# Patient Record
Sex: FEMALE | Race: WHITE | NOT HISPANIC OR LATINO | Employment: OTHER | URBAN - METROPOLITAN AREA
[De-identification: names, ages, dates, MRNs, and addresses within clinical notes are randomized per-mention and may not be internally consistent; named-entity substitution may affect disease eponyms.]

---

## 2017-01-09 ENCOUNTER — THERAPY VISIT (OUTPATIENT)
Dept: PHYSICAL THERAPY | Facility: CLINIC | Age: 72
End: 2017-01-09
Payer: COMMERCIAL

## 2017-01-09 DIAGNOSIS — R26.81 UNSTEADY GAIT: ICD-10-CM

## 2017-01-09 DIAGNOSIS — Z96.651 STATUS POST TOTAL RIGHT KNEE REPLACEMENT: ICD-10-CM

## 2017-01-09 DIAGNOSIS — M25.561 ACUTE PAIN OF RIGHT KNEE: Primary | ICD-10-CM

## 2017-01-09 PROCEDURE — 97110 THERAPEUTIC EXERCISES: CPT | Mod: GP | Performed by: PHYSICAL THERAPIST

## 2017-01-09 PROCEDURE — 97530 THERAPEUTIC ACTIVITIES: CPT | Mod: GP | Performed by: PHYSICAL THERAPIST

## 2017-01-09 PROCEDURE — 97140 MANUAL THERAPY 1/> REGIONS: CPT | Mod: GP | Performed by: PHYSICAL THERAPIST

## 2017-01-13 ENCOUNTER — THERAPY VISIT (OUTPATIENT)
Dept: PHYSICAL THERAPY | Facility: CLINIC | Age: 72
End: 2017-01-13
Payer: COMMERCIAL

## 2017-01-13 DIAGNOSIS — M25.561 ACUTE PAIN OF RIGHT KNEE: Primary | ICD-10-CM

## 2017-01-13 DIAGNOSIS — R26.81 UNSTEADY GAIT: ICD-10-CM

## 2017-01-13 DIAGNOSIS — Z96.651 STATUS POST TOTAL RIGHT KNEE REPLACEMENT: ICD-10-CM

## 2017-01-13 PROCEDURE — 97140 MANUAL THERAPY 1/> REGIONS: CPT | Mod: GP | Performed by: PHYSICAL THERAPIST

## 2017-01-13 PROCEDURE — 97110 THERAPEUTIC EXERCISES: CPT | Mod: GP | Performed by: PHYSICAL THERAPIST

## 2017-01-16 ENCOUNTER — THERAPY VISIT (OUTPATIENT)
Dept: PHYSICAL THERAPY | Facility: CLINIC | Age: 72
End: 2017-01-16
Payer: COMMERCIAL

## 2017-01-16 DIAGNOSIS — R26.81 UNSTEADY GAIT: ICD-10-CM

## 2017-01-16 DIAGNOSIS — Z96.651 STATUS POST TOTAL RIGHT KNEE REPLACEMENT: ICD-10-CM

## 2017-01-16 DIAGNOSIS — M25.561 ACUTE PAIN OF RIGHT KNEE: Primary | ICD-10-CM

## 2017-01-16 PROCEDURE — 97110 THERAPEUTIC EXERCISES: CPT | Mod: GP | Performed by: PHYSICAL THERAPIST

## 2017-01-16 PROCEDURE — 97530 THERAPEUTIC ACTIVITIES: CPT | Mod: GP | Performed by: PHYSICAL THERAPIST

## 2017-01-16 PROCEDURE — 97140 MANUAL THERAPY 1/> REGIONS: CPT | Mod: GP | Performed by: PHYSICAL THERAPIST

## 2017-01-27 ENCOUNTER — THERAPY VISIT (OUTPATIENT)
Dept: PHYSICAL THERAPY | Facility: CLINIC | Age: 72
End: 2017-01-27
Payer: COMMERCIAL

## 2017-01-27 DIAGNOSIS — M25.561 ACUTE PAIN OF RIGHT KNEE: Primary | ICD-10-CM

## 2017-01-27 DIAGNOSIS — R26.81 UNSTEADY GAIT: ICD-10-CM

## 2017-01-27 DIAGNOSIS — Z96.651 STATUS POST TOTAL RIGHT KNEE REPLACEMENT: ICD-10-CM

## 2017-01-27 PROCEDURE — 97140 MANUAL THERAPY 1/> REGIONS: CPT | Mod: GP | Performed by: PHYSICAL THERAPIST

## 2017-01-27 PROCEDURE — 97110 THERAPEUTIC EXERCISES: CPT | Mod: GP | Performed by: PHYSICAL THERAPIST

## 2017-02-06 ENCOUNTER — OFFICE VISIT (OUTPATIENT)
Dept: DERMATOLOGY | Facility: CLINIC | Age: 72
End: 2017-02-06

## 2017-02-06 VITALS — SYSTOLIC BLOOD PRESSURE: 127 MMHG | HEART RATE: 65 BPM | DIASTOLIC BLOOD PRESSURE: 74 MMHG

## 2017-02-06 DIAGNOSIS — L57.8 DIFFUSE PHOTODAMAGE OF SKIN: ICD-10-CM

## 2017-02-06 DIAGNOSIS — R21 RASH: ICD-10-CM

## 2017-02-06 DIAGNOSIS — L57.0 AK (ACTINIC KERATOSIS): Primary | ICD-10-CM

## 2017-02-06 ASSESSMENT — PAIN SCALES - GENERAL: PAINLEVEL: NO PAIN (0)

## 2017-02-06 NOTE — NURSING NOTE
Dermatology Rooming Note    Rafaela Rock's goals for this visit include:   Chief Complaint   Patient presents with     Skin Check     Rafaela is here for a skin check, no areas of concern at this time.     Nury Bucio LPN

## 2017-02-06 NOTE — LETTER
2/6/2017     RE: Rafaela Rock  [address] [place] [id number]     Dear Colleague,    Thank you for referring your patient, Rafaela Rock, to the [facility] DERMATOLOGY at [facility]. Please see a copy of my visit note below.    [facility] Dermatology Clinic Note     Dermatology Problem List:  1. Actinic keratosis: multiple on upper chest. S/p cryo 2/6/17; will discuss 5-FU tx in at next appt.  2. Solar lentigines: referred to Dr. Baron.      Encounter Date: Feb 6, 2017   Chief Complaint   Patient presents with     Skin Check     Rafaela is here for a skin check, no areas of concern at this time.     History of Present Illness:   This is a 71 year old female who presents to dermatology clinic for a follow up for actinic keratoses. The patient was last seen by Dr. Pippa Alonso on 02/29/16 when 3 biopsies indicated 2 AKs and an inflamed seborrheic keratosis. The patient presents today for a routine skin check. She is unaware of any concerning lesions. She denies any itchy, painful, bleeding, non-healing, or changing spots. She does report a many year hx of a chronically recurring rash. She did not have any rash present on examination today. The rash usually occurs at night and is extremely pruritic, causing the patient to scratch to the point of bleeding at times. It will be localized, but the affected area often changes. The rash will appear red and bumpy. She applies triamcinolone 0.1% with good result. The patient acknowledges a remote hx of asthma. The patient was also wondering if allergy testing would be a good idea.    Additionally, the pt has grown more concerned over the appearance of the tan macules and patches scattered across her arms and legs. She had these spots for a while, but they have been multiplying over the last few years. They seem to become much more distinct during the summer with more sun exposure. The patient is  aware of the benign nature of these spots but was wondering if there was any treatment that might improve their appearance.    Past Medical History:   Allergies as of    Diagnosis     Hyperlipidemia with target LDL less than 130     OA (osteoarthritis)     Rosacea     Impaired fasting glucose     POSTERIOR VITREOUS DETACHMENT, os     Advanced directives, counseling/discussion     Cataract     S/P total knee arthroplasty - left     Vitamin D deficiency     AK (actinic keratosis)     Xerosis of skin     Intertrigo     Inflamed seborrheic keratosis     Primary osteoarthritis of right knee     Status post total left knee replacement     Obesity, Class II, BMI 35-39.9, with comorbidity (H)     Acute pain of right knee     Status post total right knee replacement     Unsteady gait     Social History:   The patient is a nonsmoker.   Family History:    The patient reports family history of SCC in her son.    Medications:   Current Outpatient Prescriptions   Medication Sig Dispense Refill     metroNIDAZOLE (METROGEL) 1 % gel Apply topically once or twice daily daily to the face for rosacea. 60 g 1     triamcinolone (KENALOG) 0.1 % ointment Apply topically 2 times daily To affected areas on the arms, legs, chest, and back a needed for itch. 80 g 5     ammonium lactate (LAC-HYDRIN) 12 % cream Apply topically 2 times daily as needed for dry skin Apply to entire body daily after showers. 454 g 3     Allergies as of 02/06/2017     (No Known Allergies)     Review of Systems:   Patient reports otherwise feeling well and denies any other skin concerns.    Physical exam:   General: Well appearing female in no acute distress alert and oriented to time, person, place, and situation.  Examination: Examination of the face, head, neck, chest, abdomen, back, both arms, both hands/digits/nails and both feet is performed.  -Waxy stuck on tan to brown papules on back.  -Significant tan colored macules and patches scattered across the arms and  legs bilaterally  -Multiple erythematous, gritty feeling patches on upper chest    Assessment and Plan:   1. Actinic keratoses: multiple AKs on patient's chest. Effudex tx was recommended, but the patient will be travelling to [facility] in a few weeks and elected to hold off on this treatment until later.    Cryotherapy procedure note (performed by faculty): After verbal consent and discussion of risks and benefits including but no limited to dyspigmentation/scar, blister, infection, recurrence,5 lesions were treated with 1-2mm freeze border for 2 cycles with liquid nitrogen. Post cryotherapy instructions were provided.     Discuss possible Effudex tx at f/u with Dr. Baron  2. Solar lentigines: patient remarks that the tan macules/patches on her arms and legs have been increasing over the last few years. They become especially striking during the summer with sun exposure. The benign nature of these lesions was discussed with the patient. She is bothered by their appearance and is interested in pursing cosmetic treatments. We provided information about possible laser treatments, possibly fraxl,  and referred her to Dr. Violeta Baron.    Follow-up with Dr. Baron in 2-3 months.  3. Possible atopic dermatitis: patient reports a many year hx of a chronically recurring rash. She did not have any rash present on examination today. The rash usually occurs at night and is extremely pruritic, causing the patient to scratch to the point of bleeding at times. It will be localized, but the affected area often changes. The rash will appear red and bumpy. She applies triamcinolone 0.1% with good result.    Instructed patient to document rash in the future with photo    Scribe Disclosure:   This note was scribed by Andrew Blanco, MS4, on behalf of Dr. Ann Marie Miles MD.    I agree with the PFSH and ROS as completed by the Medical Student. The remainder of the encounter was performed by me and scribed by the Medical Student.  The scribed note accurately reflects my personal services and the medical decisions made by me. The cryotherapy procedure was primarily done by me.      Ann Marie Miles MD  Professor and Chair  Department of Dermatology  [facility]    Cc: Dr. Violeta Baron

## 2017-02-06 NOTE — PATIENT INSTRUCTIONS
-We would like you to follow up with Dr. Violeta Baron. She will discuss possible cosmetic treatments (including the laser) for your face and the darker spots on your arms and legs. She also will follow-up with you for the precancerous spots on your upper chest.  -The next time you have the itchy rash, please try to take a picture of it and email it to [email or web address]. [email or web address]  -Please bring the product you are using on your face to your appointment with Dr. Baron.    Cryotherapy    What is it?    Use of a very cold liquid, such as liquid nitrogen, to freeze and destroy abnormal skin cells that need to be removed    What should I expect?    Tenderness and redness    A small blister that might grow and fill with dark purple blood. There may be crusting.    More than one treatment may be needed if the lesions do not go away.    How do I care for the treated area?    Gently wash the area with your hands when bathing.    Use a thin layer of Vaseline to help with healing. You may use a Band-Aid.     The area should heal within 7-10 days and may leave behind a pink or lighter color.     Do not use an antibiotic or Neosporin ointment.     You may take acetaminophen (Tylenol) for pain.     Call your Doctor if you have:    Severe pain    Signs of infection (warmth, redness, cloudy yellow drainage, and or a bad smell)    Questions or concerns    Who should I call with questions?       [facility]: [phone]       [facility]: [phone]       For urgent needs outside of business hours call the [facility] at [phone]        and ask for the dermatology resident on call

## 2017-02-06 NOTE — PROGRESS NOTES
[facility] Dermatology Clinic Note     Dermatology Problem List:  1. Actinic keratosis: multiple on upper chest. S/p cryo 2/6/17; will discuss 5-FU tx in at next appt.  2. Solar lentigines: referred to Dr. Baron.      Encounter Date: Feb 6, 2017   Chief Complaint   Patient presents with     Skin Check     Rafaela is here for a skin check, no areas of concern at this time.     History of Present Illness:   This is a 71 year old female who presents to dermatology clinic for a follow up for actinic keratoses. The patient was last seen by Dr. Pippa Alonso on 02/29/16 when 3 biopsies indicated 2 AKs and an inflamed seborrheic keratosis. The patient presents today for a routine skin check. She is unaware of any concerning lesions. She denies any itchy, painful, bleeding, non-healing, or changing spots. She does report a many year hx of a chronically recurring rash. She did not have any rash present on examination today. The rash usually occurs at night and is extremely pruritic, causing the patient to scratch to the point of bleeding at times. It will be localized, but the affected area often changes. The rash will appear red and bumpy. She applies triamcinolone 0.1% with good result. The patient acknowledges a remote hx of asthma. The patient was also wondering if allergy testing would be a good idea.    Additionally, the pt has grown more concerned over the appearance of the tan macules and patches scattered across her arms and legs. She had these spots for a while, but they have been multiplying over the last few years. They seem to become much more distinct during the summer with more sun exposure. The patient is aware of the benign nature of these spots but was wondering if there was any treatment that might improve their appearance.    Past Medical History:   Allergies as of    Diagnosis     Hyperlipidemia with target LDL less than 130     OA (osteoarthritis)     Rosacea     Impaired  fasting glucose     POSTERIOR VITREOUS DETACHMENT, os     Advanced directives, counseling/discussion     Cataract     S/P total knee arthroplasty - left     Vitamin D deficiency     AK (actinic keratosis)     Xerosis of skin     Intertrigo     Inflamed seborrheic keratosis     Primary osteoarthritis of right knee     Status post total left knee replacement     Obesity, Class II, BMI 35-39.9, with comorbidity (H)     Acute pain of right knee     Status post total right knee replacement     Unsteady gait     Social History:   The patient is a nonsmoker.   Family History:    The patient reports family history of SCC in her son.    Medications:   Current Outpatient Prescriptions   Medication Sig Dispense Refill     metroNIDAZOLE (METROGEL) 1 % gel Apply topically once or twice daily daily to the face for rosacea. 60 g 1     triamcinolone (KENALOG) 0.1 % ointment Apply topically 2 times daily To affected areas on the arms, legs, chest, and back a needed for itch. 80 g 5     ammonium lactate (LAC-HYDRIN) 12 % cream Apply topically 2 times daily as needed for dry skin Apply to entire body daily after showers. 454 g 3     Allergies as of 02/06/2017     (No Known Allergies)     Review of Systems:   Patient reports otherwise feeling well and denies any other skin concerns.    Physical exam:   General: Well appearing female in no acute distress alert and oriented to time, person, place, and situation.  Examination: Examination of the face, head, neck, chest, abdomen, back, both arms, both hands/digits/nails and both feet is performed.  -Waxy stuck on tan to brown papules on back.  -Significant tan colored macules and patches scattered across the arms and legs bilaterally  -Multiple erythematous, gritty feeling patches on upper chest    Assessment and Plan:   1. Actinic keratoses: multiple AKs on patient's chest. Effudex tx was recommended, but the patient will be travelling to [facility] in a few weeks and elected to  hold off on this treatment until later.    Cryotherapy procedure note (performed by faculty): After verbal consent and discussion of risks and benefits including but no limited to dyspigmentation/scar, blister, infection, recurrence,5 lesions were treated with 1-2mm freeze border for 2 cycles with liquid nitrogen. Post cryotherapy instructions were provided.     Discuss possible Effudex tx at f/u with Dr. Baron  2. Solar lentigines: patient remarks that the tan macules/patches on her arms and legs have been increasing over the last few years. They become especially striking during the summer with sun exposure. The benign nature of these lesions was discussed with the patient. She is bothered by their appearance and is interested in pursing cosmetic treatments. We provided information about possible laser treatments, possibly fraxl,  and referred her to Dr. Violeta Baron.    Follow-up with Dr. Baron in 2-3 months.  3. Possible atopic dermatitis: patient reports a many year hx of a chronically recurring rash. She did not have any rash present on examination today. The rash usually occurs at night and is extremely pruritic, causing the patient to scratch to the point of bleeding at times. It will be localized, but the affected area often changes. The rash will appear red and bumpy. She applies triamcinolone 0.1% with good result.    Instructed patient to document rash in the future with photo    Scribe Disclosure:   This note was scribed by Andrew Blanco, MS4, on behalf of Dr. Ann Marie Miles MD.    I agree with the PFSH and ROS as completed by the Medical Student. The remainder of the encounter was performed by me and scribed by the Medical Student. The scribed note accurately reflects my personal services and the medical decisions made by me. The cryotherapy procedure was primarily done by me.      Ann Marie Miles MD  Professor and Chair  Department of Dermatology  [facility]    Cc: Dr. Violeta Baron

## 2017-02-06 NOTE — MR AVS SNAPSHOT
After Visit Summary   2/6/2017    Rafaela Rock    MRN: [id number]           Patient Information     Date Of Birth          1945        Visit Information        Provider Department      2/6/2017 2:05 PM Ann Marie Miles MD [facility] Dermatology        Care Instructions    -We would like you to follow up with Dr. Violeta Baron. She will discuss possible cosmetic treatments (including the laser) for your face and the darker spots on your arms and legs. She also will follow-up with you for the precancerous spots on your upper chest.  -The next time you have the itchy rash, please try to take a picture of it and email it to [email or web address]  -Please bring the product you are using on your face to your appointment with Dr. Baron.          Follow-ups after your visit        Your next 10 appointments already scheduled     Feb 10, 2017 10:10 AM   NIKKI Extremity with Juma Paredes PT   [facility] [place] [facility] ([facility])    [address] [facility] [address] [phone]              Who to contact     Please call your clinic at [phone] to:    Ask questions about your health    Make or cancel appointments    Discuss your medicines    Learn about your test results    Speak to your doctor   If you have compliments or concerns about an experience at your clinic, or if you wish to file a complaint, please contact [facility] Patient Relations at [phone] or email us at [email or web address]         Additional Information About Your Visit        MyChart Information     [employer] gives you secure access to your electronic health record. If you see a primary care provider, you can also send messages to your care team and make appointments. If you have questions, please call your primary care clinic.  If you do not have a primary care provider, please call [phone] and they will assist  you.      [employer] is an electronic gateway that provides easy, online access to your medical records. With [employer], you can request a clinic appointment, read your test results, renew a prescription or communicate with your care team.     To access your existing account, please contact your [facility] or call [phone] for assistance.        Care EveryWhere ID     This is your Care EveryWhere ID. This could be used by other organizations to access your [facility] [place] medical records  [id number]        Your Vitals Were     Pulse                   65            Blood Pressure from Last 3 Encounters:   02/06/17 127/74   08/08/16 127/77   06/18/15 116/72    Weight from Last 3 Encounters:   11/14/16 88.724 kg (195 lb 9.6 oz)   10/06/16 93.441 kg (206 lb)   09/01/16 93.441 kg (206 lb)              Today, you had the following     No orders found for display       Primary Care Provider Office Phone # Fax #    Josue Shah [phone]       [facility] [address] [place], [place]. \[facility].\"" [id number]        Thank you!     Thank you for choosing [facility] DERMATOLOGY  for your care. Our goal is always to provide you with excellent care. Hearing back from our patients is one way we can continue to improve our services. Please take a few minutes to complete the written survey that you may receive in the mail after your visit with us. Thank you!             Your Updated Medication List - Protect others around you: Learn how to safely use, store and throw away your medicines at www.disposemymeds.org.          This list is accurate as of: 2/6/17  2:49 PM.  Always use your most recent med list.                   Brand Name Dispense Instructions for use    ammonium lactate 12 % cream    LAC-HYDRIN    454 g    Apply topically 2 times daily as needed for dry skin Apply to entire body daily after showers.       metroNIDAZOLE 1 % gel    METROGEL    60 g    Apply topically  once or twice daily daily to the face for rosacea.       triamcinolone 0.1 % ointment    KENALOG    80 g    Apply topically 2 times daily To affected areas on the arms, legs, chest, and back a needed for itch.

## 2017-02-10 ENCOUNTER — THERAPY VISIT (OUTPATIENT)
Dept: PHYSICAL THERAPY | Facility: CLINIC | Age: 72
End: 2017-02-10
Payer: COMMERCIAL

## 2017-02-10 DIAGNOSIS — R26.81 UNSTEADY GAIT: ICD-10-CM

## 2017-02-10 DIAGNOSIS — Z96.651 STATUS POST TOTAL RIGHT KNEE REPLACEMENT: ICD-10-CM

## 2017-02-10 DIAGNOSIS — M25.561 ACUTE PAIN OF RIGHT KNEE: Primary | ICD-10-CM

## 2017-02-10 PROCEDURE — 97110 THERAPEUTIC EXERCISES: CPT | Mod: GP | Performed by: PHYSICAL THERAPIST

## 2017-02-10 PROCEDURE — 97140 MANUAL THERAPY 1/> REGIONS: CPT | Mod: GP | Performed by: PHYSICAL THERAPIST

## 2017-02-10 PROCEDURE — 97530 THERAPEUTIC ACTIVITIES: CPT | Mod: GP | Performed by: PHYSICAL THERAPIST

## 2017-02-15 DIAGNOSIS — L85.3 XEROSIS OF SKIN: ICD-10-CM

## 2017-02-15 RX ORDER — AMMONIUM LACTATE 12 G/100G
CREAM TOPICAL 2 TIMES DAILY PRN
Qty: 454 G | Refills: 3 | Status: SHIPPED | OUTPATIENT
Start: 2017-02-15 | End: 2017-06-15

## 2017-02-15 NOTE — TELEPHONE ENCOUNTER
Last visit 2-6-17  No upcoming appts        Assessment and Plan:   1. Actinic keratoses: multiple AKs on patient's chest. Effudex tx was recommended, but the patient will be travelling to [facility] in a few weeks and elected to hold off on this treatment until later.    Cryotherapy procedure note (performed by faculty): After verbal consent and discussion of risks and benefits including but no limited to dyspigmentation/scar, blister, infection, recurrence,5 was(were) treated with 1-2mm freeze border for 2 cycles with liquid nitrogen. Post cryotherapy instructions were provided.     Discuss possible Effudex tx at f/u with Dr. Baron  2. Solar lentigines: patient remarks that the tan macules/patches on her arms and legs have been increasing over the last few years. They become especially striking during the summer with sun exposure. The benign nature of these lesions was discussed with the patient. She is bothered by their appearance and is interested in pursing cosmetic treatments. We provided information about possible laser treatments and referred her to Dr. Violeta Baron.    Follow-up with Dr. Baron in 2-3 months.  3. Possible atopic dermatitis: patient reports a many year hx of a chronically recurring rash. She did not have any rash present on examination today. The rash usually occurs at night and extremely pruritic, causing the patient to itch to the point of bleeding at times. It will be localized, but the affected area often changes. The rash will appear red and bumpy. She applies triamcinolone 0.1% with good result.    Instructed patient to document rash in the future with photo     Scribe Disclosure:   This note was scribed by Andrew Blanco, MS4, on behalf of Dr. Ann Marie Miles MD.

## 2017-02-15 NOTE — TELEPHONE ENCOUNTER
Received refill request for Amlactin as the resident on call. Reviewed patient's chart and attached communication. Patient last seen 2/2017 for possible atopic dermatitis; prior to that, was prescribed Amlactin by Dr. Jeromy Leger for xerosis. After reviewing the assessment and plan from last visit, the refill request was accepted.      Sharif Campos MD   Dermatology Resident

## 2017-02-24 ENCOUNTER — THERAPY VISIT (OUTPATIENT)
Dept: PHYSICAL THERAPY | Facility: CLINIC | Age: 72
End: 2017-02-24
Payer: COMMERCIAL

## 2017-02-24 DIAGNOSIS — Z96.651 STATUS POST TOTAL RIGHT KNEE REPLACEMENT: ICD-10-CM

## 2017-02-24 DIAGNOSIS — R26.81 UNSTEADY GAIT: ICD-10-CM

## 2017-02-24 DIAGNOSIS — M25.561 ACUTE PAIN OF RIGHT KNEE: ICD-10-CM

## 2017-02-24 PROCEDURE — 97140 MANUAL THERAPY 1/> REGIONS: CPT | Mod: GP | Performed by: PHYSICAL THERAPIST

## 2017-02-24 PROCEDURE — 97530 THERAPEUTIC ACTIVITIES: CPT | Mod: GP | Performed by: PHYSICAL THERAPIST

## 2017-02-24 PROCEDURE — 97110 THERAPEUTIC EXERCISES: CPT | Mod: GP | Performed by: PHYSICAL THERAPIST

## 2017-02-24 ASSESSMENT — ACTIVITIES OF DAILY LIVING (ADL)
GIVING WAY, BUCKLING OR SHIFTING OF KNEE: I DO NOT HAVE THE SYMPTOM
GO DOWN STAIRS: ACTIVITY IS NOT DIFFICULT
KNEE_ACTIVITY_OF_DAILY_LIVING_SUM: 65
LIMPING: I DO NOT HAVE THE SYMPTOM
RAW_SCORE: 70
GO UP STAIRS: ACTIVITY IS NOT DIFFICULT
KNEE_ACTIVITY_OF_DAILY_LIVING_SCORE: 100
STAND: ACTIVITY IS NOT DIFFICULT
WEAKNESS: I DO NOT HAVE THE SYMPTOM
WALK: ACTIVITY IS NOT DIFFICULT
SQUAT: ACTIVITY IS NOT DIFFICULT
RISE FROM A CHAIR: ACTIVITY IS NOT DIFFICULT
SWELLING: I DO NOT HAVE THE SYMPTOM
PAIN: I DO NOT HAVE THE SYMPTOM
HOW_WOULD_YOU_RATE_THE_OVERALL_FUNCTION_OF_YOUR_KNEE_DURING_YOUR_USUAL_DAILY_ACTIVITIES?: NORMAL
AS_A_RESULT_OF_YOUR_KNEE_INJURY,_HOW_WOULD_YOU_RATE_YOUR_CURRENT_LEVEL_OF_DAILY_ACTIVITY?: NORMAL
SIT WITH YOUR KNEE BENT: ACTIVITY IS NOT DIFFICULT
STIFFNESS: I DO NOT HAVE THE SYMPTOM
KNEEL ON THE FRONT OF YOUR KNEE: NOT ANSWERED
HOW_WOULD_YOU_RATE_THE_CURRENT_FUNCTION_OF_YOUR_KNEE_DURING_YOUR_USUAL_DAILY_ACTIVITIES_ON_A_SCALE_FROM_0_TO_100_WITH_100_BEING_YOUR_LEVEL_OF_KNEE_FUNCTION_PRIOR_TO_YOUR_INJURY_AND_0_BEING_THE_INABILITY_TO_PERFORM_ANY_OF_YOUR_USUAL_DAILY_ACTIVITIES?: 95

## 2017-02-24 NOTE — MR AVS SNAPSHOT
After Visit Summary   2/24/2017    Rafaela Rock    MRN: [id number]           Patient Information     Date Of Birth          1945        Visit Information        Provider Department      2/24/2017 10:10 AM Juma Pareeds, PT [facility]        Today's Diagnoses     Acute pain of right knee        Status post total right knee replacement        Unsteady gait           Follow-ups after your visit        Who to contact     If you have questions or need follow up information about today's clinic visit or your schedule please contact [facility] directly at [phone].  Normal or non-critical lab and imaging results will be communicated to you by SMB Suitehart, letter or phone within 4 business days after the clinic has received the results. If you do not hear from us within 7 days, please contact the clinic through NightOwlt or phone. If you have a critical or abnormal lab result, we will notify you by phone as soon as possible.  Submit refill requests through [email or web address] or call your pharmacy and they will forward the refill request to us. Please allow 3 business days for your refill to be completed.          Additional Information About Your Visit        MyChart Information     [email or web address] gives you secure access to your electronic health record. If you see a primary care provider, you can also send messages to your care team and make appointments. If you have questions, please call your primary care clinic.  If you do not have a primary care provider, please call [phone] and they will assist you.        Care EveryWhere ID     This is your Care EveryWhere ID. This could be used by other organizations to access your [facility] medical records  [id number]         Blood Pressure from Last 3 Encounters:   02/06/17 127/74   08/08/16 127/77   06/18/15 116/72    Weight from Last 3 Encounters:   11/14/16 88.7 kg (195 lb 9.6 oz)    10/06/16 93.4 kg (206 lb)   09/01/16 93.4 kg (206 lb)              We Performed the Following     NIKKI PROGRESS NOTES REPORT     MANUAL THER TECH,1+REGIONS,EA 15 MIN     THERAPEUTIC ACTIVITIES     THERAPEUTIC EXERCISES        Primary Care Provider Office Phone # Fax #    Josue Shah [phone]       [facility] [address] [facility] [address]        Thank you!     Thank you for choosing [facility]  for your care. Our goal is always to provide you with excellent care. Hearing back from our patients is one way we can continue to improve our services. Please take a few minutes to complete the written survey that you may receive in the mail after your visit with us. Thank you!             Your Updated Medication List - Protect others around you: Learn how to safely use, store and throw away your medicines at www.disposemymeds.org.          This list is accurate as of: 2/24/17 10:47 AM.  Always use your most recent med list.                   Brand Name Dispense Instructions for use    ammonium lactate 12 % cream    LAC-HYDRIN    454 g    Apply topically 2 times daily as needed for dry skin Apply to entire body daily after showers.       metroNIDAZOLE 1 % gel    METROGEL    60 g    Apply topically once or twice daily daily to the face for rosacea.       triamcinolone 0.1 % ointment    KENALOG    80 g    Apply topically 2 times daily To affected areas on the arms, legs, chest, and back a needed for itch.

## 2017-02-24 NOTE — PROGRESS NOTES
Subjective:    HPI       Knee Activity of Daily Living Score: 100            Objective:    System    Physical Exam    General     ROS    Assessment/Plan:      DISCHARGE REPORT    Progress reporting period is from 11/11/2016 to 2/24/2017.       SUBJECTIVE  Subjective changes noted by patient:   No issues    Current pain level is  0/10.     Previous pain level was   1/10.   Changes in function:  Yes (See Goal flowsheet attached for changes in current functional level)  Adverse reaction to treatment or activity: None    OBJECTIVE  AROM 0 - 115  PROM 0 - 118   Normal gait   Strength continues to improve      ASSESSMENT/PLAN  Updated problem list and treatment plan: Diagnosis 1: R TKA Pain - manual therapy, self management, education, directional preference exercise and home program   Decreased ROM/flexibility - manual therapy and therapeutic exercise   Decreased strength - therapeutic exercise and therapeutic activities   Impaired gait - gait training   Decreased function - therapeutic activities  STG/LTGs have been met or progress has been made towards goals:  Yes (See Goal flow sheet completed today.)  Assessment of Progress: The patient's condition is improving.  The patient has met all of their long term goals.  Self Management Plans:  Patient has been instructed in a home treatment program.    Rafaela continues to require the following intervention to meet STG and LTG's:  PT intervention is no longer required to meet STG/LTG.    Recommendations:  This patient is ready to be discharged from therapy and continue their home treatment program.    Please refer to the daily flowsheet for treatment today, total treatment time and time spent performing 1:1 timed codes.

## 2017-03-26 PROBLEM — R21 RASH: Status: ACTIVE | Noted: 2017-03-26

## 2017-03-26 PROBLEM — L57.8 DIFFUSE PHOTODAMAGE OF SKIN: Status: ACTIVE | Noted: 2017-03-26

## 2017-04-03 ENCOUNTER — TELEPHONE (OUTPATIENT)
Dept: ORTHOPEDICS | Facility: CLINIC | Age: 72
End: 2017-04-03

## 2017-04-28 ENCOUNTER — TELEPHONE (OUTPATIENT)
Dept: OPHTHALMOLOGY | Facility: CLINIC | Age: 72
End: 2017-04-28

## 2017-05-25 ENCOUNTER — OFFICE VISIT (OUTPATIENT)
Dept: PODIATRY | Facility: CLINIC | Age: 72
End: 2017-05-25
Payer: COMMERCIAL

## 2017-05-25 VITALS
HEIGHT: 64 IN | BODY MASS INDEX: 35.03 KG/M2 | SYSTOLIC BLOOD PRESSURE: 130 MMHG | WEIGHT: 205.2 LBS | RESPIRATION RATE: 16 BRPM | DIASTOLIC BLOOD PRESSURE: 87 MMHG

## 2017-05-25 DIAGNOSIS — M72.2 PLANTAR FASCIITIS: Primary | ICD-10-CM

## 2017-05-25 PROCEDURE — 99213 OFFICE O/P EST LOW 20 MIN: CPT | Performed by: PODIATRIST

## 2017-05-25 ASSESSMENT — PAIN SCALES - GENERAL: PAINLEVEL: MILD PAIN (3)

## 2017-05-25 NOTE — MR AVS SNAPSHOT
After Visit Summary   5/25/2017    Rafaela Rock    MRN: [id number]           Patient Information     Date Of Birth          1945        Visit Information        Provider Department      5/25/2017 4:30 PM Cristino Rivera, MARGARITO [facility]        Today's Diagnoses     Plantar fasciitis    -  1      Care Instructions    Weight management plan: Patient was referred to their PCP to discuss a diet and exercise plan.            Follow-ups after your visit        Your next 10 appointments already scheduled     Jul 10, 2017  9:00 AM CDT   RETURN GENERAL with Meseret Jay MD   Eye Clinic ([facility])    [address]  [address] [facility]  [address] [address]  [facility] [id number] [phone]            Jul 14, 2017  9:00 AM CDT   [facility]    [id number] [place] [address] [phone]              Who to contact     If you have questions or need follow up information about today's clinic visit or your schedule please contact [facility] directly at [phone].  Normal or non-critical lab and imaging results will be communicated to you by Routezillahart, letter or phone within 4 business days after the clinic has received the results. If you do not hear from us within 7 days, please contact the clinic through Routezillahart or phone. If you have a critical or abnormal lab result, we will notify you by phone as soon as possible.  Submit refill requests through MoveinBlue or call your pharmacy and they will forward the refill request to us. Please allow 3 business days for your refill to be completed.          Additional Information About Your Visit        MyChart Information     MoveinBlue gives you secure access to your electronic health record. If you see a primary care provider, you can also send messages to your care team and make appointments. If you have questions, please  "call your primary care clinic.  If you do not have a primary care provider, please call [phone] and they will assist you.        Care EveryWhere ID     This is your Care EveryWhere ID. This could be used by other organizations to access your [facility] medical records  [id number]        Your Vitals Were     Respirations Height BMI (Body Mass Index)             16 1.613 m (5' 3.5\") 35.78 kg/m2          Blood Pressure from Last 3 Encounters:   05/25/17 130/87   02/06/17 127/74   08/08/16 127/77    Weight from Last 3 Encounters:   05/25/17 93.1 kg (205 lb 3.2 oz)   11/14/16 88.7 kg (195 lb 9.6 oz)   10/06/16 93.4 kg (206 lb)              Today, you had the following     No orders found for display       Primary Care Provider Office Phone # Fax #    Josue Shah [phone]       [address] [facility] [address]        Thank you!     Thank you for choosing [facility]  for your care. Our goal is always to provide you with excellent care. Hearing back from our patients is one way we can continue to improve our services. Please take a few minutes to complete the written survey that you may receive in the mail after your visit with us. Thank you!             Your Updated Medication List - Protect others around you: Learn how to safely use, store and throw away your medicines at www.disposemymeds.org.          This list is accurate as of: 5/25/17  4:45 PM.  Always use your most recent med list.                   Brand Name Dispense Instructions for use    ammonium lactate 12 % cream    LAC-HYDRIN    454 g    Apply topically 2 times daily as needed for dry skin Apply to entire body daily after showers.       metroNIDAZOLE 1 % gel    METROGEL    60 g    Apply topically once or twice daily daily to the face for rosacea.       triamcinolone 0.1 % ointment    KENALOG    80 g    Apply topically 2 times daily To affected areas on the arms, legs, chest, " and back a needed for itch.

## 2017-05-25 NOTE — PROGRESS NOTES
Subjective:    Patient is seen today with a 10 day(s) hx of right heel pain.  Points to the plantarmedial calcaneal tubercle.  Most painful upon rising in a.m. or after prolonged sitting.  Aggravated by activity and relieved by rest.  Has tried changing shoewear, OTC inserts, without much success.  Standing 50 % of time at work.  Wears slippers around the house.    ROS:  denies numbness, denies edema, denies weakness. Denies ecchymosis or trauma.     No Known Allergies    Current Outpatient Prescriptions   Medication Sig Dispense Refill     ammonium lactate (LAC-HYDRIN) 12 % cream Apply topically 2 times daily as needed for dry skin Apply to entire body daily after showers. 454 g 3     metroNIDAZOLE (METROGEL) 1 % gel Apply topically once or twice daily daily to the face for rosacea. 60 g 1     triamcinolone (KENALOG) 0.1 % ointment Apply topically 2 times daily To affected areas on the arms, legs, chest, and back a needed for itch. 80 g 5       Patient Active Problem List   Diagnosis     Hyperlipidemia with target LDL less than 130     OA (osteoarthritis)     Rosacea     Impaired fasting glucose     POSTERIOR VITREOUS DETACHMENT, os     Advanced directives, counseling/discussion     Cataract     S/P total knee arthroplasty - left     Vitamin D deficiency     AK (actinic keratosis)     Xerosis of skin     Intertrigo     Inflamed seborrheic keratosis     Primary osteoarthritis of right knee     Status post total left knee replacement     Obesity, Class II, BMI 35-39.9, with comorbidity (H)     Rash     Diffuse photodamage of skin       Past Medical History:   Diagnosis Date     AK (actinic keratosis) 2/2/2016     Allergic rhinitis      Asthma     mild intermittent     Hyperlipidemia LDL goal < 130      Impaired fasting glucose      Lyme disease      Nonsenile cataract      OA (osteoarthritis)      Obesity      PONV (postoperative nausea and vomiting)      Rosacea      Vitamin D deficiency 10/12       Past Surgical  "History:   Procedure Laterality Date     C NONSPECIFIC PROCEDURE  9/19/2003    Left knee scope/OA,MMT     C TOTAL KNEE ARTHROPLASTY Left 4/2/2008    Left     C TOTAL KNEE ARTHROPLASTY Right 8/17/16    DML     CATARACT IOL, RT/LT  12/27/2013    Toric- RE     CATARACT IOL, RT/LT  12/05/2013    LE     COLONOSCOPY       COLONOSCOPY  5-5-14    Return for Colonoscopy in 1 yrs     PHACOEMULSIFICATION CLEAR CORNEA WITH TORIC INTRAOCULAR LENS IMPLANT  12/6/2013    Procedure: PHACOEMULSIFICATION CLEAR CORNEA WITH TORIC INTRAOCULAR LENS IMPLANT;  LEFT PHACOEMULSIFICATION CLEAR CORNEA WITH TORIC INTRAOCULAR LENS IMPLANT  ;  Surgeon: Otoniel Schuster MD;  Location:  [facility]     PHACOEMULSIFICATION CLEAR CORNEA WITH TORIC INTRAOCULAR LENS IMPLANT  12/27/2013    Procedure: PHACOEMULSIFICATION CLEAR CORNEA WITH TORIC INTRAOCULAR LENS IMPLANT;  RIGHT PHACOEMULSIFICATION CLEAR CORNEA WITH TORIC INTRAOCULAR LENS IMPLANT  ;  Surgeon: Otoniel Schuster MD;  Location:  [facility]     TONSILLECTOMY  Age 11       Family History   Problem Relation Age of Onset     CANCER Father      lung smoker     Skin Cancer Son      skin cancer     Glaucoma No family hx of      Macular Degeneration No family hx of      Eye Surgery No family hx of      Retinal detachment No family hx of        Social History   Substance Use Topics     Smoking status: Never Smoker     Smokeless tobacco: Never Used     Alcohol use Yes      Comment: weekends         Objective:    /87  Resp 16  Ht 1.613 m (5' 3.5\")  Wt 93.1 kg (205 lb 3.2 oz)  BMI 35.78 kg/m2.  Good historian.  A&O X3 Pulses are palpable +2/4 DP & PT bilateral.  CRT < 3 seconds X 10 digits.  Bilateral ankle edema or varicosities noted.  Sensation to light touch is intact.  Achilles reflex 2/4 bilaterally.  Skin has normal texture and turgor bilaterally.  Normal arch with weightbearing.   Muscle strength and ROM is within normal limits.  Negative tinel's sign.  No side to side compression pain of the " calcaneus.  Pain upon palpation to the right plantarmedial  of the calcaneus.  No erythema, edema ecchymosis, or subcutaneous masses noted.  No pain on palpation or stressing any tendons.      Assessment:  Plantar Fasciitis right foot     Plan:  Discussed etiology and treatment options with the patient.  The potential causes and nature of plantar fasciitis were discussed with the patient.  We reviewed the natural history/prognosis of the condition and risks if left untreated.  These include chronic pain, other sites of pain due to gait changes, and potential plantar fascial rupture.      We discussed possible causes of the condition as it relates to the patients specific situation.      Conservative treatment options were reviewed:  appropriate shoes, avoidance of barefoot walking, inserts/orthoses, stretching, ice, massage, immobilization and NSAIDs.     We also reviewed the options of injection therapy and surgery.  However, it was made clear that surgery is only considered when conservative therapy fails.  The risks and benefits of injection therapy, and surgery were discussed.  Dispensed handout.       After thorough discussion and answering all questions, the patient elected to modifying activities, supportive shoes, ice, stretching, and not going barefoot.  Good house shoes at all times and I made recommendations.  Decrease activities.  Dispensed heel cup.    RTC in 4 weeks if not better otherwise prn.     Cristino Rivera DPM, FACFAS

## 2017-05-25 NOTE — NURSING NOTE
"Chief Complaint   Patient presents with     Foot Pain     Right heel pain. Onset about a week and a half. Pain is at the heel and travel down the medial side of her foot. Heel is very tender. Out of no where she will get real sharp pains in it.        Initial /87  Resp 16  Ht 1.613 m (5' 3.5\")  Wt 93.1 kg (205 lb 3.2 oz)  BMI 35.78 kg/m2 Estimated body mass index is 35.78 kg/(m^2) as calculated from the following:    Height as of this encounter: 1.613 m (5' 3.5\").    Weight as of this encounter: 93.1 kg (205 lb 3.2 oz).  Medication Reconciliation: tasha Alvarez Certified Medical Assistant    "

## 2017-05-25 NOTE — LETTER
5/25/2017       RE: Rafaela Rock  [address] [place] [id number]           Dear Colleague,    Thank you for referring your patient, Rafaela Rock, to the [facility]. Please see a copy of my visit note below.    Subjective:    Patient is seen today with a 10 day(s) hx of right heel pain.  Points to the plantarmedial calcaneal tubercle.  Most painful upon rising in a.m. or after prolonged sitting.  Aggravated by activity and relieved by rest.  Has tried changing shoewear, OTC inserts, without much success.  Standing 50 % of time at work.  Wears slippers around the house.    ROS:  denies numbness, denies edema, denies weakness. Denies ecchymosis or trauma.     No Known Allergies    Current Outpatient Prescriptions   Medication Sig Dispense Refill     ammonium lactate (LAC-HYDRIN) 12 % cream Apply topically 2 times daily as needed for dry skin Apply to entire body daily after showers. 454 g 3     metroNIDAZOLE (METROGEL) 1 % gel Apply topically once or twice daily daily to the face for rosacea. 60 g 1     triamcinolone (KENALOG) 0.1 % ointment Apply topically 2 times daily To affected areas on the arms, legs, chest, and back a needed for itch. 80 g 5       Patient Active Problem List   Diagnosis     Hyperlipidemia with target LDL less than 130     OA (osteoarthritis)     Rosacea     Impaired fasting glucose     POSTERIOR VITREOUS DETACHMENT, os     Advanced directives, counseling/discussion     Cataract     S/P total knee arthroplasty - left     Vitamin D deficiency     AK (actinic keratosis)     Xerosis of skin     Intertrigo     Inflamed seborrheic keratosis     Primary osteoarthritis of right knee     Status post total left knee replacement     Obesity, Class II, BMI 35-39.9, with comorbidity (H)     Rash     Diffuse photodamage of skin       Past Medical History:   Diagnosis Date     AK (actinic keratosis) 2/2/2016     Allergic rhinitis      Asthma     mild intermittent      "Hyperlipidemia LDL goal < 130      Impaired fasting glucose      Lyme disease      Nonsenile cataract      OA (osteoarthritis)      Obesity      PONV (postoperative nausea and vomiting)      Rosacea      Vitamin D deficiency 10/12       Past Surgical History:   Procedure Laterality Date     C NONSPECIFIC PROCEDURE  9/19/2003    Left knee scope/OA,MMT     C TOTAL KNEE ARTHROPLASTY Left 4/2/2008    Left     C TOTAL KNEE ARTHROPLASTY Right 8/17/16    DML     CATARACT IOL, RT/LT  12/27/2013    Toric- RE     CATARACT IOL, RT/LT  12/05/2013    LE     COLONOSCOPY       COLONOSCOPY  5-5-14    Return for Colonoscopy in 1 yrs     PHACOEMULSIFICATION CLEAR CORNEA WITH TORIC INTRAOCULAR LENS IMPLANT  12/6/2013    Procedure: PHACOEMULSIFICATION CLEAR CORNEA WITH TORIC INTRAOCULAR LENS IMPLANT;  LEFT PHACOEMULSIFICATION CLEAR CORNEA WITH TORIC INTRAOCULAR LENS IMPLANT  ;  Surgeon: Otoniel Schuster MD;  Location: [facility]     PHACOEMULSIFICATION CLEAR CORNEA WITH TORIC INTRAOCULAR LENS IMPLANT  12/27/2013    Procedure: PHACOEMULSIFICATION CLEAR CORNEA WITH TORIC INTRAOCULAR LENS IMPLANT;  RIGHT PHACOEMULSIFICATION CLEAR CORNEA WITH TORIC INTRAOCULAR LENS IMPLANT  ;  Surgeon: Otoniel Schuster MD;  Location:  [facility]     TONSILLECTOMY  Age 11       Family History   Problem Relation Age of Onset     CANCER Father      lung smoker     Skin Cancer Son      skin cancer     Glaucoma No family hx of      Macular Degeneration No family hx of      Eye Surgery No family hx of      Retinal detachment No family hx of        Social History   Substance Use Topics     Smoking status: Never Smoker     Smokeless tobacco: Never Used     Alcohol use Yes      Comment: weekends         Objective:    /87  Resp 16  Ht 1.613 m (5' 3.5\")  Wt 93.1 kg (205 lb 3.2 oz)  BMI 35.78 kg/m2.  Good historian.  A&O X3 Pulses are palpable +2/4 DP & PT bilateral.  CRT < 3 seconds X 10 digits.  Bilateral ankle edema or varicosities noted.  Sensation to " light touch is intact.  Achilles reflex 2/4 bilaterally.  Skin has normal texture and turgor bilaterally.  Normal arch with weightbearing.   Muscle strength and ROM is within normal limits.  Negative tinel's sign.  No side to side compression pain of the calcaneus.  Pain upon palpation to the right plantarmedial  of the calcaneus.  No erythema, edema ecchymosis, or subcutaneous masses noted.  No pain on palpation or stressing any tendons.      Assessment:  Plantar Fasciitis right foot     Plan:  Discussed etiology and treatment options with the patient.  The potential causes and nature of plantar fasciitis were discussed with the patient.  We reviewed the natural history/prognosis of the condition and risks if left untreated.  These include chronic pain, other sites of pain due to gait changes, and potential plantar fascial rupture.      We discussed possible causes of the condition as it relates to the patients specific situation.      Conservative treatment options were reviewed:  appropriate shoes, avoidance of barefoot walking, inserts/orthoses, stretching, ice, massage, immobilization and NSAIDs.     We also reviewed the options of injection therapy and surgery.  However, it was made clear that surgery is only considered when conservative therapy fails.  The risks and benefits of injection therapy, and surgery were discussed.  Dispensed handout.       After thorough discussion and answering all questions, the patient elected to modifying activities, supportive shoes, ice, stretching, and not going barefoot.  Good house shoes at all times and I made recommendations.  Decrease activities.  Dispensed heel cup.    RTC in 4 weeks if not better otherwise prn.     Cristino Rivera DPM, FACFAS      Again, thank you for allowing me to participate in the care of your patient.        Sincerely,              Cristino Rivera DPM

## 2017-06-05 ENCOUNTER — TELEPHONE (OUTPATIENT)
Dept: FAMILY MEDICINE | Facility: CLINIC | Age: 72
End: 2017-06-05

## 2017-06-05 NOTE — TELEPHONE ENCOUNTER
Reason for call:question  Patient called regarding (reason for call): Patient would like to know when is best to have her cortisone shot and if she can walk after? She is going on a trip the day after.   Additional comments:Please call patient to discuss further    Phone number to reach patient:  Cell number on file:    Telephone Information:   Mobile [phone]       Best Time:  anytime    Can we leave a detailed message on this number?  YES

## 2017-06-05 NOTE — TELEPHONE ENCOUNTER
Pt called back to clarify that she did not get an injection on the last visit. She would like to get one at her next visit. She will will discuss the options with the MD at the time of the visit.    Shalini Reese MA

## 2017-06-05 NOTE — TELEPHONE ENCOUNTER
LMTC(left mess to call)  At last Office visit an Injection was given 5/25/17. She would like a another one before her next trip. Rafaela has appt on 6/15/17 With Dr. Rivera. Can she get another one at that visit and can she walk afterwards.    Shalini Reese MA

## 2017-06-15 ENCOUNTER — OFFICE VISIT (OUTPATIENT)
Dept: PODIATRY | Facility: CLINIC | Age: 72
End: 2017-06-15
Payer: COMMERCIAL

## 2017-06-15 VITALS — OXYGEN SATURATION: 97 % | WEIGHT: 205 LBS | BODY MASS INDEX: 35.74 KG/M2 | HEART RATE: 88 BPM

## 2017-06-15 DIAGNOSIS — M72.2 PLANTAR FASCIITIS: Primary | ICD-10-CM

## 2017-06-15 PROCEDURE — 20550 NJX 1 TENDON SHEATH/LIGAMENT: CPT | Mod: RT | Performed by: PODIATRIST

## 2017-06-15 PROCEDURE — 29540 STRAPPING ANKLE &/FOOT: CPT | Mod: 59 | Performed by: PODIATRIST

## 2017-06-15 NOTE — PATIENT INSTRUCTIONS
We wish you continued good healing. If you have any questions or concerns, please do not hesitate to contact us at [phone]      Please remember to call and schedule a follow up appointment if one was recommended at your earliest convenience.   PODIATRY CLINIC HOURS  TELEPHONE NUMBER    Dr. Cristino Rivera D.P.M [facility]    Clinics:  [facility]        Shalini Reese MA  Medical Assistant  Tuesday 1PM-6PM  [facility]  Wednesday 7AM-2PM  [place]  Thursday 10AM-6PM  [place] Friday 7AM-345PM  [place]  Specialty schedulers:   [phone] to make an appointment with any Specialty Provider.        Urgent Care locations:    [facility] Monday-Friday 5 pm - 9 pm. Saturday-Sunday 9 am -5pm    Monday-Friday 11 am - 9 pm Saturday 9 am - 5 pm     Monday-Sunday 12 noon-8PM [phone]     If you need a medication refill, please contact us you may need lab work and/or a follow up visit prior to your refill (i.e. Antifungal medications).    Flickrt (secure e-mail communication and access to your chart) to send a message or to make an appointment.    If MRI needed please call [facility] at [phone]        Weight management plan: Patient was referred to their PCP to discuss a diet and exercise plan.

## 2017-06-15 NOTE — MR AVS SNAPSHOT
After Visit Summary   6/15/2017    Rafaela Rock    MRN: [id number]           Patient Information     Date Of Birth          1945        Visit Information        Provider Department      6/15/2017 10:45 AM Cristino Rivera, DPDAISY [facility]        Today's Diagnoses     Plantar fasciitis    -  1      Care Instructions    We wish you continued good healing. If you have any questions or concerns, please do not hesitate to contact us at [phone]      Please remember to call and schedule a follow up appointment if one was recommended at your earliest convenience.   PODIATRY CLINIC HOURS  TELEPHONE NUMBER    Dr. Cristino Rivera DJeriPYASSINE FAC FAS    Clinics:  [facility]        Shalini Reese MA  Medical Assistant  Tuesday 1PM-6PM  [place]  Wednesday 7AM-2PM  [place]  Thursday 10AM-6PM  [place] Friday 7AM-345PM  [place]  Specialty schedulers:   [phone] to make an appointment with any Specialty Provider.        Urgent Care locations:    [facility] Monday-Friday 5 pm - 9 pm. Saturday-Sunday 9 am -5pm    Monday-Friday 11 am - 9 pm Saturday 9 am - 5 pm     Monday-Sunday 12 noon-8PM [phone]     If you need a medication refill, please contact us you may need lab work and/or a follow up visit prior to your refill (i.e. Antifungal medications).    PlaySayhart (secure e-mail communication and access to your chart) to send a message or to make an appointment.    If MRI needed please call [facility] at [phone]        Weight management plan: Patient was referred to their PCP to discuss a diet and exercise plan.            Follow-ups after your visit        Your next 10 appointments already scheduled     Jul 10, 2017  9:00 AM CDT   RETURN GENERAL with Meseret Jay MD   Eye Clinic ([facility])    Lauro Painter  [address]  [facility]  [address]  [facility] [address]   [phone]            Jul 14, 2017  9:00 AM CDT   New Visit with Violeta Baron MD   [facility] ([facility]    [address] [address] [place] [address] [phone]              Who to contact     If you have questions or need follow up information about today's clinic visit or your schedule please contact [facility] directly at [phone].  Normal or non-critical lab and imaging results will be communicated to you by RMDMgrouphart, letter or phone within 4 business days after the clinic has received the results. If you do not hear from us within 7 days, please contact the clinic through RMDMgrouphart or phone. If you have a critical or abnormal lab result, we will notify you by phone as soon as possible.  Submit refill requests through Bidgely or call your pharmacy and they will forward the refill request to us. Please allow 3 business days for your refill to be completed.          Additional Information About Your Visit        MyChart Information     Bidgely gives you secure access to your electronic health record. If you see a primary care provider, you can also send messages to your care team and make appointments. If you have questions, please call your primary care clinic.  If you do not have a primary care provider, please call [phone] and they will assist you.        Care EveryWhere ID     This is your Care EveryWhere ID. This could be used by other organizations to access your [place] [facility] medical records  [id number]        Your Vitals Were     Pulse Pulse Oximetry BMI (Body Mass Index)             88 97% 35.74 kg/m2          Blood Pressure from Last 3 Encounters:   05/25/17 130/87   02/06/17 127/74   08/08/16 127/77    Weight from Last 3 Encounters:   06/15/17 93 kg (205 lb)   05/25/17 93.1 kg (205 lb 3.2 oz)   11/14/16 88.7 kg (195 lb 9.6 oz)              We Performed the Following     INJECTION  SINGLE TENDON SHEATH/LIGAMENT     STRAPPING ANKLE/FOOT     TRIAMCINOLONE ACET INJ NOS        Primary Care Provider Office Phone # Fax #    Josue Shah [phone]       [facility] [address]  [facility] [address]        Thank you!     Thank you for choosing [facility]  for your care. Our goal is always to provide you with excellent care. Hearing back from our patients is one way we can continue to improve our services. Please take a few minutes to complete the written survey that you may receive in the mail after your visit with us. Thank you!             Your Updated Medication List - Protect others around you: Learn how to safely use, store and throw away your medicines at www.disposemymeds.org.          This list is accurate as of: 6/15/17 11:15 AM.  Always use your most recent med list.                   Brand Name Dispense Instructions for use    metroNIDAZOLE 1 % gel    METROGEL    60 g    Apply topically once or twice daily daily to the face for rosacea.       triamcinolone 0.1 % ointment    KENALOG    80 g    Apply topically 2 times daily To affected areas on the arms, legs, chest, and back a needed for itch.

## 2017-06-15 NOTE — LETTER
6/15/2017       RE: Rafaela Rock  [address] [place] [address]           Dear Colleague,    Thank you for referring your patient, Rafaela Rock, to the [facility]. Please see a copy of my visit note below.    Subjective:    5/25/17  Patient is seen today with a 10 day(s) hx of right heel pain.  Points to the plantarmedial calcaneal tubercle.  Most painful upon rising in a.m. or after prolonged sitting.  Aggravated by activity and relieved by rest.  Has tried changing shoewear, OTC inserts, without much success.  Standing 50 % of time at work.  Wears slippers around the house.    6/15/17  Patient only slightly better.  Has new shoes and wearing these all the time.  Going rafting and hiking in 2 days and states she would like an injection.      ROS:  denies numbness, denies edema, ecchymosis      No Known Allergies    Current Outpatient Prescriptions   Medication Sig Dispense Refill     metroNIDAZOLE (METROGEL) 1 % gel Apply topically once or twice daily daily to the face for rosacea. 60 g 1     triamcinolone (KENALOG) 0.1 % ointment Apply topically 2 times daily To affected areas on the arms, legs, chest, and back a needed for itch. 80 g 5       Patient Active Problem List   Diagnosis     Hyperlipidemia with target LDL less than 130     OA (osteoarthritis)     Rosacea     Impaired fasting glucose     POSTERIOR VITREOUS DETACHMENT, os     Advanced directives, counseling/discussion     Cataract     S/P total knee arthroplasty - left     Vitamin D deficiency     AK (actinic keratosis)     Xerosis of skin     Intertrigo     Inflamed seborrheic keratosis     Primary osteoarthritis of right knee     Status post total left knee replacement     Obesity, Class II, BMI 35-39.9, with comorbidity (H)     Rash     Diffuse photodamage of skin       Past Medical History:   Diagnosis Date     AK (actinic keratosis) 2/2/2016     Allergic rhinitis      Asthma     mild intermittent     Hyperlipidemia  LDL goal < 130      Impaired fasting glucose      Lyme disease      Nonsenile cataract      OA (osteoarthritis)      Obesity      PONV (postoperative nausea and vomiting)      Rosacea      Vitamin D deficiency 10/12       Past Surgical History:   Procedure Laterality Date     C NONSPECIFIC PROCEDURE  9/19/2003    Left knee scope/OA,MMT     C TOTAL KNEE ARTHROPLASTY Left 4/2/2008    Left     C TOTAL KNEE ARTHROPLASTY Right 8/17/16    DML     CATARACT IOL, RT/LT  12/27/2013    Toric- RE     CATARACT IOL, RT/LT  12/05/2013    LE     COLONOSCOPY       COLONOSCOPY  5-5-14    Return for Colonoscopy in 1 yrs     PHACOEMULSIFICATION CLEAR CORNEA WITH TORIC INTRAOCULAR LENS IMPLANT  12/6/2013    Procedure: PHACOEMULSIFICATION CLEAR CORNEA WITH TORIC INTRAOCULAR LENS IMPLANT;  LEFT PHACOEMULSIFICATION CLEAR CORNEA WITH TORIC INTRAOCULAR LENS IMPLANT  ;  Surgeon: Otoniel Schuster MD;  Location: [facility]     PHACOEMULSIFICATION CLEAR CORNEA WITH TORIC INTRAOCULAR LENS IMPLANT  12/27/2013    Procedure: PHACOEMULSIFICATION CLEAR CORNEA WITH TORIC INTRAOCULAR LENS IMPLANT;  RIGHT PHACOEMULSIFICATION CLEAR CORNEA WITH TORIC INTRAOCULAR LENS IMPLANT  ;  Surgeon: Otoniel Schuster MD;  [facility]     TONSILLECTOMY  Age 11       Family History   Problem Relation Age of Onset     CANCER Father      lung smoker     Skin Cancer Son      skin cancer     Glaucoma No family hx of      Macular Degeneration No family hx of      Eye Surgery No family hx of      Retinal detachment No family hx of        Social History   Substance Use Topics     Smoking status: Never Smoker     Smokeless tobacco: Never Used     Alcohol use Yes      Comment: weekends         Objective:    Pulse 88  Wt 93 kg (205 lb)  SpO2 97%  BMI 35.74 kg/m2.  Good historian.  A&O X3 Pulses are palpable +2/4 DP & PT bilateral.  CRT < 3 seconds X 10 digits.  Bilateral ankle edema or varicosities noted.  Sensation to light touch is intact.  Achilles reflex 2/4  bilaterally.  Skin has normal texture and turgor bilaterally.  Normal arch with weightbearing.   Muscle strength and ROM is within normal limits.  Negative tinel's sign.  No side to side compression pain of the calcaneus.  Pain upon palpation to the right plantarmedial  of the calcaneus.  No erythema, edema ecchymosis, or subcutaneous masses noted.  No pain on palpation or stressing any tendons.      Assessment:  Plantar Fasciitis right foot     Plan:  Discussed etiology and treatment options with the patient.  The potential causes and nature of plantar fasciitis were discussed with the patient.  We reviewed the natural history/prognosis of the condition and risks if left untreated.  These include chronic pain, other sites of pain due to gait changes, and potential plantar fascial rupture.      We discussed possible causes of the condition as it relates to the patients specific situation.      Conservative treatment options were reviewed:  appropriate shoes, avoidance of barefoot walking, inserts/orthoses, stretching, ice, massage, immobilization and NSAIDs.     We also reviewed the options of injection therapy and surgery.  However, it was made clear that surgery is only considered when conservative therapy fails.  The risks and benefits of injection therapy, and surgery were discussed.  Dispensed handout.       After thorough discussion and answering all questions, the patient elected to have injection and tape.   Discussed etiology and treatment options with the pt.  Risks complications, and efficacy of cortisone injection discussed.  Patient understands there is a risk of plantar fascial rupture.  After written consent, sterile prep, injected heel with 1 cc 1% lidocaine plain and 1 cc kenalog 10 mg.  Lo dye strap applied to foot.  Had long discussion on activities to avoid so as not to rupture plantar fascia while rafting and hiking.  Return to clinic prn.        Cristino Rivera DPM, FACFAS      Again, thank  you for allowing me to participate in the care of your patient.        Sincerely,              Cristino Rivera DPM

## 2017-06-15 NOTE — PROGRESS NOTES
Subjective:    5/25/17  Patient is seen today with a 10 day(s) hx of right heel pain.  Points to the plantarmedial calcaneal tubercle.  Most painful upon rising in a.m. or after prolonged sitting.  Aggravated by activity and relieved by rest.  Has tried changing shoewear, OTC inserts, without much success.  Standing 50 % of time at work.  Wears slippers around the house.    6/15/17  Patient only slightly better.  Has new shoes and wearing these all the time.  Going rafting and hiking in 2 days and states she would like an injection.      ROS:  denies numbness, denies edema, ecchymosis      No Known Allergies    Current Outpatient Prescriptions   Medication Sig Dispense Refill     metroNIDAZOLE (METROGEL) 1 % gel Apply topically once or twice daily daily to the face for rosacea. 60 g 1     triamcinolone (KENALOG) 0.1 % ointment Apply topically 2 times daily To affected areas on the arms, legs, chest, and back a needed for itch. 80 g 5       Patient Active Problem List   Diagnosis     Hyperlipidemia with target LDL less than 130     OA (osteoarthritis)     Rosacea     Impaired fasting glucose     POSTERIOR VITREOUS DETACHMENT, os     Advanced directives, counseling/discussion     Cataract     S/P total knee arthroplasty - left     Vitamin D deficiency     AK (actinic keratosis)     Xerosis of skin     Intertrigo     Inflamed seborrheic keratosis     Primary osteoarthritis of right knee     Status post total left knee replacement     Obesity, Class II, BMI 35-39.9, with comorbidity (H)     Rash     Diffuse photodamage of skin       Past Medical History:   Diagnosis Date     AK (actinic keratosis) 2/2/2016     Allergic rhinitis      Asthma     mild intermittent     Hyperlipidemia LDL goal < 130      Impaired fasting glucose      Lyme disease      Nonsenile cataract      OA (osteoarthritis)      Obesity      PONV (postoperative nausea and vomiting)      Rosacea      Vitamin D deficiency 10/12       Past Surgical History:    Procedure Laterality Date     C NONSPECIFIC PROCEDURE  9/19/2003    Left knee scope/OA,MMT     C TOTAL KNEE ARTHROPLASTY Left 4/2/2008    Left     C TOTAL KNEE ARTHROPLASTY Right 8/17/16    DML     CATARACT IOL, RT/LT  12/27/2013    Toric- RE     CATARACT IOL, RT/LT  12/05/2013    LE     COLONOSCOPY       COLONOSCOPY  5-5-14    Return for Colonoscopy in 1 yrs     PHACOEMULSIFICATION CLEAR CORNEA WITH TORIC INTRAOCULAR LENS IMPLANT  12/6/2013    Procedure: PHACOEMULSIFICATION CLEAR CORNEA WITH TORIC INTRAOCULAR LENS IMPLANT;  LEFT PHACOEMULSIFICATION CLEAR CORNEA WITH TORIC INTRAOCULAR LENS IMPLANT  ;  Surgeon: Otoniel Schuster MD;  [facility]     PHACOEMULSIFICATION CLEAR CORNEA WITH TORIC INTRAOCULAR LENS IMPLANT  12/27/2013    Procedure: PHACOEMULSIFICATION CLEAR CORNEA WITH TORIC INTRAOCULAR LENS IMPLANT;  RIGHT PHACOEMULSIFICATION CLEAR CORNEA WITH TORIC INTRAOCULAR LENS IMPLANT  ;  Surgeon: Otoniel Schuster MD;  [facility]     TONSILLECTOMY  Age 11       Family History   Problem Relation Age of Onset     CANCER Father      lung smoker     Skin Cancer Son      skin cancer     Glaucoma No family hx of      Macular Degeneration No family hx of      Eye Surgery No family hx of      Retinal detachment No family hx of        Social History   Substance Use Topics     Smoking status: Never Smoker     Smokeless tobacco: Never Used     Alcohol use Yes      Comment: weekends         Objective:    Pulse 88  Wt 93 kg (205 lb)  SpO2 97%  BMI 35.74 kg/m2.  Good historian.  A&O X3 Pulses are palpable +2/4 DP & PT bilateral.  CRT < 3 seconds X 10 digits.  Bilateral ankle edema or varicosities noted.  Sensation to light touch is intact.  Achilles reflex 2/4 bilaterally.  Skin has normal texture and turgor bilaterally.  Normal arch with weightbearing.   Muscle strength and ROM is within normal limits.  Negative tinel's sign.  No side to side compression pain of the calcaneus.  Pain upon palpation to the  right plantarmedial  of the calcaneus.  No erythema, edema ecchymosis, or subcutaneous masses noted.  No pain on palpation or stressing any tendons.      Assessment:  Plantar Fasciitis right foot     Plan:  Discussed etiology and treatment options with the patient.  The potential causes and nature of plantar fasciitis were discussed with the patient.  We reviewed the natural history/prognosis of the condition and risks if left untreated.  These include chronic pain, other sites of pain due to gait changes, and potential plantar fascial rupture.      We discussed possible causes of the condition as it relates to the patients specific situation.      Conservative treatment options were reviewed:  appropriate shoes, avoidance of barefoot walking, inserts/orthoses, stretching, ice, massage, immobilization and NSAIDs.     We also reviewed the options of injection therapy and surgery.  However, it was made clear that surgery is only considered when conservative therapy fails.  The risks and benefits of injection therapy, and surgery were discussed.  Dispensed handout.       After thorough discussion and answering all questions, the patient elected to have injection and tape.   Discussed etiology and treatment options with the pt.  Risks complications, and efficacy of cortisone injection discussed.  Patient understands there is a risk of plantar fascial rupture.  After written consent, sterile prep, injected heel with 1 cc 1% lidocaine plain and 1 cc kenalog 10 mg.  Lo dye strap applied to foot.  Had long discussion on activities to avoid so as not to rupture plantar fascia while rafting and hiking.  Return to clinic prn.        Cristino Rivera DPM, FACFAS

## 2017-07-10 ENCOUNTER — OFFICE VISIT (OUTPATIENT)
Dept: OPHTHALMOLOGY | Facility: CLINIC | Age: 72
End: 2017-07-10
Attending: OPHTHALMOLOGY
Payer: MEDICARE

## 2017-07-10 DIAGNOSIS — H52.4 PRESBYOPIA: ICD-10-CM

## 2017-07-10 DIAGNOSIS — H52.203 MYOPIC ASTIGMATISM OF BOTH EYES: ICD-10-CM

## 2017-07-10 DIAGNOSIS — H52.13 MYOPIC ASTIGMATISM OF BOTH EYES: ICD-10-CM

## 2017-07-10 DIAGNOSIS — Z96.1 PSEUDOPHAKIA, BOTH EYES: Primary | ICD-10-CM

## 2017-07-10 PROCEDURE — 92015 DETERMINE REFRACTIVE STATE: CPT | Mod: ZF

## 2017-07-10 PROCEDURE — 99213 OFFICE O/P EST LOW 20 MIN: CPT | Mod: ZF

## 2017-07-10 ASSESSMENT — REFRACTION_WEARINGRX
OS_ADD: +2.75
OD_CYLINDER: +1.25
OS_AXIS: 043
SPECS_TYPE: PAL
OS_CYLINDER: +1.50
OS_ADD: +2.75
OS_AXIS: 040
OS_SPHERE: -0.50
OD_ADD: +2.75
OS_CYLINDER: +1.25
OD_AXIS: 142
OD_SPHERE: -0.50
OD_CYLINDER: +1.00
OD_ADD: +2.75
SPECS_TYPE: PAL
OD_SPHERE: -0.50
OD_AXIS: 145
OS_SPHERE: -0.50

## 2017-07-10 ASSESSMENT — REFRACTION_MANIFEST
OS_SPHERE: -1.00
OS_AXIS: 035
OD_SPHERE: -1.00
OD_ADD: +2.75
OS_ADD: +2.75
OD_CYLINDER: +1.25
OS_CYLINDER: +1.50
OD_AXIS: 150

## 2017-07-10 ASSESSMENT — VISUAL ACUITY
CORRECTION_TYPE: GLASSES
OD_CC+: -1
METHOD: SNELLEN - LINEAR
OD_CC: 20/20
OS_CC+: -1
OS_CC: 20/20

## 2017-07-10 ASSESSMENT — SLIT LAMP EXAM - LIDS
COMMENTS: NORMAL
COMMENTS: NORMAL

## 2017-07-10 ASSESSMENT — TONOMETRY
IOP_METHOD: TONOPEN
OD_IOP_MMHG: 11
OS_IOP_MMHG: 12

## 2017-07-10 ASSESSMENT — CONF VISUAL FIELD
METHOD: COUNTING FINGERS
OS_NORMAL: 1
OD_NORMAL: 1

## 2017-07-10 ASSESSMENT — CUP TO DISC RATIO
OD_RATIO: 0.3
OS_RATIO: 0.3

## 2017-07-10 ASSESSMENT — EXTERNAL EXAM - RIGHT EYE: OD_EXAM: NORMAL

## 2017-07-10 ASSESSMENT — EXTERNAL EXAM - LEFT EYE: OS_EXAM: NORMAL

## 2017-07-10 NOTE — MR AVS SNAPSHOT
After Visit Summary   7/10/2017    Rafaela Rock    MRN: [id number]           Patient Information     Date Of Birth          1945        Visit Information        Provider Department      7/10/2017 9:00 AM Meseret Jay MD [facility]        Today's Diagnoses     Pseudophakia, both eyes    -  1    Myopic astigmatism of both eyes        Presbyopia           Follow-ups after your visit        Follow-up notes from your care team     Return in about 1 year (around 7/10/2018).      Your next 10 appointments already scheduled     Jul 14, 2017  9:00 AM CDT   New Visit with Violeta Baron MD   [facility]    [address] [place] [address] [phone]              Who to contact     Please call your clinic at [phone] to:    Ask questions about your health    Make or cancel appointments    Discuss your medicines    Learn about your test results    Speak to your doctor   If you have compliments or concerns about an experience at your clinic, or if you wish to file a complaint, please contact [facility] Patient Relations at [phone] or email us at [email or web address]         Additional Information About Your Visit        MyChart Information     Friend.lyt gives you secure access to your electronic health record. If you see a primary care provider, you can also send messages to your care team and make appointments. If you have questions, please call your primary care clinic.  If you do not have a primary care provider, please call [phone] and they will assist you.      Contract Live is an electronic gateway that provides easy, online access to your medical records. With Contract Live, you can request a clinic appointment, read your test results, renew a prescription or communicate with your care team.     To access your existing account, please contact your [facility] or call  [phone] for assistance.        Care EveryWhere ID     This is your Care EveryWhere ID. This could be used by other organizations to access your [place] medical records  [id number]         Blood Pressure from Last 3 Encounters:   05/25/17 130/87   02/06/17 127/74   08/08/16 127/77    Weight from Last 3 Encounters:   06/15/17 93 kg (205 lb)   05/25/17 93.1 kg (205 lb 3.2 oz)   11/14/16 88.7 kg (195 lb 9.6 oz)              Today, you had the following     No orders found for display       Primary Care Provider Office Phone # Fax #    Josue Shah [phone]       [facility] [address] [facility] [address]        Equal Access to Services     CALLY CLEARY : Hadii aad siobhan hadasho Soomaali, waaxda luqadaha, qaybta kaalmada adeegyada, waxgabriel fulton hayaishwarya caruso . So [facility] [phone].    ATENCIÓN: Si habla español, tiene a lutz disposición servicios gratuitos de asistencia lingüística. Felicity al [phone].    We comply with applicable federal civil rights laws and [place] laws. We do not discriminate on the basis of race, color, national origin, age, disability sex, sexual orientation or gender identity.            Thank you!     Thank you for choosing [facility]  for your care. Our goal is always to provide you with excellent care. Hearing back from our patients is one way we can continue to improve our services. Please take a few minutes to complete the written survey that you may receive in the mail after your visit with us. Thank you!             Your Updated Medication List - Protect others around you: Learn how to safely use, store and throw away your medicines at www.disposemymeds.org.          This list is accurate as of: 7/10/17  9:41 AM.  Always use your most recent med list.                   Brand Name Dispense Instructions for use Diagnosis    metroNIDAZOLE 1 % gel    METROGEL    60 g    Apply topically once or twice daily daily to the  face for rosacea.    Rosacea       triamcinolone 0.1 % ointment    KENALOG    80 g    Apply topically 2 times daily To affected areas on the arms, legs, chest, and back a needed for itch.    Nummular eczema

## 2017-07-10 NOTE — NURSING NOTE
Chief Complaints and History of Present Illnesses   Patient presents with     Yearly Exam     HPI    Affected eye(s):  Both   Symptoms:     No decreased vision   No floaters   No flashes   No redness      Duration:  1 year      Do you have eye pain now?:  No      Comments:  Pt here for routine eye exam  Pt states vision seems stable overall - does note some blurring occasionally    Pt uses regular tears ROXANE PARIS 8:56 AM July 10, 2017

## 2017-07-10 NOTE — PROGRESS NOTES
HPI  Rafaela Rock is a 72 year old female here for full eye exam. She feels her vision is overall good and stable in both eyes. She thinks the may need a new glasses Rx. Her sunglasses especially aren't quite as clear. She denies eye pain, redness, discharge. No flashes/floaters    Assessment & Plan      (Z96.1) Pseudophakia, both eyes   Comment: S/p CE/IOL with toric lenses OU 12/2013. Open posterior capsule with clear visual axis OU  Plan: Observe    (H52.203) Myopic astigmatism of both eyes/(H52.4) Presbyopia  Comment: Minimal change in refraction  Plan: Given updated glasses Rx     -----------------------------------------------------------------------------------    Patient disposition:   Return in about 1 year (around 7/10/2018). or sooner as needed.    Teaching statement:  Complete documentation of historical and exam elements from today's encounter can be found in the full encounter summary report (not reduplicated in this progress note). I personally obtained the chief complaint(s) and history of present illness.  I confirmed and edited as necessary the review of systems, past medical/surgical history, family history, social history, and examination findings as documented by others; and I examined the patient myself. I personally reviewed the relevant tests, images, and reports as documented above.     I formulated and edited as necessary the assessment and plan and discussed the findings and management plan with the patient and family.    Meseret Jay MD  Comprehensive Ophthalmology & Ocular Pathology  Department of Ophthalmology and Visual Neurosciences  [email or web address]  Pager [phone]

## 2017-10-13 ENCOUNTER — OFFICE VISIT (OUTPATIENT)
Dept: PODIATRY | Facility: CLINIC | Age: 72
End: 2017-10-13
Payer: COMMERCIAL

## 2017-10-13 ENCOUNTER — RADIANT APPOINTMENT (OUTPATIENT)
Dept: GENERAL RADIOLOGY | Facility: CLINIC | Age: 72
End: 2017-10-13
Attending: PODIATRIST
Payer: COMMERCIAL

## 2017-10-13 VITALS — BODY MASS INDEX: 36.27 KG/M2 | WEIGHT: 208 LBS | HEART RATE: 80 BPM | OXYGEN SATURATION: 96 %

## 2017-10-13 DIAGNOSIS — M19.079 LOCALIZED, PRIMARY OSTEOARTHRITIS OF ANKLE OR FOOT, UNSPECIFIED LATERALITY: ICD-10-CM

## 2017-10-13 DIAGNOSIS — M79.671 PAIN IN BOTH FEET: ICD-10-CM

## 2017-10-13 DIAGNOSIS — M20.5X9 HALLUX LIMITUS, UNSPECIFIED LATERALITY: ICD-10-CM

## 2017-10-13 DIAGNOSIS — M79.671 PAIN IN BOTH FEET: Primary | ICD-10-CM

## 2017-10-13 DIAGNOSIS — M21.6X9 PRONATION DEFORMITY OF FOOT, UNSPECIFIED LATERALITY: ICD-10-CM

## 2017-10-13 DIAGNOSIS — M79.673 PAIN OF FOOT, UNSPECIFIED LATERALITY: ICD-10-CM

## 2017-10-13 DIAGNOSIS — M79.672 PAIN IN BOTH FEET: ICD-10-CM

## 2017-10-13 DIAGNOSIS — M79.672 PAIN IN BOTH FEET: Primary | ICD-10-CM

## 2017-10-13 PROCEDURE — 73630 X-RAY EXAM OF FOOT: CPT | Mod: LT

## 2017-10-13 PROCEDURE — 99214 OFFICE O/P EST MOD 30 MIN: CPT | Performed by: PODIATRIST

## 2017-10-13 NOTE — LETTER
10/13/2017         RE: Rafaela Rock  [address] [place] [id number]        Dear Colleague,    Thank you for referring your patient, Rafaela Rock, to the [facility]. Please see a copy of my visit note below.    S:  Complains of bilateral foot pain.  Points to medial arch and into first mtpjs.  Has had this for number of years and now getting worse.  Describes it as a burning pain.  Aggrevated by activity and relieved by rest.  Getting worse lately.  Standing at work.  Not retired.  Nonsmoker.  Had foot taped recently and no pain when taped.      ROS:  Denies bruising, swelling,weakness, or numbness.     No Known Allergies    Current Outpatient Prescriptions   Medication Sig Dispense Refill     metroNIDAZOLE (METROGEL) 1 % gel Apply topically once or twice daily daily to the face for rosacea. 60 g 1     triamcinolone (KENALOG) 0.1 % ointment Apply topically 2 times daily To affected areas on the arms, legs, chest, and back a needed for itch. 80 g 5       Patient Active Problem List   Diagnosis     Hyperlipidemia with target LDL less than 130     OA (osteoarthritis)     Rosacea     Impaired fasting glucose     POSTERIOR VITREOUS DETACHMENT, os     Advanced directives, counseling/discussion     Cataract     S/P total knee arthroplasty - left     Vitamin D deficiency     AK (actinic keratosis)     Xerosis of skin     Intertrigo     Inflamed seborrheic keratosis     Primary osteoarthritis of right knee     Status post total left knee replacement     Obesity, Class II, BMI 35-39.9, with comorbidity     Rash     Diffuse photodamage of skin       Past Medical History:   Diagnosis Date     AK (actinic keratosis) 2/2/2016     Allergic rhinitis      Asthma     mild intermittent     Hyperlipidemia LDL goal < 130      Impaired fasting glucose      Lyme disease      Nonsenile cataract      OA (osteoarthritis)      Obesity      PONV (postoperative nausea and vomiting)      Rosacea       Vitamin D deficiency 10/12       Past Surgical History:   Procedure Laterality Date     C NONSPECIFIC PROCEDURE  9/19/2003    Left knee scope/OA,MMT     C TOTAL KNEE ARTHROPLASTY Left 4/2/2008    Left     C TOTAL KNEE ARTHROPLASTY Right 8/17/16    DML     CATARACT IOL, RT/LT  12/27/2013    Toric- RE     CATARACT IOL, RT/LT  12/05/2013    LE     COLONOSCOPY       COLONOSCOPY  5-5-14    Return for Colonoscopy in 1 yrs     PHACOEMULSIFICATION CLEAR CORNEA WITH TORIC INTRAOCULAR LENS IMPLANT  12/6/2013    Procedure: PHACOEMULSIFICATION CLEAR CORNEA WITH TORIC INTRAOCULAR LENS IMPLANT;  LEFT PHACOEMULSIFICATION CLEAR CORNEA WITH TORIC INTRAOCULAR LENS IMPLANT  ;  Surgeon: Otoniel Schuster MD;  [facility]     PHACOEMULSIFICATION CLEAR CORNEA WITH TORIC INTRAOCULAR LENS IMPLANT  12/27/2013    Procedure: PHACOEMULSIFICATION CLEAR CORNEA WITH TORIC INTRAOCULAR LENS IMPLANT;  RIGHT PHACOEMULSIFICATION CLEAR CORNEA WITH TORIC INTRAOCULAR LENS IMPLANT  ;  Surgeon: Otoniel Schuster MD;  Location:  [facility]     TONSILLECTOMY  Age 11       Family History   Problem Relation Age of Onset     CANCER Father      lung smoker     Skin Cancer Son      skin cancer     Glaucoma No family hx of      Macular Degeneration No family hx of      Eye Surgery No family hx of      Retinal detachment No family hx of        Social History   Substance Use Topics     Smoking status: Never Smoker     Smokeless tobacco: Never Used     Alcohol use Yes      Comment: weekends         O:  Pulse 80  Wt 94.3 kg (208 lb)  SpO2 96%  BMI 36.27 kg/m2.  Good historian.  A&O X 3.  Pulses DP, PT 2/4 b/l.  CRT < 3 seconds X 10 digits.  Bilateral ankle edema or varicosities noted.  Sensation to light touch intact b/l.  Reflexes 2/4 b/l.  Skin has normal texture and turgor b/l.  No forefoot deformities noted.  MS 5/5 all compartments.  Normal ROM all fore foot and rearfoot joints except first mtpj.  Dorsal flag noted.  No equinus.  With weightbearing  patient has bilateral pronation .  No pain with palpation.  No pain with stressing any muscle compartments.  Good calcaneal iversion with foot flexion.  no erythema edema or ecchymosis or masses noted.    X-ray- fault at NC joint.  Some early tarsal bossing.  Early stage II hallux limitus with HAV bilateral.  First metatarsal elevated.        A:  Pronation causing pain       Tarsal arthritis       Bilateral hallux limitus/HAV    P:  X-ray take of both feet.  Explained she needs good support at all times.  RX for custom orthotics.  Discussed importance of wearing these in a good shoe at all times to prevent future problems.  Discussed good house shoes at all times until resolved.  Avoid activities that bother this.   RETURN TO CLINIC PRN.  25 minutes spent in total time caring for this patient and 16 minutes spent in face to face conversation.      Cristino Rivera DPM, FACFAS           Again, thank you for allowing me to participate in the care of your patient.        Sincerely,        Cristino Rivera DPM

## 2017-10-13 NOTE — MR AVS SNAPSHOT
"              After Visit Summary   10/13/2017    Rafaela Rock    MRN: [id number]           Patient Information     Date Of Birth          1945        Visit Information        Provider Department      10/13/2017 9:45 AM Cristino Rivera, MARGARITO [facility]        Today's Diagnoses     Pain in both feet    -  1    Pronation deformity of foot, unspecified laterality        Pain of foot, unspecified laterality        Localized, primary osteoarthritis of ankle or foot, unspecified laterality        Hallux limitus, unspecified laterality          Care Instructions    Weight management plan: Patient was referred to their PCP to discuss a diet and exercise plan.            Follow-ups after your visit        Your next 10 appointments already scheduled     Oct 23, 2017 10:30 AM CDT   MA SCREENING BILATERAL W/ DIANE with MGMA2, MG MA TECH   [facility] ([facility])    [address] [place] [id number] [phone]           Three-dimensional (3D) mammograms are available at [facility] in [facility], [place], [facility], [facility], and [place]. [facility] locations include [place] [facility] in [place]. Benefits of 3D mammograms include: - Improved rate of cancer detection - Decreases your chance of having to go back for more tests, which means fewer: - \"False-positive\" results (This means that there is an abnormal area but it isn't cancer.) - Invasive testing procedures, such as a biopsy or surgery - Can provide clearer images of the breast if you have dense breast tissue. 3D mammography is an optional exam that anyone can have with a 2D mammogram. It doesn't replace or take the place of a 2D mammogram. 2D mammograms remain an effective screening test for all women.  Not all insurance companies cover the cost of a 3D mammogram. Check with your insurance.              Who to contact     If " you have questions or need follow up information about today's clinic visit or your schedule please contact [facility] directly at [phone].  Normal or non-critical lab and imaging results will be communicated to you by MyChart, letter or phone within 4 business days after the clinic has received the results. If you do not hear from us within 7 days, please contact the clinic through Insplorionhart or phone. If you have a critical or abnormal lab result, we will notify you by phone as soon as possible.  Submit refill requests through twidox or call your pharmacy and they will forward the refill request to us. Please allow 3 business days for your refill to be completed.          Additional Information About Your Visit        InsplorionharApplyKit Information     twidox gives you secure access to your electronic health record. If you see a primary care provider, you can also send messages to your care team and make appointments. If you have questions, please call your primary care clinic.  If you do not have a primary care provider, please call [phone] and they will assist you.        Care EveryWhere ID     This is your Care EveryWhere ID. This could be used by other organizations to access your [facility] medical records  [id number]        Your Vitals Were     Pulse Pulse Oximetry BMI (Body Mass Index)             80 96% 36.27 kg/m2          Blood Pressure from Last 3 Encounters:   05/25/17 130/87   02/06/17 127/74   08/08/16 127/77    Weight from Last 3 Encounters:   10/13/17 94.3 kg (208 lb)   06/15/17 93 kg (205 lb)   05/25/17 93.1 kg (205 lb 3.2 oz)              Today, you had the following     No orders found for display       Primary Care Provider Office Phone # Fax #    Josue Shah [phone]       [address] [facility] [place] [address]        Equal Access to Services     CALLY CLEARY : Priya Mayfield, rocío jovel, yesica nava  kirstin knutsondonta renteria'aan ah. So [facility] [phone].    ATENCIÓN: Si joaquínla kvng, tiene a lutz disposición servicios gratuitos de asistencia lingüística. Felicity al [phone].    We comply with applicable federal civil rights laws and [place] laws. We do not discriminate on the basis of race, color, national origin, age, disability, sex, sexual orientation, or gender identity.            Thank you!     Thank you for choosing [facility]  for your care. Our goal is always to provide you with excellent care. Hearing back from our patients is one way we can continue to improve our services. Please take a few minutes to complete the written survey that you may receive in the mail after your visit with us. Thank you!             Your Updated Medication List - Protect others around you: Learn how to safely use, store and throw away your medicines at www.disposemymeds.org.          This list is accurate as of: 10/13/17 10:32 AM.  Always use your most recent med list.                   Brand Name Dispense Instructions for use Diagnosis    metroNIDAZOLE 1 % gel    METROGEL    60 g    Apply topically once or twice daily daily to the face for rosacea.    Rosacea       triamcinolone 0.1 % ointment    KENALOG    80 g    Apply topically 2 times daily To affected areas on the arms, legs, chest, and back a needed for itch.    Nummular eczema

## 2017-10-13 NOTE — PROGRESS NOTES
S:  Complains of bilateral foot pain.  Points to medial arch and into first mtpjs.  Has had this for number of years and now getting worse.  Describes it as a burning pain.  Aggrevated by activity and relieved by rest.  Getting worse lately.  Standing at work.  Not retired.  Nonsmoker.  Had foot taped recently and no pain when taped.      ROS:  Denies bruising, swelling,weakness, or numbness.     No Known Allergies    Current Outpatient Prescriptions   Medication Sig Dispense Refill     metroNIDAZOLE (METROGEL) 1 % gel Apply topically once or twice daily daily to the face for rosacea. 60 g 1     triamcinolone (KENALOG) 0.1 % ointment Apply topically 2 times daily To affected areas on the arms, legs, chest, and back a needed for itch. 80 g 5       Patient Active Problem List   Diagnosis     Hyperlipidemia with target LDL less than 130     OA (osteoarthritis)     Rosacea     Impaired fasting glucose     POSTERIOR VITREOUS DETACHMENT, os     Advanced directives, counseling/discussion     Cataract     S/P total knee arthroplasty - left     Vitamin D deficiency     AK (actinic keratosis)     Xerosis of skin     Intertrigo     Inflamed seborrheic keratosis     Primary osteoarthritis of right knee     Status post total left knee replacement     Obesity, Class II, BMI 35-39.9, with comorbidity     Rash     Diffuse photodamage of skin       Past Medical History:   Diagnosis Date     AK (actinic keratosis) 2/2/2016     Allergic rhinitis      Asthma     mild intermittent     Hyperlipidemia LDL goal < 130      Impaired fasting glucose      Lyme disease      Nonsenile cataract      OA (osteoarthritis)      Obesity      PONV (postoperative nausea and vomiting)      Rosacea      Vitamin D deficiency 10/12       Past Surgical History:   Procedure Laterality Date     C NONSPECIFIC PROCEDURE  9/19/2003    Left knee scope/OA,MMT     C TOTAL KNEE ARTHROPLASTY Left 4/2/2008    Left     C TOTAL KNEE ARTHROPLASTY Right 8/17/16    DML      CATARACT IOL, RT/LT  12/27/2013    Toric- RE     CATARACT IOL, RT/LT  12/05/2013    LE     COLONOSCOPY       COLONOSCOPY  5-5-14    Return for Colonoscopy in 1 yrs     PHACOEMULSIFICATION CLEAR CORNEA WITH TORIC INTRAOCULAR LENS IMPLANT  12/6/2013    Procedure: PHACOEMULSIFICATION CLEAR CORNEA WITH TORIC INTRAOCULAR LENS IMPLANT;  LEFT PHACOEMULSIFICATION CLEAR CORNEA WITH TORIC INTRAOCULAR LENS IMPLANT  ;  Surgeon: Otoniel Schuster MD;  Location: [facility]     PHACOEMULSIFICATION CLEAR CORNEA WITH TORIC INTRAOCULAR LENS IMPLANT  12/27/2013    Procedure: PHACOEMULSIFICATION CLEAR CORNEA WITH TORIC INTRAOCULAR LENS IMPLANT;  RIGHT PHACOEMULSIFICATION CLEAR CORNEA WITH TORIC INTRAOCULAR LENS IMPLANT  ;  Surgeon: Otoniel Schuster MD;  Location: [facility]     TONSILLECTOMY  Age 11       Family History   Problem Relation Age of Onset     CANCER Father      lung smoker     Skin Cancer Son      skin cancer     Glaucoma No family hx of      Macular Degeneration No family hx of      Eye Surgery No family hx of      Retinal detachment No family hx of        Social History   Substance Use Topics     Smoking status: Never Smoker     Smokeless tobacco: Never Used     Alcohol use Yes      Comment: weekends         O:  Pulse 80  Wt 94.3 kg (208 lb)  SpO2 96%  BMI 36.27 kg/m2.  Good historian.  A&O X 3.  Pulses DP, PT 2/4 b/l.  CRT < 3 seconds X 10 digits.  Bilateral ankle edema or varicosities noted.  Sensation to light touch intact b/l.  Reflexes 2/4 b/l.  Skin has normal texture and turgor b/l.  No forefoot deformities noted.  MS 5/5 all compartments.  Normal ROM all fore foot and rearfoot joints except first mtpj.  Dorsal flag noted.  No equinus.  With weightbearing patient has bilateral pronation .  No pain with palpation.  No pain with stressing any muscle compartments.  Good calcaneal iversion with foot flexion.  no erythema edema or ecchymosis or masses noted.    X-ray- fault at NC joint.  Some early tarsal bossing.   Early stage II hallux limitus with HAV bilateral.  First metatarsal elevated.        A:  Pronation causing pain       Tarsal arthritis       Bilateral hallux limitus/HAV    P:  X-ray take of both feet.  Explained she needs good support at all times.  RX for custom orthotics.  Discussed importance of wearing these in a good shoe at all times to prevent future problems.  Discussed good house shoes at all times until resolved.  Avoid activities that bother this.   RETURN TO CLINIC PRN.  25 minutes spent in total time caring for this patient and 16 minutes spent in face to face conversation.      Cristino Rivera DPM, FACFAS

## 2017-10-23 ENCOUNTER — RADIANT APPOINTMENT (OUTPATIENT)
Dept: MAMMOGRAPHY | Facility: CLINIC | Age: 72
End: 2017-10-23
Attending: FAMILY MEDICINE
Payer: COMMERCIAL

## 2017-10-23 DIAGNOSIS — L30.0 NUMMULAR ECZEMA: ICD-10-CM

## 2017-10-23 DIAGNOSIS — Z12.31 SCREENING MAMMOGRAM, ENCOUNTER FOR: ICD-10-CM

## 2017-10-23 PROCEDURE — G0202 SCR MAMMO BI INCL CAD: HCPCS | Performed by: STUDENT IN AN ORGANIZED HEALTH CARE EDUCATION/TRAINING PROGRAM

## 2017-10-23 NOTE — TELEPHONE ENCOUNTER
Last seen 2/6/17.  Appt was cancelled on 7/14/17 due to provider being out.  No future appt at this time    Assessment and Plan:   1. Actinic keratoses: multiple AKs on patient's chest. Effudex tx was recommended, but the patient will be travelling to [facility] in a few weeks and elected to hold off on this treatment until later.    Cryotherapy procedure note (performed by faculty): After verbal consent and discussion of risks and benefits including but no limited to dyspigmentation/scar, blister, infection, recurrence,5 lesions were treated with 1-2mm freeze border for 2 cycles with liquid nitrogen. Post cryotherapy instructions were provided.     Discuss possible Effudex tx at f/u with Dr. Baron  2. Solar lentigines: patient remarks that the tan macules/patches on her arms and legs have been increasing over the last few years. They become especially striking during the summer with sun exposure. The benign nature of these lesions was discussed with the patient. She is bothered by their appearance and is interested in pursing cosmetic treatments. We provided information about possible laser treatments, possibly fraxl,  and referred her to Dr. Violeta Baron.    Follow-up with Dr. Baron in 2-3 months.  3. Possible atopic dermatitis: patient reports a many year hx of a chronically recurring rash. She did not have any rash present on examination today. The rash usually occurs at night and is extremely pruritic, causing the patient to scratch to the point of bleeding at times. It will be localized, but the affected area often changes. The rash will appear red and bumpy. She applies triamcinolone 0.1% with good result.    Instructed patient to document rash in the future with photo

## 2017-10-24 RX ORDER — TRIAMCINOLONE ACETONIDE 1 MG/G
OINTMENT TOPICAL 2 TIMES DAILY
Qty: 80 G | Refills: 1 | Status: SHIPPED | OUTPATIENT
Start: 2017-10-24 | End: 2018-05-10

## 2017-10-24 NOTE — TELEPHONE ENCOUNTER
Refill request received  for Triamcinolone 0.1% ointment. Dr. Miles's notes reviewed. Will route request to [facility] as plan is not specific enough to accept under RN Refill protocol.     Namita Maciel RN

## 2018-05-10 ENCOUNTER — OFFICE VISIT (OUTPATIENT)
Dept: DERMATOLOGY | Facility: CLINIC | Age: 73
End: 2018-05-10
Payer: COMMERCIAL

## 2018-05-10 DIAGNOSIS — L30.0 NUMMULAR ECZEMA: ICD-10-CM

## 2018-05-10 DIAGNOSIS — L71.9 ROSACEA: ICD-10-CM

## 2018-05-10 DIAGNOSIS — D48.5 NEOPLASM OF UNCERTAIN BEHAVIOR OF SKIN: Primary | ICD-10-CM

## 2018-05-10 RX ORDER — TRIAMCINOLONE ACETONIDE 1 MG/G
OINTMENT TOPICAL 2 TIMES DAILY
Qty: 80 G | Refills: 1 | Status: SHIPPED | OUTPATIENT
Start: 2018-05-10 | End: 2019-07-16

## 2018-05-10 RX ORDER — METRONIDAZOLE 10 MG/G
GEL TOPICAL
Qty: 60 G | Refills: 3 | Status: SHIPPED | OUTPATIENT
Start: 2018-05-10 | End: 2020-03-23

## 2018-05-10 RX ORDER — LIDOCAINE HYDROCHLORIDE AND EPINEPHRINE 10; 10 MG/ML; UG/ML
3 INJECTION, SOLUTION INFILTRATION; PERINEURAL ONCE
Qty: 3 ML | Refills: 0 | OUTPATIENT
Start: 2018-05-10 | End: 2018-05-10

## 2018-05-10 ASSESSMENT — ACTIVITIES OF DAILY LIVING (ADL)
BATHING: 0 - INDEPENDENT
FALL_HISTORY_WITHIN_LAST_SIX_MONTHS: NO
EATING: 0 - INDEPENDENT
COGNITION: 0 - NO COGNITION ISSUES REPORTED
DRESS: 0 - INDEPENDENT
SWALLOWING: 0-->SWALLOWS FOODS/LIQUIDS WITHOUT DIFFICULTY
TRANSFERRING: 0-->INDEPENDENT
COMMUNICATION: 0 - UNDERSTANDS/COMMUNICATES WITHOUT DIFFICULTY
AMBULATION: 0 - INDEPENDENT
SWALLOWING: 0 - SWALLOWS FOODS/LIQUIDS WITHOUT DIFFICULTY
TOILETING: 0 - INDEPENDENT
TRANSFERRING: 0 - INDEPENDENT
AMBULATION: 0-->INDEPENDENT
RETIRED_EATING: 0-->INDEPENDENT
BATHING: 0-->INDEPENDENT
CHANGE_IN_FUNCTIONAL_STATUS_SINCE_ONSET_OF_CURRENT_ILLNESS/INJURY: NO
DRESS: 0-->INDEPENDENT
RETIRED_COMMUNICATION: 0-->UNDERSTANDS/COMMUNICATES WITHOUT DIFFICULTY
TOILETING: 0-->INDEPENDENT

## 2018-05-10 ASSESSMENT — PAIN SCALES - GENERAL: PAINLEVEL: NO PAIN (0)

## 2018-05-10 NOTE — NURSING NOTE
Dermatology Rooming Note    Rafaela ANA LUISA Rock's goals for this visit include:   Chief Complaint   Patient presents with     Derm Problem     Rafaela is here for an annual skin check. She is currently concerned about an area on her side.      Michelle Myers LPN

## 2018-05-10 NOTE — PATIENT INSTRUCTIONS
We will check the spot on your chest for basal cell skin cancer. It will take 1-2 weeks to get results and I will call you.    Spot on the left side of the back is a seborrheic keratosis. This is a benign thickening of the skin. It is not dangerous or worrisome. It does not become cancerous. No treatment is necessary if it doesn't bother you.    For dry skin, please start a good daily moisturizer like Cetaphil, Cerave, Vanicream. Best if applied within 3-5 minutes of bathing.    Return in 1 year or sooner if concerns.    Dry Skin    What is dry skin?    Common skin problem    Can be worse during the winter     Affects all ages    Occurs in people with or without other skin problems    What does it look like?    Fine lines in the skin become more visible     Rough feeling skin     Flaky skin    Most common on the arms and legs    Skin can become cracked, especially on the hands and feet    What are some problems caused by dry skin?     Itching    Rubbing or scratching can cause thickened, rough skin patches    Cracks in skin can be painful    Red, itchy, scaly skin (called eczema) can occur    Yellow crusting or pus could be signs of an infection    What causes dry skin?    A lack of water in the top layer of the skin    Too much soapy water,  hot water, or harsh chemicals    Aging and sun damage    How do I treat dry skin?    Shower or bathe daily for under ten minutes with lukewarm water and mild soap.    Pat yourself dry with a towel gently and leave your skin slightly damp.    Use moisturizing cream or ointment right away.  Avoid lotions.    What kind of mild soap should I be using?    Camay , Dove , Tone , Neutrogena , Purpose , or Oil of Olay     A non-detergent cleanser, like Cetaphil , can be used.    What should I stay away from?    Scented soaps     Bath oils    What moisturizers should I be using?    Cetaphil Cream,CeraVe Cream, Vanicream, Aquaphilic, Eucerin, Aquaphor, or Vaseline     Always apply after  showering or bathing.    Reapply throughout the day, if possible.    If dry skin affects your hands, always reapply after handwashing.    What else should I know?    Using a humidifier during winter months may help.    If dry skin gets worse or if eczema develops, a steroid cream may be needed.      Wound Care After a Biopsy    What is a skin biopsy?  A skin biopsy allows the doctor to examine a very small piece of tissue under the microscope to determine the diagnosis and the best treatment for the skin condition. A local anesthetic (numbing medicine)  is injected with a very small needle into the skin area to be tested. A small piece of skin is taken from the area. Sometimes a suture (stitch) is used.     What are the risks of a skin biopsy?  I will experience scar, bleeding, swelling, pain, crusting and redness. I may experience incomplete removal or recurrence. Risks of this procedure are excessive bleeding, bruising, infection, nerve damage, numbness, thick (hypertrophic or keloidal) scar and non-diagnostic biopsy.    How should I care for my wound for the first 24 hours?    Keep the wound dry and covered for 24 hours    If it bleeds, hold direct pressure on the area for 15 minutes. If bleeding does not stop then go to the emergency room    Avoid strenuous exercise the first 1-2 days or as your doctor instructs you    How should I care for the wound after 24 hours?    After 24 hours, remove the bandage    You may bathe or shower as normal    If you had a scalp biopsy, you can shampoo as usual and can use shower water to clean the biopsy site daily    Clean the wound twice a day with gentle soap and water    Do not scrub, be gentle    Apply white petroleum/Vaseline after cleaning the wound with a cotton swab or a clean finger, and keep the site covered with a Bandaid /bandage. Bandages are not necessary with a scalp biopsy    If you are unable to cover the site with a Bandaid /bandage, re-apply ointment 2-3  times a day to keep the site moist. Moisture will help with healing    Avoid strenuous activity for first 1-2 days    Avoid lakes, rivers, pools, and oceans until the stitches are removed or the site is healed    How do I clean my wound?    Wash hands thoroughly with soap or use hand  before all wound care    Clean the wound with gentle soap and water    Apply white petroleum/Vaseline  to wound after it is clean    Replace the Bandaid /bandage to keep the wound covered for the first few days or as instructed by your doctor    If you had a scalp biopsy, warm shower water to the area on a daily basis should suffice    What should I use to clean my wound?     Cotton-tipped applicators (Qtips )    White petroleum jelly (Vaseline ). Use a clean new container and use Q-tips to apply.    Bandaids   as needed    Gentle soap     How should I care for my wound long term?    Do not get your wound dirty    Keep up with wound care for one week or until the area is healed.    A small scab will form and fall off by itself when the area is completely healed. The area will be red and will become pink in color as it heals. Sun protection is very important for how your scar will turn out. Sunscreen with an SPF 30 or greater is recommended once the area is healed.    You should have some soreness but it should be mild and slowly go away over several days. Talk to your doctor about using tylenol for pain,    When should I call my doctor?  If you have increased:     Pain or swelling    Pus or drainage (clear or slightly yellow drainage is ok)    Temperature over 100F    Spreading redness or warmth around wound    When will I hear about my results?  The biopsy results can take 2-3 weeks to come back. The clinic will call you with the results, send you a [employer] message, or have you schedule a follow-up clinic or phone time to discuss the results. Contact our clinics if you do not hear from us in 3 weeks.     Who should I call  with questions?    [facility] [place]: [phone]     [facility]: [phone]    For urgent needs outside of business hours call the [facility] at [phone] and ask for the dermatology resident on call

## 2018-05-10 NOTE — LETTER
5/10/2018       RE: Rafaela Rock  [address] [place] [id number]     Dear Colleague,    Thank you for referring your patient, Rafaeal Rock, to the [facility] DERMATOLOGY at [facility]. Please see a copy of my visit note below.    [facility] Dermatology Note      Dermatology Problem List:  1. AKs. Cryo  2. Nummular eczema/xerosis.  - Rx. TMC 0.1% ointment.  3. Rosacea.  - Rx. Metronidazole gel.  # NUB, R upper chest, bx 5/11/2018. Ddx BCC v excoriation. Performed cautery to base of biopsy so if returns as low risk sub-type of BCC, will not perform further treatment. **NOTE - pathology report says LEFT upper chest but biopsy was performed on RIGHT upper chest**    Encounter Date: May 10, 2018    CC:   Chief Complaint   Patient presents with     Derm Problem     Rafaela is here for an annual skin check. She is currently concerned about an area on her side.        History of Present Illness:  Ms. Rafaela Rock is a 73 year old female who presents as a follow-up for skin check. The patient was last seen 2/6/17 at which time she had 2 AKs treated on her upper chest.  Since her last visit, she has overall been doing well.  She does note an asymptomatic spot on the left side of her chest.  It is not painful, growing, or pruritic.  She has no other concerning skin lesions.  She would like refills of her triamcinolone 0.1% ointment, which she uses rarely for skin itching when her skin is very dry.  She would also like a refill on her metronidazole gel, which she uses for rosacea.  No personal history of skin cancer.  No other changes to her health.  Otherwise feeling well today.    Past Medical History:   Patient Active Problem List   Diagnosis     Hyperlipidemia with target LDL less than 130     OA (osteoarthritis)     Rosacea     Impaired fasting glucose     POSTERIOR VITREOUS DETACHMENT, os     Advanced directives, counseling/discussion     Cataract      S/P total knee arthroplasty - left     Vitamin D deficiency     AK (actinic keratosis)     Xerosis of skin     Intertrigo     Inflamed seborrheic keratosis     Primary osteoarthritis of right knee     Status post total left knee replacement     Obesity, Class II, BMI 35-39.9, with comorbidity     Rash     Diffuse photodamage of skin     Past Medical History:   Diagnosis Date     AK (actinic keratosis) 2/2/2016     Allergic rhinitis      Asthma     mild intermittent     Hyperlipidemia LDL goal < 130      Impaired fasting glucose      Lyme disease      Nonsenile cataract      OA (osteoarthritis)      Obesity      PONV (postoperative nausea and vomiting)      Rosacea      Vitamin D deficiency 10/12     Past Surgical History:   Procedure Laterality Date     C NONSPECIFIC PROCEDURE  9/19/2003    Left knee scope/OA,MMT     C TOTAL KNEE ARTHROPLASTY Left 4/2/2008    Left     C TOTAL KNEE ARTHROPLASTY Right 8/17/16    DML     CATARACT IOL, RT/LT  12/27/2013    Toric- RE     CATARACT IOL, RT/LT  12/05/2013    LE     COLONOSCOPY       COLONOSCOPY  5-5-14    Return for Colonoscopy in 1 yrs     PHACOEMULSIFICATION CLEAR CORNEA WITH TORIC INTRAOCULAR LENS IMPLANT  12/6/2013    Procedure: PHACOEMULSIFICATION CLEAR CORNEA WITH TORIC INTRAOCULAR LENS IMPLANT;  LEFT PHACOEMULSIFICATION CLEAR CORNEA WITH TORIC INTRAOCULAR LENS IMPLANT  ;  Surgeon: Otoniel Schuster MD;  Location: [facility]     PHACOEMULSIFICATION CLEAR CORNEA WITH TORIC INTRAOCULAR LENS IMPLANT  12/27/2013    Procedure: PHACOEMULSIFICATION CLEAR CORNEA WITH TORIC INTRAOCULAR LENS IMPLANT;  RIGHT PHACOEMULSIFICATION CLEAR CORNEA WITH TORIC INTRAOCULAR LENS IMPLANT  ;  Surgeon: Otoniel Schuster MD;  Location:  [facility]     TONSILLECTOMY  Age 11       Social History:  The patient has significant history of sun exposure in youth. Non-smoker.    Family History:  Skin cancer in son, possible SCC.  No other family history of skin cancer.      Medications:  Current  Outpatient Prescriptions   Medication Sig Dispense Refill     metroNIDAZOLE (METROGEL) 1 % gel Apply topically once or twice daily daily to the face for rosacea. 60 g 3     triamcinolone (KENALOG) 0.1 % ointment Apply topically 2 times daily To affected areas on the arms, legs, chest, and back a needed for itch. 80 g 1        No Known Allergies      Review of Systems:  -Skin: The patient denies any new rash, pruritus, or lesions that are symptomatic, changing or bleeding, except as per HPI.  -HEENT: Patient denies nonhealing oral sores.    Physical exam:  GEN: This is a well developed, well-nourished female in no acute distress, in a pleasant mood.    SKIN: Total skin excluding the undergarment areas was performed. The exam included the head/face, neck, both arms, chest, back, abdomen, both legs, digits and/or nails.   - Lesion of concern on left lateral chest is a waxy stuck on brown plaque.  - On the right upper chest, there is a pearly excoriated papule.  Anoscopy with increased vasculature.  - Scattered brown macules on sun exposed areas.  - Skin diffusely xerotic.  - No other lesions of concern on areas examined.     Impression/Plan:  1. Neoplasm of uncertain behavior on the right upper chest. The differential diagnosis includes BCC v excoriation but patient reports this has been present for several weeks to months so favor BCC.     Shave biopsy:  After discussion of benefits and risks including but not limited to bleeding/bruising, pain/swelling, infection, scar, incomplete removal, nerve damage/numbness, recurrence, and non-diagnostic biopsy, written consent, verbal consent and photographs were obtained. Time-out was performed. The area was cleaned with isopropyl alcohol.  was injected to obtain adequate anesthesia of the lesion on the R upper chest. 1 ml of 1% lidocaine with 1:100,000 epinephrine was injected to obtain adequate anesthesia. A  shave biopsy was performed. Hemostasis was achieved with aluminium  chloride. Vaseline and a sterile dressing were applied. The patient tolerated the procedure and no complications were noted. The patient was provided with verbal and written post care instructions.      Following the biopsy, we performed cautery to base of biopsy site. If biopsy returns as low-risk subtype of BCC, no further intervention will be required.    **NOTE - pathology report says LEFT upper chest but biopsy was performed on RIGHT upper chest**      2. Seborrheic keratosis, not symptomatic. Discussed the natural history and benign nature of this lesion. Reassurance provided that no additional treatment is necessary.     3. Xerosis/history of nummular eczema.    Start daily moisturizer with bland emollient.    Continue triamcinolone 0.1% ointment twice daily as needed, refills provided.    Gentle skin care handout provided.    4. Rosacea. No active papules today.    Continue metronidazole gel, refills provided.      Follow-up in 1 year, earlier for new or changing lesions.       Dr. Satish Ruiz staffed the patient.    Staff Involved:  Resident(Sarah Tillman)/Staff(as above)    I talked with and examined Rafaela Rock and I agree with the assessment and the plan. I was present for the    procedure. CATA Ruiz MD.      Again, thank you for allowing me to participate in the care of your patient.      Sincerely,    Sarah Tillman MD

## 2018-05-10 NOTE — MR AVS SNAPSHOT
After Visit Summary   5/10/2018    Rafaela Rock    MRN: [id number]           Patient Information     Date Of Birth          1945        Visit Information        Provider Department      5/10/2018 9:30 AM Sarah Tillman MD [facility] Dermatology        Today's Diagnoses     Neoplasm of uncertain behavior of skin    -  1    Nummular eczema          Care Instructions    We will check the spot on your chest for basal cell skin cancer. It will take 1-2 weeks to get results and I will call you.    Spot on the left side of the back is a seborrheic keratosis. This is a benign thickening of the skin. It is not dangerous or worrisome. It does not become cancerous. No treatment is necessary if it doesn't bother you.    For dry skin, please start a good daily moisturizer like Cetaphil, Cerave, Vanicream. Best if applied within 3-5 minutes of bathing.    Return in 1 year or sooner if concerns.    Dry Skin    What is dry skin?    Common skin problem    Can be worse during the winter     Affects all ages    Occurs in people with or without other skin problems    What does it look like?    Fine lines in the skin become more visible     Rough feeling skin     Flaky skin    Most common on the arms and legs    Skin can become cracked, especially on the hands and feet    What are some problems caused by dry skin?     Itching    Rubbing or scratching can cause thickened, rough skin patches    Cracks in skin can be painful    Red, itchy, scaly skin (called eczema) can occur    Yellow crusting or pus could be signs of an infection    What causes dry skin?    A lack of water in the top layer of the skin    Too much soapy water,  hot water, or harsh chemicals    Aging and sun damage    How do I treat dry skin?    Shower or bathe daily for under ten minutes with lukewarm water and mild soap.    Pat yourself dry with a towel gently and leave your skin slightly damp.    Use moisturizing cream or ointment right away.   Avoid lotions.    What kind of mild soap should I be using?    Camay , Dove , Tone , Neutrogena , Purpose , or Oil of Olay     A non-detergent cleanser, like Cetaphil , can be used.    What should I stay away from?    Scented soaps     Bath oils    What moisturizers should I be using?    Cetaphil Cream,CeraVe Cream, Vanicream, Aquaphilic, Eucerin, Aquaphor, or Vaseline     Always apply after showering or bathing.    Reapply throughout the day, if possible.    If dry skin affects your hands, always reapply after handwashing.    What else should I know?    Using a humidifier during winter months may help.    If dry skin gets worse or if eczema develops, a steroid cream may be needed.      Wound Care After a Biopsy    What is a skin biopsy?  A skin biopsy allows the doctor to examine a very small piece of tissue under the microscope to determine the diagnosis and the best treatment for the skin condition. A local anesthetic (numbing medicine)  is injected with a very small needle into the skin area to be tested. A small piece of skin is taken from the area. Sometimes a suture (stitch) is used.     What are the risks of a skin biopsy?  I will experience scar, bleeding, swelling, pain, crusting and redness. I may experience incomplete removal or recurrence. Risks of this procedure are excessive bleeding, bruising, infection, nerve damage, numbness, thick (hypertrophic or keloidal) scar and non-diagnostic biopsy.    How should I care for my wound for the first 24 hours?    Keep the wound dry and covered for 24 hours    If it bleeds, hold direct pressure on the area for 15 minutes. If bleeding does not stop then go to the emergency room    Avoid strenuous exercise the first 1-2 days or as your doctor instructs you    How should I care for the wound after 24 hours?    After 24 hours, remove the bandage    You may bathe or shower as normal    If you had a scalp biopsy, you can shampoo as usual and can use shower water to  clean the biopsy site daily    Clean the wound twice a day with gentle soap and water    Do not scrub, be gentle    Apply white petroleum/Vaseline after cleaning the wound with a cotton swab or a clean finger, and keep the site covered with a Bandaid /bandage. Bandages are not necessary with a scalp biopsy    If you are unable to cover the site with a Bandaid /bandage, re-apply ointment 2-3 times a day to keep the site moist. Moisture will help with healing    Avoid strenuous activity for first 1-2 days    Avoid lakes, rivers, pools, and oceans until the stitches are removed or the site is healed    How do I clean my wound?    Wash hands thoroughly with soap or use hand  before all wound care    Clean the wound with gentle soap and water    Apply white petroleum/Vaseline  to wound after it is clean    Replace the Bandaid /bandage to keep the wound covered for the first few days or as instructed by your doctor    If you had a scalp biopsy, warm shower water to the area on a daily basis should suffice    What should I use to clean my wound?     Cotton-tipped applicators (Qtips )    White petroleum jelly (Vaseline ). Use a clean new container and use Q-tips to apply.    Bandaids   as needed    Gentle soap     How should I care for my wound long term?    Do not get your wound dirty    Keep up with wound care for one week or until the area is healed.    A small scab will form and fall off by itself when the area is completely healed. The area will be red and will become pink in color as it heals. Sun protection is very important for how your scar will turn out. Sunscreen with an SPF 30 or greater is recommended once the area is healed.    You should have some soreness but it should be mild and slowly go away over several days. Talk to your doctor about using tylenol for pain,    When should I call my doctor?  If you have increased:     Pain or swelling    Pus or drainage (clear or slightly yellow drainage is  ok)    Temperature over 100F    Spreading redness or warmth around wound    When will I hear about my results?  The biopsy results can take 2-3 weeks to come back. The clinic will call you with the results, send you a Tidal message, or have you schedule a follow-up clinic or phone time to discuss the results. Contact our clinics if you do not hear from us in 3 weeks.     Who should I call with questions?    [facility]: [phone]     [facility]: [phone]    For urgent needs outside of business hours call the [facility] at [phone] and ask for the dermatology resident on call              Follow-ups after your visit        Follow-up notes from your care team     Return in about 1 year (around 5/10/2019).      Who to contact     Please call your clinic at [phone] to:    Ask questions about your health    Make or cancel appointments    Discuss your medicines    Learn about your test results    Speak to your doctor            Additional Information About Your Visit        SearchMe Information     SearchMe gives you secure access to your electronic health record. If you see a primary care provider, you can also send messages to your care team and make appointments. If you have questions, please call your primary care clinic.  If you do not have a primary care provider, please call [phone] and they will assist you.      SearchMe is an electronic gateway that provides easy, online access to your medical records. With SearchMe, you can request a clinic appointment, read your test results, renew a prescription or communicate with your care team.     To access your existing account, please contact your [facility] or call [phone] for assistance.        Care EveryWhere ID     This is your Care EveryWhere ID. This could be used by other organizations to access your [place] medical records  [id number]          Blood Pressure from Last 3 Encounters:   05/25/17 130/87   02/06/17 127/74   08/08/16 127/77    Weight from Last 3 Encounters:   10/13/17 94.3 kg (208 lb)   06/15/17 93 kg (205 lb)   05/25/17 93.1 kg (205 lb 3.2 oz)              We Performed the Following     BIOPSY SKIN/SUBQ/MUC MEM, SINGLE LESION     Dermatological path order and indications          Today's Medication Changes          These changes are accurate as of 5/10/18 10:15 AM.  If you have any questions, ask your nurse or doctor.               Start taking these medicines.        Dose/Directions    lidocaine 1% with EPINEPHrine 1:100,000 1 %-1:583197 injection   Used for:  Neoplasm of uncertain behavior of skin   Started by:  Sarah Tillman MD        Dose:  3 mL   Inject 3 mLs into the skin once for 1 dose   Quantity:  3 mL   Refills:  0            Where to get your medicines      These medications were sent to [id number] - [place], [place] - [address] AT [facility] [address]  [address], [place] [address]     Phone:  [phone]     triamcinolone 0.1 % ointment         Some of these will need a paper prescription and others can be bought over the counter.  Ask your nurse if you have questions.     You don't need a prescription for these medications     lidocaine 1% with EPINEPHrine 1:100,000 1 %-1:766128 injection                Primary Care Provider Office Phone # Fax #    Josue Shah [phone]       [address] [facility] [address]        Equal Access to Services     [facility] AH: Hadii aad ku hadasho Soomaali, waaxda luqadaha, qaybta kaalmada adeegyada, yesica johnson. So [facility] [phone].    ATENCIÓN: Si habla español, tiene a lutz disposición servicios gratuitos de asistencia lingüística. Llame al [phone].    We comply with applicable federal civil rights laws and [place] laws. We do not discriminate on the basis  of race, color, national origin, age, disability, sex, sexual orientation, or gender identity.            Thank you!     Thank you for choosing [facility] DERMATOLOGY  for your care. Our goal is always to provide you with excellent care. Hearing back from our patients is one way we can continue to improve our services. Please take a few minutes to complete the written survey that you may receive in the mail after your visit with us. Thank you!             Your Updated Medication List - Protect others around you: Learn how to safely use, store and throw away your medicines at www.disposemymeds.org.          This list is accurate as of 5/10/18 10:15 AM.  Always use your most recent med list.                   Brand Name Dispense Instructions for use Diagnosis    lidocaine 1% with EPINEPHrine 1:100,000 1 %-1:334632 injection     3 mL    Inject 3 mLs into the skin once for 1 dose    Neoplasm of uncertain behavior of skin       metroNIDAZOLE 1 % gel    METROGEL    60 g    Apply topically once or twice daily daily to the face for rosacea.    Rosacea       triamcinolone 0.1 % ointment    KENALOG    80 g    Apply topically 2 times daily To affected areas on the arms, legs, chest, and back a needed for itch.    Nummular eczema, Neoplasm of uncertain behavior of skin

## 2018-05-10 NOTE — LETTER
5/10/2018      RE: Rafaela Rock  [address] [facility] [place] [id number]       [facility] Dermatology Note      Dermatology Problem List:  1. AKs. Cryo  2. Nummular eczema/xerosis.  - Rx. TMC 0.1% ointment.  3. Rosacea.  - Rx. Metronidazole gel.  # NUB, R upper chest, bx 5/11/2018. Ddx BCC v excoriation. Performed cautery to base of biopsy so if returns as low risk sub-type of BCC, will not perform further treatment. **NOTE - pathology report says LEFT upper chest but biopsy was performed on RIGHT upper chest**    Encounter Date: May 10, 2018    CC:   Chief Complaint   Patient presents with     Derm Problem     Rafaela is here for an annual skin check. She is currently concerned about an area on her side.        History of Present Illness:  Ms. Rafaela Rock is a 73 year old female who presents as a follow-up for skin check. The patient was last seen 2/6/17 at which time she had 2 AKs treated on her upper chest.  Since her last visit, she has overall been doing well.  She does note an asymptomatic spot on the left side of her chest.  It is not painful, growing, or pruritic.  She has no other concerning skin lesions.  She would like refills of her triamcinolone 0.1% ointment, which she uses rarely for skin itching when her skin is very dry.  She would also like a refill on her metronidazole gel, which she uses for rosacea.  No personal history of skin cancer.  No other changes to her health.  Otherwise feeling well today.    Past Medical History:   Patient Active Problem List   Diagnosis     Hyperlipidemia with target LDL less than 130     OA (osteoarthritis)     Rosacea     Impaired fasting glucose     POSTERIOR VITREOUS DETACHMENT, os     Advanced directives, counseling/discussion     Cataract     S/P total knee arthroplasty - left     Vitamin D deficiency     AK (actinic keratosis)     Xerosis of skin     Intertrigo     Inflamed seborrheic keratosis     Primary osteoarthritis of right  knee     Status post total left knee replacement     Obesity, Class II, BMI 35-39.9, with comorbidity     Rash     Diffuse photodamage of skin     Past Medical History:   Diagnosis Date     AK (actinic keratosis) 2/2/2016     Allergic rhinitis      Asthma     mild intermittent     Hyperlipidemia LDL goal < 130      Impaired fasting glucose      Lyme disease      Nonsenile cataract      OA (osteoarthritis)      Obesity      PONV (postoperative nausea and vomiting)      Rosacea      Vitamin D deficiency 10/12     Past Surgical History:   Procedure Laterality Date     C NONSPECIFIC PROCEDURE  9/19/2003    Left knee scope/OA,MMT     C TOTAL KNEE ARTHROPLASTY Left 4/2/2008    Left     C TOTAL KNEE ARTHROPLASTY Right 8/17/16    DML     CATARACT IOL, RT/LT  12/27/2013    Toric- RE     CATARACT IOL, RT/LT  12/05/2013    LE     COLONOSCOPY       COLONOSCOPY  5-5-14    Return for Colonoscopy in 1 yrs     PHACOEMULSIFICATION CLEAR CORNEA WITH TORIC INTRAOCULAR LENS IMPLANT  12/6/2013    Procedure: PHACOEMULSIFICATION CLEAR CORNEA WITH TORIC INTRAOCULAR LENS IMPLANT;  LEFT PHACOEMULSIFICATION CLEAR CORNEA WITH TORIC INTRAOCULAR LENS IMPLANT  ;  Surgeon: Otoniel Schuster MD;  Location: [facility]     PHACOEMULSIFICATION CLEAR CORNEA WITH TORIC INTRAOCULAR LENS IMPLANT  12/27/2013    Procedure: PHACOEMULSIFICATION CLEAR CORNEA WITH TORIC INTRAOCULAR LENS IMPLANT;  RIGHT PHACOEMULSIFICATION CLEAR CORNEA WITH TORIC INTRAOCULAR LENS IMPLANT  ;  Surgeon: Otoniel Schuster MD;  Location: [facility]     TONSILLECTOMY  Age 11       Social History:  The patient has significant history of sun exposure in youth. Non-smoker.    Family History:  Skin cancer in son, possible SCC.  No other family history of skin cancer.      Medications:  Current Outpatient Prescriptions   Medication Sig Dispense Refill     metroNIDAZOLE (METROGEL) 1 % gel Apply topically once or twice daily daily to the face for rosacea. 60 g 3     triamcinolone (KENALOG) 0.1  % ointment Apply topically 2 times daily To affected areas on the arms, legs, chest, and back a needed for itch. 80 g 1        No Known Allergies      Review of Systems:  -Skin: The patient denies any new rash, pruritus, or lesions that are symptomatic, changing or bleeding, except as per HPI.  -HEENT: Patient denies nonhealing oral sores.    Physical exam:  GEN: This is a well developed, well-nourished female in no acute distress, in a pleasant mood.    SKIN: Total skin excluding the undergarment areas was performed. The exam included the head/face, neck, both arms, chest, back, abdomen, both legs, digits and/or nails.   - Lesion of concern on left lateral chest is a waxy stuck on brown plaque.  - On the right upper chest, there is a pearly excoriated papule.  Anoscopy with increased vasculature.  - Scattered brown macules on sun exposed areas.  - Skin diffusely xerotic.  - No other lesions of concern on areas examined.     Impression/Plan:  1. Neoplasm of uncertain behavior on the right upper chest. The differential diagnosis includes BCC v excoriation but patient reports this has been present for several weeks to months so favor BCC.     Shave biopsy:  After discussion of benefits and risks including but not limited to bleeding/bruising, pain/swelling, infection, scar, incomplete removal, nerve damage/numbness, recurrence, and non-diagnostic biopsy, written consent, verbal consent and photographs were obtained. Time-out was performed. The area was cleaned with isopropyl alcohol.  was injected to obtain adequate anesthesia of the lesion on the R upper chest. 1 ml of 1% lidocaine with 1:100,000 epinephrine was injected to obtain adequate anesthesia. A  shave biopsy was performed. Hemostasis was achieved with aluminium chloride. Vaseline and a sterile dressing were applied. The patient tolerated the procedure and no complications were noted. The patient was provided with verbal and written post care instructions.       Following the biopsy, we performed cautery to base of biopsy site. If biopsy returns as low-risk subtype of BCC, no further intervention will be required.    **NOTE - pathology report says LEFT upper chest but biopsy was performed on RIGHT upper chest**      2. Seborrheic keratosis, not symptomatic. Discussed the natural history and benign nature of this lesion. Reassurance provided that no additional treatment is necessary.     3. Xerosis/history of nummular eczema.    Start daily moisturizer with bland emollient.    Continue triamcinolone 0.1% ointment twice daily as needed, refills provided.    Gentle skin care handout provided.    4. Rosacea. No active papules today.    Continue metronidazole gel, refills provided.      Follow-up in 1 year, earlier for new or changing lesions.       Dr. Satish Ruiz staffed the patient.    Staff Involved:  Resident(Sarah Tillman)/Staff(as above)    I talked with and examined Rafaela Rock and I agree with the assessment and the plan. I was present for the    procedure. CATA Ruiz MD.      Sarah Tillman MD

## 2018-05-12 NOTE — PROGRESS NOTES
[facility] Dermatology Note      Dermatology Problem List:  1. AKs. Cryo  2. Nummular eczema/xerosis.  - Rx. TMC 0.1% ointment.  3. Rosacea.  - Rx. Metronidazole gel.  # NUB, R upper chest, bx 5/11/2018. Ddx BCC v excoriation. Performed cautery to base of biopsy so if returns as low risk sub-type of BCC, will not perform further treatment. **NOTE - pathology report says LEFT upper chest but biopsy was performed on RIGHT upper chest**    Encounter Date: May 10, 2018    CC:   Chief Complaint   Patient presents with     Derm Problem     Rafaela is here for an annual skin check. She is currently concerned about an area on her side.        History of Present Illness:  Ms. Rafaela Rock is a 73 year old female who presents as a follow-up for skin check. The patient was last seen 2/6/17 at which time she had 2 AKs treated on her upper chest.  Since her last visit, she has overall been doing well.  She does note an asymptomatic spot on the left side of her chest.  It is not painful, growing, or pruritic.  She has no other concerning skin lesions.  She would like refills of her triamcinolone 0.1% ointment, which she uses rarely for skin itching when her skin is very dry.  She would also like a refill on her metronidazole gel, which she uses for rosacea.  No personal history of skin cancer.  No other changes to her health.  Otherwise feeling well today.    Past Medical History:   Patient Active Problem List   Diagnosis     Hyperlipidemia with target LDL less than 130     OA (osteoarthritis)     Rosacea     Impaired fasting glucose     POSTERIOR VITREOUS DETACHMENT, os     Advanced directives, counseling/discussion     Cataract     S/P total knee arthroplasty - left     Vitamin D deficiency     AK (actinic keratosis)     Xerosis of skin     Intertrigo     Inflamed seborrheic keratosis     Primary osteoarthritis of right knee     Status post total left knee replacement     Obesity, Class II, BMI 35-39.9, with  comorbidity     Rash     Diffuse photodamage of skin     Past Medical History:   Diagnosis Date     AK (actinic keratosis) 2/2/2016     Allergic rhinitis      Asthma     mild intermittent     Hyperlipidemia LDL goal < 130      Impaired fasting glucose      Lyme disease      Nonsenile cataract      OA (osteoarthritis)      Obesity      PONV (postoperative nausea and vomiting)      Rosacea      Vitamin D deficiency 10/12     Past Surgical History:   Procedure Laterality Date     C NONSPECIFIC PROCEDURE  9/19/2003    Left knee scope/OA,MMT     C TOTAL KNEE ARTHROPLASTY Left 4/2/2008    Left     C TOTAL KNEE ARTHROPLASTY Right 8/17/16    DML     CATARACT IOL, RT/LT  12/27/2013    Toric- RE     CATARACT IOL, RT/LT  12/05/2013    LE     COLONOSCOPY       COLONOSCOPY  5-5-14    Return for Colonoscopy in 1 yrs     PHACOEMULSIFICATION CLEAR CORNEA WITH TORIC INTRAOCULAR LENS IMPLANT  12/6/2013    Procedure: PHACOEMULSIFICATION CLEAR CORNEA WITH TORIC INTRAOCULAR LENS IMPLANT;  LEFT PHACOEMULSIFICATION CLEAR CORNEA WITH TORIC INTRAOCULAR LENS IMPLANT  ;  Surgeon: Otoniel Schuster MD;  Location: [facility]     PHACOEMULSIFICATION CLEAR CORNEA WITH TORIC INTRAOCULAR LENS IMPLANT  12/27/2013    Procedure: PHACOEMULSIFICATION CLEAR CORNEA WITH TORIC INTRAOCULAR LENS IMPLANT;  RIGHT PHACOEMULSIFICATION CLEAR CORNEA WITH TORIC INTRAOCULAR LENS IMPLANT  ;  Surgeon: Otoniel Schuster MD;  Location:  [facility]     TONSILLECTOMY  Age 11       Social History:  The patient has significant history of sun exposure in youth. Non-smoker.    Family History:  Skin cancer in son, possible SCC.  No other family history of skin cancer.      Medications:  Current Outpatient Prescriptions   Medication Sig Dispense Refill     metroNIDAZOLE (METROGEL) 1 % gel Apply topically once or twice daily daily to the face for rosacea. 60 g 3     triamcinolone (KENALOG) 0.1 % ointment Apply topically 2 times daily To affected areas on the arms, legs, chest, and  back a needed for itch. 80 g 1        No Known Allergies      Review of Systems:  -Skin: The patient denies any new rash, pruritus, or lesions that are symptomatic, changing or bleeding, except as per HPI.  -HEENT: Patient denies nonhealing oral sores.    Physical exam:  GEN: This is a well developed, well-nourished female in no acute distress, in a pleasant mood.    SKIN: Total skin excluding the undergarment areas was performed. The exam included the head/face, neck, both arms, chest, back, abdomen, both legs, digits and/or nails.   - Lesion of concern on left lateral chest is a waxy stuck on brown plaque.  - On the right upper chest, there is a pearly excoriated papule.  Anoscopy with increased vasculature.  - Scattered brown macules on sun exposed areas.  - Skin diffusely xerotic.  - No other lesions of concern on areas examined.     Impression/Plan:  1. Neoplasm of uncertain behavior on the right upper chest. The differential diagnosis includes BCC v excoriation but patient reports this has been present for several weeks to months so favor BCC.     Shave biopsy:  After discussion of benefits and risks including but not limited to bleeding/bruising, pain/swelling, infection, scar, incomplete removal, nerve damage/numbness, recurrence, and non-diagnostic biopsy, written consent, verbal consent and photographs were obtained. Time-out was performed. The area was cleaned with isopropyl alcohol.  was injected to obtain adequate anesthesia of the lesion on the R upper chest. 1 ml of 1% lidocaine with 1:100,000 epinephrine was injected to obtain adequate anesthesia. A  shave biopsy was performed. Hemostasis was achieved with aluminium chloride. Vaseline and a sterile dressing were applied. The patient tolerated the procedure and no complications were noted. The patient was provided with verbal and written post care instructions.      Following the biopsy, we performed cautery to base of biopsy site. If biopsy returns  as low-risk subtype of BCC, no further intervention will be required.    **NOTE - pathology report says LEFT upper chest but biopsy was performed on RIGHT upper chest**      2. Seborrheic keratosis, not symptomatic. Discussed the natural history and benign nature of this lesion. Reassurance provided that no additional treatment is necessary.     3. Xerosis/history of nummular eczema.    Start daily moisturizer with bland emollient.    Continue triamcinolone 0.1% ointment twice daily as needed, refills provided.    Gentle skin care handout provided.    4. Rosacea. No active papules today.    Continue metronidazole gel, refills provided.      Follow-up in 1 year, earlier for new or changing lesions.       Dr. Satish Ruiz staffed the patient.    Staff Involved:  Resident(Sarah Tillman)/Staff(as above)    I talked with and examined Rafaela Rock and I agree with the assessment and the plan. I was present for the    procedure. CATA Ruiz MD.

## 2018-05-16 LAB — COPATH REPORT: NORMAL

## 2018-10-01 ENCOUNTER — HEALTH MAINTENANCE LETTER (OUTPATIENT)
Age: 73
End: 2018-10-01

## 2018-10-19 ENCOUNTER — OFFICE VISIT (OUTPATIENT)
Dept: FAMILY MEDICINE | Facility: CLINIC | Age: 73
End: 2018-10-19
Payer: COMMERCIAL

## 2018-10-19 VITALS
HEIGHT: 63 IN | BODY MASS INDEX: 35.79 KG/M2 | SYSTOLIC BLOOD PRESSURE: 134 MMHG | DIASTOLIC BLOOD PRESSURE: 72 MMHG | TEMPERATURE: 97.1 F | HEART RATE: 65 BPM | WEIGHT: 202 LBS

## 2018-10-19 DIAGNOSIS — G56.21 ULNAR NEUROPATHY OF RIGHT UPPER EXTREMITY: ICD-10-CM

## 2018-10-19 DIAGNOSIS — Z23 NEED FOR PROPHYLACTIC VACCINATION AND INOCULATION AGAINST INFLUENZA: ICD-10-CM

## 2018-10-19 DIAGNOSIS — Z23 NEED FOR PROPHYLACTIC VACCINATION AGAINST STREPTOCOCCUS PNEUMONIAE (PNEUMOCOCCUS): ICD-10-CM

## 2018-10-19 DIAGNOSIS — Z78.0 ASYMPTOMATIC POSTMENOPAUSAL STATUS: ICD-10-CM

## 2018-10-19 DIAGNOSIS — Z23 NEED FOR PROPHYLACTIC VACCINATION WITH TETANUS-DIPHTHERIA (TD): ICD-10-CM

## 2018-10-19 DIAGNOSIS — L71.9 ROSACEA: ICD-10-CM

## 2018-10-19 DIAGNOSIS — R73.01 IMPAIRED FASTING GLUCOSE: ICD-10-CM

## 2018-10-19 DIAGNOSIS — E78.5 HYPERLIPIDEMIA WITH TARGET LDL LESS THAN 130: ICD-10-CM

## 2018-10-19 DIAGNOSIS — Z00.00 ROUTINE GENERAL MEDICAL EXAMINATION AT A HEALTH CARE FACILITY: Primary | ICD-10-CM

## 2018-10-19 DIAGNOSIS — E66.01 MORBID OBESITY (H): ICD-10-CM

## 2018-10-19 PROCEDURE — 99213 OFFICE O/P EST LOW 20 MIN: CPT | Mod: 25 | Performed by: FAMILY MEDICINE

## 2018-10-19 PROCEDURE — 90472 IMMUNIZATION ADMIN EACH ADD: CPT | Performed by: FAMILY MEDICINE

## 2018-10-19 PROCEDURE — G0009 ADMIN PNEUMOCOCCAL VACCINE: HCPCS | Performed by: FAMILY MEDICINE

## 2018-10-19 PROCEDURE — 99397 PER PM REEVAL EST PAT 65+ YR: CPT | Mod: 25 | Performed by: FAMILY MEDICINE

## 2018-10-19 PROCEDURE — G0008 ADMIN INFLUENZA VIRUS VAC: HCPCS | Performed by: FAMILY MEDICINE

## 2018-10-19 PROCEDURE — 90662 IIV NO PRSV INCREASED AG IM: CPT | Performed by: FAMILY MEDICINE

## 2018-10-19 PROCEDURE — 90714 TD VACC NO PRESV 7 YRS+ IM: CPT | Performed by: FAMILY MEDICINE

## 2018-10-19 PROCEDURE — 90732 PPSV23 VACC 2 YRS+ SUBQ/IM: CPT | Performed by: FAMILY MEDICINE

## 2018-10-19 ASSESSMENT — ACTIVITIES OF DAILY LIVING (ADL)
CURRENT_FUNCTION: NO ASSISTANCE NEEDED
I_NEED_ASSISTANCE_FOR_THE_FOLLOWING_DAILY_ACTIVITIES:: NO ASSISTANCE IS NEEDED

## 2018-10-19 ASSESSMENT — ENCOUNTER SYMPTOMS
COUGH: 0
EYE PAIN: 0
JOINT SWELLING: 0
HEMATURIA: 0
MYALGIAS: 0
SHORTNESS OF BREATH: 0
FREQUENCY: 0
CONSTIPATION: 0
NAUSEA: 0
WEAKNESS: 0
HEADACHES: 0
SORE THROAT: 0
ABDOMINAL PAIN: 0
CHILLS: 0
HEARTBURN: 0
DIZZINESS: 0
DYSURIA: 0
ARTHRALGIAS: 0
HEMATOCHEZIA: 0
DIARRHEA: 0
PARESTHESIAS: 0
BREAST MASS: 0

## 2018-10-19 NOTE — NURSING NOTE
Prior to injection verified patient identity using patient's name and date of birth.  Due to injection administration, patient instructed to remain in clinic for 15 minutes  afterwards, and to report any adverse reaction to me immediately.      Lin Rivas MA

## 2018-10-19 NOTE — PROGRESS NOTES

## 2018-10-19 NOTE — PROGRESS NOTES
SUBJECTIVE:   Rafaela Rock is a 73 year old female who presents for a Preventive Visit and follow-up on some baseline health conditions.  Are you in the first 12 months of your Medicare coverage?  No    Physical   Annual:     Getting at least 3 servings of Calcium per day:  Yes    Bi-annual eye exam:  Yes    Dental care twice a year:  Yes    Sleep apnea or symptoms of sleep apnea:  None    Diet:  Regular (no restrictions) and Breakfast skipped    Frequency of exercise:  2-3 days/week    Duration of exercise:  Greater than 60 minutes    Taking medications regularly:  Yes    Medication side effects:  Not applicable    Additional concerns today:  YES    Ability to successfully perform activities of daily living: no assistance needed    Home Safety:  No safety concerns identified    Hearing Impairment: no hearing concerns      COGNITIVE SCREEN  1) Repeat 3 items (Leader, Season, Table)    2) Clock draw: NORMAL  3) 3 item recall: Recalls NO objects   Results: 0 items recalled: PROBABLE COGNITIVE IMPAIRMENT, **INFORM PROVIDER**    Mini-CogTM Copyright YELENA Armstrong. Licensed by the author for use in [facility]; reprinted with permission (nikolai@[facility]). All rights reserved.        Reviewed and updated as needed this visit by clinical staff  Tobacco  Allergies  Meds  Med Hx  Surg Hx  Fam Hx  Soc Hx        Reviewed and updated as needed this visit by Provider        Social History   Substance Use Topics     Smoking status: Never Smoker     Smokeless tobacco: Never Used     Alcohol use Yes      Comment: weekends       Alcohol Use 10/19/2018   If you drink alcohol do you typically have greater than 3 drinks per day OR greater than 7 drinks per week? No     She is due for some vaccines and would like to get those done today.  She is due for a tetanus booster flu shot and Pneumovax 23 booster.  She has looked into the new shingles vaccine and will plan to get that at an outside pharmacy.  She has had some right  elbow discomfort recently with some numbness down into the fourth and fifth fingers.  It is not bothering her today.  She is due for a 3-year follow-up colonoscopy and has that scheduled for Monday of this coming week.    Today's PHQ-2 Score:   PHQ-2 ( 1999 Pfizer) 10/19/2018   Q1: Little interest or pleasure in doing things 0   Q2: Feeling down, depressed or hopeless 0   PHQ-2 Score 0   Q1: Little interest or pleasure in doing things Not at all   Q2: Feeling down, depressed or hopeless Not at all   PHQ-2 Score 0       Do you feel safe in your environment - Yes        Current providers sharing in care for this patient include:   Patient Care Team:  Josue Shah MD as PCP - General  Self, Referred, MD as Marya Chatterjee MD as MD (Student in organized health care education/training program)    The following health maintenance items are reviewed in Epic and correct as of today:  Health Maintenance   Topic Date Due     DEXA Q5 YR  04/13/1973     PNEUMOCOCCAL (2 of 2 - PPSV23) 08/08/2017     TETANUS IMMUNIZATION (SYSTEM ASSIGNED)  12/28/2017     EYE EXAM Q1 YEAR  07/10/2018     COLONOSCOPY Q3 YR  07/27/2018     INFLUENZA VACCINE (1) 09/01/2018     FALL RISK ASSESSMENT  05/10/2019     PHQ-2 Q1 YR  05/10/2019     LIPID SCREEN Q5 YR FEMALE (SYSTEM ASSIGNED)  08/08/2021     ADVANCE DIRECTIVE PLANNING Q5 YRS  08/15/2021     Patient Active Problem List   Diagnosis     Hyperlipidemia with target LDL less than 130     OA (osteoarthritis)     Rosacea     Impaired fasting glucose     POSTERIOR VITREOUS DETACHMENT, os     Advanced directives, counseling/discussion     Cataract     S/P total knee arthroplasty - left     Vitamin D deficiency     AK (actinic keratosis)     Xerosis of skin     Intertrigo     Inflamed seborrheic keratosis     Status post total left knee replacement     Rash     Diffuse photodamage of skin     Obesity (BMI 35.0-39.9) with comorbidity (H)     Past Surgical History:   Procedure Laterality Date      C NONSPECIFIC PROCEDURE  9/19/2003    Left knee scope/OA,MMT     C TOTAL KNEE ARTHROPLASTY Left 4/2/2008    Left     C TOTAL KNEE ARTHROPLASTY Right 8/17/16    DML     CATARACT IOL, RT/LT  12/27/2013    Toric- RE     CATARACT IOL, RT/LT  12/05/2013    LE     COLONOSCOPY       COLONOSCOPY  5-5-14    Return for Colonoscopy in 1 yrs     PHACOEMULSIFICATION CLEAR CORNEA WITH TORIC INTRAOCULAR LENS IMPLANT  12/6/2013    Procedure: PHACOEMULSIFICATION CLEAR CORNEA WITH TORIC INTRAOCULAR LENS IMPLANT;  LEFT PHACOEMULSIFICATION CLEAR CORNEA WITH TORIC INTRAOCULAR LENS IMPLANT  ;  Surgeon: Otoniel Schuster MD;  Location:  [facility]     PHACOEMULSIFICATION CLEAR CORNEA WITH TORIC INTRAOCULAR LENS IMPLANT  12/27/2013    Procedure: PHACOEMULSIFICATION CLEAR CORNEA WITH TORIC INTRAOCULAR LENS IMPLANT;  RIGHT PHACOEMULSIFICATION CLEAR CORNEA WITH TORIC INTRAOCULAR LENS IMPLANT  ;  Surgeon: Otoniel Schuster MD;  Location:  [facility]     TONSILLECTOMY  Age 11       Social History   Substance Use Topics     Smoking status: Never Smoker     Smokeless tobacco: Never Used     Alcohol use Yes      Comment: weekends     Family History   Problem Relation Age of Onset     Cancer Father      lung smoker     Skin Cancer Son      skin cancer     Glaucoma No family hx of      Macular Degeneration No family hx of      Eye Surgery No family hx of      Retinal detachment No family hx of      Melanoma No family hx of          Current Outpatient Prescriptions   Medication Sig Dispense Refill     metroNIDAZOLE (METROGEL) 1 % gel Apply topically once or twice daily daily to the face for rosacea. (Patient not taking: Reported on 10/19/2018) 60 g 3     triamcinolone (KENALOG) 0.1 % ointment Apply topically 2 times daily To affected areas on the arms, legs, chest, and back a needed for itch. (Patient not taking: Reported on 10/19/2018) 80 g 1     No Known Allergies        Review of Systems   Constitutional: Negative for chills.   HENT: Negative for  "congestion, ear pain, hearing loss and sore throat.    Eyes: Negative for pain and visual disturbance.   Respiratory: Negative for cough and shortness of breath.    Cardiovascular: Negative for chest pain and peripheral edema.   Gastrointestinal: Negative for abdominal pain, constipation, diarrhea, heartburn, hematochezia and nausea.   Breasts:  Negative for tenderness, breast mass and discharge.   Genitourinary: Negative for dysuria, frequency, genital sores, hematuria, pelvic pain, urgency, vaginal bleeding and vaginal discharge.   Musculoskeletal: Negative for arthralgias, joint swelling and myalgias.   Skin: Negative for rash.   Neurological: Negative for dizziness, weakness, headaches and paresthesias.   Psychiatric/Behavioral: Negative for mood changes.         OBJECTIVE:   /72 (BP Location: Right arm, Patient Position: Sitting)  Pulse 65  Temp 97.1  F (36.2  C) (Oral)  Ht 5' 3.31\" (1.608 m)  Wt 202 lb (91.6 kg)  BMI 35.44 kg/m2 Estimated body mass index is 35.44 kg/(m^2) as calculated from the following:    Height as of this encounter: 5' 3.31\" (1.608 m).    Weight as of this encounter: 202 lb (91.6 kg).  Physical Exam  GENERAL: alert, no distress and over weight  EYES: Eyes grossly normal to inspection, PERRL and conjunctivae and sclerae normal  HENT: ear canals and TM's normal, nose and mouth without ulcers or lesions  NECK: no adenopathy, no asymmetry, masses, or scars and thyroid normal to palpation  RESP: lungs clear to auscultation - no rales, rhonchi or wheezes  CV: regular rate and rhythm, normal S1 S2, no S3 or S4, no murmur, click or rub, no peripheral edema and peripheral pulses strong  ABDOMEN: soft, nontender, no hepatosplenomegaly, no masses and bowel sounds normal  MS: no gross musculoskeletal defects noted, no edema  SKIN: no suspicious lesions or rashes  NEURO: Normal strength and tone, mentation intact and speech normal  PSYCH: mentation appears normal, affect " normal/bright    Diagnostic Test Results:  none     ASSESSMENT / PLAN:       ICD-10-CM    1. Routine general medical examination at a health care facility Z00.00    2. Rosacea L71.9    3. Impaired fasting glucose R73.01 Basic metabolic panel     Hemoglobin A1c     CBC with platelets   4. Obesity (BMI 35.0-39.9) with comorbidity (H) E66.01    5. Hyperlipidemia with target LDL less than 130 E78.5 Lipid panel reflex to direct LDL Fasting   6. Ulnar neuropathy of right upper extremity G56.21    7. Asymptomatic postmenopausal status Z78.0 DEXA HIP/PELVIS/SPINE - Future   8. Need for prophylactic vaccination against Streptococcus pneumoniae (pneumococcus) Z23 PNEUMOCOCCAL VACCINE,ADULT,SQ OR IM     ADMIN PNEUMOCOCCAL VACCINE   9. Need for prophylactic vaccination with tetanus-diphtheria (Td) Z23 TD (ADULT, 7+) PRESERVE FREE          ADMIN VACCINE, FIRST   10. Need for prophylactic vaccination and inoculation against influenza Z23 FLU VACCINE, INCREASED ANTIGEN, PRESV FREE, AGE 65+ [62013]     Vaccine Administration, Initial [79607]     Vaccine Administration, Each Additional [96844]     We discussed the above items   Blood pressure and other vital signs look fine  We will give her the 3 vaccines as noted above  She will continue with her same baseline topical meds as needed  We will get her set up for a fasting lab draw, DEXA scan, and mammogram for next week  Discussed avoiding pressure over the right elbow to minimize right ulnar neuropathy symptoms  She was encouraged on diet and exercise treatment for weight loss  Plan a recheck in 1 year, or sooner prn    End of Life Planning:      COUNSELING:  Reviewed preventive health counseling, as reflected in patient instructions       Regular exercise       Healthy diet/nutrition       Immunizations    Vaccinated for: Influenza, Pneumococcal and Td          BP Readings from Last 1 Encounters:   10/19/18 134/72     Estimated body mass index is 35.44 kg/(m^2) as calculated from  "the following:    Height as of this encounter: 5' 3.31\" (1.608 m).    Weight as of this encounter: 202 lb (91.6 kg).      Weight management plan: Discussed healthy diet and exercise guidelines and patient will follow up in 12 months in clinic to re-evaluate.     reports that she has never smoked. She has never used smokeless tobacco.      Appropriate preventive services were discussed with this patient, including applicable screening as appropriate for cardiovascular disease, diabetes, osteopenia/osteoporosis, and glaucoma.  As appropriate for age/gender, discussed screening for colorectal cancer, prostate cancer, breast cancer, and cervical cancer. Checklist reviewing preventive services available has been given to the patient.    Reviewed patients plan of care and provided an AVS. The Basic Care Plan (routine screening as documented in Health Maintenance) for Rafaela meets the Care Plan requirement. This Care Plan has been established and reviewed with the Patient.    Counseling Resources:  ATP IV Guidelines  Pooled Cohorts Equation Calculator  Breast Cancer Risk Calculator  FRAX Risk Assessment  ICSI Preventive Guidelines  Dietary Guidelines for Americans, 2010  USDA's MyPlate  ASA Prophylaxis  Lung CA Screening    Josue Shah MD  [facility]      Answers for HPI/ROS submitted by the patient on 10/19/2018   PHQ-2 Score: 0    "

## 2018-10-19 NOTE — MR AVS SNAPSHOT
After Visit Summary   10/19/2018    Rafaela Rock    MRN: [id number]           Patient Information     Date Of Birth          1945        Visit Information        Provider Department      10/19/2018 10:40 AM Josue Shah MD [facility]        Today's Diagnoses     Routine general medical examination at a health care facility    -  1    Rosacea        Impaired fasting glucose        Obesity (BMI 35.0-39.9) with comorbidity (H)        Hyperlipidemia with target LDL less than 130        Ulnar neuropathy of right upper extremity        Asymptomatic postmenopausal status        Need for prophylactic vaccination against Streptococcus pneumoniae (pneumococcus)        Need for prophylactic vaccination with tetanus-diphtheria (Td)        Need for prophylactic vaccination and inoculation against influenza          Care Instructions      Preventive Health Recommendations    Female Ages 65 +    Yearly exam:     See your health care provider every year in order to  o Review health changes.   o Discuss preventive care.    o Review your medicines if your doctor has prescribed any.      You no longer need a yearly Pap test unless you've had an abnormal Pap test in the past 10 years. If you have vaginal symptoms, such as bleeding or discharge, be sure to talk with your provider about a Pap test.      Every 1 to 2 years, have a mammogram.  If you are over 69, talk with your health care provider about whether or not you want to continue having screening mammograms.      Every 10 years, have a colonoscopy. Or, have a yearly FIT test (stool test). These exams will check for colon cancer.       Have a cholesterol test every 5 years, or more often if your doctor advises it.       Have a diabetes test (fasting glucose) every three years. If you are at risk for diabetes, you should have this test more often.       At age 65, have a bone density scan (DEXA) to check for osteoporosis (brittle bone  disease).    Shots:    Get a flu shot each year.    Get a tetanus shot every 10 years.    Talk to your doctor about your pneumonia vaccines. There are now two you should receive - Pneumovax (PPSV 23) and Prevnar (PCV 13).    Talk to your pharmacist about the shingles vaccine.    Talk to your doctor about the hepatitis B vaccine.    Nutrition:     Eat at least 5 servings of fruits and vegetables each day.      Eat whole-grain bread, whole-wheat pasta and brown rice instead of white grains and rice.      Get adequate Calcium and Vitamin D.     Lifestyle    Exercise at least 150 minutes a week (30 minutes a day, 5 days a week). This will help you control your weight and prevent disease.      Limit alcohol to one drink per day.      No smoking.       Wear sunscreen to prevent skin cancer.       See your dentist twice a year for an exam and cleaning.      See your eye doctor every 1 to 2 years to screen for conditions such as glaucoma, macular degeneration and cataracts.          Follow-ups after your visit        Follow-up notes from your care team     Return in about 1 year (around 10/19/2019).      Your next 10 appointments already scheduled     Oct 26, 2018  7:45 AM CDT   LAB with FK LAB   [facility]    [address] [place] [id number] [phone]           Please do not eat 10-12 hours before your appointment if you are coming in fasting for labs on lipids, cholesterol, or glucose (sugar). This does not apply to pregnant women. Water, hot tea and black coffee (with nothing added) are okay. Do not drink other fluids, diet soda or chew gum.            Oct 26, 2018  8:00 AM CDT   DX HIP/PELVIS/SPINE with FKDX1   [facility] ([facility])    [address] [place] [id number] [phone]           How do I prepare for my exam? (Food and drink instructions) No Food and Drink Restrictions.  How do I prepare for my exam?  (Other instructions) Please do not take any of the following 24 hours prior to the day of your exam: vitamins, calcium tablets, antacids.  What should I wear: If possible, please wear clothes without metal (snaps, zippers). A sweat suit works well.  How long does the exam take: The exam takes about 20 minutes.  What should I bring: Bring a list of your current medicines to your exam (including vitamins, minerals and over-the-counter drugs).  Do I need a :  No  is needed.  What should I do after the exam: No restrictions, You may resume normal activities.  How do I prepare for my exam? (Food and drink instructions) A DEXA scan is a bone-density scan. It uses a low level of radiation to check the strength of your bones. As you lie on a padded table, a machine will take X-rays. We most often scan the hips and lower spine.  Who should I call with questions: If you have any questions, please call the Imaging Department where you will have your exam. Directions, parking instructions, and other information is available on our website, [email or web address].            Oct 26, 2018  8:30 AM CDT   MA SCREENING DIGITAL BILATERAL with FKMA1   [facility]    [address] [place] [id number] [phone]           How do I prepare for my exam? (Food and drink instructions) No Food and Drink Restrictions.  How do I prepare for my exam? (Other instructions) Do not use any powder, lotion or deodorant under your arms or on your breast. If you do, we will ask you to remove it before your exam.  What should I wear: Wear comfortable, two-piece clothing.  How long does the exam take: Most scans will take 15 minutes.  What should I bring: Bring any previous mammograms from other facilities or have them mailed to the breast center.  Do I need a :  No  is needed.  What do I need to tell my doctor: If you have any allergies, tell your care team.  What should I  "do after the exam: No restrictions, You may resume normal activities.  What is this test: This test is an x-ray of the breast to look for breast disease. The breast is pressed between two plates to flatten and spread the tissue. An X-ray is taken of the breast from different angles.  Who should I call with questions: If you have any questions, please call the Imaging Department where you will have your exam. Directions, parking instructions, and other information is available on our website, [email or web address].  Other information about my exam Three-dimensional (3D) mammograms are available at [facility] locations in [place], [facility], [facility], [place], [facility] and [place]. [facility] locations include [place] and the [facility] [place] in [place].  Benefits of 3D mammograms include * Improved rate of cancer detection * Decreases your chance of having to go back for more tests, which means fewer: * \"False-positive\" results (This means that there is an abnormal area but it isn't cancer.) * Invasive testing procedures, such as a biopsy or surgery * Can provide clearer images of the breast if you have dense breast tissue.  *3D mammography is an optional exam that anyone can have with a 2D mammogram. It doesn't replace or take the place of a 2D mammogram. 2D mammograms remain an effective screening test for all women.  Not all insurance companies cover the cost of a 3D mammogram. Check with your insurance.              Future tests that were ordered for you today     Open Future Orders        Priority Expected Expires Ordered    Lipid panel reflex to direct LDL Fasting Routine 10/26/2018 11/30/2018 10/19/2018    Basic metabolic panel Routine 10/26/2018 11/30/2018 10/19/2018    Hemoglobin A1c Routine 10/26/2018 11/30/2018 10/19/2018    CBC with platelets Routine 10/26/2018 11/30/2018 10/19/2018    DEXA HIP/PELVIS/SPINE - Future Routine  10/17/2019 10/19/2018            Who " "to contact     If you have questions or need follow up information about today's clinic visit or your schedule please contact [facility] directly at [phone].  Normal or non-critical lab and imaging results will be communicated to you by MyChart, letter or phone within 4 business days after the clinic has received the results. If you do not hear from us within 7 days, please contact the clinic through [email or web address] or phone. If you have a critical or abnormal lab result, we will notify you by phone as soon as possible.  Submit refill requests through [employer] or call your pharmacy and they will forward the refill request to us. Please allow 3 business days for your refill to be completed.          Additional Information About Your Visit        [employer] Information     [employer] gives you secure access to your electronic health record. If you see a primary care provider, you can also send messages to your care team and make appointments. If you have questions, please call your primary care clinic.  If you do not have a primary care provider, please call [phone] and they will assist you.        Care EveryWhere ID     This is your Care EveryWhere ID. This could be used by other organizations to access your [facility] medical records  [id number]        Your Vitals Were     Pulse Temperature Height BMI (Body Mass Index)          65 97.1  F (36.2  C) (Oral) 5' 3.31\" (1.608 m) 35.44 kg/m2         Blood Pressure from Last 3 Encounters:   10/19/18 134/72   05/25/17 130/87   02/06/17 127/74    Weight from Last 3 Encounters:   10/19/18 202 lb (91.6 kg)   10/13/17 208 lb (94.3 kg)   06/15/17 205 lb (93 kg)              We Performed the Following          ADMIN VACCINE, FIRST     ADMIN PNEUMOCOCCAL VACCINE     FLU VACCINE, INCREASED ANTIGEN, PRESV FREE, AGE 65+ [65261]     PNEUMOCOCCAL VACCINE,ADULT,SQ OR IM     TD (ADULT, 7+) PRESERVE FREE     Vaccine Administration, Each Additional [64782]     Vaccine " Administration, Initial [68051]        Primary Care Provider Office Phone # Fax #    Josue Shah [phone]       [address] [facility] [facility] [address]        Equal Access to Services     CALLY CLEARY : Priya ryan mccann kimmyo Sotimali, waaxda luqadaha, qaybta kaalmada adeegyada, yesica prestonn darline gee laAlliaishwarya johnson. So [facility] [phone].    ATENCIÓN: Si habla español, tiene a lutz disposición servicios gratuitos de asistencia lingüística. Llame al [phone].    We comply with applicable federal civil rights laws and [place] laws. We do not discriminate on the basis of race, color, national origin, age, disability, sex, sexual orientation, or gender identity.            Thank you!     Thank you for choosing [facility]  for your care. Our goal is always to provide you with excellent care. Hearing back from our patients is one way we can continue to improve our services. Please take a few minutes to complete the written survey that you may receive in the mail after your visit with us. Thank you!             Your Updated Medication List - Protect others around you: Learn how to safely use, store and throw away your medicines at www.disposemymeds.org.          This list is accurate as of 10/19/18 11:44 AM.  Always use your most recent med list.                   Brand Name Dispense Instructions for use Diagnosis    metroNIDAZOLE 1 % gel    METROGEL    60 g    Apply topically once or twice daily daily to the face for rosacea.    Rosacea       triamcinolone 0.1 % ointment    KENALOG    80 g    Apply topically 2 times daily To affected areas on the arms, legs, chest, and back a needed for itch.    Nummular eczema, Neoplasm of uncertain behavior of skin

## 2018-10-22 ENCOUNTER — TRANSFERRED RECORDS (OUTPATIENT)
Dept: HEALTH INFORMATION MANAGEMENT | Facility: CLINIC | Age: 73
End: 2018-10-22

## 2018-10-26 ENCOUNTER — RADIANT APPOINTMENT (OUTPATIENT)
Dept: MAMMOGRAPHY | Facility: CLINIC | Age: 73
End: 2018-10-26
Attending: FAMILY MEDICINE
Payer: COMMERCIAL

## 2018-10-26 ENCOUNTER — RADIANT APPOINTMENT (OUTPATIENT)
Dept: BONE DENSITY | Facility: CLINIC | Age: 73
End: 2018-10-26
Attending: FAMILY MEDICINE
Payer: COMMERCIAL

## 2018-10-26 DIAGNOSIS — E78.5 HYPERLIPIDEMIA WITH TARGET LDL LESS THAN 130: ICD-10-CM

## 2018-10-26 DIAGNOSIS — Z12.31 VISIT FOR SCREENING MAMMOGRAM: ICD-10-CM

## 2018-10-26 DIAGNOSIS — Z78.0 ASYMPTOMATIC POSTMENOPAUSAL STATUS: ICD-10-CM

## 2018-10-26 DIAGNOSIS — R73.01 IMPAIRED FASTING GLUCOSE: ICD-10-CM

## 2018-10-26 LAB
ANION GAP SERPL CALCULATED.3IONS-SCNC: 9 MMOL/L (ref 3–14)
BUN SERPL-MCNC: 17 MG/DL (ref 7–30)
CALCIUM SERPL-MCNC: 9 MG/DL (ref 8.5–10.1)
CHLORIDE SERPL-SCNC: 108 MMOL/L (ref 94–109)
CHOLEST SERPL-MCNC: 183 MG/DL
CO2 SERPL-SCNC: 26 MMOL/L (ref 20–32)
CREAT SERPL-MCNC: 0.73 MG/DL (ref 0.52–1.04)
ERYTHROCYTE [DISTWIDTH] IN BLOOD BY AUTOMATED COUNT: 13.4 % (ref 10–15)
GFR SERPL CREATININE-BSD FRML MDRD: 77 ML/MIN/1.7M2
GLUCOSE SERPL-MCNC: 86 MG/DL (ref 70–99)
HBA1C MFR BLD: 5.7 % (ref 0–5.6)
HCT VFR BLD AUTO: 41.8 % (ref 35–47)
HDLC SERPL-MCNC: 72 MG/DL
HGB BLD-MCNC: 13.4 G/DL (ref 11.7–15.7)
LDLC SERPL CALC-MCNC: 91 MG/DL
MCH RBC QN AUTO: 29.7 PG (ref 26.5–33)
MCHC RBC AUTO-ENTMCNC: 32.1 G/DL (ref 31.5–36.5)
MCV RBC AUTO: 93 FL (ref 78–100)
NONHDLC SERPL-MCNC: 111 MG/DL
PLATELET # BLD AUTO: 247 10E9/L (ref 150–450)
POTASSIUM SERPL-SCNC: 4.1 MMOL/L (ref 3.4–5.3)
RBC # BLD AUTO: 4.51 10E12/L (ref 3.8–5.2)
SODIUM SERPL-SCNC: 143 MMOL/L (ref 133–144)
TRIGL SERPL-MCNC: 99 MG/DL
WBC # BLD AUTO: 8 10E9/L (ref 4–11)

## 2018-10-26 PROCEDURE — 85027 COMPLETE CBC AUTOMATED: CPT | Performed by: FAMILY MEDICINE

## 2018-10-26 PROCEDURE — 77067 SCR MAMMO BI INCL CAD: CPT | Mod: TC

## 2018-10-26 PROCEDURE — 83036 HEMOGLOBIN GLYCOSYLATED A1C: CPT | Performed by: FAMILY MEDICINE

## 2018-10-26 PROCEDURE — 80048 BASIC METABOLIC PNL TOTAL CA: CPT | Performed by: FAMILY MEDICINE

## 2018-10-26 PROCEDURE — 80061 LIPID PANEL: CPT | Performed by: FAMILY MEDICINE

## 2018-10-26 PROCEDURE — 77080 DXA BONE DENSITY AXIAL: CPT | Performed by: INTERNAL MEDICINE

## 2018-10-26 PROCEDURE — 36415 COLL VENOUS BLD VENIPUNCTURE: CPT | Performed by: FAMILY MEDICINE

## 2018-10-29 NOTE — PROGRESS NOTES
Rafaela,  These lab results look nice and normal including cholesterol values, kidney tests, diabetes testing, and blood counts.  Keep up the good work!    Josue Shah MD

## 2018-10-29 NOTE — PROGRESS NOTES
Rafaela,  Your DEXA bone density x-ray test shows normal bone density at this time.    Josue Shah MD

## 2018-11-21 ENCOUNTER — OFFICE VISIT (OUTPATIENT)
Dept: OPHTHALMOLOGY | Facility: CLINIC | Age: 73
End: 2018-11-21
Attending: OPHTHALMOLOGY
Payer: MEDICARE

## 2018-11-21 DIAGNOSIS — H52.4 PRESBYOPIA: ICD-10-CM

## 2018-11-21 DIAGNOSIS — H52.13 MYOPIC ASTIGMATISM OF BOTH EYES: ICD-10-CM

## 2018-11-21 DIAGNOSIS — H04.123 DRY EYES, BILATERAL: Primary | ICD-10-CM

## 2018-11-21 DIAGNOSIS — H52.203 MYOPIC ASTIGMATISM OF BOTH EYES: ICD-10-CM

## 2018-11-21 DIAGNOSIS — Z96.1 PSEUDOPHAKIA, BOTH EYES: ICD-10-CM

## 2018-11-21 PROCEDURE — 68761 CLOSE TEAR DUCT OPENING: CPT | Mod: ZF | Performed by: OPHTHALMOLOGY

## 2018-11-21 PROCEDURE — G0463 HOSPITAL OUTPT CLINIC VISIT: HCPCS | Mod: ZF,25

## 2018-11-21 ASSESSMENT — TONOMETRY
IOP_METHOD: TONOPEN
OS_IOP_MMHG: 13
OD_IOP_MMHG: 13

## 2018-11-21 ASSESSMENT — REFRACTION_WEARINGRX
OD_ADD: +2.75
OS_AXIS: 035
OS_CYLINDER: +1.25
OS_SPHERE: -1.00
OD_AXIS: 145
SPECS_TYPE: PAL
OD_CYLINDER: +1.25
OD_SPHERE: -1.00
OS_ADD: +2.75

## 2018-11-21 ASSESSMENT — CONF VISUAL FIELD
METHOD: COUNTING FINGERS
OS_NORMAL: 1
OD_NORMAL: 1

## 2018-11-21 ASSESSMENT — REFRACTION_MANIFEST
OD_AXIS: 165
OD_ADD: +2.75
OD_CYLINDER: +1.50
OS_CYLINDER: +2.00
OS_ADD: +2.75
OD_SPHERE: -1.00
OS_AXIS: 020
OS_SPHERE: -1.25

## 2018-11-21 ASSESSMENT — VISUAL ACUITY
OS_CC: 20/30
OS_CC+: -1
METHOD: SNELLEN - LINEAR
OD_CC+: -3
CORRECTION_TYPE: GLASSES
OD_CC: 20/20

## 2018-11-21 ASSESSMENT — EXTERNAL EXAM - RIGHT EYE: OD_EXAM: NORMAL

## 2018-11-21 ASSESSMENT — CUP TO DISC RATIO
OS_RATIO: 0.3
OD_RATIO: 0.3

## 2018-11-21 ASSESSMENT — EXTERNAL EXAM - LEFT EYE: OS_EXAM: NORMAL

## 2018-11-21 NOTE — MR AVS SNAPSHOT
After Visit Summary   11/21/2018    Rafaela Rock    MRN: [id number]           Patient Information     Date Of Birth          1945        Visit Information        Provider Department      11/21/2018 2:30 PM [facility]        Today's Diagnoses     Dry eyes, bilateral    -  1    Pseudophakia, both eyes        Myopic astigmatism of both eyes        Presbyopia           Follow-ups after your visit        Follow-up notes from your care team     Return in about 1 year (around 11/21/2019).      Who to contact     Please call your clinic at [phone] to:    Ask questions about your health    Make or cancel appointments    Discuss your medicines    Learn about your test results    Speak to your doctor            Additional Information About Your Visit        [employer] [employer] Information     [employer] gives you secure access to your electronic health record. If you see a primary care provider, you can also send messages to your care team and make appointments. If you have questions, please call your primary care clinic.  If you do not have a primary care provider, please call [phone] and they will assist you.      [employer] is an electronic gateway that provides easy, online access to your medical records. With [employer], you can request a clinic appointment, read your test results, renew a prescription or communicate with your care team.     To access your existing account, please contact your [facility] or call [phone] for assistance.        Care EveryWhere ID     This is your Care EveryWhere ID. This could be used by other organizations to access your [facility] medical records  [id number]         Blood Pressure from Last 3 Encounters:   10/19/18 134/72   05/25/17 130/87   02/06/17 127/74    Weight from Last 3 Encounters:   10/19/18 91.6 kg (202 lb)   10/13/17 94.3 kg (208 lb)   06/15/17 93 kg (205 lb)              We Performed the Following     Punctal  Closure, Plugs        Primary Care Provider Office Phone # Fax #    Josue Shah [phone]       [address] [facility] [address]        Equal Access to Services     CALLY CLEARY : Hadii ryan ku kimmyo Sotimali, waaxda luqadaha, qaybta kaalmada adedontayada, yesica mohraishwarya johnson. So [facility] [phone].    ATENCIÓN: Si habla español, tiene a lutz disposición servicios gratuitos de asistencia lingüística. Llame al [phone].    We comply with applicable federal civil rights laws and [place] laws. We do not discriminate on the basis of race, color, national origin, age, disability, sex, sexual orientation, or gender identity.            Thank you!     Thank you for choosing [facility]  for your care. Our goal is always to provide you with excellent care. Hearing back from our patients is one way we can continue to improve our services. Please take a few minutes to complete the written survey that you may receive in the mail after your visit with us. Thank you!             Your Updated Medication List - Protect others around you: Learn how to safely use, store and throw away your medicines at www.disposemymeds.org.          This list is accurate as of 11/21/18  5:49 PM.  Always use your most recent med list.                   Brand Name Dispense Instructions for use Diagnosis    metroNIDAZOLE 1 % gel    METROGEL    60 g    Apply topically once or twice daily daily to the face for rosacea.    Rosacea       triamcinolone 0.1 % ointment    KENALOG    80 g    Apply topically 2 times daily To affected areas on the arms, legs, chest, and back a needed for itch.    Nummular eczema, Neoplasm of uncertain behavior of skin

## 2018-11-21 NOTE — NURSING NOTE
Chief Complaints and History of Present Illnesses   Patient presents with     Annual Eye Exam     f/u on Pseudophakia, both eyes     HPI    Affected eye(s):  Both   Symptoms:     No floaters   No flashes   No redness   No tearing   Dryness         Do you have eye pain now?:  No      Comments:  Pt here for her yearly eye exam, f/u on pseudophakia, both eyes. Pt c/o BE seem more dry intermittently x 1 year. Pt notes she has been needing to put in more AT's recently.     Kirstin Lopez, [facility] 2:11 PM November 21, 2018

## 2018-11-21 NOTE — PROGRESS NOTES
HPI  Rafaela Rock is a 73 year old female here for full eye exam. She feels her vision is overall good and stable in both eyes. She has been noticing more dryness in both eyes. She has been using ATs BID which help some, but she notes she needs more tears to be comfortable. She denies eye pain, redness, discharge. No flashes/floaters    Assessment & Plan    (H04.123) Dry eyes, bilateral  (primary encounter diagnosis)  Comment: Increased symptoms  Plan: Continue ATs. Discussed r/b/a of punctal plug placement lower eyelids both eyes and she would like to proceed. Please see Imaging/Proc tab for procedure details. 0.2 mm collagen right eye, 0.3 left eye     (Z96.1) Pseudophakia, both eyes   Comment: S/p CE/IOL with toric lenses OU 12/2013. Open posterior capsule with clear visual axis OU  Plan: Observe    (H52.203) Myopic astigmatism of both eyes/(H52.4) Presbyopia  Comment: Minimal change in refraction  Plan: Given updated glasses Rx     -----------------------------------------------------------------------------------    Patient disposition:   Return in about 1 year (around 11/21/2019). or sooner as needed.    Teaching statement:  Complete documentation of historical and exam elements from today's encounter can be found in the full encounter summary report (not reduplicated in this progress note). I personally obtained the chief complaint(s) and history of present illness.  I confirmed and edited as necessary the review of systems, past medical/surgical history, family history, social history, and examination findings as documented by others; and I examined the patient myself. I personally reviewed the relevant tests, images, and reports as documented above.     I formulated and edited as necessary the assessment and plan and discussed the findings and management plan with the patient and family.    Meseret Jay MD  Comprehensive Ophthalmology & Ocular Pathology  Department of Ophthalmology and Visual  [email or web address]  Pager [phone]

## 2018-12-14 ENCOUNTER — TELEPHONE (OUTPATIENT)
Dept: OPHTHALMOLOGY | Facility: CLINIC | Age: 73
End: 2018-12-14

## 2018-12-14 ENCOUNTER — OFFICE VISIT (OUTPATIENT)
Dept: OPHTHALMOLOGY | Facility: CLINIC | Age: 73
End: 2018-12-14
Payer: COMMERCIAL

## 2018-12-14 DIAGNOSIS — H00.024 HORDEOLUM INTERNUM LEFT UPPER EYELID: Primary | ICD-10-CM

## 2018-12-14 ASSESSMENT — TONOMETRY
OS_IOP_MMHG: 09
OD_IOP_MMHG: 10
IOP_METHOD: ICARE

## 2018-12-14 ASSESSMENT — SLIT LAMP EXAM - LIDS: COMMENTS: NORMAL

## 2018-12-14 ASSESSMENT — EXTERNAL EXAM - RIGHT EYE: OD_EXAM: NORMAL

## 2018-12-14 ASSESSMENT — VISUAL ACUITY
METHOD: SNELLEN - LINEAR
OD_CC: 20/20
OS_CC+: -1
OS_CC: 20/30
CORRECTION_TYPE: GLASSES

## 2018-12-14 ASSESSMENT — CONF VISUAL FIELD
OS_NORMAL: 1
OD_NORMAL: 1

## 2018-12-14 ASSESSMENT — EXTERNAL EXAM - LEFT EYE: OS_EXAM: NORMAL

## 2018-12-14 NOTE — PROGRESS NOTES
Assessment/Plan  (H00.024) Hordeolum internum left upper eyelid  (primary encounter diagnosis)  Comment: Since Wednesday, irritation. No discharge. Minimal baseline pain, more discomfort with palpation. Very mild hordeolum evident on lid eversion.   Plan: Discussed findings with patient. Recommend hot compresses at least four times daily. Patient should call if symptoms worsen- especially new pain/redness/lid swelling. Recommended artificial tears or cool compresses as needed for comfort.       Complete documentation of historical and exam elements from today's encounter can  be found in the full encounter summary report (not reduplicated in this progress  note). I personally obtained the chief complaint(s) and history of present illness. I  confirmed and edited as necessary the review of systems, past medical/surgical  history, family history, social history, and examination findings as documented by  others; and I examined the patient myself. I personally reviewed the relevant tests,  images, and reports as documented above. I formulated and edited as necessary the  assessment and plan and discussed the findings and management plan with the  patient and family.    Jose A Alba OD

## 2018-12-14 NOTE — TELEPHONE ENCOUNTER
[facility] Call Center    Phone Message    May a detailed message be left on voicemail: yes    Reason for Call: Other: The pt would like to speak with a nurse. She thinks she has an infection in her L eye that started 3 days ago. She is scheduled for tomorrow am. Please call to discuss. Thanks.     Action Taken: Message routed to:  [facility] ([facility]

## 2018-12-14 NOTE — TELEPHONE ENCOUNTER
Spoke to pt at 1105  Left eye irritation under upper lid on globe  Left eye globe redness  More irritation with some pain at times  Vision stable      Left message with direct number at [phone]  Tommie Diop RN 10:42 AM 12/14/18    Reviewed have availability for eval at [facility] today  Scheduled this afternoon with Dr. Parth Diop RN 10:42 AM 12/14/18

## 2019-01-22 ENCOUNTER — OFFICE VISIT (OUTPATIENT)
Dept: ORTHOPEDICS | Facility: CLINIC | Age: 74
End: 2019-01-22
Payer: COMMERCIAL

## 2019-01-22 ENCOUNTER — ANCILLARY PROCEDURE (OUTPATIENT)
Dept: GENERAL RADIOLOGY | Facility: CLINIC | Age: 74
End: 2019-01-22
Payer: COMMERCIAL

## 2019-01-22 VITALS
HEIGHT: 63 IN | SYSTOLIC BLOOD PRESSURE: 133 MMHG | DIASTOLIC BLOOD PRESSURE: 71 MMHG | HEART RATE: 86 BPM | BODY MASS INDEX: 35.44 KG/M2 | RESPIRATION RATE: 13 BRPM | OXYGEN SATURATION: 93 % | WEIGHT: 200 LBS

## 2019-01-22 DIAGNOSIS — Z96.652 STATUS POST TOTAL LEFT KNEE REPLACEMENT: Primary | ICD-10-CM

## 2019-01-22 DIAGNOSIS — M70.62 TROCHANTERIC BURSITIS OF LEFT HIP: ICD-10-CM

## 2019-01-22 DIAGNOSIS — Z96.651 HISTORY OF TOTAL KNEE ARTHROPLASTY, RIGHT: ICD-10-CM

## 2019-01-22 DIAGNOSIS — Z96.652 STATUS POST TOTAL LEFT KNEE REPLACEMENT: ICD-10-CM

## 2019-01-22 PROCEDURE — 99213 OFFICE O/P EST LOW 20 MIN: CPT | Performed by: ORTHOPAEDIC SURGERY

## 2019-01-22 PROCEDURE — 73562 X-RAY EXAM OF KNEE 3: CPT | Mod: RT

## 2019-01-22 ASSESSMENT — MIFFLIN-ST. JEOR: SCORE: 1375.28

## 2019-01-22 NOTE — PROGRESS NOTES
Rafaela Rock is seen for follow up left total knee arthroplasty from 4/2/2008, right total knee arthroplasty from 8/17/16..  She complains of left lateral hip pain.  Also low back pain and pain from left knee down shin.  On close questioning, much of her pain started in December when she took a long car ride with friends.  She was in the front seat and twisted backwards toward left to talk into back seat.  Exercise:  walking.  Yoga.    Past Medical History:   Diagnosis Date     AK (actinic keratosis) 2/2/2016     Allergic rhinitis      Asthma     mild intermittent     Hyperlipidemia LDL goal < 130      Impaired fasting glucose      Lyme disease      Nonsenile cataract      OA (osteoarthritis)      Obesity      PONV (postoperative nausea and vomiting)      Rosacea      Vitamin D deficiency 10/12       Past Surgical History:   Procedure Laterality Date     C NONSPECIFIC PROCEDURE  9/19/2003    Left knee scope/OA,MMT     C TOTAL KNEE ARTHROPLASTY Left 4/2/2008    Left     C TOTAL KNEE ARTHROPLASTY Right 8/17/16    DML     CATARACT IOL, RT/LT  12/27/2013    Toric- RE     CATARACT IOL, RT/LT  12/05/2013    LE     COLONOSCOPY       COLONOSCOPY  5-5-14    Return for Colonoscopy in 1 yrs     PHACOEMULSIFICATION CLEAR CORNEA WITH TORIC INTRAOCULAR LENS IMPLANT  12/6/2013    Procedure: PHACOEMULSIFICATION CLEAR CORNEA WITH TORIC INTRAOCULAR LENS IMPLANT;  LEFT PHACOEMULSIFICATION CLEAR CORNEA WITH TORIC INTRAOCULAR LENS IMPLANT  ;  Surgeon: Otoniel Schuster MD;  Location: [facility]     PHACOEMULSIFICATION CLEAR CORNEA WITH TORIC INTRAOCULAR LENS IMPLANT  12/27/2013    Procedure: PHACOEMULSIFICATION CLEAR CORNEA WITH TORIC INTRAOCULAR LENS IMPLANT;  RIGHT PHACOEMULSIFICATION CLEAR CORNEA WITH TORIC INTRAOCULAR LENS IMPLANT  ;  Surgeon: Otoniel Schuster MD;  Location:  [facility]     TONSILLECTOMY  Age 11       Family History   Problem Relation Age of Onset     Cancer Father         lung smoker     Skin Cancer Son          skin cancer     Glaucoma No family hx of      Macular Degeneration No family hx of      Eye Surgery No family hx of      Retinal detachment No family hx of      Melanoma No family hx of        Social History     Socioeconomic History     Marital status:      Spouse name: Not on file     Number of children: 2s     Years of education: Not on file     Highest education level: Not on file   Social Needs     Financial resource strain: Not on file     Food insecurity - worry: Not on file     Food insecurity - inability: Not on file     Transportation needs - medical: Not on file     Transportation needs - non-medical: Not on file   Occupational History     Employer: [employer]   Tobacco Use     Smoking status: Never Smoker     Smokeless tobacco: Never Used   Substance and Sexual Activity     Alcohol use: Yes     Comment: weekends     Drug use: No     Sexual activity: No     Partners: Male   Other Topics Concern     Parent/sibling w/ CABG, MI or angioplasty before 65F 55M? No   Social History Narrative     Not on file       Current Outpatient Medications   Medication Sig Dispense Refill     metroNIDAZOLE (METROGEL) 1 % gel Apply topically once or twice daily daily to the face for rosacea. 60 g 3     triamcinolone (KENALOG) 0.1 % ointment Apply topically 2 times daily To affected areas on the arms, legs, chest, and back a needed for itch. 80 g 1       No Known Allergies    REVIEW OF SYSTEMS:  CONSTITUTIONAL:  NEGATIVE for fever, chills, change in weight, not feeling tired  SKIN:  NEGATIVE for worrisome rashes, no skin lumps, no skin ulcers and no non-healing wounds  EYES:  NEGATIVE for vision changes or irritation.  ENT/MOUTH:  NEGATIVE.  No hearing loss, no hoarseness, no difficulty swallowing.  RESP:  NEGATIVE. No cough or shortness of breath.  BREAST:  NEGATIVE for masses, tenderness or discharge  CV:  NEGATIVE for chest pain, palpitations or peripheral edema  GI:  NEGATIVE for nausea, abdominal pain, heartburn,  "or change in bowel habits  :  Negative. No dysuria, no hematuria  MUSCULOSKELETAL:  See HPI above  NEURO:  NEGATIVE . No headaches, no dizziness,  no numbness  ENDOCRINE:  NEGATIVE for temperature intolerance, skin/hair changes  HEME/ALLERGY/IMMUNE:  NEGATIVE for bleeding problems  PSYCHIATRIC:  NEGATIVE. no anxiety, no depression.      Xray:  Xray images of total knee arthroplasties were independently visualized with the patient.  This shows good position of components.  minor lucent lines under tibial edges.  Prior Dexa scan shows no osteoarthritis of hips.    Exam:  Vitals: /71   Pulse 86   Resp 13   Ht 1.607 m (5' 3.25\")   Wt 90.7 kg (200 lb)   SpO2 93%   BMI 35.15 kg/m    BMI= Body mass index is 35.15 kg/m .   Positive low back pain along left sacro-iliac joint.  Positive tenderness over left greater trochanter.  Good hip range of motion, with only lateral hip pain with internal rotation to 30 degrees on left.  Range of motion right 0-118 degrees, left 0-124 degrees.  Alignment  Normal.  Stability:   Right       Lateral collateral ligament no laxity       Medial collateral ligament no laxity  Left:       Lateral collateral ligament no laxity       Medial collateral ligament no laxity  Wound:  Well healed.  Edema: None  Effusion: None  Tenderness: Right None       Left:  None  Sensation, motor, and circulation intact.    Assessment:  bilateral total knee arthroplasty doing well.  Left greater trochanteric bursitis.  Left sciatica from twisting in December.  Plan:  Resume activity as tolerated.  iliotibial band stretching, hip abduction strengthening.  Return to clinic 2-4 years with xray bilateral knee.  Continue antibiotics for dental work.  "

## 2019-01-22 NOTE — PATIENT INSTRUCTIONS
Trochanteric Bursitis           What is trochanteric bursitis?   Trochanteric bursitis is irritation or inflammation of the trochanteric bursa. A bursa is a fluid-filled sac that acts as a cushion between tendons, bones, and skin. The trochanteric bursa is located on the upper, outer area of the thigh. There is a bump on the outer side of the upper part of the thigh bone (femur) called the greater trochanter. The trochanteric bursa is located over the greater trochanter.   How does it occur?   The trochanteric bursa may be inflamed by a group of muscles or tendons rubbing over the bursa and causing friction against the thigh bone. This injury can occur with running, walking, or bicycling, especially when the bicycle seat is too high.   What are the symptoms?   You have pain on the upper outer area of your thigh or in your hip. The pain is worse when you walk, bicycle, or go up or down stairs. You have pain when you move your thigh bone and feel tenderness in the area over the greater trochanter.   How is it diagnosed?   Your healthcare provider will ask about your symptoms and examine your hip and thigh.   How is it treated?   To treat this condition:   Put an ice pack, gel pack, or package of frozen vegetables, wrapped in a cloth on the area every 3 to 4 hours, for up to 20 minutes at a time.   Sleep with a pillow between your legs.  Take an anti-inflammatory medicine such as ibuprofen, or other medicine as directed by your provider. Nonsteroidal anti-inflammatory medicines (NSAIDs) may cause stomach bleeding and other problems. These risks increase with age. Read the label and take as directed. Unless recommended by your healthcare provider, do not take for more than 10 days.   Your provider may give you an injection of a corticosteroid medicine.   While you are recovering from your injury you will need to change your sport or activity to one that does not make your condition worse. For example,  you may need to swim instead of running or bicycling. If you are bicycling, you may need to lower your bicycle seat.   How long do the effects last?   The length of recovery depends on many factors such as your age, health, and if you have had a previous injury. Recovery time also depends on the severity of the injury. A bursa that is only mildly inflamed and has just started to hurt may improve within a few weeks. A bursa that is significantly inflamed and has been painful for a long time may take up to a few months to improve. You need to stop doing the activities that cause pain until your bursa has healed. If you continue doing activities that cause pain, your symptoms will return and it will take longer to recover.   When can I return to my normal activities?   Everyone recovers from an injury at a different rate. Return to your activities depends on how soon your leg recovers, not by how many days or weeks it has been since your injury has occurred. In general, the longer you have symptoms before you start treatment, the longer it will take to get better. The goal of rehabilitation is to return to your normal activities as soon as is safely possible. If you return too soon you may worsen your injury.   You may safely return to your normal activities when, starting from the top of the list and progressing to the end, each of the following is true:   You have full range of motion in the injured leg compared to the uninjured leg.   You have full strength of the injured leg compared to the uninjured leg.   You can walk straight ahead without pain or limping.   How can I prevent trochanteric bursitis?   Trochanteric bursitis is best prevented by warming up properly and stretching the muscles on the outer side of your upper thigh.     Published by [employer].  This content is reviewed periodically and is subject to change as new health information becomes available. The information is intended to inform and educate  and is not a replacement for medical evaluation, advice, diagnosis or treatment by a healthcare professional.   Written by Jeromy Duran MD, for [employer].   ? 2010 [employer] and/or its affiliates. All Rights Reserved.   Copyright   [employer] 2011                     Stretches lower back, side of hip, and neck  1. Sit on floor with left leg straight out in front   2. Bend right leg, cross right foot over, place outside left knee   3. Bend left elbow and rest it outside right knee   4. Place right hand behind hips on floor   5. Turn head over right shoulder, rotate upper body right   6. Hold 10 to 15 seconds   7. Repeat on other side   8. Breathe in slowly               Trochanteric Bursitis Rehabilitation Exercises   You can begin stretching the muscles that run along the outside of your hip using the first exercise. You can do the strengthening exercises when the sharp pain lessens.                    Strengthening exercises:  Start abductor strengthening and begin the exercises demonstrated today.  Leg strengthening:  Hold onto the sink with painful leg toward the sink.  Lift painful leg out to touch the wall with the heel.  Lift 10-15 times, twice a day.                                  Step-up: Stand with the foot of your injured leg on a support 3 to 5 inches high (like a small step or block of wood). Keep your other foot flat on the floor. Shift your weight onto the injured leg on the support. Straighten your injured leg as the other leg comes off the floor. Return to the starting position by bending your injured leg and slowly lowering your uninjured leg back to the floor. Do 3 sets of 10.   Clam exercise: Lie on your uninjured side with your hips and knees bent and feet together. Slowly raise your top leg toward the ceiling while keeping your heels touching each other. Hold for 2 seconds and lower slowly. Do 3 sets of 10 repetitions.   Iliotibial band stretch: Side-bending: Cross one  leg in front of the other leg and lean in the opposite direction from the front leg. Reach the arm on the side of the back leg over your head while you do this. Hold this position for 15 to 30 seconds. Return to the starting position. Repeat 3 times and then switch legs and repeat the exercise.   Published by [employer].  This content is reviewed periodically and is subject to change as new health information becomes available. The information is intended to inform and educate and is not a replacement for medical evaluation, advice, diagnosis or treatment by a healthcare professional.   Written by Lupe Tran, MS, PT, and Kristin Gore PT, [facility], [facility], for [employer].   ? 2010 [facility] and/or its affiliates. All Rights Reserved.   Copyright   Clinical Reference Systems 2011  Adult Health Advisor

## 2019-01-22 NOTE — LETTER
1/22/2019         RE: Rafaela Rock  [address] [place] [id number]        Dear Colleague,    Thank you for referring your patient, Rafaela Rock, to the [facility]. Please see a copy of my visit note below.    Rafaela Rock is seen for follow up left total knee arthroplasty from 4/2/2008, right total knee arthroplasty from 8/17/16..  She complains of left lateral hip pain.  Also low back pain and pain from left knee down shin.  On close questioning, much of her pain started in December when she took a long car ride with friends.  She was in the front seat and twisted backwards toward left to talk into back seat.  Exercise:  walking.  Yoga.    Past Medical History:   Diagnosis Date     AK (actinic keratosis) 2/2/2016     Allergic rhinitis      Asthma     mild intermittent     Hyperlipidemia LDL goal < 130      Impaired fasting glucose      Lyme disease      Nonsenile cataract      OA (osteoarthritis)      Obesity      PONV (postoperative nausea and vomiting)      Rosacea      Vitamin D deficiency 10/12       Past Surgical History:   Procedure Laterality Date     C NONSPECIFIC PROCEDURE  9/19/2003    Left knee scope/OA,MMT     C TOTAL KNEE ARTHROPLASTY Left 4/2/2008    Left     C TOTAL KNEE ARTHROPLASTY Right 8/17/16    DML     CATARACT IOL, RT/LT  12/27/2013    Toric- RE     CATARACT IOL, RT/LT  12/05/2013    LE     COLONOSCOPY       COLONOSCOPY  5-5-14    Return for Colonoscopy in 1 yrs     PHACOEMULSIFICATION CLEAR CORNEA WITH TORIC INTRAOCULAR LENS IMPLANT  12/6/2013    Procedure: PHACOEMULSIFICATION CLEAR CORNEA WITH TORIC INTRAOCULAR LENS IMPLANT;  LEFT PHACOEMULSIFICATION CLEAR CORNEA WITH TORIC INTRAOCULAR LENS IMPLANT  ;  Surgeon: Otoniel Schuster MD;  Location: [facility]     PHACOEMULSIFICATION CLEAR CORNEA WITH TORIC INTRAOCULAR LENS IMPLANT  12/27/2013    Procedure: PHACOEMULSIFICATION CLEAR CORNEA WITH TORIC INTRAOCULAR LENS IMPLANT;  RIGHT  PHACOEMULSIFICATION CLEAR CORNEA WITH TORIC INTRAOCULAR LENS IMPLANT  ;  Surgeon: Otoniel Schuster MD;  [facility]     TONSILLECTOMY  Age 11       Family History   Problem Relation Age of Onset     Cancer Father         lung smoker     Skin Cancer Son         skin cancer     Glaucoma No family hx of      Macular Degeneration No family hx of      Eye Surgery No family hx of      Retinal detachment No family hx of      Melanoma No family hx of        Social History     Socioeconomic History     Marital status:      Spouse name: Not on file     Number of children: 2s     Years of education: Not on file     Highest education level: Not on file   Social Needs     Financial resource strain: Not on file     Food insecurity - worry: Not on file     Food insecurity - inability: Not on file     Transportation needs - medical: Not on file     Transportation needs - non-medical: Not on file   Occupational History     Employer: [employer]   Tobacco Use     Smoking status: Never Smoker     Smokeless tobacco: Never Used   Substance and Sexual Activity     Alcohol use: Yes     Comment: weekends     Drug use: No     Sexual activity: No     Partners: Male   Other Topics Concern     Parent/sibling w/ CABG, MI or angioplasty before 65F 55M? No   Social History Narrative     Not on file       Current Outpatient Medications   Medication Sig Dispense Refill     metroNIDAZOLE (METROGEL) 1 % gel Apply topically once or twice daily daily to the face for rosacea. 60 g 3     triamcinolone (KENALOG) 0.1 % ointment Apply topically 2 times daily To affected areas on the arms, legs, chest, and back a needed for itch. 80 g 1       No Known Allergies    REVIEW OF SYSTEMS:  CONSTITUTIONAL:  NEGATIVE for fever, chills, change in weight, not feeling tired  SKIN:  NEGATIVE for worrisome rashes, no skin lumps, no skin ulcers and no non-healing wounds  EYES:  NEGATIVE for vision changes or irritation.  ENT/MOUTH:  NEGATIVE.  No hearing  "loss, no hoarseness, no difficulty swallowing.  RESP:  NEGATIVE. No cough or shortness of breath.  BREAST:  NEGATIVE for masses, tenderness or discharge  CV:  NEGATIVE for chest pain, palpitations or peripheral edema  GI:  NEGATIVE for nausea, abdominal pain, heartburn, or change in bowel habits  :  Negative. No dysuria, no hematuria  MUSCULOSKELETAL:  See HPI above  NEURO:  NEGATIVE . No headaches, no dizziness,  no numbness  ENDOCRINE:  NEGATIVE for temperature intolerance, skin/hair changes  HEME/ALLERGY/IMMUNE:  NEGATIVE for bleeding problems  PSYCHIATRIC:  NEGATIVE. no anxiety, no depression.      Xray:  Xray images of total knee arthroplasties were independently visualized with the patient.  This shows good position of components.  minor lucent lines under tibial edges.  Prior Dexa scan shows no osteoarthritis of hips.    Exam:  Vitals: /71   Pulse 86   Resp 13   Ht 1.607 m (5' 3.25\")   Wt 90.7 kg (200 lb)   SpO2 93%   BMI 35.15 kg/m     BMI= Body mass index is 35.15 kg/m .   Positive low back pain along left sacro-iliac joint.  Positive tenderness over left greater trochanter.  Good hip range of motion, with only lateral hip pain with internal rotation to 30 degrees on left.  Range of motion right 0-118 degrees, left 0-124 degrees.  Alignment  Normal.  Stability:   Right       Lateral collateral ligament no laxity       Medial collateral ligament no laxity  Left:       Lateral collateral ligament no laxity       Medial collateral ligament no laxity  Wound:  Well healed.  Edema: None  Effusion: None  Tenderness: Right None       Left:  None  Sensation, motor, and circulation intact.    Assessment:  bilateral total knee arthroplasty doing well.  Left greater trochanteric bursitis.  Left sciatica from twisting in December.  Plan:  Resume activity as tolerated.  iliotibial band stretching, hip abduction strengthening.  Return to clinic 2-4 years with xray bilateral knee.  Continue antibiotics for " dental work.    Again, thank you for allowing me to participate in the care of your patient.        Sincerely,        Josue Tolliver MD

## 2019-01-31 ENCOUNTER — TELEPHONE (OUTPATIENT)
Dept: OPHTHALMOLOGY | Facility: CLINIC | Age: 74
End: 2019-01-31

## 2019-01-31 NOTE — TELEPHONE ENCOUNTER
M for patient to either call me back or send me copies of her 2 bills for visit on 12/14/2018 with Dr. Alba.  She called the call center and stated that she received 2 bills and should only have received one.  She also stated that she called billing and they told her to call scheduling.    I left her my office number, email and fax number to send copies of the bills so I can investigate.    Lino PARIS 12:35 PM January 31, 2019

## 2019-02-01 ENCOUNTER — TELEPHONE (OUTPATIENT)
Dept: OPHTHALMOLOGY | Facility: CLINIC | Age: 74
End: 2019-02-01

## 2019-02-01 NOTE — TELEPHONE ENCOUNTER
Rafaela thought the extra charge was for her visit with Dr. Alba but it was for visit with Dr. Jay in November.  Explained about facility fee and that she should have always been receiving 2 bills at [facility].  Suggested she check with her insurance as to why the copay is higher.  She will go to [facility] from now on for her eye care needs.  Lino Morris COA 9:23 AM February 1, 2019

## 2019-05-14 ENCOUNTER — OFFICE VISIT (OUTPATIENT)
Dept: DERMATOLOGY | Facility: CLINIC | Age: 74
End: 2019-05-14
Payer: COMMERCIAL

## 2019-05-14 DIAGNOSIS — L23.0 ALLERGIC CONTACT DERMATITIS DUE TO METALS: ICD-10-CM

## 2019-05-14 DIAGNOSIS — L30.9 DERMATITIS: ICD-10-CM

## 2019-05-14 DIAGNOSIS — L71.9 ROSACEA: ICD-10-CM

## 2019-05-14 DIAGNOSIS — D48.5 NEOPLASM OF UNCERTAIN BEHAVIOR OF SKIN: Primary | ICD-10-CM

## 2019-05-14 RX ORDER — LIDOCAINE HYDROCHLORIDE AND EPINEPHRINE 10; 10 MG/ML; UG/ML
3 INJECTION, SOLUTION INFILTRATION; PERINEURAL ONCE
Status: DISCONTINUED | OUTPATIENT
Start: 2019-05-14 | End: 2023-10-16

## 2019-05-14 ASSESSMENT — PAIN SCALES - GENERAL
PAINLEVEL: NO PAIN (0)
PAINLEVEL: NO PAIN (0)

## 2019-05-14 NOTE — PATIENT INSTRUCTIONS

## 2019-05-14 NOTE — PROGRESS NOTES
[facility] Dermatology Note      Dermatology Problem List:  1. AKs. Cryo  2. Nummular eczema/xerosis.  - Rx. TMC 0.1% ointment.  3. Rosacea.  - Rx. Metronidazole gel.  # NUB, R upper chest, bx 5/11/2018. Ddx BCC v excoriation. Performed cautery to base of biopsy so if returns as low risk sub-type of BCC, will not perform further treatment. **NOTE - pathology report says LEFT upper chest but biopsy was performed on RIGHT upper chest**  4. Nub on the left upper back. S/p bx 5/14/19  5. Nub on the right upper back. S/p bx 5/14/19  6. Nub on the popliteal fossa. S/p bx 5/14/19  7. Contact dermatitis to metals - gold      Encounter Date: May 14, 2019    CC:  Chief Complaint   Patient presents with     Derm Problem     Rafaela, is being seen today for a 1 year skin check, no new areas of concern as reported by patient.         History of Present Illness:  Ms. Rafaela Rock is a 74 year old female who presents as a follow-up for FBSE. The patient was last seen 5/10/18 when a shave biopsy was preformed on the right upper chest. Pathology reports benign lichenoid keratosis. Additionally patient was recommended to use metronidazole gel for her rosacea, and this is working well for her. At today's visit patient states that she has been applying sun screen regularly. She does continue to use the metronidazole gel and is not in a need of refills. Se also mentions to me that her brown spots on her arms and legs and face have become particularly bothersome and sometimes embarrassing, especially in the summer. She has been receiving BBL treatments on the face which have helped there. She is considering having this done on the arms and legs as well. She is otherwise in her nml state of health and has no additional sx. The patient denies painful, itching, tingling or bleeding lesions unless otherwise noted.    Past Medical History:   Patient Active Problem List   Diagnosis     Hyperlipidemia with target LDL less  than 130     OA (osteoarthritis)     Rosacea     Impaired fasting glucose     POSTERIOR VITREOUS DETACHMENT, os     Advanced directives, counseling/discussion     Cataract     S/P total knee arthroplasty - left     Vitamin D deficiency     AK (actinic keratosis)     Xerosis of skin     Intertrigo     Inflamed seborrheic keratosis     Status post total left knee replacement     Rash     Diffuse photodamage of skin     Obesity (BMI 35.0-39.9) with comorbidity (H)     Trochanteric bursitis of left hip     Past Medical History:   Diagnosis Date     AK (actinic keratosis) 2/2/2016     Allergic rhinitis      Asthma     mild intermittent     Hyperlipidemia LDL goal < 130      Impaired fasting glucose      Lyme disease      Nonsenile cataract      OA (osteoarthritis)      Obesity      PONV (postoperative nausea and vomiting)      Rosacea      Vitamin D deficiency 10/12     Past Surgical History:   Procedure Laterality Date     C NONSPECIFIC PROCEDURE  9/19/2003    Left knee scope/OA,MMT     C TOTAL KNEE ARTHROPLASTY Left 4/2/2008    Left     C TOTAL KNEE ARTHROPLASTY Right 8/17/16    DML     CATARACT IOL, RT/LT  12/27/2013    Toric- RE     CATARACT IOL, RT/LT  12/05/2013    LE     COLONOSCOPY       COLONOSCOPY  5-5-14    Return for Colonoscopy in 1 yrs     PHACOEMULSIFICATION CLEAR CORNEA WITH TORIC INTRAOCULAR LENS IMPLANT  12/6/2013    Procedure: PHACOEMULSIFICATION CLEAR CORNEA WITH TORIC INTRAOCULAR LENS IMPLANT;  LEFT PHACOEMULSIFICATION CLEAR CORNEA WITH TORIC INTRAOCULAR LENS IMPLANT  ;  Surgeon: Otoniel Schuster MD;  Location: [facility]     PHACOEMULSIFICATION CLEAR CORNEA WITH TORIC INTRAOCULAR LENS IMPLANT  12/27/2013    Procedure: PHACOEMULSIFICATION CLEAR CORNEA WITH TORIC INTRAOCULAR LENS IMPLANT;  RIGHT PHACOEMULSIFICATION CLEAR CORNEA WITH TORIC INTRAOCULAR LENS IMPLANT  ;  Surgeon: Otoniel Schuster MD;  Location:  [facility]     TONSILLECTOMY  Age 11       Social History:   reports that she has never smoked.  She has never used smokeless tobacco. She reports that she drinks alcohol. She reports that she does not use drugs.    Family History:  Family History   Problem Relation Age of Onset     Cancer Father         lung smoker     Skin Cancer Son         skin cancer     Glaucoma No family hx of      Macular Degeneration No family hx of      Eye Surgery No family hx of      Retinal detachment No family hx of      Melanoma No family hx of        Medications:  Current Outpatient Medications   Medication Sig Dispense Refill     metroNIDAZOLE (METROGEL) 1 % gel Apply topically once or twice daily daily to the face for rosacea. 60 g 3     triamcinolone (KENALOG) 0.1 % ointment Apply topically 2 times daily To affected areas on the arms, legs, chest, and back a needed for itch. 80 g 1       No Known Allergies    Review of Systems:  -Constitutional: The patient denies fatigue, fevers, chills, unintended weight loss, and night sweats.  -HEENT: Patient denies nonhealing oral sores.  -Skin: As above in HPI. No additional skin concerns.  -Heme/Lymph: no concerning bumps, no bleeding or bruising problems     Physical exam:  Vitals: There were no vitals taken for this visit.  GEN: This is a well developed, well-nourished female in no acute distress, in a pleasant mood.    SKIN: Full skin, which includes the head/face, both arms, chest, back, abdomen,both legs, genitalia and/or groin buttocks, digits and/or nails, was examined.  -Bishop's skin type I-II, less than 100 nevi   -patchy erythema on the posterior neck  -left anterior hip there is patchy erythema   -upper back, arms and legs there are diffuse lentigenes    -left upper back 6 mm erythematous a macule with overlying scale  -right upper back 4 mm pink shiny papule   -left popliteal fossa 1 cm erythematous nodular lesion  -No other lesions of concern on areas examined.     Impression/Plan:  1. Skin Check     ABCDs of melanoma were discussed and self skin checks were advised.      Sun precaution was advised including the use of sun screens of SPF 30 or higher, sun protective clothing, and avoidance of tanning beds.    Yearly skin check     2. Neoplasm of uncertain behavior on the Left upper back. The differential diagnosis includes BCC vs lichenoid keratosis vs other     Shave biopsy:  After discussion of benefits and risks including but not limited to bleeding/bruising, pain/swelling, infection, scar, incomplete removal, nerve damage/numbness, recurrence, and non-diagnostic biopsy, written consent, verbal consent and photographs were obtained. Time-out was performed. The area was cleaned with isopropyl alcohol. 0.5ml of 1% lidocaine with 1:100,000 epinephrine was injected to obtain adequate anesthesia. A shave biopsy was performed. Hemostasis was achieved with aluminium chloride. Vaseline and a sterile dressing were applied. The patient tolerated the procedure and no complications were noted. The patient was provided with verbal and written post care instructions.    Photograph was obtained for clinical monitoring and inclusion in medical record.    3. Neoplasm of uncertain behavior on the right upper back . The differential diagnosis includes BCC vs lichenoid keratosis vs other.     Shave biopsy:  After discussion of benefits and risks including but not limited to bleeding/bruising, pain/swelling, infection, scar, incomplete removal, nerve damage/numbness, recurrence, and non-diagnostic biopsy, written consent, verbal consent and photographs were obtained. Time-out was performed. The area was cleaned with isopropyl alcohol. 0.5ml of 1% lidocaine with 1:100,000 epinephrine was injected to obtain adequate anesthesia. A shave biopsy was performed. Hemostasis was achieved with aluminium chloride. Vaseline and a sterile dressing were applied. The patient tolerated the procedure and no complications were noted. The patient was provided with verbal and written post care  instructions.    Photograph was obtained for clinical monitoring and inclusion in medical record.    4. Neoplasm of uncertain behavior on the left popliteal fossa. The differential diagnosis includes lichenoid keratosis vs BCC vs other.     Shave biopsy:  After discussion of benefits and risks including but not limited to bleeding/bruising, pain/swelling, infection, scar, incomplete removal, nerve damage/numbness, recurrence, and non-diagnostic biopsy, written consent, verbal consent and photographs were obtained. Time-out was performed. The area was cleaned with isopropyl alcohol. 0.5ml of 1% lidocaine with 1:100,000 epinephrine was injected to obtain adequate anesthesia. A shave biopsy was performed. Hemostasis was achieved with aluminium chloride. Vaseline and a sterile dressing were applied. The patient tolerated the procedure and no complications were noted. The patient was provided with verbal and written post care instructions.    Photograph was obtained for clinical monitoring and inclusion in medical record.    5. Rosacea - stable    Continue metronidazole gel    BBL working well on face, edu on risks and benfits of procedure, patient may continue as this has been beneficial for her    6. Contact dermatitis on posterior neck 2/2 gold necklace and eczematous dermatis on the lower legs     Avoid gold jewelry when possible    May use TMC when needed    Discussed continued use of emollients      CC Dr. Flores on close of this encounter.  Follow-up in 1 year, earlier for new or changing lesions.       Staff Involved:  Staff/Scribe    Scribe Disclosure:  LAURENCE, Reta Adame, am serving as a scribe to document services personally performed by Rita Hicks PA-C, based on data collection and the provider's statements to me.     Provider Disclosure:   The documentation recorded by the scribe accurately reflects the services I personally performed and the decisions made by me.    All risks, benefits and alternatives  were discussed with patient.  Patient is in agreement and understands the assessment and plan.  All questions were answered.    Rita Hicks PA-C  [facility] [place]: Phone: [phone], Fax: [phone]

## 2019-05-14 NOTE — NURSING NOTE
Lidocaine-epinephrine 1-1:516077 % injection   2mL once for one use, starting 5/14/2019 ending 5/14/2019,  2mL disp, R-0, injection  Injected by JACOBO Hargrove

## 2019-05-14 NOTE — LETTER
5/14/2019       RE: Rafaela Rock  [address] [place] [id number]     Dear Colleague,    Thank you for referring your patient, Rafaela Rock, to the [facility] DERMATOLOGY at [facility]. Please see a copy of my visit note below.    [facility] Dermatology Note      Dermatology Problem List:  1. AKs. Cryo  2. Nummular eczema/xerosis.  - Rx. TMC 0.1% ointment.  3. Rosacea.  - Rx. Metronidazole gel.  # NUB, R upper chest, bx 5/11/2018. Ddx BCC v excoriation. Performed cautery to base of biopsy so if returns as low risk sub-type of BCC, will not perform further treatment. **NOTE - pathology report says LEFT upper chest but biopsy was performed on RIGHT upper chest**  4. Nub on the left upper back. S/p bx 5/14/19  5. Nub on the right upper back. S/p bx 5/14/19  6. Nub on the popliteal fossa. S/p bx 5/14/19  7. Contact dermatitis to metals - gold      Encounter Date: May 14, 2019    CC:  Chief Complaint   Patient presents with     Derm Problem     Rafaela, is being seen today for a 1 year skin check, no new areas of concern as reported by patient.         History of Present Illness:  Ms. Rafaela Rock is a 74 year old female who presents as a follow-up for FBSE. The patient was last seen 5/10/18 when a shave biopsy was preformed on the right upper chest. Pathology reports benign lichenoid keratosis. Additionally patient was recommended to use metronidazole gel for her rosacea, and this is working well for her. At today's visit patient states that she has been applying sun screen regularly. She does continue to use the metronidazole gel and is not in a need of refills. Se also mentions to me that her brown spots on her arms and legs and face have become particularly bothersome and sometimes embarrassing, especially in the summer. She has been receiving BBL treatments on the face which have helped there. She is considering having this done on the arms and  legs as well. She is otherwise in her nml state of health and has no additional sx. The patient denies painful, itching, tingling or bleeding lesions unless otherwise noted.    Past Medical History:   Patient Active Problem List   Diagnosis     Hyperlipidemia with target LDL less than 130     OA (osteoarthritis)     Rosacea     Impaired fasting glucose     POSTERIOR VITREOUS DETACHMENT, os     Advanced directives, counseling/discussion     Cataract     S/P total knee arthroplasty - left     Vitamin D deficiency     AK (actinic keratosis)     Xerosis of skin     Intertrigo     Inflamed seborrheic keratosis     Status post total left knee replacement     Rash     Diffuse photodamage of skin     Obesity (BMI 35.0-39.9) with comorbidity (H)     Trochanteric bursitis of left hip     Past Medical History:   Diagnosis Date     AK (actinic keratosis) 2/2/2016     Allergic rhinitis      Asthma     mild intermittent     Hyperlipidemia LDL goal < 130      Impaired fasting glucose      Lyme disease      Nonsenile cataract      OA (osteoarthritis)      Obesity      PONV (postoperative nausea and vomiting)      Rosacea      Vitamin D deficiency 10/12     Past Surgical History:   Procedure Laterality Date     C NONSPECIFIC PROCEDURE  9/19/2003    Left knee scope/OA,MMT     C TOTAL KNEE ARTHROPLASTY Left 4/2/2008    Left     C TOTAL KNEE ARTHROPLASTY Right 8/17/16    DML     CATARACT IOL, RT/LT  12/27/2013    Toric- RE     CATARACT IOL, RT/LT  12/05/2013    LE     COLONOSCOPY       COLONOSCOPY  5-5-14    Return for Colonoscopy in 1 yrs     PHACOEMULSIFICATION CLEAR CORNEA WITH TORIC INTRAOCULAR LENS IMPLANT  12/6/2013    Procedure: PHACOEMULSIFICATION CLEAR CORNEA WITH TORIC INTRAOCULAR LENS IMPLANT;  LEFT PHACOEMULSIFICATION CLEAR CORNEA WITH TORIC INTRAOCULAR LENS IMPLANT  ;  Surgeon: Otoniel Schuster MD;  Location: [facility]     PHACOEMULSIFICATION CLEAR CORNEA WITH TORIC INTRAOCULAR LENS IMPLANT  12/27/2013    Procedure:  PHACOEMULSIFICATION CLEAR CORNEA WITH TORIC INTRAOCULAR LENS IMPLANT;  RIGHT PHACOEMULSIFICATION CLEAR CORNEA WITH TORIC INTRAOCULAR LENS IMPLANT  ;  Surgeon: Otoniel Schuster MD;  Location:  [facility]     TONSILLECTOMY  Age 11       Social History:   reports that she has never smoked. She has never used smokeless tobacco. She reports that she drinks alcohol. She reports that she does not use drugs.    Family History:  Family History   Problem Relation Age of Onset     Cancer Father         lung smoker     Skin Cancer Son         skin cancer     Glaucoma No family hx of      Macular Degeneration No family hx of      Eye Surgery No family hx of      Retinal detachment No family hx of      Melanoma No family hx of        Medications:  Current Outpatient Medications   Medication Sig Dispense Refill     metroNIDAZOLE (METROGEL) 1 % gel Apply topically once or twice daily daily to the face for rosacea. 60 g 3     triamcinolone (KENALOG) 0.1 % ointment Apply topically 2 times daily To affected areas on the arms, legs, chest, and back a needed for itch. 80 g 1       No Known Allergies    Review of Systems:  -Constitutional: The patient denies fatigue, fevers, chills, unintended weight loss, and night sweats.  -HEENT: Patient denies nonhealing oral sores.  -Skin: As above in HPI. No additional skin concerns.  -Heme/Lymph: no concerning bumps, no bleeding or bruising problems     Physical exam:  Vitals: There were no vitals taken for this visit.  GEN: This is a well developed, well-nourished female in no acute distress, in a pleasant mood.    SKIN: Full skin, which includes the head/face, both arms, chest, back, abdomen,both legs, genitalia and/or groin buttocks, digits and/or nails, was examined.  -Bishop's skin type I-II, less than 100 nevi   -patchy erythema on the posterior neck  -left anterior hip there is patchy erythema   -upper back, arms and legs there are diffuse lentigenes    -left upper back 6 mm  erythematous a macule with overlying scale  -right upper back 4 mm pink shiny papule   -left popliteal fossa 1 cm erythematous nodular lesion  -No other lesions of concern on areas examined.     Impression/Plan:  1. Skin Check     ABCDs of melanoma were discussed and self skin checks were advised.     Sun precaution was advised including the use of sun screens of SPF 30 or higher, sun protective clothing, and avoidance of tanning beds.    Yearly skin check     2. Neoplasm of uncertain behavior on the Left upper back. The differential diagnosis includes BCC vs lichenoid keratosis vs other     Shave biopsy:  After discussion of benefits and risks including but not limited to bleeding/bruising, pain/swelling, infection, scar, incomplete removal, nerve damage/numbness, recurrence, and non-diagnostic biopsy, written consent, verbal consent and photographs were obtained. Time-out was performed. The area was cleaned with isopropyl alcohol. 0.5ml of 1% lidocaine with 1:100,000 epinephrine was injected to obtain adequate anesthesia. A shave biopsy was performed. Hemostasis was achieved with aluminium chloride. Vaseline and a sterile dressing were applied. The patient tolerated the procedure and no complications were noted. The patient was provided with verbal and written post care instructions.    Photograph was obtained for clinical monitoring and inclusion in medical record.    3. Neoplasm of uncertain behavior on the right upper back . The differential diagnosis includes BCC vs lichenoid keratosis vs other.     Shave biopsy:  After discussion of benefits and risks including but not limited to bleeding/bruising, pain/swelling, infection, scar, incomplete removal, nerve damage/numbness, recurrence, and non-diagnostic biopsy, written consent, verbal consent and photographs were obtained. Time-out was performed. The area was cleaned with isopropyl alcohol. 0.5ml of 1% lidocaine with 1:100,000 epinephrine was injected to  obtain adequate anesthesia. A shave biopsy was performed. Hemostasis was achieved with aluminium chloride. Vaseline and a sterile dressing were applied. The patient tolerated the procedure and no complications were noted. The patient was provided with verbal and written post care instructions.    Photograph was obtained for clinical monitoring and inclusion in medical record.    4. Neoplasm of uncertain behavior on the left popliteal fossa. The differential diagnosis includes lichenoid keratosis vs BCC vs other.     Shave biopsy:  After discussion of benefits and risks including but not limited to bleeding/bruising, pain/swelling, infection, scar, incomplete removal, nerve damage/numbness, recurrence, and non-diagnostic biopsy, written consent, verbal consent and photographs were obtained. Time-out was performed. The area was cleaned with isopropyl alcohol. 0.5ml of 1% lidocaine with 1:100,000 epinephrine was injected to obtain adequate anesthesia. A shave biopsy was performed. Hemostasis was achieved with aluminium chloride. Vaseline and a sterile dressing were applied. The patient tolerated the procedure and no complications were noted. The patient was provided with verbal and written post care instructions.    Photograph was obtained for clinical monitoring and inclusion in medical record.    5. Rosacea - stable    Continue metronidazole gel    BBL working well on face, edu on risks and benfits of procedure, patient may continue as this has been beneficial for her    6. Contact dermatitis on posterior neck 2/2 gold necklace and eczematous dermatis on the lower legs     Avoid gold jewelry when possible    May use TMC when needed    Discussed continued use of emollients      CC Dr. Flores on close of this encounter.  Follow-up in 1 year, earlier for new or changing lesions.       Staff Involved:  Staff/Scribe    Scribe Disclosure:  Reta DANG am serving as a scribe to document services personally performed  by Rita Hicks PA-C, based on data collection and the provider's statements to me.     Provider Disclosure:   The documentation recorded by the scribe accurately reflects the services I personally performed and the decisions made by me.    All risks, benefits and alternatives were discussed with patient.  Patient is in agreement and understands the assessment and plan.  All questions were answered.    Rita Hicks PA-C  [facility] [place]: Phone: [phone], Fax: [phone]                                  Pictures were placed in Pt's chart today for future reference.

## 2019-05-14 NOTE — NURSING NOTE
Dermatology Rooming Note    Rafaela Rock's goals for this visit include:   Chief Complaint   Patient presents with     Derm Problem     Rafaela, is being seen today for a 1 year skin check, no new areas of concern as reported by patient.     Iraida Lynne LPN

## 2019-05-15 LAB — COPATH REPORT: NORMAL

## 2019-07-16 ENCOUNTER — MYC REFILL (OUTPATIENT)
Dept: DERMATOLOGY | Facility: CLINIC | Age: 74
End: 2019-07-16

## 2019-07-16 DIAGNOSIS — D48.5 NEOPLASM OF UNCERTAIN BEHAVIOR OF SKIN: ICD-10-CM

## 2019-07-16 DIAGNOSIS — L30.0 NUMMULAR ECZEMA: ICD-10-CM

## 2019-07-17 RX ORDER — TRIAMCINOLONE ACETONIDE 1 MG/G
OINTMENT TOPICAL 2 TIMES DAILY
Qty: 80 G | Refills: 3 | Status: SHIPPED | OUTPATIENT
Start: 2019-07-17 | End: 2020-03-23

## 2019-09-09 ENCOUNTER — MYC MEDICAL ADVICE (OUTPATIENT)
Dept: ORTHOPEDICS | Facility: CLINIC | Age: 74
End: 2019-09-09

## 2019-10-14 ENCOUNTER — MYC MEDICAL ADVICE (OUTPATIENT)
Dept: ORTHOPEDICS | Facility: CLINIC | Age: 74
End: 2019-10-14

## 2019-10-17 ENCOUNTER — TELEPHONE (OUTPATIENT)
Dept: FAMILY MEDICINE | Facility: CLINIC | Age: 74
End: 2019-10-17

## 2019-10-17 NOTE — TELEPHONE ENCOUNTER
Patient sent the following message via [email or web address] Request:    Received 2nd Shingle shot at [facility] Monday Oct. 14.   Please add this info. to my chart.   Thanks ,   Rafaela

## 2019-10-17 NOTE — TELEPHONE ENCOUNTER
RN updated immunization record.     Rossana Schuster, RN, BSN, PHN  [facility]: [place]

## 2019-10-30 ENCOUNTER — ANCILLARY PROCEDURE (OUTPATIENT)
Dept: MAMMOGRAPHY | Facility: CLINIC | Age: 74
End: 2019-10-30
Attending: FAMILY MEDICINE
Payer: COMMERCIAL

## 2019-10-30 DIAGNOSIS — Z12.31 VISIT FOR SCREENING MAMMOGRAM: ICD-10-CM

## 2019-10-30 PROCEDURE — 77067 SCR MAMMO BI INCL CAD: CPT | Mod: TC

## 2019-10-30 PROCEDURE — 77063 BREAST TOMOSYNTHESIS BI: CPT | Mod: TC

## 2019-12-05 ENCOUNTER — OFFICE VISIT (OUTPATIENT)
Dept: OPHTHALMOLOGY | Facility: CLINIC | Age: 74
End: 2019-12-05
Payer: COMMERCIAL

## 2019-12-05 DIAGNOSIS — H15.101 EPISCLERITIS OF RIGHT EYE: Primary | ICD-10-CM

## 2019-12-05 RX ORDER — FLUOROMETHOLONE 0.1 %
1 SUSPENSION, DROPS(FINAL DOSAGE FORM)(ML) OPHTHALMIC (EYE) 4 TIMES DAILY
Qty: 1 BOTTLE | Refills: 1 | Status: SHIPPED | OUTPATIENT
Start: 2019-12-05 | End: 2019-12-28

## 2019-12-05 ASSESSMENT — REFRACTION_WEARINGRX
OS_ADD: +2.75
SPECS_TYPE: PAL
OD_AXIS: 145
OD_CYLINDER: +1.25
OD_SPHERE: -1.00
OD_ADD: +2.75
OS_CYLINDER: +1.25
OS_AXIS: 035
OS_SPHERE: -1.00

## 2019-12-05 ASSESSMENT — CONF VISUAL FIELD
OD_NORMAL: 1
OS_NORMAL: 1
METHOD: COUNTING FINGERS

## 2019-12-05 ASSESSMENT — EXTERNAL EXAM - RIGHT EYE: OD_EXAM: NORMAL

## 2019-12-05 ASSESSMENT — TONOMETRY
OS_IOP_MMHG: 8
IOP_METHOD: ICARE
OD_IOP_MMHG: 8

## 2019-12-05 ASSESSMENT — EXTERNAL EXAM - LEFT EYE: OS_EXAM: NORMAL

## 2019-12-05 ASSESSMENT — VISUAL ACUITY
METHOD: SNELLEN - LINEAR
OD_CC: 20/20
OS_CC: 20/30
CORRECTION_TYPE: GLASSES

## 2019-12-05 NOTE — PROGRESS NOTES
History  HPI     Eye Pain Right Eye       In right eye.  Characterized as aching and irritation.  Pain was noted as 5/10.  Occurring constantly.  It is worse throughout the day.  Duration of 1 week.  Since onset it is stable.  Associated symptoms include redness.  Negative for discharge and blurred vision.  Treatments tried include artificial tears.  Response to treatment was no improvement.              Comments     Pt states red eye for the last week. Pt denies any vision change or discharge. Right eye constantly aching and hurts around the eye brow. Refresh PRN each eye.    CHENG Herzog COT 12:42 PM December 5, 2019             Last edited by Shell Bray COT on 12/5/2019 12:42 PM. (History)          Assessment/Plan  (H15.101) Episcleritis of right eye  (primary encounter diagnosis)  Comment: Sectoral episcleritis left eye, blanched with phenylephrine  Plan: fluorometholone (FML LIQUIFILM) 0.1 % ophthalmic suspension         Educated patient on condition and clinical findings. Prescribed FML four times each day right eye and recommended frequent use of non-preserved artificial tears. Return to clinic in 1 week for follow-up.    Complete documentation of historical and exam elements from today's encounter can  be found in the full encounter summary report (not reduplicated in this progress  note). I personally obtained the chief complaint(s) and history of present illness. I  confirmed and edited as necessary the review of systems, past medical/surgical  history, family history, social history, and examination findings as documented by  others; and I examined the patient myself. I personally reviewed the relevant tests,  images, and reports as documented above. I formulated and edited as necessary the  assessment and plan and discussed the findings and management plan with the  patient and family.    Blue Rojas OD, FAAO

## 2019-12-05 NOTE — NURSING NOTE
Chief Complaints and History of Present Illnesses   Patient presents with     Eye Pain Right Eye     Chief Complaint(s) and History of Present Illness(es)     Eye Pain Right Eye     Laterality: In right eye    Quality: aching and irritation    Pain scale: 5/10    Frequency: constantly    Timing: throughout the day    Duration: 1 week    Course: stable    Associated symptoms: redness.  Negative for discharge and blurred vision    Treatments tried: artificial tears    Response to treatment: no improvement              Comments     Pt states red eye for the last week. Pt denies any vision change or discharge. Right eye constantly aching and hurts around the eye brow. Refresh PRN each eye.    CHENG Hezrog COT 12:42 PM December 5, 2019

## 2019-12-12 ENCOUNTER — OFFICE VISIT (OUTPATIENT)
Dept: OPHTHALMOLOGY | Facility: CLINIC | Age: 74
End: 2019-12-12
Payer: COMMERCIAL

## 2019-12-12 DIAGNOSIS — H15.101 EPISCLERITIS OF RIGHT EYE: Primary | ICD-10-CM

## 2019-12-12 DIAGNOSIS — D23.10 PAPILLOMA OF RIGHT EYELID: ICD-10-CM

## 2019-12-12 ASSESSMENT — EXTERNAL EXAM - LEFT EYE: OS_EXAM: NORMAL

## 2019-12-12 ASSESSMENT — TONOMETRY
OS_IOP_MMHG: 8
IOP_METHOD: ICARE
OD_IOP_MMHG: 9

## 2019-12-12 ASSESSMENT — VISUAL ACUITY
OS_CC: 20/20
CORRECTION_TYPE: GLASSES
OS_CC+: -3
OD_CC: 20/20
METHOD: SNELLEN - LINEAR

## 2019-12-12 ASSESSMENT — CONF VISUAL FIELD
OD_NORMAL: 1
METHOD: COUNTING FINGERS
OS_NORMAL: 1

## 2019-12-12 ASSESSMENT — EXTERNAL EXAM - RIGHT EYE: OD_EXAM: NORMAL

## 2019-12-12 NOTE — PROGRESS NOTES
History  HPI     Follow Up     In right eye.  Onset was gradual.  This started days ago.  Occurring constantly.  Since onset it is gradually improving.  Treatments tried include eye drops.  Response to treatment was significant improvement.  Pain was noted as 0/10. Additional comments: Episcleritis of right eye               Comments     Right eye doing better. Redness much better. Very itchy still. No eye pain. No blurred vision.    FML QID OD, ATs PRN    Sujata Drake COT 2:36 PM December 12, 2019             Last edited by Sujata Drake on 12/12/2019  2:36 PM. (History)          Assessment/Plan  (H15.101) Episcleritis of right eye  (primary encounter diagnosis)  Comment: Symptoms and signs improving  Plan:  Educated patient on condition and clinical findings. Taper FML to twice each day for 1 week, once every day for 1 week, then discontinue. Moniotor at exam in 1 month.    (D23.10) Papilloma of right eyelid  Comment: Symptomatic, interested in excision  Plan:  Referred to Dr. Pepper for oculoplastics consultation    Return to clinic in 1 month for comprehensive eye exam.    Complete documentation of historical and exam elements from today's encounter can  be found in the full encounter summary report (not reduplicated in this progress  note). I personally obtained the chief complaint(s) and history of present illness. I  confirmed and edited as necessary the review of systems, past medical/surgical  history, family history, social history, and examination findings as documented by  others; and I examined the patient myself. I personally reviewed the relevant tests,  images, and reports as documented above. I formulated and edited as necessary the  assessment and plan and discussed the findings and management plan with the  patient and family.    Blue Rojas, OD, FAAO

## 2019-12-12 NOTE — NURSING NOTE
Chief Complaints and History of Present Illnesses   Patient presents with     Follow Up     Episcleritis of right eye      Chief Complaint(s) and History of Present Illness(es)     Follow Up     Laterality: right eye    Onset: gradual    Onset: days ago    Frequency: constantly    Course: gradually improving    Treatments tried: eye drops    Response to treatment: significant improvement    Pain scale: 0/10    Comments: Episcleritis of right eye               Comments     Right eye doing better. Redness much better. Very itchy still. No eye pain. No blurred vision.    FMNGUYEN ROY OD, ATs PRN    Sujata Drake COT 2:36 PM December 12, 2019

## 2019-12-13 NOTE — TELEPHONE ENCOUNTER
FUTURE VISIT INFORMATION      FUTURE VISIT INFORMATION:    Date: 1/14/20    Time: 12:15pm    Location: [facility]  REFERRAL INFORMATION:    Referring provider:  Blue Rojas    Referring providers clinic:  [facility]    Reason for visit/diagnosis  lower lid pappule- symptomatic    RECORDS REQUESTED FROM:       Clinic name Comments Records Status Imaging Status   [facility] OV/referral 12/12/19- [place]  OV/notes 7/13/11-12/12/19 EPIC

## 2019-12-24 ENCOUNTER — TELEPHONE (OUTPATIENT)
Dept: OPHTHALMOLOGY | Facility: CLINIC | Age: 74
End: 2019-12-24

## 2019-12-24 NOTE — TELEPHONE ENCOUNTER
Spoke with pt. Seen by Dr. Rojas in the past 3 weeks for presumed episcleritis right eye, improving on FML. Woke up last night with watering and thin white discharge of both eyes, and crusting eyes. Also with sore throat. No change in vision, no pain.    Likely viral conjunctivitis. Recommended hand hygiene (aggressive) and frequent ATs. Discussed that she should call if it's not getting better in a few days and will re-evaluate. Stressed to call if discharge worsens or vision decreases.     Chico Serrano MD  Ophthalmology Resident  PGY-3

## 2019-12-28 ENCOUNTER — OFFICE VISIT (OUTPATIENT)
Dept: OPHTHALMOLOGY | Facility: CLINIC | Age: 74
End: 2019-12-28
Payer: COMMERCIAL

## 2019-12-28 ENCOUNTER — PRE VISIT (OUTPATIENT)
Dept: OPHTHALMOLOGY | Facility: CLINIC | Age: 74
End: 2019-12-28

## 2019-12-28 DIAGNOSIS — H15.101 EPISCLERITIS OF RIGHT EYE: ICD-10-CM

## 2019-12-28 DIAGNOSIS — H16.203 KERATOCONJUNCTIVITIS OF BOTH EYES: ICD-10-CM

## 2019-12-28 RX ORDER — TOBRAMYCIN 3 MG/ML
SOLUTION/ DROPS OPHTHALMIC
COMMUNITY
Start: 2019-12-26 | End: 2020-09-04

## 2019-12-28 RX ORDER — TOBRAMYCIN 3 MG/ML
1-2 SOLUTION/ DROPS OPHTHALMIC 4 TIMES DAILY
Qty: 1 BOTTLE | Refills: 1 | Status: SHIPPED | OUTPATIENT
Start: 2019-12-28 | End: 2020-04-10

## 2019-12-28 RX ORDER — FLUOROMETHOLONE 0.1 %
1 SUSPENSION, DROPS(FINAL DOSAGE FORM)(ML) OPHTHALMIC (EYE) 4 TIMES DAILY
Qty: 1 BOTTLE | Refills: 1 | Status: SHIPPED | OUTPATIENT
Start: 2019-12-28 | End: 2020-09-04

## 2019-12-28 ASSESSMENT — CONF VISUAL FIELD
OS_NORMAL: 1
OD_NORMAL: 1
METHOD: COUNTING FINGERS

## 2019-12-28 ASSESSMENT — TONOMETRY
OS_IOP_MMHG: 9
IOP_METHOD: TONOPEN
OD_IOP_MMHG: 9

## 2019-12-28 ASSESSMENT — REFRACTION_WEARINGRX
OD_CYLINDER: +1.25
OD_AXIS: 145
OS_SPHERE: -1.00
SPECS_TYPE: PAL
OD_SPHERE: -1.00
OS_ADD: +2.75
OD_ADD: +2.75
OS_CYLINDER: +1.25
OS_AXIS: 035

## 2019-12-28 ASSESSMENT — VISUAL ACUITY
OD_CC+: -2
CORRECTION_TYPE: GLASSES
METHOD: SNELLEN - LINEAR
OS_CC: 20/25
OD_CC: 20/25
OS_CC+: -1

## 2019-12-28 ASSESSMENT — EXTERNAL EXAM - LEFT EYE: OS_EXAM: NORMAL

## 2019-12-28 ASSESSMENT — EXTERNAL EXAM - RIGHT EYE: OD_EXAM: NORMAL

## 2019-12-28 NOTE — NURSING NOTE
"Chief Complaints and History of Present Illnesses   Patient presents with     Follow Up     Episcleritis of right eye      Chief Complaint(s) and History of Present Illness(es)     Follow Up     Associated symptoms: redness, itching, discharge and tearing.  Negative for eye pain, photophobia, foreign body sensation and burning    Comments: Episcleritis of right eye               Comments     12/24/2019 Pt woke up to white mattering in RE.  \"White gooyey eyes.\"  She another Dr saw Pt in ER 12/26/2019.  He said it might be bacterial conjunctivitis.  She is using Flurometholone drops 4x a day RE and Tobramycin BE every 4 hours and also preservative free art tears.  She has seen improvement since starting the Tobramycin.    CHENG Cantu December 28, 2019 8:41 AM                  "

## 2019-12-28 NOTE — PATIENT INSTRUCTIONS
One drop 4 times per day then Taper treatment  1 drop 3 x per day 4 days, 1 drop 2 X per day 4 days, 1 drop once per day 1 week    Recheck on the 14th

## 2019-12-28 NOTE — PROGRESS NOTES
Assessment & Plan       Rafaela Rock is a 74 year old female with the following diagnoses:   1. Episcleritis of right eye - Right Eye    2. Keratoconjunctivitis of both eyes - Both Eyes          One drop 4 times per day then Taper treatment  1 drop 3 x per day 4 days, 1 drop 2 X per day 4 days, 1 drop once per day 1 week    Recheck on the 14th      Patient disposition:   No follow-ups on file.          Complete documentation of historical and exam elements from today's encounter can be found in the full encounter summary report (not reduplicated in this progress note). I personally obtained the chief complaint(s) and history of present illness.  I confirmed and edited as necessary the review of systems, past medical/surgical history, family history, social history, and examination findings as documented by others; and I examined the patient myself. I personally reviewed the relevant tests, images, and reports as documented above. I formulated and edited as necessary the assessment and plan and discussed the findings and management plan with the patient and family.  Dr. Rasheed Townsend

## 2020-01-10 ENCOUNTER — TELEPHONE (OUTPATIENT)
Dept: OPHTHALMOLOGY | Facility: CLINIC | Age: 75
End: 2020-01-10

## 2020-01-14 ENCOUNTER — OFFICE VISIT (OUTPATIENT)
Dept: OPHTHALMOLOGY | Facility: CLINIC | Age: 75
End: 2020-01-14

## 2020-01-14 ENCOUNTER — PRE VISIT (OUTPATIENT)
Dept: OPHTHALMOLOGY | Facility: CLINIC | Age: 75
End: 2020-01-14

## 2020-01-14 VITALS — HEIGHT: 65 IN | BODY MASS INDEX: 35.49 KG/M2 | WEIGHT: 213 LBS

## 2020-01-14 DIAGNOSIS — H02.9 EYELID LESION: Primary | ICD-10-CM

## 2020-01-14 DIAGNOSIS — H15.101 EPISCLERITIS OF RIGHT EYE: Primary | ICD-10-CM

## 2020-01-14 DIAGNOSIS — H02.834 DERMATOCHALASIS OF BOTH UPPER EYELIDS: ICD-10-CM

## 2020-01-14 DIAGNOSIS — L71.8 OCULAR ROSACEA: ICD-10-CM

## 2020-01-14 DIAGNOSIS — H52.223 REGULAR ASTIGMATISM OF BOTH EYES: ICD-10-CM

## 2020-01-14 DIAGNOSIS — H02.403 INVOLUTIONAL PTOSIS, ACQUIRED, BILATERAL: ICD-10-CM

## 2020-01-14 DIAGNOSIS — D23.10 PAPILLOMA OF RIGHT EYELID: ICD-10-CM

## 2020-01-14 DIAGNOSIS — H02.402 PTOSIS OF EYELID, LEFT: ICD-10-CM

## 2020-01-14 DIAGNOSIS — Z96.1 PSEUDOPHAKIA OF BOTH EYES: ICD-10-CM

## 2020-01-14 DIAGNOSIS — H02.831 DERMATOCHALASIS OF BOTH UPPER EYELIDS: ICD-10-CM

## 2020-01-14 RX ORDER — ERYTHROMYCIN 5 MG/G
OINTMENT OPHTHALMIC ONCE
Status: COMPLETED | OUTPATIENT
Start: 2020-01-14 | End: 2020-01-14

## 2020-01-14 RX ADMIN — ERYTHROMYCIN: 5 OINTMENT OPHTHALMIC at 13:17

## 2020-01-14 ASSESSMENT — MARGIN REFLEX DISTANCE
OS_MRD1: 3
OD_MRD1: 3

## 2020-01-14 ASSESSMENT — REFRACTION_MANIFEST
OD_AXIS: 156
OS_SPHERE: -1.00
OS_AXIS: 023
OS_CYLINDER: +1.75
OD_ADD: +2.75
OD_SPHERE: -1.00
OS_ADD: +2.75
OD_CYLINDER: +1.50

## 2020-01-14 ASSESSMENT — VISUAL ACUITY
OS_CC: 20/20
OD_CC+: -1
METHOD: SNELLEN - LINEAR
OD_CC: 20/20
OS_CC: 20/20
OD_CC: 20/20
CORRECTION_TYPE: GLASSES
OD_CC+: -1
METHOD: SNELLEN - LINEAR
OS_CC+: -1
CORRECTION_TYPE: GLASSES
OS_CC+: -1

## 2020-01-14 ASSESSMENT — CUP TO DISC RATIO
OD_RATIO: 0.4
OS_RATIO: 0.4

## 2020-01-14 ASSESSMENT — REFRACTION_WEARINGRX
OS_CYLINDER: +1.25
OS_SPHERE: -1.00
OD_ADD: +2.75
SPECS_TYPE: PAL
OD_AXIS: 145
OD_SPHERE: -1.00
OD_CYLINDER: +1.25
OS_ADD: +2.75
OS_AXIS: 035

## 2020-01-14 ASSESSMENT — TONOMETRY
OS_IOP_MMHG: 9
OS_IOP_MMHG: 09
IOP_METHOD: ICARE
OD_IOP_MMHG: 10
IOP_METHOD: ICARE
OD_IOP_MMHG: 10

## 2020-01-14 ASSESSMENT — CONF VISUAL FIELD
METHOD: COUNTING FINGERS
OD_NORMAL: 1
OS_NORMAL: 1
OD_NORMAL: 1
OS_NORMAL: 1

## 2020-01-14 ASSESSMENT — EXTERNAL EXAM - RIGHT EYE
OD_EXAM: NORMAL
OD_EXAM: NORMAL

## 2020-01-14 ASSESSMENT — EXTERNAL EXAM - LEFT EYE
OS_EXAM: NORMAL
OS_EXAM: NORMAL

## 2020-01-14 ASSESSMENT — MIFFLIN-ST. JEOR: SCORE: 1457.04

## 2020-01-14 NOTE — PROGRESS NOTES
History  HPI     Annual Eye Exam     In both eyes.  This started months ago.  Occurring constantly.  Since onset it is stable.  Treatments tried include eye drops.  Response to treatment was significant improvement.  Pain was noted as 0/10.              Comments     Doing much better since last visit. Vision is stable. No eye pain or irritation. Using FML once a day, Tobramycin once a day    Sujata Drake COT 11:40 AM January 14, 2020             Last edited by Sujata Drake on 1/14/2020 11:41 AM. (History)          Assessment/Plan  (H15.101) Episcleritis of right eye - Right Eye  (primary encounter diagnosis)  Comment: Clinical signs resolved  Plan:  Educated patient on clinical findings. Continue taper as prescribed (course complete in 4 days) and then discontinue. Follow-up as needed.    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 Return if symptoms worsen for oral doxycycline treatment.     (H52.223) Regular astigmatism of both eyes  Comment: Mixed astigmatism with presbyopia both eyes   Plan: REFRACTION         Dispensed spectacle prescription for full time wear. Educated patient on possibility of adaptation period, if symptoms do not improve return to clinic for further testing.    (D23.10) Papilloma of right eyelid  Comment: Longstanding, bothersome  Plan:  Referred to Dr. Pepper for oculoplastics consultation.    (H02.402) Ptosis of eyelid, left  Comment: Longstanding, asymptomatic  Plan:  See above regarding referral.    (H02.831,  H02.834) Dermatochalasis of both upper eyelids  Comment: Needing to raise eyebrow to see clearly  Plan:  See above regarding referral.    (Z96.1) Pseudophakia of both eyes  Comment: Stable  Plan:  No treatment indicated at this time. Monitor annually.    Return to clinic in 1 year for comprehensive eye exam.    Complete documentation of historical and exam elements from today's encounter can  be found  in the full encounter summary report (not reduplicated in this progress  note). I personally obtained the chief complaint(s) and history of present illness. I  confirmed and edited as necessary the review of systems, past medical/surgical  history, family history, social history, and examination findings as documented by  others; and I examined the patient myself. I personally reviewed the relevant tests,  images, and reports as documented above. I formulated and edited as necessary the  assessment and plan and discussed the findings and management plan with the  patient and family.    Blue Rojas OD, FAAO

## 2020-01-14 NOTE — PROGRESS NOTES
Oculoplastic Clinic New Patient    Patient: Rafaela Rock MRN# [id number]   YOB: 1945 Age: 74 year old   Date of Visit: Jan 14, 2020    CC: Droopy eyelids obstructing vision.              HPI:     Chief Complaint(s) and History of Present Illness(es)     Consult For     Laterality: right eye              Comments     Papilloma of right eyelid-Doing much better since last visit. Vision is   stable. No eye pain or irritation. Using FML once a day, Tobramycin once a   day     Sujata Vidal COT 11:40 AM January 14, 2020          She also has noted gradual onset of droopy eyelids over the past years. The droopy eyelid is interfering with activities of daily living including driving, and reading. She notes that she has to stop reading and raise her brows to get her eyelids elevated again. The patient denies double vision, variability of the eyelid position. She is currently being treated for episcleritis. She has used Refresh and Systane in the past.     Lesion present for 1 year and now catching on her eyelashes and causing irritation. Seems to be enlarging because was barely noticeable in the past. No history of skin cancer.         Assessment & Plan     Rafaela Rock is a 74 year old female with the following diagnoses:   1. Eyelid lesion    2. Dermatochalasis of both upper eyelids    3. Involutional ptosis, acquired, bilateral       While she is symptomatic of the heavy upper eyelids, she does not meet insurance criteria. Discussed options. Plan: Observe    Right lower lid lesion favor EIC, but symptomatic as it is in her lash line. Plan: excisional biopsy    ANTICOAGULATION:  None         Attending Physician Attestation:  Complete documentation of historical and exam elements from today's encounter can be found in the full encounter summary report (not reduplicated in this progress note).  I personally obtained the chief complaint(s) and history of present illness.  I confirmed and edited as necessary  the review of systems, past medical/surgical history, family history, social history, and examination findings as documented by others; and I examined the patient myself.  I personally reviewed the relevant tests, images, and reports as documented above.  I formulated and edited as necessary the assessment and plan and discussed the findings and management plan with the patient and family. I personally reviewed the ophthalmic test(s) associated with this encounter, agree with the interpretation(s) as documented by the resident/fellow, and have edited the corresponding report(s) as necessary.   -Cristopher Pepper MD        Operative Note - Eyelid Biopsy      Jan 14, 2020    Pre-operative Diagnosis: Lesion right eye Lower Eyelid    Post-operative Diagnosis: Same.    Procedure: Excision of eyelid neoplasm.    Surgeon: Cristopher Pepper MD    Assistant: None    Anesthesia: Local infiltration with 2% Lidocaine and Epinephrine.    Complications: None.    Estimated blood loss: <5 mL    Specimen: Eyelid neoplasm to pathology.    Procedure: The patient was brought to the minor procedure room and placed supine on the operating table.  The involved eyelid was infiltrated with local anesthetic.  The area was prepped and draped in the typical sterile fashion.  A tooth forceps was used to elevate the lesion and it was excised at its base with a Leilani scissors.  Hemostasis was obtained with a high temperature cautery.  The excised diameter measured 4 mm.      Closure of wound: Granulation    Dressing: The wound was dressed with Erythromycin.    Disposition: The patient left the minor procedure room in stable condition.    I was present for the entire procedure. Cristopher Pepper MD, MD

## 2020-01-14 NOTE — NURSING NOTE
Chief Complaints and History of Present Illnesses   Patient presents with     Consult For     Chief Complaint(s) and History of Present Illness(es)     Consult For     Laterality: right eye              Comments     Papilloma of right eyelid-Doing much better since last visit. Vision is stable. No eye pain or irritation. Using FML once a day, Tobramycin once a day     Sujata Drake COT 11:40 AM January 14, 2020

## 2020-01-14 NOTE — LETTER
"January 20, 2020      Radhika Roberts  [address] [place] [id number]        Dear Radhika,    Please see below for your test results. As expected, it was a benign cyst of the skin.    Resulted Orders   Surgical pathology exam   Result Value Ref Range    Copath Report       Patient Name: RADHIKA ROBERTS  MR#: [id number]  Specimen #:   Collected: 1/14/2020  Received: 1/14/2020  Reported: 1/17/2020 12:11  Ordering Phy(s): SUSAN ODEN    For improved result formatting, select 'View Enhanced Report Format' under   Linked Documents section.    SPECIMEN(S):  Skin, right lower eyelid    FINAL DIAGNOSIS:  Lower eyelid, right, biopsy: Epidermoid (keratin) cyst.    I have personally reviewed all specimens and/or slides, including the   listed special stains, and used them  with my medical judgement to determine or confirm the final diagnosis.    Electronically signed out by:    Meseret Jay M.D., [facility]    CLINICAL HISTORY:  The patient is a 74 year old female with a lesion of the right lower   eyelid. She undergoes biopsy to rule out  malignancy.    GROSS:  A: The specimen is received in formalin with proper patient   identification, labeled \"right lower lid lesion\".  The specimen consists of one segment of tan soft tissue measuring 0.3 cm   in  greatest dimension.  It is wrapped  and entirely submitted in cassette A1. (Dictated by: Catrina WOODSON   [facility] 1/14/2020 03:47 PM)    MICROSCOPIC:  The tissue consists of skin with fragments of cyst wall present in the   dermis. The cyst is lined by stratified  squamous keratinizing epithelium. The lumen contains keratin.    The technical component of this testing was completed at the [facility], with the professional component performed   at the [facility], [address] [place] [phone] ([phone])    CPT Codes:  A: " [id number]    COLLECTION SITE:  Client: [facility] [place]  Location: [facility] ()           If you have any questions, please call the clinic to make an appointment.    Sincerely,    Cristopher Pepper MD  Opthalmic Plastic & Reconstructive Surgery  Department of Ophthalmology & Visual Neurosciences    [facility]

## 2020-01-14 NOTE — NURSING NOTE
Chief Complaints and History of Present Illnesses   Patient presents with     Annual Eye Exam     Chief Complaint(s) and History of Present Illness(es)     Annual Eye Exam     Laterality: both eyes    Onset: months ago    Frequency: constantly    Course: stable    Treatments tried: eye drops    Response to treatment: significant improvement    Pain scale: 0/10              Comments     Doing much better since last visit. Vision is stable. No eye pain or irritation. Using FML once a day, Tobramycin once a day    Sujata Drake COT 11:40 AM January 14, 2020

## 2020-01-14 NOTE — LETTER
2020         RE:  :  MRN: Rafaela Rock  1945  [id number]     Dear Dr. Blue Rojas,    Thank you for asking me to see your patient, Rafaela Rock, for an oculoplastic   consultation.  My assessment and plan are below.  For further details, please see my attached clinic note.           HPI:     Chief Complaint(s) and History of Present Illness(es)     Consult For     Laterality: right eye              Comments     Papilloma of right eyelid-Doing much better since last visit. Vision is   stable. No eye pain or irritation. Using FML once a day, Tobramycin once a   day     Sujata Hazelwacki COT 11:40 AM 2020          She also has noted gradual onset of droopy eyelids over the past years. The droopy eyelid is interfering with activities of daily living including driving, and reading. She notes that she has to stop reading and raise her brows to get her eyelids elevated again. The patient denies double vision, variability of the eyelid position. She is currently being treated for episcleritis. She has used Refresh and Systane in the past.     Lesion present for 1 year and now catching on her eyelashes and causing irritation. Seems to be enlarging because was barely noticeable in the past. No history of skin cancer.         Assessment & Plan     Rafaela Rock is a 74 year old female with the following diagnoses:   1. Eyelid lesion    2. Dermatochalasis of both upper eyelids    3. Involutional ptosis, acquired, bilateral       While she is symptomatic of the heavy upper eyelids, she does not meet insurance criteria. Discussed options. Plan: Observe    Right lower lid lesion favor EIC, but symptomatic as it is in her lash line. Plan: excisional biopsy    ANTICOAGULATION:  None      Again, thank you for allowing me to participate in the care of your patient.      Sincerely,    Cristopher Pepper MD  Department of Ophthalmology and Visual Neurosciences  [facility]    CC: Blue Rojas,  OD  [address] [phone]  VIA In Basket

## 2020-01-17 LAB — COPATH REPORT: NORMAL

## 2020-02-23 ENCOUNTER — HEALTH MAINTENANCE LETTER (OUTPATIENT)
Age: 75
End: 2020-02-23

## 2020-03-23 ENCOUNTER — MYC REFILL (OUTPATIENT)
Dept: DERMATOLOGY | Facility: CLINIC | Age: 75
End: 2020-03-23

## 2020-03-23 DIAGNOSIS — D48.5 NEOPLASM OF UNCERTAIN BEHAVIOR OF SKIN: ICD-10-CM

## 2020-03-23 DIAGNOSIS — L30.0 NUMMULAR ECZEMA: ICD-10-CM

## 2020-03-23 DIAGNOSIS — L71.9 ROSACEA: ICD-10-CM

## 2020-03-23 RX ORDER — TRIAMCINOLONE ACETONIDE 1 MG/G
OINTMENT TOPICAL 2 TIMES DAILY
Qty: 80 G | Refills: 0 | Status: SHIPPED | OUTPATIENT
Start: 2020-03-23 | End: 2020-09-04

## 2020-03-23 RX ORDER — METRONIDAZOLE 10 MG/G
GEL TOPICAL
Qty: 60 G | Refills: 0 | Status: SHIPPED | OUTPATIENT
Start: 2020-03-23 | End: 2021-08-02

## 2020-03-23 NOTE — TELEPHONE ENCOUNTER
Last Clinic Visit: 5/14/2019  [facility]   (RTC 1 Y)    metroNIDAZOLE (METROGEL) 1 % external gel   Derm #1  Continue metronidazole gel     triamcinolone (KENALOG) 0.1 % external ointment   Derm #4  May use TMC when needed

## 2020-04-10 ENCOUNTER — TELEPHONE (OUTPATIENT)
Dept: OPHTHALMOLOGY | Facility: CLINIC | Age: 75
End: 2020-04-10

## 2020-04-10 DIAGNOSIS — H16.203 KERATOCONJUNCTIVITIS OF BOTH EYES: ICD-10-CM

## 2020-04-10 RX ORDER — TOBRAMYCIN 3 MG/ML
SOLUTION/ DROPS OPHTHALMIC
Qty: 1 BOTTLE | Refills: 1 | Status: SHIPPED | OUTPATIENT
Start: 2020-04-10 | End: 2020-04-22

## 2020-04-10 NOTE — TELEPHONE ENCOUNTER
Spoke to pt at 0752    Left eye conjunctivitis concerns per patient  Left eye redness  More pain on top of eye ball  No light sensitivity  Increase eye strain   Vision stable  No swelling  No itching  No current mattering    H/o Epi-Scleritis-- last diagnosed in December by Dr. Townsend    steroid drop/taper (tobradex) helped previously    Asked pt to send [place] image and will ask Dr. Dillard to review and assist in plan of care    Tommie Diop RN 8:00 AM 04/10/20            [facility] Call Center    Phone Message    May a detailed message be left on voicemail: yes     Reason for Call: Symptoms or Concerns     If patient has red-flag symptoms, warm transfer to triage line    Current symptom or concern: Conjunctivitis - she was told to put cold packs on it but now it is matted and painful.  Please have staff call to discuss treatment options    Symptoms have been present for:  2 week(s)    Has patient previously been seen for this? No    By Cristopher Pepper MD    Date: 1/14/2020    Are there any new or worsening symptoms? Yes: severity incresing      Action Taken: Message routed to:  [facility] eye clinic    Travel Screening: Not Applicable

## 2020-04-10 NOTE — TELEPHONE ENCOUNTER
"Called and reviewed all symptoms with patient. Hx of possible episcleritis with current symptoms similar to past episode. Patient sent images and reviewed - showed some redness and mild chemosis of left eye. Discussed that difficult to fully assess without in-person visit but patient does not wish to come to clinic due to COVID-19 precautions and possible conjunctivitis.     Patient wishes to restart previous medication that was used during last \"flare.\" Patient to restart short course of Tobradex. Instructed patient on side effects of medication. Patient also to use artificial tears, cold compresses and avoid close contact with others in case it is viral conjunctivitis. Will call back next week to provide an update. Will call immediately with any worsening of symptoms. All questions answered.    Homar Dillard MD  Ophthalmology Resident, PGY-3    "

## 2020-04-10 NOTE — TELEPHONE ENCOUNTER
Left message may try to send to [email or web address]     Tommie Diop RN 9:59 AM 04/10/20

## 2020-04-10 NOTE — TELEPHONE ENCOUNTER
Called patient x 2 with no answer. Left voicemail with my direct number if patient wishes to send a picture. Instructed patient to send picture and call to discuss further.    Homar Dillard MD  Ophthalmology Resident, PGY-3

## 2020-04-10 NOTE — TELEPHONE ENCOUNTER
[facility] Call Center    Phone Message    May a detailed message be left on voicemail: yes     Reason for Call: Other: Patient called in and stated she can not upload a picture on mychart she can not figure it out she is asking if she can just send it to someone's phone. I added  to her care team and she still only sees her pcp on her mychart. Please follow up with the patient to discuss. Thank you.     Action Taken: Message routed to:  [facility] eye    Travel Screening: Not Applicable

## 2020-07-15 ENCOUNTER — TELEPHONE (OUTPATIENT)
Dept: FAMILY MEDICINE | Facility: CLINIC | Age: 75
End: 2020-07-15

## 2020-07-15 DIAGNOSIS — Z96.651 STATUS POST TOTAL RIGHT KNEE REPLACEMENT: ICD-10-CM

## 2020-07-15 RX ORDER — AMOXICILLIN 500 MG/1
2000 CAPSULE ORAL ONCE
Qty: 8 CAPSULE | Refills: 3 | Status: SHIPPED | OUTPATIENT
Start: 2020-07-15 | End: 2021-07-28

## 2020-07-15 NOTE — TELEPHONE ENCOUNTER
[facility] faxed a Prescription Refill Request for the following:    amoxicillin (AMOXIL) 500 MG capsule           Last Written Prescription Date:  10/6/2016  Last Fill Quantity: 8 capsule,   # refills: 3  Last Office Visit: 2019 w/ DARON Tolliver  Future Office visit:       Routing refill request to provider for review/approval because:  Drug not active on patient's medication list

## 2020-07-15 NOTE — TELEPHONE ENCOUNTER
Reason for Call:  Other prescription (AMOXICILLAN 500 MG)    Detailed comments: Pt would like a call back to discuss if a refill for Rx can be submitted.    Phone Number Patient can be reached at: Home number on file [phone] (home)    Best Time: ASAP    Can we leave a detailed message on this number? NO    Call taken on 7/15/2020 at 9:45 AM by Joann Anderson

## 2020-09-04 ENCOUNTER — OFFICE VISIT (OUTPATIENT)
Dept: PODIATRY | Facility: CLINIC | Age: 75
End: 2020-09-04
Payer: COMMERCIAL

## 2020-09-04 VITALS
SYSTOLIC BLOOD PRESSURE: 132 MMHG | BODY MASS INDEX: 30.95 KG/M2 | HEART RATE: 62 BPM | DIASTOLIC BLOOD PRESSURE: 80 MMHG | WEIGHT: 186 LBS

## 2020-09-04 DIAGNOSIS — M20.40 HAMMER TOE, UNSPECIFIED LATERALITY: Primary | ICD-10-CM

## 2020-09-04 PROCEDURE — 99213 OFFICE O/P EST LOW 20 MIN: CPT | Performed by: PODIATRIST

## 2020-09-04 RX ORDER — TRIAMCINOLONE ACETONIDE 1 MG/G
OINTMENT TOPICAL
COMMUNITY
Start: 2020-03-23 | End: 2020-09-10

## 2020-09-04 NOTE — PATIENT INSTRUCTIONS
We wish you continued good healing. If you have any questions or concerns, please do not hesitate to contact us at [phone]    Please remember to call and schedule a follow up appointment if one was recommended at your earliest convenience.   PODIATRY CLINIC HOURS  TELEPHONE NUMBER    Dr. Cristino Rivera D.P.[facility]    Clinics:  [facility]    Shalini Reese [facility]   Tuesday 1PM-6PM  [place]  Wednesday 7AM-2PM  [facility]  Thursday 10AM-6PM  [place]  Friday 7AM-3PM  [place]  Specialty schedulers:   [phone] to make an appointment with any Specialty Provider.        Urgent Care locations:    [facility] Monday-Friday 5 pm - 9 pm. Saturday-Sunday 9 am -5pm    Monday-Friday 11 am - 9 pm Saturday 9 am - 5 pm     Monday-Sunday 12 noon-8PM [phone]     If you need a medication refill, please contact us you may need lab work and/or a follow up visit prior to your refill (i.e. Antifungal medications).    [employer] (secure e-mail communication and access to your chart) to send a message or to make an appointment.    If MRI needed please call [facility] at [phone]

## 2020-09-04 NOTE — LETTER
9/4/2020         RE: Rafaela Rock  [address] [place] [phone]        Dear Colleague,    Thank you for referring your patient, Rafaela Rock, to the [facility]. Please see a copy of my visit note below.     Subjective:    Pt is seen today with the c/c of painful right and left toes.  This has been symptomatic for the past 1 month(s).  Points to PIPJ of second toes R>L.  Has pain w/ ambulation and shoewear and is relieved by rest and going barefoot.  Denies pain in the contralateral foot.  Describes as burning pain.  Patient believes her hammertoes get worse this summer.  Patient has a history of pronation.  She states she is wearing good shoes at all times.      ROS: See above     No Known Allergies    Current Outpatient Medications   Medication Sig Dispense Refill     metroNIDAZOLE (METROGEL) 1 % external gel Apply topically once or twice daily daily to the face for rosacea. 60 g 0     triamcinolone (KENALOG) 0.1 % external ointment          Patient Active Problem List   Diagnosis     Hyperlipidemia with target LDL less than 130     OA (osteoarthritis)     Rosacea     Impaired fasting glucose     POSTERIOR VITREOUS DETACHMENT, os     Advanced directives, counseling/discussion     Cataract     S/P total knee arthroplasty - left     Vitamin D deficiency     AK (actinic keratosis)     Xerosis of skin     Intertrigo     Inflamed seborrheic keratosis     Status post total left knee replacement     Rash     Diffuse photodamage of skin     Obesity (BMI 35.0-39.9) with comorbidity (H)     Trochanteric bursitis of left hip     Keratoconjunctivitis of both eyes       Past Medical History:   Diagnosis Date     AK (actinic keratosis) 2/2/2016     Allergic rhinitis      Asthma     mild intermittent     Hyperlipidemia LDL goal < 130      Impaired fasting glucose      Lyme disease      Nonsenile cataract      OA (osteoarthritis)      Obesity      PONV (postoperative nausea and vomiting)       Rosacea      Vitamin D deficiency 10/12       Past Surgical History:   Procedure Laterality Date     C NONSPECIFIC PROCEDURE  9/19/2003    Left knee scope/OA,MMT     C TOTAL KNEE ARTHROPLASTY Left 4/2/2008    Left     C TOTAL KNEE ARTHROPLASTY Right 8/17/16    DML     CATARACT IOL, RT/LT  12/27/2013    Toric- RE     CATARACT IOL, RT/LT  12/05/2013    LE     COLONOSCOPY       COLONOSCOPY  5-5-14    Return for Colonoscopy in 1 yrs     PHACOEMULSIFICATION CLEAR CORNEA WITH TORIC INTRAOCULAR LENS IMPLANT  12/6/2013    Procedure: PHACOEMULSIFICATION CLEAR CORNEA WITH TORIC INTRAOCULAR LENS IMPLANT;  LEFT PHACOEMULSIFICATION CLEAR CORNEA WITH TORIC INTRAOCULAR LENS IMPLANT  ;  Surgeon: Otoniel Schuster MD;  [facility]:  [facility]     PHACOEMULSIFICATION CLEAR CORNEA WITH TORIC INTRAOCULAR LENS IMPLANT  12/27/2013    Procedure: PHACOEMULSIFICATION CLEAR CORNEA WITH TORIC INTRAOCULAR LENS IMPLANT;  RIGHT PHACOEMULSIFICATION CLEAR CORNEA WITH TORIC INTRAOCULAR LENS IMPLANT  ;  Surgeon: Otoniel Schuster MD;  Location:  [facility]     TONSILLECTOMY  Age 11       Family History   Problem Relation Age of Onset     Cancer Father         lung smoker     Skin Cancer Son         skin cancer     Glaucoma No family hx of      Macular Degeneration No family hx of      Eye Surgery No family hx of      Retinal detachment No family hx of      Melanoma No family hx of        Social History     Tobacco Use     Smoking status: Never Smoker     Smokeless tobacco: Never Used   Substance Use Topics     Alcohol use: Yes     Comment: weekends       Objective:    O:  /80   Pulse 62   Wt 84.4 kg (186 lb)   BMI 30.95 kg/m  .      Constitutional/ general:  Pt is in no apparent distress, appears well-nourished.  Cooperative with history and physical exam.     Psych:  The patient answered questions appropriately.  Normal affect.  Seems to have reasonable expectations, in terms of treatment.     Eyes:  Visual scanning/ tracking without deficit.      Ears:  Response to auditory stimuli is normal.   Auricles in proper alignment.     Lymphatic:  Popliteal lymph nodes not enlarged.     Lungs:  Non labored breathing, non labored speech. No cough.  No audible wheezing. Even, quiet breathing.       Vascular:  positive pedal pulses bilaterally for both the DP and PT arteries.  CFT < 3 sec.  positive ankle edema.  positive pedal hair growth.    Neuro:  Alert and oriented x 3. Coordinated gait.  Light touch sensation is intact to the L4, L5, S1 distributions. No obvious deficits.  No evidence of neurological-based weakness, spasticity, or contracture in the lower extremities.      Derm: Normal texture and turgor.  No erythema, ecchymosis, or cyanosis.      Musculoskeletal:    Lower extremity muscle strength is normal.  Patient is ambulatory without an assistive device or brace.  Pronated arch.  Bilateral second hammertoes noted with the right being worse than left.  Right third also somewhat hammered.  Difficult to totally reduce the right second toe at the PIPJ.        Assessment:  right and left second hammertoe    Plan:  Discussed conservative treatments such as good shoes with wide forefoot and taping down toes and showed patient how to do this.  Dispensed budin splint.  Showed how patient to reduce this daily to keep joints supple.  Good quality shoes to keep pressure off toe.  Briefly discussed surgery.  Return to clinic prn.    Cristino Rivera DPM DPM, FACFAS        Again, thank you for allowing me to participate in the care of your patient.        Sincerely,        Cristino Rivera DPM

## 2020-09-04 NOTE — PROGRESS NOTES
Subjective:    Pt is seen today with the c/c of painful right and left toes.  This has been symptomatic for the past 1 month(s).  Points to PIPJ of second toes R>L.  Has pain w/ ambulation and shoewear and is relieved by rest and going barefoot.  Denies pain in the contralateral foot.  Describes as burning pain.  Patient believes her hammertoes get worse this summer.  Patient has a history of pronation.  She states she is wearing good shoes at all times.      ROS: See above     No Known Allergies    Current Outpatient Medications   Medication Sig Dispense Refill     metroNIDAZOLE (METROGEL) 1 % external gel Apply topically once or twice daily daily to the face for rosacea. 60 g 0     triamcinolone (KENALOG) 0.1 % external ointment          Patient Active Problem List   Diagnosis     Hyperlipidemia with target LDL less than 130     OA (osteoarthritis)     Rosacea     Impaired fasting glucose     POSTERIOR VITREOUS DETACHMENT, os     Advanced directives, counseling/discussion     Cataract     S/P total knee arthroplasty - left     Vitamin D deficiency     AK (actinic keratosis)     Xerosis of skin     Intertrigo     Inflamed seborrheic keratosis     Status post total left knee replacement     Rash     Diffuse photodamage of skin     Obesity (BMI 35.0-39.9) with comorbidity (H)     Trochanteric bursitis of left hip     Keratoconjunctivitis of both eyes       Past Medical History:   Diagnosis Date     AK (actinic keratosis) 2/2/2016     Allergic rhinitis      Asthma     mild intermittent     Hyperlipidemia LDL goal < 130      Impaired fasting glucose      Lyme disease      Nonsenile cataract      OA (osteoarthritis)      Obesity      PONV (postoperative nausea and vomiting)      Rosacea      Vitamin D deficiency 10/12       Past Surgical History:   Procedure Laterality Date     C NONSPECIFIC PROCEDURE  9/19/2003    Left knee scope/OA,MMT     C TOTAL KNEE ARTHROPLASTY Left 4/2/2008    Left     C TOTAL KNEE ARTHROPLASTY  Right 8/17/16    DML     CATARACT IOL, RT/LT  12/27/2013    Toric- RE     CATARACT IOL, RT/LT  12/05/2013    LE     COLONOSCOPY       COLONOSCOPY  5-5-14    Return for Colonoscopy in 1 yrs     PHACOEMULSIFICATION CLEAR CORNEA WITH TORIC INTRAOCULAR LENS IMPLANT  12/6/2013    Procedure: PHACOEMULSIFICATION CLEAR CORNEA WITH TORIC INTRAOCULAR LENS IMPLANT;  LEFT PHACOEMULSIFICATION CLEAR CORNEA WITH TORIC INTRAOCULAR LENS IMPLANT  ;  Surgeon: Otoniel Schuster MD;  Location:  [facility]     PHACOEMULSIFICATION CLEAR CORNEA WITH TORIC INTRAOCULAR LENS IMPLANT  12/27/2013    Procedure: PHACOEMULSIFICATION CLEAR CORNEA WITH TORIC INTRAOCULAR LENS IMPLANT;  RIGHT PHACOEMULSIFICATION CLEAR CORNEA WITH TORIC INTRAOCULAR LENS IMPLANT  ;  Surgeon: Otoniel Schuster MD;  Location:  [facility]     TONSILLECTOMY  Age 11       Family History   Problem Relation Age of Onset     Cancer Father         lung smoker     Skin Cancer Son         skin cancer     Glaucoma No family hx of      Macular Degeneration No family hx of      Eye Surgery No family hx of      Retinal detachment No family hx of      Melanoma No family hx of        Social History     Tobacco Use     Smoking status: Never Smoker     Smokeless tobacco: Never Used   Substance Use Topics     Alcohol use: Yes     Comment: weekends       Objective:    O:  /80   Pulse 62   Wt 84.4 kg (186 lb)   BMI 30.95 kg/m  .      Constitutional/ general:  Pt is in no apparent distress, appears well-nourished.  Cooperative with history and physical exam.     Psych:  The patient answered questions appropriately.  Normal affect.  Seems to have reasonable expectations, in terms of treatment.     Eyes:  Visual scanning/ tracking without deficit.     Ears:  Response to auditory stimuli is normal.   Auricles in proper alignment.     Lymphatic:  Popliteal lymph nodes not enlarged.     Lungs:  Non labored breathing, non labored speech. No cough.  No audible wheezing. Even, quiet breathing.        Vascular:  positive pedal pulses bilaterally for both the DP and PT arteries.  CFT < 3 sec.  positive ankle edema.  positive pedal hair growth.    Neuro:  Alert and oriented x 3. Coordinated gait.  Light touch sensation is intact to the L4, L5, S1 distributions. No obvious deficits.  No evidence of neurological-based weakness, spasticity, or contracture in the lower extremities.      Derm: Normal texture and turgor.  No erythema, ecchymosis, or cyanosis.      Musculoskeletal:    Lower extremity muscle strength is normal.  Patient is ambulatory without an assistive device or brace.  Pronated arch.  Bilateral second hammertoes noted with the right being worse than left.  Right third also somewhat hammered.  Difficult to totally reduce the right second toe at the PIPJ.        Assessment:  right and left second hammertoe    Plan:  Discussed conservative treatments such as good shoes with wide forefoot and taping down toes and showed patient how to do this.  Dispensed budin splint.  Showed how patient to reduce this daily to keep joints supple.  Good quality shoes to keep pressure off toe.  Briefly discussed surgery.  Return to clinic prn.    Cristino Rivera DPM DPM, FACLALIT

## 2020-09-10 ENCOUNTER — OFFICE VISIT (OUTPATIENT)
Dept: DERMATOLOGY | Facility: CLINIC | Age: 75
End: 2020-09-10
Payer: COMMERCIAL

## 2020-09-10 DIAGNOSIS — L82.0 SEBORRHEIC KERATOSES, INFLAMED: ICD-10-CM

## 2020-09-10 DIAGNOSIS — L82.1 SEBORRHEIC KERATOSES: ICD-10-CM

## 2020-09-10 DIAGNOSIS — L81.4 LENTIGINES: ICD-10-CM

## 2020-09-10 DIAGNOSIS — D48.5 NEOPLASM OF UNCERTAIN BEHAVIOR OF SKIN: ICD-10-CM

## 2020-09-10 DIAGNOSIS — L30.9 DERMATITIS: ICD-10-CM

## 2020-09-10 DIAGNOSIS — L29.9 GENERALIZED PRURITUS: ICD-10-CM

## 2020-09-10 DIAGNOSIS — L57.0 ACTINIC KERATOSIS: ICD-10-CM

## 2020-09-10 DIAGNOSIS — Z12.83 SKIN CANCER SCREENING: Primary | ICD-10-CM

## 2020-09-10 RX ORDER — LIDOCAINE HYDROCHLORIDE AND EPINEPHRINE 10; 10 MG/ML; UG/ML
3 INJECTION, SOLUTION INFILTRATION; PERINEURAL ONCE
Status: DISCONTINUED | OUTPATIENT
Start: 2020-09-10 | End: 2023-10-16

## 2020-09-10 RX ORDER — TACROLIMUS 1 MG/G
OINTMENT TOPICAL
Qty: 60 G | Refills: 3 | Status: SHIPPED | OUTPATIENT
Start: 2020-09-10 | End: 2021-09-28

## 2020-09-10 RX ORDER — FLUOROURACIL 50 MG/G
CREAM TOPICAL 2 TIMES DAILY
Qty: 40 G | Refills: 0 | Status: SHIPPED | OUTPATIENT
Start: 2020-09-10 | End: 2021-09-28

## 2020-09-10 RX ORDER — TRIAMCINOLONE ACETONIDE 1 MG/G
OINTMENT TOPICAL 2 TIMES DAILY
Qty: 80 G | Refills: 3 | Status: SHIPPED | OUTPATIENT
Start: 2020-09-10 | End: 2021-09-28

## 2020-09-10 RX ORDER — TRIAMCINOLONE ACETONIDE 1 MG/G
OINTMENT TOPICAL 2 TIMES DAILY
Qty: 453.6 G | Refills: 3 | Status: SHIPPED | OUTPATIENT
Start: 2020-09-10 | End: 2022-09-20

## 2020-09-10 ASSESSMENT — PAIN SCALES - GENERAL
PAINLEVEL: NO PAIN (0)
PAINLEVEL: NO PAIN (0)

## 2020-09-10 NOTE — PATIENT INSTRUCTIONS
FYI  -triamcinolone 0.1% oint- steroid containing anti-itch ointment for eczema  -tacrolimus 0.1% oint - steroid FREE anti-itch ointment for eczema - much safe on sensitive skin areas (groin/neck/face/underarms/thin skin areas. Use this as the default FIRST, if still itching, then use triamcinolone    Efudex Treatment    Today, you are being prescribed Fluorouracil (Efudex) a topical cream used for the treatment of Actinic Keratosis (AK's).  The medication is working to eliminate the unhealthy cells. Even though this treatment may be unattractive and somewhat uncomfortable.    Your treatment will last twice daily for 2-3 weeks or until the onset of irritation on the chest.  You may experience some mild discomfort while being treated.    You will want to stop any other creams such as glycolic acid products, retin A, Tazorac, etc. to the area. You may use bland makeup/cover-up as long as it doesn't sting or cause you discomfort.    Apply the cream at night as your physician recommends. Use a cotton-tipped applicator, or use gloves if applying it with your fingertips. If applied with unprotected fingertips, it is important to wash your hands well after you apply this medicine.     Keep this medication away from pets.    We recommend avoiding excessive sun exposure to the treated area    You may use moisturizing creams over bothersome areas such as Vanicream or Cetaphil cream if the reaction becomes too bothersome. Please, call the clinic if this occurs.   Potential Side Effects    Your treated areas may be unsightly during therapy.  This will improve slowly following the discontinuation of therapy.     During the first week of application, mild inflammation may occur.     During the following weeks, redness, and swelling may occur with some crusting and burning.     Lesions resolve as the skin exfoliates.     Over 1 to 2 weeks, new skin grows into the treatment area.    Keep this medication away from pets  Specific  side effects that usually do not require medical attention (report to your doctor or health care professional if they continue or are bothersome) include: Red or dark-colored skin     Mild erosion (loss of upper layer of skin)     Mild eye irritation including burning, itching, sensitivity, stinging, or watering     Increased sensitivity of the skin to sun and ultraviolet light     Pain and burning of the affected area     Dryness, scaling or swelling of the affected area     Skin rash, itching of the affected area     Tenderness   Who should I call with questions?     [facility]: [phone]     [facility]: [phone]     For urgent needs outside of business hours call the [facility] at [phone]  and ask for the dermatology resident on call  Wound Care After a Biopsy    What is a skin biopsy?  A skin biopsy allows the doctor to examine a very small piece of tissue under the microscope to determine the diagnosis and the best treatment for the skin condition. A local anesthetic (numbing medicine)  is injected with a very small needle into the skin area to be tested. A small piece of skin is taken from the area. Sometimes a suture (stitch) is used.     What are the risks of a skin biopsy?  I will experience scar, bleeding, swelling, pain, crusting and redness. I may experience incomplete removal or recurrence. Risks of this procedure are excessive bleeding, bruising, infection, nerve damage, numbness, thick (hypertrophic or keloidal) scar and non-diagnostic biopsy.    How should I care for my wound for the first 24 hours?    Keep the wound dry and covered for 24 hours    If it bleeds, hold direct pressure on the area for 15 minutes. If bleeding does not stop then go to the emergency room    Avoid strenuous exercise the first 1-2 days or as your doctor instructs you    How should I care for the wound after 24 hours?    After 24 hours, remove  the bandage    You may bathe or shower as normal    If you had a scalp biopsy, you can shampoo as usual and can use shower water to clean the biopsy site daily    Clean the wound twice a day with gentle soap and water    Do not scrub, be gentle    Apply white petroleum/Vaseline after cleaning the wound with a cotton swab or a clean finger, and keep the site covered with a Bandaid /bandage. Bandages are not necessary with a scalp biopsy    If you are unable to cover the site with a Bandaid /bandage, re-apply ointment 2-3 times a day to keep the site moist. Moisture will help with healing    Avoid strenuous activity for first 1-2 days    Avoid lakes, rivers, pools, and oceans until the stitches are removed or the site is healed    How do I clean my wound?    Wash hands thoroughly with soap or use hand  before all wound care    Clean the wound with gentle soap and water    Apply white petroleum/Vaseline  to wound after it is clean    Replace the Bandaid /bandage to keep the wound covered for the first few days or as instructed by your doctor    If you had a scalp biopsy, warm shower water to the area on a daily basis should suffice    What should I use to clean my wound?     Cotton-tipped applicators (Qtips )    White petroleum jelly (Vaseline ). Use a clean new container and use Q-tips to apply.    Bandaids   as needed    Gentle soap     How should I care for my wound long term?    Do not get your wound dirty    Keep up with wound care for one week or until the area is healed.    A small scab will form and fall off by itself when the area is completely healed. The area will be red and will become pink in color as it heals. Sun protection is very important for how your scar will turn out. Sunscreen with an SPF 30 or greater is recommended once the area is healed.    If you have stitches, stitches need to be removed in n/a days. You may return to our clinic for this or you may have it done locally at your  doctor s office.    You should have some soreness but it should be mild and slowly go away over several days. Talk to your doctor about using tylenol for pain,    When should I call my doctor?  If you have increased:     Pain or swelling    Pus or drainage (clear or slightly yellow drainage is ok)    Temperature over 100F    Spreading redness or warmth around wound    When will I hear about my results?  The biopsy results can take 2-3 weeks to come back. The clinic will call you with the results, send you a [facility] message, or have you schedule a follow-up clinic or phone time to discuss the results. Contact our clinics if you do not hear from us in 3 weeks.     Who should I call with questions?    [facility]: [phone]     [facility]: [phone]    For urgent needs outside of business hours call the [facility] at [phone] and ask for the dermatology resident on call    Cryotherapy    What is it?    Use of a very cold liquid, such as liquid nitrogen, to freeze and destroy abnormal skin cells that need to be removed    What should I expect?    Tenderness and redness    A small blister that might grow and fill with dark purple blood. There may be crusting.    More than one treatment may be needed if the lesions do not go away.    How do I care for the treated area?    Gently wash the area with your hands when bathing.    Use a thin layer of Vaseline to help with healing. You may use a Band-Aid.     The area should heal within 7-10 days and may leave behind a pink or lighter color.     Do not use an antibiotic or Neosporin ointment.     You may take acetaminophen (Tylenol) for pain.     Call your Doctor if you have:    Severe pain    Signs of infection (warmth, redness, cloudy yellow drainage, and or a bad smell)    Questions or concerns    Who should I call with questions?       [facility]: [phone]        [facility]: [phone]       For urgent needs outside of business hours call the [facility] at [phone]        and ask for the dermatology resident on call  Dry Skin    What is dry skin?    Common skin problem    Can be worse during the winter     Affects all ages    Occurs in people with or without other skin problems    What does it look like?    Fine lines in the skin become more visible     Rough feeling skin     Flaky skin    Most common on the arms and legs    Skin can become cracked, especially on the hands and feet    What are some problems caused by dry skin?     Itching    Rubbing or scratching can cause thickened, rough skin patches    Cracks in skin can be painful    Red, itchy, scaly skin (called eczema) can occur    Yellow crusting or pus could be signs of an infection    What causes dry skin?    A lack of water in the top layer of the skin    Too much soapy water,  hot water, or harsh chemicals    Aging and sun damage    How do I treat dry skin?    Shower or bathe daily for under ten minutes with lukewarm water and mild soap.    Pat yourself dry with a towel gently and leave your skin slightly damp.    Use moisturizing cream or ointment right away.  Avoid lotions.    What kind of mild soap should I be using?    Camay , Dove , Tone , Neutrogena , Purpose , or Oil of Olay     A non-detergent cleanser, like Cetaphil , can be used.    What should I stay away from?    Scented soaps     Bath oils    What moisturizers should I be using?    Cetaphil Cream,CeraVe Cream, Vanicream, Aquaphilic, Eucerin, Aquaphor, or Vaseline     Always apply after showering or bathing.    Reapply throughout the day, if possible.    If dry skin affects your hands, always reapply after handwashing.    What else should I know?    Using a humidifier during winter months may help.    If dry skin gets worse or if eczema develops, a steroid cream may be needed.          CERAVE  cream

## 2020-09-10 NOTE — PROGRESS NOTES
"[facility] Dermatology Note      Dermatology Problem List:  1. AKs. Cryo  2. Nummular eczema/xerosis.  - Rx. TMC 0.1% ointment. Cerave cream.  3. Rosacea.  - Rx. Metronidazole gel.  4. Contact dermatitis to metals - gold, silver?  5. Lichenoid keratoses - several have been biopsied in the past    CC:   Chief Complaint   Patient presents with     Skin Check     Rafaela is here today for a skin check- Rafaela notes some areas of concern.      Encounter Date: Sep 10, 2020    History of Present Illness:  Ms. Rafaela Rock is a 75 year old female who presents today for a FBSE. She has no hx of skin cancer. Has had several biopsies, but many have come back as benign lichenoid keratoses. Hx rosacea, uses metronidazole gel. Today notes a few areas of concern, one specifically on the L axillary area.     Notes itching, using triamcinolone frequently. Wondering if she needs allergy testing. Itching worse in the evening. She has tried going free and clear detergent. She has tried free and clear deodorants and soaps. She mostly gets itching across the tops of her feet, sometimes the hot areas, like around the waist. Almost always in the groin area. She states she feels like she is always itching. Someplace, her finger and head. The triamcinolone helps when she uses it. Does not get seasonal allergies. She maybe has had asthma previously, but not formal asthma diagnosis. This has at least been ongoing since 2017. Notes she goes through \"gallons\" of moisturizer. She uses aveeno, other pump bottle lotions. Soap she uses Dove soap. Applies moisturizer BID, at least once or twice daily, once after bathing. Using triamcinolone at least once daily, every evening, occasionally on the flaring areas during the day. Lives alone. Does use sunscreen - dermalogica SPF 30, tinted. Otherwise doing well, no other concerns.     Past Medical History:   Patient Active Problem List   Diagnosis     Hyperlipidemia with target LDL less " than 130     OA (osteoarthritis)     Rosacea     Impaired fasting glucose     POSTERIOR VITREOUS DETACHMENT, os     Advanced directives, counseling/discussion     Cataract     S/P total knee arthroplasty - left     Vitamin D deficiency     AK (actinic keratosis)     Xerosis of skin     Intertrigo     Inflamed seborrheic keratosis     Status post total left knee replacement     Rash     Diffuse photodamage of skin     Obesity (BMI 35.0-39.9) with comorbidity (H)     Trochanteric bursitis of left hip     Keratoconjunctivitis of both eyes     Past Medical History:   Diagnosis Date     AK (actinic keratosis) 2/2/2016     Allergic rhinitis      Asthma     mild intermittent     Hyperlipidemia LDL goal < 130      Impaired fasting glucose      Lyme disease      Nonsenile cataract      OA (osteoarthritis)      Obesity      PONV (postoperative nausea and vomiting)      Rosacea      Vitamin D deficiency 10/12     Past Surgical History:   Procedure Laterality Date     C NONSPECIFIC PROCEDURE  9/19/2003    Left knee scope/OA,MMT     C TOTAL KNEE ARTHROPLASTY Left 4/2/2008    Left     C TOTAL KNEE ARTHROPLASTY Right 8/17/16    DML     CATARACT IOL, RT/LT  12/27/2013    Toric- RE     CATARACT IOL, RT/LT  12/05/2013    LE     COLONOSCOPY       COLONOSCOPY  5-5-14    Return for Colonoscopy in 1 yrs     PHACOEMULSIFICATION CLEAR CORNEA WITH TORIC INTRAOCULAR LENS IMPLANT  12/6/2013    Procedure: PHACOEMULSIFICATION CLEAR CORNEA WITH TORIC INTRAOCULAR LENS IMPLANT;  LEFT PHACOEMULSIFICATION CLEAR CORNEA WITH TORIC INTRAOCULAR LENS IMPLANT  ;  Surgeon: Otoniel Schuster MD;  Location: [facility]     PHACOEMULSIFICATION CLEAR CORNEA WITH TORIC INTRAOCULAR LENS IMPLANT  12/27/2013    Procedure: PHACOEMULSIFICATION CLEAR CORNEA WITH TORIC INTRAOCULAR LENS IMPLANT;  RIGHT PHACOEMULSIFICATION CLEAR CORNEA WITH TORIC INTRAOCULAR LENS IMPLANT  ;  Surgeon: Otoniel Schuster MD;  Location:  [facility]     TONSILLECTOMY  Age 11     Social  History:  reports that she has never smoked. She has never used smokeless tobacco. She reports that she drinks alcohol. She reports that she does not use drugs.    Lives alone.     Family History:  There is no family history of melanoma. Son with hx of skin cancer.     Medications:  Current Outpatient Medications   Medication Sig Dispense Refill     metroNIDAZOLE (METROGEL) 1 % external gel Apply topically once or twice daily daily to the face for rosacea. 60 g 0     triamcinolone (KENALOG) 0.1 % external ointment        No Known Allergies    Review of Systems:  -Heme/Lymph: no concerning bumps, no bleeding or bruising problems   -Constitutional: The patient denies fatigue, fevers, chills, unintended weight loss, and night sweats.  -HEENT: Patient denies nonhealing oral sores.  -Skin: As above in HPI. No additional skin concerns.  -GI: no nausea, abdominal pain, vomiting, diarrhea or blood in the stool    Physical exam:  Vitals: There were no vitals taken for this visit.  GEN: This is a well developed, well-nourished female in no acute distress, in a pleasant mood.    SKIN: Full skin, which includes the head/face, both arms, chest, back, abdomen,both legs, genitalia and/or groin buttocks, digits and/or nails, was examined.  -mid lower back with patchy erythema and mild excoriations  -vaginal area- labia - no visible rash or erythema or scaly plaques  -waxy stuck on papules - scattered torso  -inflamed waxy papule on L popliteal fossa and R ant knee  -numerous brown macules on sun exposed skin  -L lateral lower lemm erythematous papule  -No other lesions of concern on areas examined.     Impression/Plan:  1. Xerosis/Dermatitis/Pruritis - possible irritant/contact associated. Hx of reaction with gold and silver/metals  -continue triamcinolone 0.1% ointment as this is helpful for her  -edu on steroid atrophy  -start protopic 0.1% ointment to vaginal/labial skin - avoid triam when possible to sensitive skin areas,  ok to use however if needed  -avoid lotions, switch to creams - cerave cream recommended  -dry skin care hand out provided  -consider allergy testing per patient request - referral placed  -will order pruritis labs: CBC, CMP, TSH vs free T4, Hepatic panel, Creatine    2. Actinic keratosis - chest  -discussed etiology  -start efudex 5% cream BID for 14-21 days, or until onset of irritation - wash hands after application  -recheck site in 2-3mo    3. Neoplasm of uncertain behavior on the L lateral lower leg. The differential diagnosis includes NMSC vs other.   -Shave biopsy:  After discussion of benefits and risks including but not limited to bleeding/bruising, pain/swelling, infection, scar, incomplete removal, nerve damage/numbness, recurrence, and non-diagnostic biopsy, written consent, verbal consent and photographs were obtained. Time-out was performed. The area was cleaned with isopropyl alcohol. 0.5ml of 1% lidocaine with 1:100,000 epinephrine was injected to obtain adequate anesthesia. A shave biopsy was performed. Hemostasis was achieved with aluminium chloride. Vaseline and a sterile dressing were applied. The patient tolerated the procedure and no complications were noted. The patient was provided with verbal and written post care instructions.  -Photograph was obtained for clinical monitoring and inclusion in medical record.    4. ISKs x2- reassured benign   -Cryotherapy procedure note: After verbal consent and discussion of risks and benefits including but no limited to  dyspigmentation/scar, blister, and pain, 2 was(were) treated with 1-2mm freeze border for 2 cycles with liquid  nitrogen. Post cryotherapy instructions were provided.     5. Lentigines - reassured benign, discussed etiology    6. Skin Cancer Screening  -ABCDs of melanoma were discussed and self skin checks were advised.   -Sun precaution was advised including the use of sun screens of SPF 30 or higher, sun protective clothing, and  avoidance of tanning beds.  -Continue with annual skin exams    CC Dr. Melchor on close of this encounter.  Follow-up in 1 year, earlier for new or changing lesions.     Staff Involved:  Staff Only  All risks, benefits and alternatives were discussed with patient.  Patient is in agreement and understands the assessment and plan.  All questions were answered.    Rita Hicks PA-C, MPAS  [facility] [place]: Phone: [phone], Fax: [phone]  [facility]: Phone: [phone],  Fax: [phone]

## 2020-09-10 NOTE — NURSING NOTE
Lidocaine-epinephrine 1-1:797896 % injection   1.5mL once for one use, starting 9/10/2020 ending 9/10/2020,  2mL disp, R-0, injection  Injected by JACOBO Hargrove

## 2020-09-10 NOTE — NURSING NOTE
Dermatology Rooming Note    Rafaela Rock's goals for this visit include:   Chief Complaint   Patient presents with     Skin Check     Rafaela is here today for a skin check- Rafaela notes some areas of concern.      JACOBO Hargrove

## 2020-09-14 LAB — COPATH REPORT: NORMAL

## 2020-09-15 DIAGNOSIS — D48.5 NEOPLASM OF UNCERTAIN BEHAVIOR OF SKIN: Primary | ICD-10-CM

## 2020-09-22 ENCOUNTER — VIRTUAL VISIT (OUTPATIENT)
Dept: DERMATOLOGY | Facility: CLINIC | Age: 75
End: 2020-09-22
Payer: COMMERCIAL

## 2020-09-22 DIAGNOSIS — C44.729 SQUAMOUS CELL CARCINOMA OF LOWER LEG, LEFT: Primary | ICD-10-CM

## 2020-09-22 ASSESSMENT — PAIN SCALES - GENERAL: PAINLEVEL: NO PAIN (0)

## 2020-09-22 NOTE — LETTER
9/22/2020       RE: Rafaela Rock  [address] [place] [id number]     Dear Colleague,    Thank you for referring your patient, Rafaela Rock, to the [facility] DERMATOLOGIC SURGERY at [facility]. Please see a copy of my visit note below.        [facility] Dermatology Record:  Store and Forward and Telephone [phone]      Dermatology Problem List:  SCCIS L sin    Encounter Date: Sep 22, 2020    CC:   Chief Complaint   Patient presents with     Telephone     Rafaela is doing a telephone consult for SCCIS on the left lateral lower leg.       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  This is her first skin cancer.  She I very active and wants to maintain her activity      ROS: Patient is generally feeling well today     Physical Examination:  General: Well-appearing, appropriately-developed individual.  Skin: Focused examination including L lower leg was performed.   -10 mm scaling papule    Labs:  SCCIS L shin    Past Medical History:   Patient Active Problem List   Diagnosis     Hyperlipidemia with target LDL less than 130     OA (osteoarthritis)     Rosacea     Impaired fasting glucose     POSTERIOR VITREOUS DETACHMENT, os     Advanced directives, counseling/discussion     Cataract     S/P total knee arthroplasty - left     Vitamin D deficiency     AK (actinic keratosis)     Xerosis of skin     Intertrigo     Inflamed seborrheic keratosis     Status post total left knee replacement     Rash     Diffuse photodamage of skin     Obesity (BMI 35.0-39.9) with comorbidity (H)     Trochanteric bursitis of left hip     Keratoconjunctivitis of both eyes     Past Medical History:   Diagnosis Date     AK (actinic keratosis) 2/2/2016     Allergic rhinitis      Asthma     mild intermittent     Hyperlipidemia LDL goal < 130      Impaired fasting glucose      Lyme disease      Nonsenile cataract      OA  (osteoarthritis)      Obesity      PONV (postoperative nausea and vomiting)      Rosacea      Vitamin D deficiency 10/12     Past Surgical History:   Procedure Laterality Date     C NONSPECIFIC PROCEDURE  9/19/2003    Left knee scope/OA,MMT     C TOTAL KNEE ARTHROPLASTY Left 4/2/2008    Left     C TOTAL KNEE ARTHROPLASTY Right 8/17/16    DML     CATARACT IOL, RT/LT  12/27/2013    Toric- RE     CATARACT IOL, RT/LT  12/05/2013    LE     COLONOSCOPY       COLONOSCOPY  5-5-14    Return for Colonoscopy in 1 yrs     PHACOEMULSIFICATION CLEAR CORNEA WITH TORIC INTRAOCULAR LENS IMPLANT  12/6/2013    Procedure: PHACOEMULSIFICATION CLEAR CORNEA WITH TORIC INTRAOCULAR LENS IMPLANT;  LEFT PHACOEMULSIFICATION CLEAR CORNEA WITH TORIC INTRAOCULAR LENS IMPLANT  ;  Surgeon: Otoniel Schuster MD;  Location: [facility]     PHACOEMULSIFICATION CLEAR CORNEA WITH TORIC INTRAOCULAR LENS IMPLANT  12/27/2013    Procedure: PHACOEMULSIFICATION CLEAR CORNEA WITH TORIC INTRAOCULAR LENS IMPLANT;  RIGHT PHACOEMULSIFICATION CLEAR CORNEA WITH TORIC INTRAOCULAR LENS IMPLANT  ;  Surgeon: Otoniel Schuster MD;  Location:  [facility]     TONSILLECTOMY  Age 11       Social History:  Patient reports that she has never smoked. She has never used smokeless tobacco. She reports current alcohol use. She reports that she does not use drugs.    Family History:  Family History   Problem Relation Age of Onset     Cancer Father         lung smoker     Skin Cancer Son         skin cancer     Glaucoma No family hx of      Macular Degeneration No family hx of      Eye Surgery No family hx of      Retinal detachment No family hx of      Melanoma No family hx of        Medications:  Current Outpatient Medications   Medication     Emollient (CERAVE) CREA     fluorouracil (EFUDEX) 5 % external cream     metroNIDAZOLE (METROGEL) 1 % external gel     tacrolimus (PROTOPIC) 0.1 % external ointment     triamcinolone (KENALOG) 0.1 % external ointment     triamcinolone (KENALOG)  0.1 % external ointment     Current Facility-Administered Medications   Medication     lidocaine 1% with EPINEPHrine 1:100,000 injection 3 mL     lidocaine 1% with EPINEPHrine 1:100,000 injection 3 mL          No Known Allergies        Impression and Recommendations (Patient Counseled on the Following):  1. SCCIS L shin -- schedule Mohs.  Discussed closure options.  Will decide with patient between closure and granulation based on desired activity and healing time.          Follow-up:   For surgery     Staff only    Jules Roman MD    _____________________________________________________________________________    Teledermatology information:  - Location of patient: [place]  - Patient presented as: return  - Location of teledermatologist:  [facility] DERMATOLOGIC SURGERY )  - Reason teledermatology is appropriate:  of National Emergency Regarding Coronavirus disease (COVID 19) Outbreak  - Image quality and interpretability: acceptable  - Physician has received verbal consent for a Video/Photos Visit from the patient? Yes  - In-person dermatology visit recommendation: for Mohs  - Date of images: 9/22/20  - Service start time:3:30  - Service end time:3:38  - Date of report: 9/22/2020

## 2020-09-22 NOTE — PROGRESS NOTES
[facility] Dermatology Record:  Store and Forward and Telephone [phone]      Dermatology Problem List:  SCCIS L sin    Encounter Date: Sep 22, 2020    CC:   Chief Complaint   Patient presents with     Telephone     Rafaela is doing a telephone consult for SCCIS on the left lateral lower leg.       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  This is her first skin cancer.  She I very active and wants to maintain her activity      ROS: Patient is generally feeling well today     Physical Examination:  General: Well-appearing, appropriately-developed individual.  Skin: Focused examination including L lower leg was performed.   -10 mm scaling papule    Labs:  SCCIS L shin    Past Medical History:   Patient Active Problem List   Diagnosis     Hyperlipidemia with target LDL less than 130     OA (osteoarthritis)     Rosacea     Impaired fasting glucose     POSTERIOR VITREOUS DETACHMENT, os     Advanced directives, counseling/discussion     Cataract     S/P total knee arthroplasty - left     Vitamin D deficiency     AK (actinic keratosis)     Xerosis of skin     Intertrigo     Inflamed seborrheic keratosis     Status post total left knee replacement     Rash     Diffuse photodamage of skin     Obesity (BMI 35.0-39.9) with comorbidity (H)     Trochanteric bursitis of left hip     Keratoconjunctivitis of both eyes     Past Medical History:   Diagnosis Date     AK (actinic keratosis) 2/2/2016     Allergic rhinitis      Asthma     mild intermittent     Hyperlipidemia LDL goal < 130      Impaired fasting glucose      Lyme disease      Nonsenile cataract      OA (osteoarthritis)      Obesity      PONV (postoperative nausea and vomiting)      Rosacea      Vitamin D deficiency 10/12     Past Surgical History:   Procedure Laterality Date     C NONSPECIFIC PROCEDURE  9/19/2003    Left knee scope/OA,MMT     C TOTAL KNEE ARTHROPLASTY Left 4/2/2008    Left     C  TOTAL KNEE ARTHROPLASTY Right 8/17/16    DML     CATARACT IOL, RT/LT  12/27/2013    Toric- RE     CATARACT IOL, RT/LT  12/05/2013    LE     COLONOSCOPY       COLONOSCOPY  5-5-14    Return for Colonoscopy in 1 yrs     PHACOEMULSIFICATION CLEAR CORNEA WITH TORIC INTRAOCULAR LENS IMPLANT  12/6/2013    Procedure: PHACOEMULSIFICATION CLEAR CORNEA WITH TORIC INTRAOCULAR LENS IMPLANT;  LEFT PHACOEMULSIFICATION CLEAR CORNEA WITH TORIC INTRAOCULAR LENS IMPLANT  ;  Surgeon: Otoniel Schuster MD;  Location: [facility]     PHACOEMULSIFICATION CLEAR CORNEA WITH TORIC INTRAOCULAR LENS IMPLANT  12/27/2013    Procedure: PHACOEMULSIFICATION CLEAR CORNEA WITH TORIC INTRAOCULAR LENS IMPLANT;  RIGHT PHACOEMULSIFICATION CLEAR CORNEA WITH TORIC INTRAOCULAR LENS IMPLANT  ;  Surgeon: Otoniel Schuster MD;  Location: [facility]     TONSILLECTOMY  Age 11       Social History:  Patient reports that she has never smoked. She has never used smokeless tobacco. She reports current alcohol use. She reports that she does not use drugs.    Family History:  Family History   Problem Relation Age of Onset     Cancer Father         lung smoker     Skin Cancer Son         skin cancer     Glaucoma No family hx of      Macular Degeneration No family hx of      Eye Surgery No family hx of      Retinal detachment No family hx of      Melanoma No family hx of        Medications:  Current Outpatient Medications   Medication     Emollient (CERAVE) CREA     fluorouracil (EFUDEX) 5 % external cream     metroNIDAZOLE (METROGEL) 1 % external gel     tacrolimus (PROTOPIC) 0.1 % external ointment     triamcinolone (KENALOG) 0.1 % external ointment     triamcinolone (KENALOG) 0.1 % external ointment     Current Facility-Administered Medications   Medication     lidocaine 1% with EPINEPHrine 1:100,000 injection 3 mL     lidocaine 1% with EPINEPHrine 1:100,000 injection 3 mL          No Known Allergies        Impression and Recommendations (Patient Counseled on the  Following):  1. SCCIS L shin -- schedule Mohs.  Discussed closure options.  Will decide with patient between closure and granulation based on desired activity and healing time.          Follow-up:   For surgery     Staff only    Jules Roman MD    _____________________________________________________________________________    Teledermatology information:  - Location of patient: [place]  - Patient presented as: return  - Location of teledermatologist:  [facility] DERMATOLOGIC SURGERY )  - Reason teledermatology is appropriate:  of National Emergency Regarding Coronavirus disease (COVID 19) Outbreak  - Image quality and interpretability: acceptable  - Physician has received verbal consent for a Video/Photos Visit from the patient? Yes  - In-person dermatology visit recommendation: for Mohs  - Date of images: 9/22/20  - Service start time:3:30  - Service end time:3:38  - Date of report: 9/22/2020

## 2020-10-10 NOTE — TELEPHONE ENCOUNTER
FUTURE VISIT INFORMATION      FUTURE VISIT INFORMATION:    Date: 11.16.20    Time: 1:30    [place] [facility] [place]  REFERRAL INFORMATION:    Referring provider:  Rita Hicks PA-C    Referring providers clinic:  [facility]    Reason for visit/diagnosis  allergy consulation, per pt, csc verified, med recs in epic     RECORDS REQUESTED FROM:       Clinic name Comments Records Status Photos Status   [facility] 9.10.20, 5.14.19  Rita Hicks Epic Epic

## 2020-11-02 ENCOUNTER — OFFICE VISIT (OUTPATIENT)
Dept: DERMATOLOGY | Facility: CLINIC | Age: 75
End: 2020-11-02
Payer: COMMERCIAL

## 2020-11-02 VITALS — SYSTOLIC BLOOD PRESSURE: 144 MMHG | DIASTOLIC BLOOD PRESSURE: 81 MMHG | HEART RATE: 62 BPM

## 2020-11-02 DIAGNOSIS — D04.72 SQUAMOUS CELL CARCINOMA IN SITU (SCCIS) OF SKIN OF LEFT LOWER LEG: Primary | ICD-10-CM

## 2020-11-02 DIAGNOSIS — D48.5 NEOPLASM OF UNCERTAIN BEHAVIOR OF SKIN: ICD-10-CM

## 2020-11-02 PROCEDURE — 17313 MOHS 1 STAGE T/A/L: CPT | Mod: GC | Performed by: DERMATOLOGY

## 2020-11-02 ASSESSMENT — PAIN SCALES - GENERAL: PAINLEVEL: NO PAIN (0)

## 2020-11-02 NOTE — LETTER
11/2/2020       RE: Rafaela Rock  [address] [place] [id number]     Dear Colleague,    Thank you for referring your patient, Rafaela Rock, to the [facility] DERMATOLOGIC SURGERY CLINIC [place] at [facility]. Please see a copy of my visit note below.    [facility] Mohs Dermatologic Surgery Procedure Note      Date of Service:  Nov 2, 2020  Surgery: Mohs micrographic surgery    Case 1  Repair Type: Second intention healing with Puracol collagen graft   Repair Size: NA cm  Suture Material: 5-0 FAG  Tumor Type: SCCis  Location: Left lateral lower leg  Derm-Path Accession #: [id number]  PreOp Size: 1.3 x 1.0 cm  PostOp Size: 2.0 x 1.7 cm  Mohs Accession #:   Level of Defect: Fat      Procedure:  We discussed the principles of treatment and most likely complications including scarring, bleeding, infection, swelling, pain, crusting, nerve damage, large wound,  incomplete excision, wound dehiscence,  nerve damage, recurrence, and a second procedure may be recommended to obtain the best cosmetic or functional result.    Informed consent was obtained and the patient underwent the procedure as follows:  The patient was placed supine on the operating table.  The cancer was identified, outlined with a marker, and verified by the patient.  The entire surgical field was prepped with chlorhexidine.  The surgical site was anesthetized using Lidocaine 1% with epi 1:100,000.      The area of clinically apparent tumor was debulked. The layer of tissue was then surgically excised using a #15 blade and was then transferred onto a specimen sheet maintaining the orientation of the specimen. Hemostasis was obtained using electrocoagulation. The wound site was then covered with a dressing while the tissue samples were processed for examination.    The excised tissue was transported to the Mohs histology laboratory maintaining the tissue orientation.   The tissue specimen was relaxed so that the entire surgical margin was in a a single horizontal plane for sectioning and inked for precise mapping.  A precise reference map was drawn to reflect the sectioning of the specimen, colored inking of the margins, and orientation on the patient. The tissue was processed using horizontal sectioning of the base and continuous peripheral margins.  The histopathologic sections were reviewed in conjunction with the reference map.    Total blocks: 1    Total slides:  2    There were no cancer cells visualized on examination, therefore Mohs surgery was complete.    EVALUATION OF MOHS DEFECT FOR RECONSTRUCTION    The patient is status post Mohs' micrographic surgery.  The surgical site was examined with attention to normal anatomic and functional relationships.  After consideration and discussion of multiple options with the patient, it was determined that healing by second intention would offer the best chance for preservation/restoration of all normal anatomic and functional relationships and allow the patient to return to her normal physical activity within the next several days, which is preferred by the patient.  The patient verbalized understanding and agrees with this plan. It is also understood that should second intention healing be sub-optimal, additional procedures such as scar revision, steroid injection or dermabrasion may be recommended.    The open wound was cleaned with chlorhexidine and rinsed with sterile saline, a Puracol collagen graft was utilized to promote healing and protect the wound bed. The graft was sutured into place with 5-0 Fast absorbing gut sutures in a simple running fashion. A thick layer of ointment and a Xeroform gauze bolster was sutured into place.  Wound care was discussed with patient both orally and in writing.  The patient stated understanding and agreement of the course of care.    Fernandez Snider MD  PGY-6    Micrographic Surgery and  Dermatologic Oncology Fellow  November 2, 2020      Attending attestation:  I was present for key elements of the procedure and immediately available for all other portions of the procedure.  I have reviewed the note and edited it as necessary.    Jules Roman M.D.  Professor  Director of Dermatologic Surgery  Department of Dermatology  [facility]    Dermatology Surgery Clinic  [place] [phone]

## 2020-11-02 NOTE — NURSING NOTE
Chief Complaint   Patient presents with     Derm Problem     L lower leg sccis     Eboni Maddox, EMT

## 2020-11-02 NOTE — PROGRESS NOTES
[facility] Dermatologic Surgery Procedure Note      Date of Service:  Nov 2, 2020  Surgery: Mohs micrographic surgery    Case 1  Repair Type: Second intention healing with Puracol collagen graft   Repair Size: NA cm  Suture Material: 5-0 FAG  Tumor Type: SCCis  Location: Left lateral lower leg  Derm-Path Accession #: [id number]  PreOp Size: 1.3 x 1.0 cm  PostOp Size: 2.0 x 1.7 cm  Mohs Accession #:   Level of Defect: Fat      Procedure:  We discussed the principles of treatment and most likely complications including scarring, bleeding, infection, swelling, pain, crusting, nerve damage, large wound,  incomplete excision, wound dehiscence,  nerve damage, recurrence, and a second procedure may be recommended to obtain the best cosmetic or functional result.    Informed consent was obtained and the patient underwent the procedure as follows:  The patient was placed supine on the operating table.  The cancer was identified, outlined with a marker, and verified by the patient.  The entire surgical field was prepped with chlorhexidine.  The surgical site was anesthetized using Lidocaine 1% with epi 1:100,000.      The area of clinically apparent tumor was debulked. The layer of tissue was then surgically excised using a #15 blade and was then transferred onto a specimen sheet maintaining the orientation of the specimen. Hemostasis was obtained using electrocoagulation. The wound site was then covered with a dressing while the tissue samples were processed for examination.    The excised tissue was transported to the Mohs histology laboratory maintaining the tissue orientation.  The tissue specimen was relaxed so that the entire surgical margin was in a a single horizontal plane for sectioning and inked for precise mapping.  A precise reference map was drawn to reflect the sectioning of the specimen, colored inking of the margins, and orientation on the patient. The tissue was processed using  horizontal sectioning of the base and continuous peripheral margins.  The histopathologic sections were reviewed in conjunction with the reference map.    Total blocks: 1    Total slides:  2    There were no cancer cells visualized on examination, therefore Mohs surgery was complete.    EVALUATION OF MOHS DEFECT FOR RECONSTRUCTION    The patient is status post Mohs' micrographic surgery.  The surgical site was examined with attention to normal anatomic and functional relationships.  After consideration and discussion of multiple options with the patient, it was determined that healing by second intention would offer the best chance for preservation/restoration of all normal anatomic and functional relationships and allow the patient to return to her normal physical activity within the next several days, which is preferred by the patient.  The patient verbalized understanding and agrees with this plan. It is also understood that should second intention healing be sub-optimal, additional procedures such as scar revision, steroid injection or dermabrasion may be recommended.    The open wound was cleaned with chlorhexidine and rinsed with sterile saline, a Puracol collagen graft was utilized to promote healing and protect the wound bed. The graft was sutured into place with 5-0 Fast absorbing gut sutures in a simple running fashion. A thick layer of ointment and a Xeroform gauze bolster was sutured into place.  Wound care was discussed with patient both orally and in writing.  The patient stated understanding and agreement of the course of care.    Fernandez Snider MD  PGY-6    Micrographic Surgery and Dermatologic Oncology Fellow  November 2, 2020      Attending attestation:  I was present for key elements of the procedure and immediately available for all other portions of the procedure.  I have reviewed the note and edited it as necessary.    Jules Roman M.D.  Professor  Director of Dermatologic Surgery  Department  of Dermatology  [facility]    Dermatology Surgery Clinic  [facility]  [address] [place] [phone]

## 2020-11-02 NOTE — PATIENT INSTRUCTIONS
Wound Care Instructions  I will experience scar, altered skin color, bleeding, swelling, pain, crusting and redness. I may experience altered sensation. Risks are excessive bleeding, infection, muscle weakness, thick (hypertrophic or keloidal) scar, and recurrence,. A second procedure may be recommended to obtain the best cosmetic or functional result.  Possible complications of any surgical procedure are bleeding, infection, scarring, alteration in skin color and sensation, muscle weakness in the area, wound dehiscence or seperation, or recurrence of the lesion or disease. On occasion, after healing, a secondary procedure or revision may be recommended in order to obtain the best cosmetic or functional result.   After your surgery, a pressure bandage will be placed over the area that has sutures. This will help prevent bleeding.   For the First 48 hours After Surgery:  1. Leave the pressure bandage on and keep it dry. If it should come loose, you may retape it, but do not take it off.  2. Relax and take it easy. Do not do any vigorous exercise, heavy lifting, or bending forward. This could cause the wound to bleed.  3. Post-operative pain is usually mild. You may take plain or extra strength Tylenol every 4 hours as needed (do not take more than 4,000mg in one day). Do not take any medicine that contains aspirin, ibuprofen or motrin unless you have been recommended these by a doctor.  Avoid alcohol and vitamin E as these may increase your tendency to bleed.  4. You may put an ice pack around the bandaged area for 20 minutes every 2-3 hours. This may help reduce swelling, bruising, and pain. Make sure the ice pack is waterproof so that the pressure bandage does not get wet.   5. You may see a small amount of drainage or blood on your pressure bandage. This is normal. However, if drainage or bleeding continues or saturates the bandage, you will need to apply firm pressure over the bandage with a washcloth for 15  minutes. If bleeding continues after applying pressure for 15 minutes then go to the nearest emergency room.  48 Hours After Surgery  Carefully remove the bandage and start daily wound care and dressing changes. You may also now shower and get the wound wet. Wash wound with a mild soap and water.  Use caution when washing the wound. Be gentle and do not let the forceful shower stream hit the wound directly.  PAT dry.  Daily Wound Care:  1. Wash wound with a mild soap and water.  Use caution when washing the wound, be gentle and do not let the forceful shower stream hit the wound directly.  2. PAT DRY.  3. Apply Vaseline (from a new container or tube) over the suture line with a Q-tip. It is very important to keep the wound continuously moist, as wounds heal best in a moist environment.  4.  Keep the site covered until sutures are removed, you can cover it with a Telfa (non-stick) dressing and tape or a band-aid.    5. If you are unable to keep wound covered, you must apply Vaseline every 2 - 3 hours (while awake) to ensure it is being kept moist for optimal healing. A dressing overnight is recommended to keep the area moist.   Call Us If:  1. You have pain that is not controlled with Tylenol.  2. You have signs or symptoms of an infection, such as: fever over 100 degrees F, redness, warmth, or foul-smelling or yellow/creamy drainage from the wound.  Who should I call with questions?    [facility]: [phone]     [facility]: [phone]    For urgent needs outside of business hours call the [facility] at [phone] and ask for the dermatology resident on call

## 2020-11-04 ENCOUNTER — ANCILLARY PROCEDURE (OUTPATIENT)
Dept: MAMMOGRAPHY | Facility: CLINIC | Age: 75
End: 2020-11-04
Attending: FAMILY MEDICINE
Payer: COMMERCIAL

## 2020-11-04 DIAGNOSIS — Z12.31 VISIT FOR SCREENING MAMMOGRAM: ICD-10-CM

## 2020-11-04 PROCEDURE — 77063 BREAST TOMOSYNTHESIS BI: CPT | Performed by: RADIOLOGY

## 2020-11-04 PROCEDURE — 77067 SCR MAMMO BI INCL CAD: CPT | Performed by: RADIOLOGY

## 2020-11-16 ENCOUNTER — PRE VISIT (OUTPATIENT)
Dept: ALLERGY | Facility: CLINIC | Age: 75
End: 2020-11-16

## 2020-11-17 ENCOUNTER — VIRTUAL VISIT (OUTPATIENT)
Dept: DERMATOLOGY | Facility: CLINIC | Age: 75
End: 2020-11-17
Payer: COMMERCIAL

## 2020-11-17 DIAGNOSIS — D04.72 SQUAMOUS CELL CARCINOMA IN SITU (SCCIS) OF SKIN OF LEFT LOWER LEG: Primary | ICD-10-CM

## 2020-11-17 PROCEDURE — 99441 PR PHYSICIAN TELEPHONE EVALUATION 5-10 MIN: CPT | Mod: 95 | Performed by: DERMATOLOGY

## 2020-11-17 ASSESSMENT — PAIN SCALES - GENERAL: PAINLEVEL: NO PAIN (0)

## 2020-11-17 NOTE — LETTER
"11/17/2020       RE: Rafaela Rock  [address] [place] [id number]     Dear Colleague,    Thank you for referring your patient, Rafaela Rock, to the [facility] DERMATOLOGIC SURGERY CLINIC [place] at [facility]. Please see a copy of my visit note below.    Pt presents for 2 week follow up of granulating wound on lower leg.    She notes that she is doing well.  The wound is doing \"great\".  She denies pain or discharge.  She notes some mild itching and irritation around the wound which she attributes to tape.  The nurses gave her advice on how to bandage while minimizing tape.    Normally granulating wound on LE.    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  Discussed warning signs of infection and patient will call if symptoms arise.    Jules Roman MD    Teledermatology information:  - Location of patient: [place]  - Patient presented as: return  - Location of teledermatologist:  ([facility] DERMATOLOGIC SUR[facility]ERY CLINIC [place] )  - Reason teledermatology is appropriate:  of National Emergency Regarding Coronavirus disease (COVID 19) Outbreak  - Image quality and interpretability: acceptable  - Physician has received verbal consent for a Video/Photos Visit from the patient? YES  - In-person dermatology visit recommendation: yes - for physician visit  - Date of images: 11/17/20  - Service start time:11/17/20  - Service end time:11/17/20  - Date of report: 11/17/20  "

## 2020-11-17 NOTE — NURSING NOTE
Chief Complaint   Patient presents with     Derm Problem     follow up L lateral leg, no concerns      Eboni Maddox, EMT

## 2020-11-17 NOTE — PROGRESS NOTES
"  Pt presents for 2 week follow up of granulating wound on lower leg.    She notes that she is doing well.  The wound is doing \"great\".  She denies pain or discharge.  She notes some mild itching and irritation around the wound which she attributes to tape.  The nurses gave her advice on how to bandage while minimizing tape.    Normally granulating wound on LE.    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  Discussed warning signs of infection and patient will call if symptoms arise.    Jules Roman MD      Teledermatology information:  - Location of patient: [place]  - Patient presented as: return  - Location of teledermatologist:  ([facility] DERMATOLOGIC SURGERY CLINIC [place] )  - Reason teledermatology is appropriate:  of National Emergency Regarding Coronavirus disease (COVID 19) Outbreak  - Image quality and interpretability: acceptable  - Physician has received verbal consent for a Video/Photos Visit from the patient? YES  - In-person dermatology visit recommendation: yes - for physician visit  - Date of images: 11/17/20  - Service start time:11/17/20  - Service end time:11/17/20  - Date of report: 11/17/20    "

## 2020-11-17 NOTE — PATIENT INSTRUCTIONS
[facility] Dermatology Visit    Thank you for allowing us to participate in your care. Your findings, instructions and follow-up plan are as follows:    We will have you follow up in 4 weeks.    When should I call my doctor?    If you are worsening or not improving, please, contact us or seek urgent care as noted below.     Who should I call with questions (adults)?    [facility] (adult and pediatric): [phone]     [facility] (adult): [phone]    For urgent needs outside of business hours call the [facility] at [phone] and ask for the dermatology resident on call    If this is a medical emergency and you are unable to reach an ER, Call [phone]      Who should I call with questions (pediatric)?  [facility]- Pediatric Dermatology  Dr. Dawna Mazariegos, Dr. Roxane Vivar, Dr. Angela Velazquez, Oneida Peters, [place]  Dr. Do Sampson, Dr. Ann Marie Miles & Dr. Josue Barnard  Non Urgent  Nurse Triage Line; [phone]- Sara and Jess BARRIOS Care Coordinators   Rosemary (/Complex ) [phone]    If you need a prescription refill, please contact your pharmacy. Refills are approved or denied by our Physicians during normal business hours, Monday through Fridays  Per office policy, refills will not be granted if you have not been seen within the past year (or sooner depending on your child's condition)    Scheduling Information:  Pediatric Appointment Scheduling and Call Center [phone]  Radiology Scheduling- [phone]  Sedation Unit Scheduling- [phone]  [facility] Scheduling- General [phone]; Pediatric Dermatology [phone]  Main  Services: [phone]  Amharic: [phone]  Estonian: [phone]  Hmong/Bryan/Italian: [phone]  Preadmission Nursing Department Fax Number: [phone] (Fax all pre-operative paperwork to this number)    For  urgent matters arising during evenings, weekends, or holidays that cannot wait for normal business hours please call [phone] and ask for the Dermatology Resident On-Call to be paged.

## 2020-12-13 ENCOUNTER — HEALTH MAINTENANCE LETTER (OUTPATIENT)
Age: 75
End: 2020-12-13

## 2021-01-01 NOTE — TELEPHONE ENCOUNTER
Patient calling. She has a tooth infection. They will not be able to treat until July. She had two knee replacements. Will this be a problem for her? Please call and advise.   
Spoke with Rafaela regarding her question. I let her know that Dr. Tolliver said that at this point his recommendation is watchful waiting until she can get her infection treated. I let her know to watch for redness and warmth in the knees and other warning signs of infection. I let her know that if she experiences these symptoms between now and when her infection is treated she should reach out to us and we will reassess from there. She agreed with this plan and had no further questions or concerns at this time.    Ryan Vidal PA-C  Supervising physician: Josue Tolliver MD  Dept. of Orthopedics  [facility]        
[phone]

## 2021-01-19 ENCOUNTER — OFFICE VISIT (OUTPATIENT)
Dept: OPHTHALMOLOGY | Facility: CLINIC | Age: 76
End: 2021-01-19
Attending: OPHTHALMOLOGY
Payer: COMMERCIAL

## 2021-01-19 ENCOUNTER — OFFICE VISIT (OUTPATIENT)
Dept: OPHTHALMOLOGY | Facility: CLINIC | Age: 76
End: 2021-01-19
Payer: COMMERCIAL

## 2021-01-19 DIAGNOSIS — Z86.69 HISTORY OF EPISCLERITIS: ICD-10-CM

## 2021-01-19 DIAGNOSIS — H02.422 MYOGENIC PTOSIS OF LEFT EYELID: Primary | ICD-10-CM

## 2021-01-19 DIAGNOSIS — L71.8 OCULAR ROSACEA: Primary | ICD-10-CM

## 2021-01-19 DIAGNOSIS — H02.402 PTOSIS OF EYELID, LEFT: ICD-10-CM

## 2021-01-19 DIAGNOSIS — Z96.1 PSEUDOPHAKIA OF BOTH EYES: ICD-10-CM

## 2021-01-19 DIAGNOSIS — H02.831 DERMATOCHALASIS OF BOTH UPPER EYELIDS: ICD-10-CM

## 2021-01-19 DIAGNOSIS — H52.223 REGULAR ASTIGMATISM OF BOTH EYES: ICD-10-CM

## 2021-01-19 DIAGNOSIS — L71.8 OCULAR ROSACEA: ICD-10-CM

## 2021-01-19 DIAGNOSIS — H02.834 DERMATOCHALASIS OF BOTH UPPER EYELIDS: ICD-10-CM

## 2021-01-19 PROCEDURE — 99214 OFFICE O/P EST MOD 30 MIN: CPT | Mod: GC | Performed by: OPHTHALMOLOGY

## 2021-01-19 PROCEDURE — 92015 DETERMINE REFRACTIVE STATE: CPT | Performed by: OPTOMETRIST

## 2021-01-19 PROCEDURE — 92285 EXTERNAL OCULAR PHOTOGRAPHY: CPT | Mod: GC | Performed by: OPHTHALMOLOGY

## 2021-01-19 PROCEDURE — 92014 COMPRE OPH EXAM EST PT 1/>: CPT | Performed by: OPTOMETRIST

## 2021-01-19 PROCEDURE — 92082 INTERMEDIATE VISUAL FIELD XM: CPT | Mod: GC | Performed by: OPHTHALMOLOGY

## 2021-01-19 ASSESSMENT — REFRACTION_WEARINGRX
OS_SPHERE: -1.00
OS_AXIS: 035
SPECS_TYPE: PAL
OD_CYLINDER: +1.25
OS_ADD: +2.75
OD_AXIS: 145
OD_SPHERE: -1.00
OD_SPHERE: -1.00
OS_ADD: +2.75
OS_SPHERE: -1.00
OD_ADD: +2.75
OD_CYLINDER: +1.25
OD_ADD: +2.75
OS_CYLINDER: +1.25
OS_CYLINDER: +1.25
OD_AXIS: 145
OS_AXIS: 035
SPECS_TYPE: PAL

## 2021-01-19 ASSESSMENT — LEVATOR FUNCTION
OD_LEVATOR: 15
OS_LEVATOR: 15

## 2021-01-19 ASSESSMENT — CONF VISUAL FIELD
METHOD: COUNTING FINGERS
METHOD: COUNTING FINGERS
OD_NORMAL: 1
OD_NORMAL: 1
OS_NORMAL: 1
OS_NORMAL: 1

## 2021-01-19 ASSESSMENT — VISUAL ACUITY
OS_CC+: -1
OS_CC: J1
OD_CC: 20/30
OD_CC: 20/30
METHOD: SNELLEN - LINEAR
OD_CC: J1+
OD_CC+: +2
OS_CC: 20/25
CORRECTION_TYPE: GLASSES
OS_CC+: -1
OD_CC+: +2
OS_CC: 20/25
METHOD: SNELLEN - LINEAR

## 2021-01-19 ASSESSMENT — REFRACTION_MANIFEST
OS_ADD: +2.75
OS_CYLINDER: +1.25
OD_ADD: +2.75
OD_SPHERE: -1.00
OD_AXIS: 156
OD_CYLINDER: +1.25
OS_AXIS: 015
OS_SPHERE: -1.00

## 2021-01-19 ASSESSMENT — CUP TO DISC RATIO
OS_RATIO: 0.4
OD_RATIO: 0.4

## 2021-01-19 ASSESSMENT — LAGOPHTHALMOS
OD_LAGOPHTHALMOS: 0
OS_LAGOPHTHALMOS: 0

## 2021-01-19 ASSESSMENT — MARGIN REFLEX DISTANCE
OD_MRD1: 3
OS_MRD1: 2

## 2021-01-19 ASSESSMENT — EXTERNAL EXAM - LEFT EYE
OS_EXAM: NORMAL
OS_EXAM: NORMAL

## 2021-01-19 ASSESSMENT — TONOMETRY
OD_IOP_MMHG: 8
OS_IOP_MMHG: 8
IOP_METHOD: ICARE
OS_IOP_MMHG: 8
IOP_METHOD: ICARE
OD_IOP_MMHG: 8

## 2021-01-19 ASSESSMENT — EXTERNAL EXAM - RIGHT EYE
OD_EXAM: NORMAL
OD_EXAM: NORMAL

## 2021-01-19 NOTE — PROGRESS NOTES
History  HPI     Annual Eye Exam     In both eyes.  Associated symptoms include Negative for dryness, eye pain, redness, tearing and double vision.  Treatments tried include eye drops.  Pain was noted as 0/10. Additional comments: Vision is fluctuating with each eye.              Comments     Unsure if glasses are causing vision fluctuation or Eye lids. Glasses have many scratches. Notices reading in the evening having to raise eye lids more to see clearly. When watching TV has to tilt head up to see clearly. Distance and near vision is not always clear. Denies any headaches or forehead tension.  Lubricants used 4 times weekly each eye PRN.     Angie Morris, COT COT 12:10 PM January 19, 2021               Last edited by Blue Rojas, OD on 1/19/2021 12:26 PM. 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 Recommended increasing non-preserved artificial tears to twice each day both eyes. Recommended warm compresses twice each day for ten minutes. Monitor annually, or sooner if symptoms worsen.    (H52.223) Regular astigmatism of both eyes  Comment: Mixed astigmatism with presbyopia both eyes   Plan: REFRACTION         Educated patient on condition and clinical findings. Dispensed spectacle prescription for full time wear. Monitor annually.    (H02.402) Ptosis of eyelid, left  Comment: Longstanding, bothersome when reading  Plan:  Referred to Dr. Pepper for oculoplastics consultation.    (H02.831,  H02.834) Dermatochalasis of both upper eyelids  Comment: See above    (Z96.1) Pseudophakia of both eyes  Comment: Stable, clear capsules  Plan:  No treatment indicated at this time. Monitor annually.    (Z86.69) History of episcleritis  Comment: No recurrence  Plan:  No treatment indicated at this time.  Monitor annually.    Return to clinic in 1 year for comprehensive eye exam.    Complete documentation of historical and exam elements from today's encounter can  be found in the full encounter summary report (not reduplicated in this progress  note). I personally obtained the chief complaint(s) and history of present illness. I  confirmed and edited as necessary the review of systems, past medical/surgical  history, family history, social history, and examination findings as documented by  others; and I examined the patient myself. I personally reviewed the relevant tests,  images, and reports as documented above. I formulated and edited as necessary the  assessment and plan and discussed the findings and management plan with the  patient and family.    Blue Rojas, GWENDOLYN, FAAO

## 2021-01-19 NOTE — NURSING NOTE
Chief Complaints and History of Present Illnesses   Patient presents with     Droopy Eye Lid Follow-Up     Chief Complaint(s) and History of Present Illness(es)     Droopy Eye Lid Follow-Up     Laterality: right upper lid and left upper lid    Onset: chronic    Duration: years    Course: gradually worsening    Pain scale: 0/10              Comments     Rafaela Rock is being seen today at the request of Blue Rojas for ptosis both eyes. Pt states she has gradually noticed more droopiness in both eyes.The droopy eyelid is interfering with activities of daily living including driving, and reading. She notes that she has to stop reading and raise her brows to get her eyelids elevated again.    Shell Bray, COT COT 12:52 PM January 19, 2021

## 2021-01-19 NOTE — PROGRESS NOTES
Chief Complaint(s) and History of Present Illness(es)     Droopy Eye Lid Follow-Up     Laterality: right upper lid and left upper lid    Onset: chronic    Duration: years    Course: gradually worsening    Pain scale: 0/10              Comments     Rafaela Rock is being seen today at the request of Blue Rojas for   ptosis both eyes. Pt states she has gradually noticed more droopiness in   both eyes.The droopy eyelid is interfering with activities of daily living   including driving, and reading. She notes that she has to stop reading and   raise her brows to get her eyelids elevated again.    Shell Bray, COT COT 12:52 PM January 19, 2021           FUNCTIONAL COMPLAINTS RELATED TO DROOPY EYELIDS/BROWS:  Rafaela Rock describes upper lids interfering with superior visual field and interfering with activities of daily living including reading, driving and watching television.     EXAM:   Dominant eye right    MRD1: Right eye 2   Left eye 1.5  Dermatochalasis with excess skin touching eyelashes   Ptosis left upper eyelid, and brow ptosis left relative to right side.    VISUAL FIELD:  Right eye untaped:30 degrees Right eye taped:50 degrees  Left eye untaped:30 degrees Left eye taped:50 degrees    Right eye visual field improves by: 20 degrees  Left eye visual field improves by: 20 degrees             Assessment & Plan     Rafaela Rock is a 75 year old female with the following diagnoses:   Encounter Diagnoses   Name Primary?     Myogenic ptosis of left eyelid Yes     Dermatochalasis of both upper eyelids      Ocular rosacea      Pseudophakia of both eyes      Ocular roasacea well managed    PLAN:  Bilateral upper mechanical ptosis repair (S+NF, +/- apo)    Pointed out brow ptosis on the left but will not address.  Will obtain prior auth for [id number]    Attending Physician Attestation: Complete documentation of historical and exam elements from today's encounter can be found in the full encounter summary report  (not reduplicated in this progress note). I personally obtained the chief complaint(s) and history of present illness. I confirmed and edited as necessary the review of systems, past medical/surgical history, family history, social history, and examination findings as documented by others; and I examined the patient myself. I personally reviewed the relevant tests, images, and reports as documented above. I formulated and edited as necessary the assessment and plan and discussed the findings and management plan with the patient.  -Cristopher Pepper MD      Today with Rafaela Rock, I reviewed the indications, risks, benefits, and alternatives of the proposed surgical procedure including, but not limited to, failure obtain the desired result  and need for additional surgery, bleeding, infection, loss of vision, loss of the eye, and the remote possibility of permanent damage to any organ system or death with the use of anesthesia.  I provided multiple opportunities for the questions, answered all questions to the best of my ability, and confirmed that my answers and my discussion were understood.   Cristopher Pepper MD

## 2021-01-19 NOTE — NURSING NOTE
Chief Complaints and History of Present Illnesses   Patient presents with     Annual Eye Exam     Vision is fluctuating with each eye.     Chief Complaint(s) and History of Present Illness(es)     Annual Eye Exam     Laterality: both eyes    Associated symptoms: Negative for dryness, eye pain, redness, tearing and double vision    Treatments tried: eye drops    Pain scale: 0/10    Comments: Vision is fluctuating with each eye.              Comments     Unsure of glasses are causing vision fluctuation or Eye lids. Glasses have many scratches. Notices reading in the evening having to raise eye lids more to see clearly. When watching TV has to tilt head up to see clearly. Distance and near vision is not always clear. Denies any headaches or forehead tension.  Lubricants used 4 times weekly each eye PRN.     CHENG Chan COT 12:10 PM January 19, 2021

## 2021-01-20 ENCOUNTER — TELEPHONE (OUTPATIENT)
Dept: OPHTHALMOLOGY | Facility: CLINIC | Age: 76
End: 2021-01-20

## 2021-01-20 PROBLEM — H02.422 MYOGENIC PTOSIS OF LEFT EYELID: Status: ACTIVE | Noted: 2021-01-20

## 2021-01-20 PROBLEM — H02.834 DERMATOCHALASIS OF BOTH UPPER EYELIDS: Status: ACTIVE | Noted: 2021-01-20

## 2021-01-20 PROBLEM — H02.831 DERMATOCHALASIS OF BOTH UPPER EYELIDS: Status: ACTIVE | Noted: 2021-01-20

## 2021-01-20 NOTE — TELEPHONE ENCOUNTER
Spoke with patient to schedule surgery with Dr. Pepper.    Surgery was scheduled on 3/4 at [facility]  Patient will have H&P at [facility]     Patient is aware a COVID-19 test is needed before their procedure. The test should be with-in 4 days of their procedure.   Test Details: Date 3/1 Location [facility]    Post-Op visit was scheduled on 3/16  Patient is aware a / is needed day of surgery.   Surgery packet was mailed 3/16, patient has my direct contact information for any further questions.

## 2021-01-22 ENCOUNTER — PREP FOR PROCEDURE (OUTPATIENT)
Dept: OPHTHALMOLOGY | Facility: CLINIC | Age: 76
End: 2021-01-22

## 2021-01-22 DIAGNOSIS — H02.422 MYOGENIC PTOSIS OF LEFT EYELID: Primary | ICD-10-CM

## 2021-01-23 ENCOUNTER — MYC MEDICAL ADVICE (OUTPATIENT)
Dept: SURGERY | Facility: AMBULATORY SURGERY CENTER | Age: 76
End: 2021-01-23

## 2021-02-04 ENCOUNTER — IMMUNIZATION (OUTPATIENT)
Dept: FAMILY MEDICINE | Facility: CLINIC | Age: 76
End: 2021-02-04
Payer: COMMERCIAL

## 2021-02-04 PROCEDURE — 91300 PR COVID VAC PFIZER DIL RECON 30 MCG/0.3 ML IM: CPT

## 2021-02-04 PROCEDURE — 0001A PR COVID VAC PFIZER DIL RECON 30 MCG/0.3 ML IM: CPT

## 2021-02-18 DIAGNOSIS — Z11.59 ENCOUNTER FOR SCREENING FOR OTHER VIRAL DISEASES: Primary | ICD-10-CM

## 2021-02-19 ENCOUNTER — MYC MEDICAL ADVICE (OUTPATIENT)
Dept: OPHTHALMOLOGY | Facility: CLINIC | Age: 76
End: 2021-02-19

## 2021-02-25 ENCOUNTER — IMMUNIZATION (OUTPATIENT)
Dept: FAMILY MEDICINE | Facility: CLINIC | Age: 76
End: 2021-02-25
Attending: FAMILY MEDICINE
Payer: COMMERCIAL

## 2021-02-25 PROCEDURE — 91300 PR COVID VAC PFIZER DIL RECON 30 MCG/0.3 ML IM: CPT

## 2021-02-25 PROCEDURE — 0002A PR COVID VAC PFIZER DIL RECON 30 MCG/0.3 ML IM: CPT

## 2021-03-02 ENCOUNTER — OFFICE VISIT (OUTPATIENT)
Dept: FAMILY MEDICINE | Facility: CLINIC | Age: 76
End: 2021-03-02
Payer: COMMERCIAL

## 2021-03-02 VITALS
HEIGHT: 63 IN | HEART RATE: 69 BPM | BODY MASS INDEX: 32.04 KG/M2 | OXYGEN SATURATION: 96 % | DIASTOLIC BLOOD PRESSURE: 76 MMHG | WEIGHT: 180.8 LBS | SYSTOLIC BLOOD PRESSURE: 134 MMHG | TEMPERATURE: 98.3 F

## 2021-03-02 DIAGNOSIS — E78.5 HYPERLIPIDEMIA WITH TARGET LDL LESS THAN 130: ICD-10-CM

## 2021-03-02 DIAGNOSIS — R73.01 IMPAIRED FASTING GLUCOSE: ICD-10-CM

## 2021-03-02 DIAGNOSIS — Z11.59 ENCOUNTER FOR SCREENING FOR OTHER VIRAL DISEASES: ICD-10-CM

## 2021-03-02 DIAGNOSIS — Z11.59 NEED FOR HEPATITIS C SCREENING TEST: ICD-10-CM

## 2021-03-02 DIAGNOSIS — Z01.818 PREOP GENERAL PHYSICAL EXAM: Primary | ICD-10-CM

## 2021-03-02 DIAGNOSIS — H02.831 DERMATOCHALASIS OF BOTH UPPER EYELIDS: ICD-10-CM

## 2021-03-02 DIAGNOSIS — H02.834 DERMATOCHALASIS OF BOTH UPPER EYELIDS: ICD-10-CM

## 2021-03-02 PROBLEM — E66.01 MORBID OBESITY (H): Status: RESOLVED | Noted: 2018-10-19 | Resolved: 2021-03-02

## 2021-03-02 LAB
ANION GAP SERPL CALCULATED.3IONS-SCNC: 2 MMOL/L (ref 3–14)
BUN SERPL-MCNC: 20 MG/DL (ref 7–30)
CALCIUM SERPL-MCNC: 9.3 MG/DL (ref 8.5–10.1)
CHLORIDE SERPL-SCNC: 107 MMOL/L (ref 94–109)
CHOLEST SERPL-MCNC: 195 MG/DL
CO2 SERPL-SCNC: 29 MMOL/L (ref 20–32)
CREAT SERPL-MCNC: 0.86 MG/DL (ref 0.52–1.04)
ERYTHROCYTE [DISTWIDTH] IN BLOOD BY AUTOMATED COUNT: 13.8 % (ref 10–15)
GFR SERPL CREATININE-BSD FRML MDRD: 64 ML/MIN/{1.73_M2}
GLUCOSE SERPL-MCNC: 89 MG/DL (ref 70–99)
HBA1C MFR BLD: 5.4 % (ref 0–5.6)
HCT VFR BLD AUTO: 41.6 % (ref 35–47)
HCV AB SERPL QL IA: NONREACTIVE
HDLC SERPL-MCNC: 94 MG/DL
HGB BLD-MCNC: 13 G/DL (ref 11.7–15.7)
LDLC SERPL CALC-MCNC: 89 MG/DL
MCH RBC QN AUTO: 29.5 PG (ref 26.5–33)
MCHC RBC AUTO-ENTMCNC: 31.3 G/DL (ref 31.5–36.5)
MCV RBC AUTO: 95 FL (ref 78–100)
NONHDLC SERPL-MCNC: 101 MG/DL
PLATELET # BLD AUTO: 208 10E9/L (ref 150–450)
POTASSIUM SERPL-SCNC: 4.9 MMOL/L (ref 3.4–5.3)
RBC # BLD AUTO: 4.4 10E12/L (ref 3.8–5.2)
SARS-COV-2 RNA RESP QL NAA+PROBE: NORMAL
SODIUM SERPL-SCNC: 138 MMOL/L (ref 133–144)
SPECIMEN SOURCE: NORMAL
TRIGL SERPL-MCNC: 59 MG/DL
WBC # BLD AUTO: 6.4 10E9/L (ref 4–11)

## 2021-03-02 PROCEDURE — 85027 COMPLETE CBC AUTOMATED: CPT | Performed by: FAMILY MEDICINE

## 2021-03-02 PROCEDURE — U0003 INFECTIOUS AGENT DETECTION BY NUCLEIC ACID (DNA OR RNA); SEVERE ACUTE RESPIRATORY SYNDROME CORONAVIRUS 2 (SARS-COV-2) (CORONAVIRUS DISEASE [COVID-19]), AMPLIFIED PROBE TECHNIQUE, MAKING USE OF HIGH THROUGHPUT TECHNOLOGIES AS DESCRIBED BY CMS-2020-01-R: HCPCS | Performed by: OPHTHALMOLOGY

## 2021-03-02 PROCEDURE — 86803 HEPATITIS C AB TEST: CPT | Performed by: FAMILY MEDICINE

## 2021-03-02 PROCEDURE — 80048 BASIC METABOLIC PNL TOTAL CA: CPT | Performed by: FAMILY MEDICINE

## 2021-03-02 PROCEDURE — U0005 INFEC AGEN DETEC AMPLI PROBE: HCPCS | Performed by: OPHTHALMOLOGY

## 2021-03-02 PROCEDURE — 99214 OFFICE O/P EST MOD 30 MIN: CPT | Performed by: FAMILY MEDICINE

## 2021-03-02 PROCEDURE — 83036 HEMOGLOBIN GLYCOSYLATED A1C: CPT | Performed by: FAMILY MEDICINE

## 2021-03-02 PROCEDURE — 80061 LIPID PANEL: CPT | Performed by: FAMILY MEDICINE

## 2021-03-02 PROCEDURE — 36415 COLL VENOUS BLD VENIPUNCTURE: CPT | Performed by: FAMILY MEDICINE

## 2021-03-02 ASSESSMENT — MIFFLIN-ST. JEOR: SCORE: 1280.35

## 2021-03-02 NOTE — PATIENT INSTRUCTIONS

## 2021-03-02 NOTE — PROGRESS NOTES
[facility]  [address]  [place] [address]  Phone: [phone]  Primary Provider: Jero Pereyra  Pre-op Performing Provider: JERO PEREYRA      PREOPERATIVE EVALUATION:  Today's date: 3/2/2021    Rafaela Rock is a 75 year old female who presents for a preoperative evaluation.    Surgical Information:  Surgery/Procedure: Bilateral Upper Eyelid Mechanical Ptosis Repair  Surgery Location: [facility]  Surgeon: Cristopher Pepper MD  Surgery Date: 3/5/21  Time of Surgery: 7:30 AM  Where patient plans to recover: At home with family  Fax number for surgical facility: Note does not need to be faxed, will be available electronically in Epic.    Type of Anesthesia Anticipated: Local with MAC    Assessment & Plan     The proposed surgical procedure is considered LOW risk.      ICD-10-CM    1. Preop general physical exam  Z01.818    2. Dermatochalasis of both upper eyelids  H02.831     H02.834    3. Impaired fasting glucose  R73.01 CBC with platelets     Basic metabolic panel     Hemoglobin A1c   4. Hyperlipidemia with target LDL less than 130  E78.5 Lipid panel reflex to direct LDL Fasting   5. Need for hepatitis C screening test  Z11.59 Hepatitis C Screen Reflex to HCV RNA Quant and Genotype         Risks and Recommendations:  The patient has the following additional risks and recommendations for perioperative complications:   - No identified additional risk factors other than previously addressed    Medication Instructions:  Patient is on no chronic oral medications     RECOMMENDATION:  APPROVAL GIVEN to proceed with proposed procedure, without further diagnostic evaluation.      Subjective     HPI related to upcoming procedure: 75-year-old female with bilateral dermatochalasis of the upper eyelids is coming in now for surgical treatment of that.    Preop Questions 2/27/2021   1. Have you ever had a heart attack or stroke? No   2. Have you ever had surgery on your heart or  blood vessels, such as a stent placement, a coronary artery bypass, or surgery on an artery in your head, neck, heart, or legs? No   3. Do you have chest pain with activity? No   4. Do you have a history of  heart failure? No   5. Do you currently have a cold, bronchitis or symptoms of other infection? No   6. Do you have a cough, shortness of breath, or wheezing? No   7. Do you or anyone in your family have previous history of blood clots? No   8. Do you or does anyone in your family have a serious bleeding problem such as prolonged bleeding following surgeries or cuts? No   9. Have you ever had problems with anemia or been told to take iron pills? No   10. Have you had any abnormal blood loss such as black, tarry or bloody stools, or abnormal vaginal bleeding? No   11. Have you ever had a blood transfusion? No   12. Are you willing to have a blood transfusion if it is medically needed before, during, or after your surgery? Yes   13. Have you or any of your relatives ever had problems with anesthesia? No   14. Do you have sleep apnea, excessive snoring or daytime drowsiness? No   15. Do you have any artifical heart valves or other implanted medical devices like a pacemaker, defibrillator, or continuous glucose monitor? No   16. Do you have artificial joints? YES    17. Are you allergic to latex? No         Status of Chronic Conditions:  See problem list for active medical problems.  Problems all longstanding and stable, except as noted/documented.  See ROS for pertinent symptoms related to these conditions.      Review of Systems   She has had a roughly 30 pound weight loss in the last year through diet and exercise.  She sees dermatology for ongoing skin care.  ROS: 10 point ROS neg other than the symptoms noted above in the HPI.      Patient Active Problem List    Diagnosis Date Noted     Myogenic ptosis of left eyelid 01/20/2021     Priority: Medium     Added automatically from request for surgery [id number]        Dermatochalasis of both upper eyelids 01/20/2021     Priority: Medium     Added automatically from request for surgery [id number]       Keratoconjunctivitis of both eyes 12/28/2019     Priority: Medium     Trochanteric bursitis of left hip 01/22/2019     Priority: Medium     Obesity (BMI 35.0-39.9) with comorbidity (H) 10/19/2018     Priority: Medium     Rash 03/26/2017     Priority: Medium     Diffuse photodamage of skin 03/26/2017     Priority: Medium     Status post total left knee replacement 03/21/2016     Priority: Medium     Inflamed seborrheic keratosis 02/29/2016     Priority: Medium     AK (actinic keratosis) 02/02/2016     Priority: Medium     Xerosis of skin 02/02/2016     Priority: Medium     Intertrigo 02/02/2016     Priority: Medium     Vitamin D deficiency 11/27/2013     Priority: Medium     S/P total knee arthroplasty - left 03/05/2013     Priority: Medium     Cataract 10/29/2012     Priority: Medium     Utility update for deleted IMO code  Imo Update utility       Advanced directives, counseling/discussion 10/26/2012     Priority: Medium              POSTERIOR VITREOUS DETACHMENT, os 07/14/2011     Priority: Medium     Hyperlipidemia with target LDL less than 130      Priority: Medium     Diagnosis updated by automated process. Provider to review and confirm.       OA (osteoarthritis)      Priority: Medium     Rosacea      Priority: Medium     Impaired fasting glucose      Priority: Medium      Past Medical History:   Diagnosis Date     AK (actinic keratosis) 2/2/2016     Allergic rhinitis      Asthma     mild intermittent     Hyperlipidemia LDL goal < 130      Impaired fasting glucose      Lyme disease      Nonsenile cataract      OA (osteoarthritis)      Obesity      PONV (postoperative nausea and vomiting)      Rosacea      Vitamin D deficiency 10/12     Past Surgical History:   Procedure Laterality Date     C TOTAL KNEE ARTHROPLASTY Left 4/2/2008    Left     C TOTAL KNEE ARTHROPLASTY Right 8/17/16     DML     CATARACT IOL, RT/LT  12/27/2013    Toric- RE     CATARACT IOL, RT/LT  12/05/2013    LE     COLONOSCOPY       COLONOSCOPY  5-5-14    Return for Colonoscopy in 1 yrs     PHACOEMULSIFICATION CLEAR CORNEA WITH TORIC INTRAOCULAR LENS IMPLANT  12/6/2013    Procedure: PHACOEMULSIFICATION CLEAR CORNEA WITH TORIC INTRAOCULAR LENS IMPLANT;  LEFT PHACOEMULSIFICATION CLEAR CORNEA WITH TORIC INTRAOCULAR LENS IMPLANT  ;  Surgeon: Otoniel Schuster MD;  Location: [facility]     PHACOEMULSIFICATION CLEAR CORNEA WITH TORIC INTRAOCULAR LENS IMPLANT  12/27/2013    Procedure: PHACOEMULSIFICATION CLEAR CORNEA WITH TORIC INTRAOCULAR LENS IMPLANT;  RIGHT PHACOEMULSIFICATION CLEAR CORNEA WITH TORIC INTRAOCULAR LENS IMPLANT  ;  Surgeon: Otoniel Schuster MD;  Location: [facility]     TONSILLECTOMY  Age 11     ZZC NONSPECIFIC PROCEDURE  9/19/2003    Left knee scope/OA,MMT     Current Outpatient Medications   Medication Sig Dispense Refill     Emollient (CERAVE) CREA Externally apply topically 3 times daily as needed 453 g 11     fluorouracil (EFUDEX) 5 % external cream Apply topically 2 times daily 40 g 0     metroNIDAZOLE (METROGEL) 1 % external gel Apply topically once or twice daily daily to the face for rosacea. 60 g 0     tacrolimus (PROTOPIC) 0.1 % external ointment Apply topically BID to the groin, alternate with triamcinolone (steroid free) 60 g 3     triamcinolone (KENALOG) 0.1 % external ointment Apply topically 2 times daily 453.6 g 3     triamcinolone (KENALOG) 0.1 % external ointment Apply topically 2 times daily 80 g 3       No Known Allergies     Social History     Tobacco Use     Smoking status: Never Smoker     Smokeless tobacco: Never Used   Substance Use Topics     Alcohol use: Yes     Comment: weekends     Family History   Problem Relation Age of Onset     Cancer Father         lung smoker     Skin Cancer Son         skin cancer     Glaucoma No family hx of      Macular Degeneration No family hx of      Eye  "Surgery No family hx of      Retinal detachment No family hx of      Melanoma No family hx of      History   Drug Use No         Objective     /76 (BP Location: Right arm, Patient Position: Sitting, Cuff Size: Adult Large)   Pulse 69   Temp 98.3  F (36.8  C) (Oral)   Ht 1.61 m (5' 3.39\")   Wt 82 kg (180 lb 12.8 oz)   SpO2 96%   BMI 31.64 kg/m      Physical Exam    GENERAL APPEARANCE: healthy, alert and no distress     EYES: EOMI, PERRL     HENT: ear canals and TM's normal and nose and mouth without ulcers or lesions     NECK: no adenopathy, no asymmetry, masses, or scars and thyroid normal to palpation     RESP: lungs clear to auscultation - no rales, rhonchi or wheezes     CV: regular rates and rhythm, normal S1 S2, no S3 or S4 and no murmur, click or rub     ABDOMEN:  soft, nontender, no HSM or masses and bowel sounds normal     MS: extremities normal -- no gross deformities noted, she has a prominent left sternoclavicular joint which was noted and discussed     SKIN: no suspicious lesions or rashes, she has scattered areas of sun damaged skin     NEURO: Normal strength and tone, sensory exam grossly normal, mentation intact and speech normal     PSYCH: mentation appears normal and affect normal/bright     LYMPHATICS: No cervical adenopathy    No results for input(s): HGB, PLT, INR, NA, POTASSIUM, CR, A1C in the last 63470 hours.     Diagnostics:  Labs pending at this time.  Results will be reviewed when available.   No EKG required for low risk surgery (cataract, skin procedure, breast biopsy, etc).    Revised Cardiac Risk Index (RCRI):  The patient has the following serious cardiovascular risks for perioperative complications:   - No serious cardiac risks = 0 points     RCRI Interpretation: 0 points: Class I (very low risk - 0.4% complication rate)      Signed Electronically by: Josue Shah MD  Copy of this evaluation report is provided to requesting physician.    Preop [facility] " [facility] Preop Guidelines    Revised Cardiac Risk Index

## 2021-03-02 NOTE — H&P (VIEW-ONLY)
[facility]  [address]  [place] [address]  Phone: [phone]  Primary Provider: Jero Pereyra  Pre-op Performing Provider: JERO PEREYRA      PREOPERATIVE EVALUATION:  Today's date: 3/2/2021    Rafaela Rock is a 75 year old female who presents for a preoperative evaluation.    Surgical Information:  Surgery/Procedure: Bilateral Upper Eyelid Mechanical Ptosis Repair  Surgery Location: [facility]  Surgeon: Cristopher Pepper MD  Surgery Date: 3/5/21  Time of Surgery: 7:30 AM  Where patient plans to recover: At home with family  Fax number for surgical facility: Note does not need to be faxed, will be available electronically in Epic.    Type of Anesthesia Anticipated: Local with MAC    Assessment & Plan     The proposed surgical procedure is considered LOW risk.      ICD-10-CM    1. Preop general physical exam  Z01.818    2. Dermatochalasis of both upper eyelids  H02.831     H02.834    3. Impaired fasting glucose  R73.01 CBC with platelets     Basic metabolic panel     Hemoglobin A1c   4. Hyperlipidemia with target LDL less than 130  E78.5 Lipid panel reflex to direct LDL Fasting   5. Need for hepatitis C screening test  Z11.59 Hepatitis C Screen Reflex to HCV RNA Quant and Genotype         Risks and Recommendations:  The patient has the following additional risks and recommendations for perioperative complications:   - No identified additional risk factors other than previously addressed    Medication Instructions:  Patient is on no chronic oral medications     RECOMMENDATION:  APPROVAL GIVEN to proceed with proposed procedure, without further diagnostic evaluation.      Subjective     HPI related to upcoming procedure: 75-year-old female with bilateral dermatochalasis of the upper eyelids is coming in now for surgical treatment of that.    Preop Questions 2/27/2021   1. Have you ever had a heart attack or stroke? No   2. Have you ever had surgery on your heart or  blood vessels, such as a stent placement, a coronary artery bypass, or surgery on an artery in your head, neck, heart, or legs? No   3. Do you have chest pain with activity? No   4. Do you have a history of  heart failure? No   5. Do you currently have a cold, bronchitis or symptoms of other infection? No   6. Do you have a cough, shortness of breath, or wheezing? No   7. Do you or anyone in your family have previous history of blood clots? No   8. Do you or does anyone in your family have a serious bleeding problem such as prolonged bleeding following surgeries or cuts? No   9. Have you ever had problems with anemia or been told to take iron pills? No   10. Have you had any abnormal blood loss such as black, tarry or bloody stools, or abnormal vaginal bleeding? No   11. Have you ever had a blood transfusion? No   12. Are you willing to have a blood transfusion if it is medically needed before, during, or after your surgery? Yes   13. Have you or any of your relatives ever had problems with anesthesia? No   14. Do you have sleep apnea, excessive snoring or daytime drowsiness? No   15. Do you have any artifical heart valves or other implanted medical devices like a pacemaker, defibrillator, or continuous glucose monitor? No   16. Do you have artificial joints? YES    17. Are you allergic to latex? No         Status of Chronic Conditions:  See problem list for active medical problems.  Problems all longstanding and stable, except as noted/documented.  See ROS for pertinent symptoms related to these conditions.      Review of Systems   She has had a roughly 30 pound weight loss in the last year through diet and exercise.  She sees dermatology for ongoing skin care.  ROS: 10 point ROS neg other than the symptoms noted above in the HPI.      Patient Active Problem List    Diagnosis Date Noted     Myogenic ptosis of left eyelid 01/20/2021     Priority: Medium     Added automatically from request for surgery [id number]        Dermatochalasis of both upper eyelids 01/20/2021     Priority: Medium     Added automatically from request for surgery [id number]       Keratoconjunctivitis of both eyes 12/28/2019     Priority: Medium     Trochanteric bursitis of left hip 01/22/2019     Priority: Medium     Obesity (BMI 35.0-39.9) with comorbidity (H) 10/19/2018     Priority: Medium     Rash 03/26/2017     Priority: Medium     Diffuse photodamage of skin 03/26/2017     Priority: Medium     Status post total left knee replacement 03/21/2016     Priority: Medium     Inflamed seborrheic keratosis 02/29/2016     Priority: Medium     AK (actinic keratosis) 02/02/2016     Priority: Medium     Xerosis of skin 02/02/2016     Priority: Medium     Intertrigo 02/02/2016     Priority: Medium     Vitamin D deficiency 11/27/2013     Priority: Medium     S/P total knee arthroplasty - left 03/05/2013     Priority: Medium     Cataract 10/29/2012     Priority: Medium     Utility update for deleted IMO code  Imo Update utility       Advanced directives, counseling/discussion 10/26/2012     Priority: Medium              POSTERIOR VITREOUS DETACHMENT, os 07/14/2011     Priority: Medium     Hyperlipidemia with target LDL less than 130      Priority: Medium     Diagnosis updated by automated process. Provider to review and confirm.       OA (osteoarthritis)      Priority: Medium     Rosacea      Priority: Medium     Impaired fasting glucose      Priority: Medium      Past Medical History:   Diagnosis Date     AK (actinic keratosis) 2/2/2016     Allergic rhinitis      Asthma     mild intermittent     Hyperlipidemia LDL goal < 130      Impaired fasting glucose      Lyme disease      Nonsenile cataract      OA (osteoarthritis)      Obesity      PONV (postoperative nausea and vomiting)      Rosacea      Vitamin D deficiency 10/12     Past Surgical History:   Procedure Laterality Date     C TOTAL KNEE ARTHROPLASTY Left 4/2/2008    Left     C TOTAL KNEE ARTHROPLASTY Right 8/17/16     DML     CATARACT IOL, RT/LT  12/27/2013    Toric- RE     CATARACT IOL, RT/LT  12/05/2013    LE     COLONOSCOPY       COLONOSCOPY  5-5-14    Return for Colonoscopy in 1 yrs     PHACOEMULSIFICATION CLEAR CORNEA WITH TORIC INTRAOCULAR LENS IMPLANT  12/6/2013    Procedure: PHACOEMULSIFICATION CLEAR CORNEA WITH TORIC INTRAOCULAR LENS IMPLANT;  LEFT PHACOEMULSIFICATION CLEAR CORNEA WITH TORIC INTRAOCULAR LENS IMPLANT  ;  Surgeon: Otoniel Schuster MD;  Location: [facility]     PHACOEMULSIFICATION CLEAR CORNEA WITH TORIC INTRAOCULAR LENS IMPLANT  12/27/2013    Procedure: PHACOEMULSIFICATION CLEAR CORNEA WITH TORIC INTRAOCULAR LENS IMPLANT;  RIGHT PHACOEMULSIFICATION CLEAR CORNEA WITH TORIC INTRAOCULAR LENS IMPLANT  ;  Surgeon: Otoniel Schuster MD;  Location: [facility]     TONSILLECTOMY  Age 11     ZZC NONSPECIFIC PROCEDURE  9/19/2003    Left knee scope/OA,MMT     Current Outpatient Medications   Medication Sig Dispense Refill     Emollient (CERAVE) CREA Externally apply topically 3 times daily as needed 453 g 11     fluorouracil (EFUDEX) 5 % external cream Apply topically 2 times daily 40 g 0     metroNIDAZOLE (METROGEL) 1 % external gel Apply topically once or twice daily daily to the face for rosacea. 60 g 0     tacrolimus (PROTOPIC) 0.1 % external ointment Apply topically BID to the groin, alternate with triamcinolone (steroid free) 60 g 3     triamcinolone (KENALOG) 0.1 % external ointment Apply topically 2 times daily 453.6 g 3     triamcinolone (KENALOG) 0.1 % external ointment Apply topically 2 times daily 80 g 3       No Known Allergies     Social History     Tobacco Use     Smoking status: Never Smoker     Smokeless tobacco: Never Used   Substance Use Topics     Alcohol use: Yes     Comment: weekends     Family History   Problem Relation Age of Onset     Cancer Father         lung smoker     Skin Cancer Son         skin cancer     Glaucoma No family hx of      Macular Degeneration No family hx of      Eye  "Surgery No family hx of      Retinal detachment No family hx of      Melanoma No family hx of      History   Drug Use No         Objective     /76 (BP Location: Right arm, Patient Position: Sitting, Cuff Size: Adult Large)   Pulse 69   Temp 98.3  F (36.8  C) (Oral)   Ht 1.61 m (5' 3.39\")   Wt 82 kg (180 lb 12.8 oz)   SpO2 96%   BMI 31.64 kg/m      Physical Exam    GENERAL APPEARANCE: healthy, alert and no distress     EYES: EOMI, PERRL     HENT: ear canals and TM's normal and nose and mouth without ulcers or lesions     NECK: no adenopathy, no asymmetry, masses, or scars and thyroid normal to palpation     RESP: lungs clear to auscultation - no rales, rhonchi or wheezes     CV: regular rates and rhythm, normal S1 S2, no S3 or S4 and no murmur, click or rub     ABDOMEN:  soft, nontender, no HSM or masses and bowel sounds normal     MS: extremities normal -- no gross deformities noted, she has a prominent left sternoclavicular joint which was noted and discussed     SKIN: no suspicious lesions or rashes, she has scattered areas of sun damaged skin     NEURO: Normal strength and tone, sensory exam grossly normal, mentation intact and speech normal     PSYCH: mentation appears normal and affect normal/bright     LYMPHATICS: No cervical adenopathy    No results for input(s): HGB, PLT, INR, NA, POTASSIUM, CR, A1C in the last 57705 hours.     Diagnostics:  Labs pending at this time.  Results will be reviewed when available.   No EKG required for low risk surgery (cataract, skin procedure, breast biopsy, etc).    Revised Cardiac Risk Index (RCRI):  The patient has the following serious cardiovascular risks for perioperative complications:   - No serious cardiac risks = 0 points     RCRI Interpretation: 0 points: Class I (very low risk - 0.4% complication rate)      Signed Electronically by: Josue Shah MD  Copy of this evaluation report is provided to requesting physician.    Preop [employer] " [facility] Preop Guidelines    Revised Cardiac Risk Index

## 2021-03-02 NOTE — LETTER
[facility]  [address]  [place] [address]  Phone: [phone]    March 2, 2021        Rafaela Rock  [address] [place] [id number]          To whom it may concern:    RE: Rafaela Rock    I provide primary care for the above individual.  She has had successful weight loss of about 30 pounds in the last year or so.  An appropriate final weight goal for her would be 170 to 175 pounds, or basically anything less than 175 pounds, which would place her BMI less than 30.    Thank you for your consideration.  Please contact me for questions or concerns.      Sincerely,        Josue Shah MD

## 2021-03-03 LAB
LABORATORY COMMENT REPORT: NORMAL
SARS-COV-2 RNA RESP QL NAA+PROBE: NEGATIVE
SPECIMEN SOURCE: NORMAL

## 2021-03-03 NOTE — RESULT ENCOUNTER NOTE
"Rafaela,  These lab results look wonderful and are basically all normal including cholesterol values, electrolytes, kidney tests, diabetes testing, blood counts, and the hepatitis C screening antibody test.  Your 3-month average blood sugar test, the hemoglobin A1c, is the lowest it has been in years, and your LDL \"bad cholesterol\" level is the lowest and your HDL \"good cholesterol\" is the highest ever that I can see going back many years. Keep up the good work!  :)    Josue Shah MD    "

## 2021-03-04 ENCOUNTER — ANESTHESIA EVENT (OUTPATIENT)
Dept: SURGERY | Facility: CLINIC | Age: 76
End: 2021-03-04
Payer: COMMERCIAL

## 2021-03-05 ENCOUNTER — HOSPITAL ENCOUNTER (OUTPATIENT)
Facility: CLINIC | Age: 76
Discharge: HOME | End: 2021-03-05
Attending: OPHTHALMOLOGY | Admitting: OPHTHALMOLOGY
Payer: COMMERCIAL

## 2021-03-05 ENCOUNTER — ANESTHESIA (OUTPATIENT)
Dept: SURGERY | Facility: CLINIC | Age: 76
End: 2021-03-05
Payer: COMMERCIAL

## 2021-03-05 VITALS
TEMPERATURE: 96.7 F | RESPIRATION RATE: 20 BRPM | DIASTOLIC BLOOD PRESSURE: 68 MMHG | BODY MASS INDEX: 30.56 KG/M2 | HEART RATE: 53 BPM | SYSTOLIC BLOOD PRESSURE: 129 MMHG | WEIGHT: 179 LBS | OXYGEN SATURATION: 99 % | HEIGHT: 64 IN

## 2021-03-05 DIAGNOSIS — H02.422 MYOGENIC PTOSIS OF LEFT EYELID: ICD-10-CM

## 2021-03-05 PROCEDURE — 370N000017 HC ANESTHESIA TECHNICAL FEE, PER MIN: Performed by: OPHTHALMOLOGY

## 2021-03-05 PROCEDURE — 250N000011 HC RX IP 250 OP 636: Performed by: NURSE ANESTHETIST, CERTIFIED REGISTERED

## 2021-03-05 PROCEDURE — 250N000009 HC RX 250: Performed by: OPHTHALMOLOGY

## 2021-03-05 PROCEDURE — 710N000012 HC RECOVERY PHASE 2, PER MINUTE: Performed by: OPHTHALMOLOGY

## 2021-03-05 PROCEDURE — 272N000001 HC OR GENERAL SUPPLY STERILE: Performed by: OPHTHALMOLOGY

## 2021-03-05 PROCEDURE — 710N000009 HC RECOVERY PHASE 1, LEVEL 1, PER MIN: Performed by: OPHTHALMOLOGY

## 2021-03-05 PROCEDURE — 258N000003 HC RX IP 258 OP 636: Performed by: NURSE ANESTHETIST, CERTIFIED REGISTERED

## 2021-03-05 PROCEDURE — 999N000141 HC STATISTIC PRE-PROCEDURE NURSING ASSESSMENT: Performed by: OPHTHALMOLOGY

## 2021-03-05 PROCEDURE — 360N000076 HC SURGERY LEVEL 3, PER MIN: Performed by: OPHTHALMOLOGY

## 2021-03-05 RX ORDER — TETRACAINE HYDROCHLORIDE 5 MG/ML
SOLUTION OPHTHALMIC PRN
Status: DISCONTINUED | OUTPATIENT
Start: 2021-03-05 | End: 2021-03-05 | Stop reason: HOSPADM

## 2021-03-05 RX ORDER — ONDANSETRON 4 MG/1
4 TABLET, ORALLY DISINTEGRATING ORAL EVERY 30 MIN PRN
Status: DISCONTINUED | OUTPATIENT
Start: 2021-03-05 | End: 2021-03-05 | Stop reason: HOSPADM

## 2021-03-05 RX ORDER — TETRACAINE HYDROCHLORIDE 5 MG/ML
SOLUTION OPHTHALMIC
Status: DISCONTINUED
Start: 2021-03-05 | End: 2021-03-05 | Stop reason: HOSPADM

## 2021-03-05 RX ORDER — LIDOCAINE 40 MG/G
CREAM TOPICAL
Status: DISCONTINUED | OUTPATIENT
Start: 2021-03-05 | End: 2021-03-05 | Stop reason: HOSPADM

## 2021-03-05 RX ORDER — FENTANYL CITRATE 50 UG/ML
INJECTION, SOLUTION INTRAMUSCULAR; INTRAVENOUS PRN
Status: DISCONTINUED | OUTPATIENT
Start: 2021-03-05 | End: 2021-03-05

## 2021-03-05 RX ORDER — NALOXONE HYDROCHLORIDE 0.4 MG/ML
0.4 INJECTION, SOLUTION INTRAMUSCULAR; INTRAVENOUS; SUBCUTANEOUS
Status: DISCONTINUED | OUTPATIENT
Start: 2021-03-05 | End: 2021-03-05 | Stop reason: HOSPADM

## 2021-03-05 RX ORDER — ONDANSETRON 2 MG/ML
4 INJECTION INTRAMUSCULAR; INTRAVENOUS EVERY 30 MIN PRN
Status: DISCONTINUED | OUTPATIENT
Start: 2021-03-05 | End: 2021-03-05 | Stop reason: HOSPADM

## 2021-03-05 RX ORDER — SODIUM CHLORIDE, SODIUM LACTATE, POTASSIUM CHLORIDE, CALCIUM CHLORIDE 600; 310; 30; 20 MG/100ML; MG/100ML; MG/100ML; MG/100ML
INJECTION, SOLUTION INTRAVENOUS CONTINUOUS
Status: DISCONTINUED | OUTPATIENT
Start: 2021-03-05 | End: 2021-03-05 | Stop reason: HOSPADM

## 2021-03-05 RX ORDER — BUPIVACAINE HYDROCHLORIDE 5 MG/ML
INJECTION, SOLUTION EPIDURAL; INTRACAUDAL
Status: DISCONTINUED
Start: 2021-03-05 | End: 2021-03-05 | Stop reason: HOSPADM

## 2021-03-05 RX ORDER — NALOXONE HYDROCHLORIDE 0.4 MG/ML
0.2 INJECTION, SOLUTION INTRAMUSCULAR; INTRAVENOUS; SUBCUTANEOUS
Status: DISCONTINUED | OUTPATIENT
Start: 2021-03-05 | End: 2021-03-05 | Stop reason: HOSPADM

## 2021-03-05 RX ORDER — MAGNESIUM HYDROXIDE 1200 MG/15ML
LIQUID ORAL PRN
Status: DISCONTINUED | OUTPATIENT
Start: 2021-03-05 | End: 2021-03-05 | Stop reason: HOSPADM

## 2021-03-05 RX ORDER — ONDANSETRON 2 MG/ML
INJECTION INTRAMUSCULAR; INTRAVENOUS PRN
Status: DISCONTINUED | OUTPATIENT
Start: 2021-03-05 | End: 2021-03-05

## 2021-03-05 RX ORDER — ERYTHROMYCIN 5 MG/G
OINTMENT OPHTHALMIC PRN
Status: DISCONTINUED | OUTPATIENT
Start: 2021-03-05 | End: 2021-03-05 | Stop reason: HOSPADM

## 2021-03-05 RX ORDER — SODIUM CHLORIDE, SODIUM LACTATE, POTASSIUM CHLORIDE, CALCIUM CHLORIDE 600; 310; 30; 20 MG/100ML; MG/100ML; MG/100ML; MG/100ML
INJECTION, SOLUTION INTRAVENOUS CONTINUOUS PRN
Status: DISCONTINUED | OUTPATIENT
Start: 2021-03-05 | End: 2021-03-05

## 2021-03-05 RX ORDER — HYDROMORPHONE HYDROCHLORIDE 1 MG/ML
.3-.5 INJECTION, SOLUTION INTRAMUSCULAR; INTRAVENOUS; SUBCUTANEOUS EVERY 10 MIN PRN
Status: DISCONTINUED | OUTPATIENT
Start: 2021-03-05 | End: 2021-03-05 | Stop reason: HOSPADM

## 2021-03-05 RX ORDER — MEPERIDINE HYDROCHLORIDE 25 MG/ML
12.5 INJECTION INTRAMUSCULAR; INTRAVENOUS; SUBCUTANEOUS
Status: DISCONTINUED | OUTPATIENT
Start: 2021-03-05 | End: 2021-03-05 | Stop reason: HOSPADM

## 2021-03-05 RX ORDER — PROPOFOL 10 MG/ML
INJECTION, EMULSION INTRAVENOUS PRN
Status: DISCONTINUED | OUTPATIENT
Start: 2021-03-05 | End: 2021-03-05

## 2021-03-05 RX ORDER — ERYTHROMYCIN 5 MG/G
OINTMENT OPHTHALMIC
Qty: 3.5 G | Refills: 0 | Status: SHIPPED | OUTPATIENT
Start: 2021-03-05 | End: 2021-05-18

## 2021-03-05 RX ORDER — FENTANYL CITRATE 0.05 MG/ML
25-50 INJECTION, SOLUTION INTRAMUSCULAR; INTRAVENOUS
Status: DISCONTINUED | OUTPATIENT
Start: 2021-03-05 | End: 2021-03-05 | Stop reason: HOSPADM

## 2021-03-05 RX ORDER — ERYTHROMYCIN 5 MG/G
OINTMENT OPHTHALMIC
Status: DISCONTINUED
Start: 2021-03-05 | End: 2021-03-05 | Stop reason: HOSPADM

## 2021-03-05 RX ORDER — LIDOCAINE HCL/EPINEPHRINE/PF 2%-1:200K
VIAL (ML) INJECTION
Status: DISCONTINUED
Start: 2021-03-05 | End: 2021-03-05 | Stop reason: HOSPADM

## 2021-03-05 RX ADMIN — FENTANYL CITRATE 50 MCG: 50 INJECTION, SOLUTION INTRAMUSCULAR; INTRAVENOUS at 07:25

## 2021-03-05 RX ADMIN — SODIUM CHLORIDE, POTASSIUM CHLORIDE, SODIUM LACTATE AND CALCIUM CHLORIDE: 600; 310; 30; 20 INJECTION, SOLUTION INTRAVENOUS at 07:25

## 2021-03-05 RX ADMIN — PROPOFOL 40 MG: 10 INJECTION, EMULSION INTRAVENOUS at 07:29

## 2021-03-05 RX ADMIN — ONDANSETRON 4 MG: 2 INJECTION INTRAMUSCULAR; INTRAVENOUS at 07:39

## 2021-03-05 RX ADMIN — MIDAZOLAM 1 MG: 1 INJECTION INTRAMUSCULAR; INTRAVENOUS at 07:25

## 2021-03-05 ASSESSMENT — MIFFLIN-ST. JEOR: SCORE: 1281.94

## 2021-03-05 ASSESSMENT — ENCOUNTER SYMPTOMS
SEIZURES: 0
DYSRHYTHMIAS: 0

## 2021-03-05 ASSESSMENT — LIFESTYLE VARIABLES: TOBACCO_USE: 0

## 2021-03-05 NOTE — ANESTHESIA POSTPROCEDURE EVALUATION
Patient: Rafaela Rock    Procedure(s):  Bilateral Upper Eyelid Mechanical Ptosis Repair    Diagnosis:Myogenic ptosis of left eyelid [H02.422]  Diagnosis Additional Information: No value filed.    Anesthesia Type:  MAC    Note:  Disposition: Outpatient   Postop Pain Control: Uneventful            Sign Out: Well controlled pain   PONV: No   Neuro/Psych: Uneventful            Sign Out: Acceptable/Baseline neuro status   Airway/Respiratory: Uneventful            Sign Out: Acceptable/Baseline resp. status   CV/Hemodynamics: Uneventful            Sign Out: Acceptable CV status   Other NRE: NONE   DID A NON-ROUTINE EVENT OCCUR? No         Last vitals:  Vitals:    03/05/21 0559 03/05/21 0801   BP: (!) 159/79 121/73   Pulse:  56   Resp: 16 14   Temp: 36.1  C (97  F) 36.5  C (97.7  F)   SpO2: 97% 95%       Last vitals prior to Anesthesia Care Transfer:  CRNA VITALS  3/5/2021 0727 - 3/5/2021 0813      3/5/2021             Pulse:  64    Ht Rate:  64    SpO2:  93 %    Resp Rate (set):  10          Electronically Signed By: Amarjit Najera MD  March 5, 2021  8:13 AM

## 2021-03-05 NOTE — BRIEF OP NOTE
[facility]    Brief Operative Note    Pre-operative diagnosis: Myogenic ptosis of left eyelid [H02.422]  Post-operative diagnosis Same as pre-operative diagnosis    Procedure: Procedure(s):  Bilateral Upper Eyelid Mechanical Ptosis Repair  Surgeon: Surgeon(s) and Role:     * Cristopher Pepper MD - Primary  Anesthesia: Combined MAC with Local   Estimated blood loss: 2cc  Drains: None  Specimens:   ID Type Source Tests Collected by Time Destination   1 : BILATERAL UPPER EYELID TISSUE Other (specify in comments) Other OR DOCUMENTATION ONLY Cristopher Pepper MD 3/5/2021  7:47 AM      Findings:   as expected.  Complications: None.  Implants: * No implants in log *

## 2021-03-05 NOTE — ANESTHESIA PREPROCEDURE EVALUATION
Anesthesia Pre-Procedure Evaluation    Patient: Rafaela Rock   MRN: [id number] : 1945        Preoperative Diagnosis: Myogenic ptosis of left eyelid [H02.422]   Procedure : Procedure(s):  Bilateral Upper Eyelid Mechanical Ptosis Repair     Past Medical History:   Diagnosis Date     AK (actinic keratosis) 2016     Allergic rhinitis      Hyperlipidemia LDL goal < 130      Impaired fasting glucose      Lyme disease      Nonsenile cataract      OA (osteoarthritis)      Obesity      PONV (postoperative nausea and vomiting)      Rosacea      Vitamin D deficiency 10/12      Past Surgical History:   Procedure Laterality Date     C TOTAL KNEE ARTHROPLASTY Left 2008    Left     C TOTAL KNEE ARTHROPLASTY Right 16    DML     CATARACT IOL, RT/LT  2013    Toric- RE     CATARACT IOL, RT/LT  2013    LE     COLONOSCOPY       COLONOSCOPY  14    Return for Colonoscopy in 1 yrs     PHACOEMULSIFICATION CLEAR CORNEA WITH TORIC INTRAOCULAR LENS IMPLANT  2013    Procedure: PHACOEMULSIFICATION CLEAR CORNEA WITH TORIC INTRAOCULAR LENS IMPLANT;  LEFT PHACOEMULSIFICATION CLEAR CORNEA WITH TORIC INTRAOCULAR LENS IMPLANT  ;  Surgeon: Otoniel Schuster MD;  Location: [facility]     PHACOEMULSIFICATION CLEAR CORNEA WITH TORIC INTRAOCULAR LENS IMPLANT  2013    Procedure: PHACOEMULSIFICATION CLEAR CORNEA WITH TORIC INTRAOCULAR LENS IMPLANT;  RIGHT PHACOEMULSIFICATION CLEAR CORNEA WITH TORIC INTRAOCULAR LENS IMPLANT  ;  Surgeon: Otoniel Schuster MD;  [facility]     TONSILLECTOMY  Age 11     ZZC NONSPECIFIC PROCEDURE  2003    Left knee scope/OA,MMT      No Known Allergies   Social History     Tobacco Use     Smoking status: Never Smoker     Smokeless tobacco: Never Used   Substance Use Topics     Alcohol use: Yes     Comment: weekends      Wt Readings from Last 1 Encounters:   21 81.2 kg (179 lb)        Anesthesia Evaluation   Pt has had prior anesthetic. Type: General.    History of  anesthetic complications  - PONV.      ROS/MED HX  ENT/Pulmonary:     (+) allergic rhinitis,  (-) tobacco use, asthma and sleep apnea   Neurologic:     (+) no peripheral neuropathy  (-) no seizures and no CVA   Cardiovascular:     (+) Dyslipidemia ----- (-) hypertension, CAD and arrhythmias   METS/Exercise Tolerance:     Hematologic:       Musculoskeletal:   (+) arthritis,     GI/Hepatic:    (-) GERD   Renal/Genitourinary:    (-) renal disease   Endo:     (+) Obesity,  (-) Type II DM and thyroid disease   Psychiatric/Substance Use:       Infectious Disease:    (-) Recent Fever   Malignancy:       Other:            Physical Exam    Airway  airway exam normal      Mallampati: III   TM distance: > 3 FB   Neck ROM: full   Mouth opening: > 3 cm    Respiratory Devices and Support         Dental  no notable dental history         Cardiovascular   cardiovascular exam normal          Pulmonary   pulmonary exam normal                OUTSIDE LABS:  CBC:   Lab Results   Component Value Date    WBC 6.4 03/02/2021    WBC 8.0 10/26/2018    HGB 13.0 03/02/2021    HGB 13.4 10/26/2018    HCT 41.6 03/02/2021    HCT 41.8 10/26/2018     03/02/2021     10/26/2018     BMP:   Lab Results   Component Value Date     03/02/2021     10/26/2018    POTASSIUM 4.9 03/02/2021    POTASSIUM 4.1 10/26/2018    CHLORIDE 107 03/02/2021    CHLORIDE 108 10/26/2018    CO2 29 03/02/2021    CO2 26 10/26/2018    BUN 20 03/02/2021    BUN 17 10/26/2018    CR 0.86 03/02/2021    CR 0.73 10/26/2018    GLC 89 03/02/2021    GLC 86 10/26/2018     COAGS: No results found for: PTT, INR, FIBR  POC: No results found for: BGM, HCG, HCGS  HEPATIC:   Lab Results   Component Value Date    ALBUMIN 4.1 11/30/2010    PROTTOTAL 7.2 11/30/2010    ALT 24 11/30/2010    AST 23 11/30/2010    ALKPHOS 100 11/30/2010    BILITOTAL 0.9 11/30/2010     OTHER:   Lab Results   Component Value Date    A1C 5.4 03/02/2021    PALOMA 9.3 03/02/2021    TSH 4.54 11/29/2013        Anesthesia Plan    ASA Status:  2   NPO Status:  NPO Appropriate    Anesthesia Type: MAC.              Consents    Anesthesia Plan(s) and associated risks, benefits, and realistic alternatives discussed. Questions answered and patient/representative(s) expressed understanding.     - Discussed with:  Patient      - Specific Concerns: Specific risks discussed (but not limited to): Possibility of intraoperative awareness..        Postoperative Care    Pain management: IV analgesics, Oral pain medications.   PONV prophylaxis: Ondansetron (or other 5HT-3), Dexamethasone or Solumedrol     Comments:                Johnny Mckeon MD

## 2021-03-05 NOTE — ANESTHESIA CARE TRANSFER NOTE
Patient: Rafaela Rock    Procedure(s):  Bilateral Upper Eyelid Mechanical Ptosis Repair    Diagnosis: Myogenic ptosis of left eyelid [H02.422]  Diagnosis Additional Information: No value filed.    Anesthesia Type:   MAC     Note:    Oropharynx: oropharynx clear of all foreign objects  Level of Consciousness: awake  Oxygen Supplementation: room air    Independent Airway: airway patency satisfactory and stable  Dentition: dentition unchanged  Vital Signs Stable: post-procedure vital signs reviewed and stable  Report to RN Given: handoff report given  Patient transferred to: PACU    Handoff Report: Identifed the Patient, Identified the Reponsible Provider, Reviewed the pertinent medical history, Discussed the surgical course, Reviewed Intra-OP anesthesia mangement and issues during anesthesia, Set expectations for post-procedure period and Allowed opportunity for questions and acknowledgement of understanding      Vitals: (Last set prior to Anesthesia Care Transfer)  CRNA VITALS  3/5/2021 0727 - 3/5/2021 0803      3/5/2021             Pulse:  64    Ht Rate:  64    SpO2:  93 %    Resp Rate (set):  10        Electronically Signed By: SAMANTHA López CRNA  March 5, 2021  8:03 AM

## 2021-03-05 NOTE — OP NOTE
PREOPERATIVE DIAGNOSES:   1. Bilateral upper eyelid mechanical ptosis    POSTOPERATIVE DIAGNOSES:   1. Bilateral upper eyelid mechanical ptosis    PROCEDURE PERFORMED:   1. Bilateral upper eyelid mechanical ptosis repair    SURGEON: Cristopher Pepper MD    ASSISTANT: Cheryl Hernandez MD    ANESTHESIA: Monitored with local infiltration, 50/50 mixture of 2% lidocaine with epinephrine and 0.5% Marcaine.     COMPLICATIONS: None.     ESTIMATED BLOOD LOSS: Less than 5 mL.     HISTORY: Rafaela Rock  presented with upper lid drooping interfering with superior visual field and activities of daily living. After the risks, benefits and alternatives to the proposed procedure were explained, informed consent was obtained.     DESCRIPTION OF PROCEDURE: Rafaela Rock  was brought to the operating room and placed supine on the operating table. IV sedation was given. The upper lid crease and excess upper eyelid skin was marked on each upper eyelid, and the eyelids infiltrated with local anesthetic. The area was prepped and draped in the typical sterile fashion for oculoplastic surgery. Attention was directed to the right side. Skin was incised following marked lines. Skin and orbicularis muscle flap were excised with cautery. Orbital septum was opened horizontally. The nasal and central fat pads were debulked with the high temperature cautery. Again, hemostasis was obtained. The orbital septum was opened horizontally and the levator aponeurosis identified. A rectangle was drawn with a marking pen, centered on the peak of the eyelid at the top of the tarsus. This was then excised with a Leilani scissors. Hemostasis was obtained. The levator aponeurosis was then reapproximated using interrupted 7-0 Vicryl sutures. Attention was directed under the lateral brow. The superior edge of the cut orbicularis was secured to the superior lateral orbital rim, and then to the inferior edge of cut orbicularis with a 5-0 chromic gut suture.  The  upper eyelid was closed with running 6-0 plain gut suture.  Attention was directed to the left side where the same procedure was performed. Throughout the procedure the eyelid height and contour was checked and adjusted to ensure appropriate levator advancement.  Antibiotic ointment was applied. The patient tolerated the procedure well and was taken to the recovery room in stable condition.    Cristopher Pepper MD

## 2021-03-05 NOTE — OR NURSING
Discharge instructions was given to patient's friend Clifford . No questions or concerns. Patient discharge by wheelchair instable condition.

## 2021-03-05 NOTE — DISCHARGE INSTRUCTIONS
Post-operative Instructions  Ophthalmic Plastic and Reconstructive Surgery    Cristopher Pepper M.D.     All instructions apply to the operated eye(s) or eyelid(s).    Wound care and personal care  ? Apply ice compresses 15 minutes of every hour while awake for 2 days. As long as there is no further bleeding, after two days, switch to warm water compresses for five minutes, four times a day until seen by your physician.   ? You may shower or wash your hair the day after surgery. Do not go swimming for at least 2 weeks to prevent contamination of your wounds.  ? Do not apply make-up to the eyes or eyelids for 2 weeks after surgery.  ? Expect some swelling, bruising, black eye (even into the lower eyelids and cheeks). Also expect serum caking, crusting and discharge from the eye and/or incisions. A small amount of surface bleeding, and depending on the type of surgery, bleeding from the inside of the eyelid, is normal for the first 48 hours.  ? Avoid straining, bending at the waist, or lifting more than 15 pounds for 10 days. Activities that raise your blood pressure can worsen swelling, cause bleeding, and breaking of sutures. Like wise, sleeping with your head slightly elevated for the first several days can help swelling resolve more quickly.   ? Do continue to ambulate (walk) as you normally would - being sedentary after surgery can cause blood clots.   ? Your eye(s) and eyelid(s) may be painful and tender. This is normal after surgery.      Contact information and follow-up  ? Return to the Eye Clinic for a follow-up appointment with your physician as scheduled. If no appointment has been scheduled:   - [facility] eye clinic: [phone] for an appointment with Dr. Pepper within 1 to 2 weeks from your date of surgery. If you are scheduled for a phone or video visit for your first postoperative appointment, please e-mail pictures to [email or web address] 1-2 days before your appointment.   -   [facility] eye clinic: [phone] for an appointment with Dr. Pepper within 1 to 2 weeks from your date of surgery.     ? For severe pain, bleeding, or loss of vision, call the [facility] at [phone] or [facility] at [phone].     After hours or on weekends and holidays, call [phone] and ask to speak with the ophthalmologist on call.    An on call person can be reached after hours for concerns. The on call doctor should not call in medication refill requests after hours or on weekends, so please plan accordingly. An effort has been made to provide adequate pain medications following every surgery, and refills will not be provided in most instances.     Activity restrictions and driving  ? Avoid heavy lifting, bending, exercise or strenuous activity for 1 week after surgery.  You may resume other activities and return to work as tolerated.  ? You may not resume driving if you are using narcotic pain medications (such as Norco, Percocet, Tylenol #3).    Medications  ? Restart all regular home medications and eye drops. If you take Plavix or  Aspirin on a regular basis, wait for 72 hours after your surgery before restarting these in order to decrease the risk of bleeding complications.  ? Avoid aspirin and aspirin-like medications (Motrin, Aleve, Ibuprofen, [place] etc) for 72 hours to reduce the risk of bleeding. You may take Tylenol (acetaminophen) for pain.  ? In addition to your home medications, take the following post-operative medications as prescribed by your physician.    ? Apply antibiotic ointment to all sutures three times a day, and into the operated eye(s) at night.  ? In many cases, postoperative discomfort can be managed with Tylenol alone. If narcotic pain medication was prescribed, take pain pills at prescribed frequency as needed for pain.        Same Day Surgery Discharge Instructions for  Sedation and General Anesthesia        It's not unusual to feel dizzy, light-headed or faint for up to 24 hours after surgery or while taking pain medication.  If you have these symptoms: sit for a few minutes before standing and have someone assist you when you get up to walk or use the bathroom.      You should rest and relax for the next 24 hours. We recommend you make arrangements to have an adult stay with you for at least 24 hours after your discharge.  Avoid hazardous and strenuous activity.      DO NOT DRIVE any vehicle or operate mechanical equipment for 24 hours following the end of your surgery.  Even though you may feel normal, your reactions may be affected by the medication you have received.      Do not drink alcoholic beverages for 24 hours following surgery.       Slowly progress to your regular diet as you feel able. It's not unusual to feel nauseated and/or vomit after receiving anesthesia.  If you develop these symptoms, drink clear liquids (apple juice, ginger ale, broth, 7-up, etc. ) until you feel better.  If your nausea and vomiting persists for 24 hours, please notify your surgeon.        All narcotic pain medications, along with inactivity and anesthesia, can cause constipation. Drinking plenty of liquids and increasing fiber intake will help.      For any questions of a medical nature, call your surgeon.      Do not make important decisions for 24 hours.      If you had general anesthesia, you may have a sore throat for a couple of days related to the breathing tube used during surgery.  You may use Cepacol lozenges to help with this discomfort.  If it worsens or if you develop a fever, contact your surgeon.       If you feel your pain is not well managed with the pain medications prescribed by your surgeon, please contact your surgeon's office to let them know so they can address your concerns.       CoVid 19 Information    We want to give you information regarding Covid. Please consult your primary care provider with  any questions you might have.     Patient who have symptoms (cough, fever, or shortness of breath), need to isolate for 7 days from when symptoms started OR 72 hours after fever resolves (without fever reducing medications) AND improvement of respiratory symptoms (whichever is longer).      Isolate yourself at home (in own room/own bathroom if possible)    Do Not allow any visitors    Do Not go to work or school    Do Not go to Yazidism,  centers, shopping, or other public places.    Do Not shake hands.    Avoid close and intimate contact with others (hugging, kissing).    Follow CDC recommendations for household cleaning of frequently touched services.     After the initial 7 days, continue to isolate yourself from household members as much as possible. To continue decrease the risk of community spread and exposure, you and any members of your household should limit activities in public for 14 days after starting home isolation.     You can reference the following CDC link for helpful home isolation/care tips:  https://www.cdc.gov/coronavirus/2019-ncov/downloads/10Things.pdf    Protect Others:    Cover Your Mouth and Nose with a mask, disposable tissue or wash cloth to avoid spreading germs to others.    Wash your hands and face frequently with soap and water    Call Your Primary Doctor If: Breathing difficulty develops or you become worse.    For more information about COVID19 and options for caring for yourself at home, please visit the CDC website at https://www.cdc.gov/coronavirus/2019-ncov/about/steps-when-sick.html  For more options for care at [facility], please visit our website at [email or web address]              **If you have questions or concerns about your procedure,   call Dr. Pepper at [phone]**

## 2021-03-06 NOTE — PROGRESS NOTES
Postop day 1 call to make sure Ms. Rock was doing well after surgery and see if she had any postoperative questions.  The phone went unanswered and I left a voicemail.

## 2021-03-09 ENCOUNTER — TELEPHONE (OUTPATIENT)
Dept: OPHTHALMOLOGY | Facility: CLINIC | Age: 76
End: 2021-03-09

## 2021-03-09 NOTE — TELEPHONE ENCOUNTER
Received message from patient regarding loosening of sutures of the left upper eyelid incision. Patient sent photo which was reviewed with Dr. Rahman. Reassured patient that incision still approximated and should heal appropriately. Recommend continuing ointment to incisions as instructed. Patient to call with any worsening or opening of the incision. Patient also inquiring about small bumps along incision. Explained that the bumps are normal during the healing process. All questions answered.    Homar Dillard MD  Ophthalmology Resident, PGY-4

## 2021-03-16 ENCOUNTER — OFFICE VISIT (OUTPATIENT)
Dept: OPHTHALMOLOGY | Facility: CLINIC | Age: 76
End: 2021-03-16
Payer: COMMERCIAL

## 2021-03-16 DIAGNOSIS — Z98.890 POSTOPERATIVE EYE STATE: Primary | ICD-10-CM

## 2021-03-16 PROCEDURE — 99024 POSTOP FOLLOW-UP VISIT: CPT | Mod: GC | Performed by: OPHTHALMOLOGY

## 2021-03-16 ASSESSMENT — TONOMETRY
IOP_METHOD: ICARE
OD_IOP_MMHG: 08
OS_IOP_MMHG: 08

## 2021-03-16 ASSESSMENT — VISUAL ACUITY
OD_SC: 20/20
OS_SC: 20/20
METHOD: SNELLEN - LINEAR

## 2021-03-16 ASSESSMENT — MARGIN REFLEX DISTANCE
OD_MRD1: 3
OS_MRD1: 3

## 2021-03-16 NOTE — NURSING NOTE
Chief Complaints and History of Present Illnesses   Patient presents with     Post Op (Ophthalmology) Both Eyes     Chief Complaint(s) and History of Present Illness(es)     Post Op (Ophthalmology) Both Eyes     Laterality: both eyes    Onset: 2 weeks ago              Comments     Bilateral Upper Eyelid Mechanical Ptosis Repair-Pt. States that she is doing great. Very happy with results of surgery. No change in VA BE. No pain BE.  Tawnya Unger COT 1:27 PM March 16, 2021

## 2021-03-16 NOTE — PROGRESS NOTES
Chief Complaint(s) and History of Present Illness(es)     Post Op (Ophthalmology) Both Eyes     Laterality: both eyes    Onset: 2 weeks ago              Comments     Bilateral Upper Eyelid Mechanical Ptosis Repair-Pt. States that she is   doing great. Very happy with results of surgery. No change in VA BE. No   pain BE.  Tawnya Unger COT 1:27 PM March 16, 2021                     Assessment & Plan     Rafaela Rock is a 75 year old female with the following diagnoses:   Encounter Diagnosis   Name Primary?     Postoperative eye state Yes     POW1.5 s/p upper lid ptosis repair via levator advancement.  Lids are healing nicely with good height and symmetry.  Mild edema, incisions healing well.    Plan:  - continue ointment until out, then switch to vaseline/aquaphor  - warm compresses until swelling is gone  - massage incisions  - ok for exercise and makeup  2 mo    Ranjith Gray MD/MPH  Oculoplastic Surgery Fellow  3/16/2021 at 1:36 PM    Attending Physician Attestation: Complete documentation of historical and exam elements from today's encounter can be found in the full encounter summary report (not reduplicated in this progress note). I personally obtained the chief complaint(s) and history of present illness. I confirmed and edited as necessary the review of systems, past medical/surgical history, family history, social history, and examination findings as documented by others; and I examined the patient myself. I personally reviewed the relevant tests, images, and reports as documented above. I formulated and edited as necessary the assessment and plan and discussed the findings and management plan with the patient and family.   -Cristopher Pepper MD

## 2021-04-11 ENCOUNTER — HEALTH MAINTENANCE LETTER (OUTPATIENT)
Age: 76
End: 2021-04-11

## 2021-05-18 ENCOUNTER — OFFICE VISIT (OUTPATIENT)
Dept: OPHTHALMOLOGY | Facility: CLINIC | Age: 76
End: 2021-05-18
Payer: COMMERCIAL

## 2021-05-18 DIAGNOSIS — Z98.890 POSTOPERATIVE EYE STATE: Primary | ICD-10-CM

## 2021-05-18 PROCEDURE — 99024 POSTOP FOLLOW-UP VISIT: CPT | Mod: GC | Performed by: OPHTHALMOLOGY

## 2021-05-18 ASSESSMENT — VISUAL ACUITY
OD_CC: 20/20
OS_CC: 20/20
METHOD: SNELLEN - LINEAR
CORRECTION_TYPE: GLASSES

## 2021-05-18 ASSESSMENT — TONOMETRY
IOP_METHOD: ICARE
OS_IOP_MMHG: 09
OD_IOP_MMHG: 09

## 2021-05-18 NOTE — PROGRESS NOTES
Oculoplastic Clinic Return Patient    Patient: Rafaela Rock MRN# [id number]   YOB: 1945 Age: 76 year old   Date of Visit: May 18, 2021    Chief Complaint(s) and History of Present Illness(es)     Post Op (Ophthalmology) Both Eyes     Laterality: both eyes    Associated symptoms: dryness (uses PFAT about 3x weekly. ).  Negative for   eye pain and tearing    Pain scale: 0/10    Comments: POM#2 s/p BULB mechanical ptosis repair       Comments     Pt notes that she feels everything went really well with sx, she has no   concerns today.     Lisa Garza COT May 18, 2021 12:26 PM       Assessment & Plan     Rafaela Rock is a 76 year old female with the following diagnoses:   1. Postoperative eye state       Rafaela Rock is 2 months s/p BULB w/ levator advancement.   The incision(s) are healing well.  The lid(s) are in excellent position.  Patient is very happy with the results. She has vast improvement to her vision when reading and driving. Uses artifical tears three times per week. No longer using ointments.     I have recommended:  -- Artifical tears as needed  -- Return to clinic as needed   F/u with Dr. Rojas next year.   Dana Quevedo MD  Plastic Surgery Resident       Attending Physician Attestation: Complete documentation of historical and exam elements from today's encounter can be found in the full encounter summary report (not reduplicated in this progress note). I personally obtained the chief complaint(s) and history of present illness. I confirmed and edited as necessary the review of systems, past medical/surgical history, family history, social history, and examination findings as documented by others; and I examined the patient myself. I personally reviewed the relevant tests, images, and reports as documented above. I formulated and edited as necessary the assessment and plan and discussed the findings and management plan with the patient. Cristopher Pepper MD      Today with Rafaela  ANA LUISA Rock I reviewed the indications, risks, benefits, and alternatives of the proposed surgical procedure including, but not limited to, failure obtain the desired result  and need for additional surgery, bleeding, infection, loss of vision, loss of the eye, and the remote possibility of permanent damage to any organ system or death with the use of anesthesia.  I provided multiple opportunities for the questions, answered all questions to the best of my ability, and confirmed that my answers and my discussion were understood.

## 2021-05-18 NOTE — NURSING NOTE
Chief Complaints and History of Present Illnesses   Patient presents with     Post Op (Ophthalmology) Both Eyes     POM#2 s/p BULB mechanical ptosis repair     Chief Complaint(s) and History of Present Illness(es)     Post Op (Ophthalmology) Both Eyes     Laterality: both eyes    Associated symptoms: dryness (uses PFAT about 3x weekly. ).  Negative for eye pain and tearing    Pain scale: 0/10    Comments: POM#2 s/p BULB mechanical ptosis repair              Comments     Pt notes that she feels everything went really well with sx, she has no concerns today.     Lisa Garza COT May 18, 2021 12:26 PM

## 2021-07-26 DIAGNOSIS — Z96.651 STATUS POST TOTAL RIGHT KNEE REPLACEMENT: ICD-10-CM

## 2021-07-28 RX ORDER — AMOXICILLIN 500 MG/1
CAPSULE ORAL
Qty: 8 CAPSULE | Refills: 3 | Status: SHIPPED | OUTPATIENT
Start: 2021-07-28 | End: 2022-09-22

## 2021-07-28 NOTE — TELEPHONE ENCOUNTER
Patient called back.  Patient reports that she takes amoxicillin (AMOXIL) 500 MG capsule prior to dental work.  She had procedure performed 7/27/21 without the amoxicillin (AMOXIL) 500 MG capsule and is scheduled for another procedure in 6 weeks (around 1 st week of September 2021).    Patient would like to have the refill before then.

## 2021-07-28 NOTE — TELEPHONE ENCOUNTER
"Requested Prescriptions   Pending Prescriptions Disp Refills     amoxicillin (AMOXIL) 500 MG capsule [Pharmacy Med Name: AMOXICILLIN 500MG CAPSULES] 8 capsule 3     Sig: TAKE 4 CAPSULES(2000 MG) BY MOUTH 1 TIME 1 HOUR BEFORE PROCEDURE FOR 1 DOSE       Oral Acne/Rosacea Medications Protocol Failed - 7/28/2021  5:03 PM        Failed - Confirmation of diagnosis is required     Please confirm diagnosis is acne or rosacea.     If NOT acne or rosacea; refer request to provider for further evaluation.    If diagnosis IS acne or rosacea, OK to refill BASED ON PREVIOUS REFILL CLINICAL NOTE RECOMMENDATION.          Failed - Medication is active on med list        Passed - Patient is 12 years of age or older        Passed - Recent (12 mo) or future (30 days) visit within the authorizing provider's specialty     Patient has had an office visit with the authorizing provider or a provider within the authorizing providers department within the previous 12 mos or has a future within next 30 days. See \"Patient Info\" tab in inbasket, or \"Choose Columns\" in Meds & Orders section of the refill encounter.              Passed - No active pregnancy on record        Passed - No positive prenancy test is past 12 months           Routing refill request to provider for review/approval because:  Drug not active on patient's medication list        "

## 2021-07-28 NOTE — TELEPHONE ENCOUNTER
Left message on answering machine for patient to call back to the nurse at [phone].    Is pt taking medication prior to dental procedures?    Nereyda Mckinnon RN  [facility]

## 2021-07-29 NOTE — TELEPHONE ENCOUNTER
Reached out to patient to ensure she has picked up the refill. She stated she has. Closing encounter.    SHANICE GranadosN DENIS  [facility], [place]

## 2021-08-02 ENCOUNTER — MYC REFILL (OUTPATIENT)
Dept: FAMILY MEDICINE | Facility: CLINIC | Age: 76
End: 2021-08-02

## 2021-08-02 DIAGNOSIS — L71.9 ROSACEA: ICD-10-CM

## 2021-08-02 RX ORDER — METRONIDAZOLE 10 MG/G
GEL TOPICAL
Qty: 60 G | Refills: 0 | Status: SHIPPED | OUTPATIENT
Start: 2021-08-02 | End: 2022-05-24

## 2021-08-02 NOTE — TELEPHONE ENCOUNTER
metroNIDAZOLE (METROGEL) 1 % external gel   Apply topically once or twice daily daily to the face for rosacea.     Last Written Prescription Date:  3/23/20  Last Fill Quantity: 60 g,   # refills: 0  Last Office Visit : 9/10/20  Follow-up in 1 year, earlier for new or changing lesions.   Future Office visit:  none    Process #1    Refill to pharmacy.    Scheduling has been notified to contact the pt for appointment.

## 2021-09-26 ENCOUNTER — HEALTH MAINTENANCE LETTER (OUTPATIENT)
Age: 76
End: 2021-09-26

## 2021-09-28 ENCOUNTER — OFFICE VISIT (OUTPATIENT)
Dept: DERMATOLOGY | Facility: CLINIC | Age: 76
End: 2021-09-28
Payer: COMMERCIAL

## 2021-09-28 DIAGNOSIS — Z85.828 HISTORY OF NONMELANOMA SKIN CANCER: ICD-10-CM

## 2021-09-28 DIAGNOSIS — D48.9 NEOPLASM OF UNCERTAIN BEHAVIOR: Primary | ICD-10-CM

## 2021-09-28 DIAGNOSIS — L81.4 LENTIGINES: ICD-10-CM

## 2021-09-28 DIAGNOSIS — L82.0 SEBORRHEIC KERATOSIS, INFLAMED: ICD-10-CM

## 2021-09-28 DIAGNOSIS — L82.1 SEBORRHEIC KERATOSIS: ICD-10-CM

## 2021-09-28 PROCEDURE — 88305 TISSUE EXAM BY PATHOLOGIST: CPT | Mod: 26 | Performed by: DERMATOLOGY

## 2021-09-28 PROCEDURE — 88305 TISSUE EXAM BY PATHOLOGIST: CPT | Mod: TC | Performed by: DERMATOLOGY

## 2021-09-28 PROCEDURE — 11102 TANGNTL BX SKIN SINGLE LES: CPT | Mod: XS | Performed by: DERMATOLOGY

## 2021-09-28 PROCEDURE — 99213 OFFICE O/P EST LOW 20 MIN: CPT | Mod: 25 | Performed by: DERMATOLOGY

## 2021-09-28 PROCEDURE — 17110 DESTRUCTION B9 LES UP TO 14: CPT | Performed by: DERMATOLOGY

## 2021-09-28 PROCEDURE — 11103 TANGNTL BX SKIN EA SEP/ADDL: CPT | Mod: XS | Performed by: DERMATOLOGY

## 2021-09-28 ASSESSMENT — PAIN SCALES - GENERAL: PAINLEVEL: NO PAIN (0)

## 2021-09-28 NOTE — NURSING NOTE
Dermatology Rooming Note    Damion Rock's goals for this visit include:   Chief Complaint   Patient presents with     Skin Check     damion is coming in today for a skin check, states that she has a spot of concern on the cheek     Laurita John CMA on 9/28/2021 at 8:16 AM

## 2021-09-28 NOTE — NURSING NOTE
Lidocaine-epinephrine 1-1:464540 % injection   1.5mL once for one use, starting 9/28/2021 ending 9/28/2021,  2mL disp, R-0, injection  Injected by Laurita John CMA

## 2021-09-28 NOTE — PROGRESS NOTES
[facility] Dermatology Note  Encounter Date: Sep 28, 2021  Office Visit     Dermatology Problem List:  1. 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 AKs. Cryo  - S/p Efudex to chest 9/2020  3. Nummular eczema/xerosis.  - Rx. TMC 0.1% ointment. Cerave cream.  4. Rosacea.  - Rx. Metronidazole gel.  5. Contact dermatitis to metals - gold, silver?  6. Lichenoid keratoses - several have been biopsied in the past  7. SK in background of telangiectasia, left medial cheek  - s/p cryo 9/28/21  - monitor    # NUB, left superior shoulder  - s/p shave biopsy 9/28/21  #  NUB, right-mid back  - s/p shave biopsy 9/28/21    ____________________________________________    Assessment & Plan:    # NUB, left superior shoulder. Ddx SCC versus ISK    - Shave biopsy performed today. See procedure note below.      # NUB, right-mid back. Ddx BCC versus AK  - Shave biopsy performed today. See procedure note below.     # SK in background of telangiectasia, left medial cheek. Lesion very subtle, favor SK and will treat as such. Advised cannot definitively r/o NMSC but suspect less likely. Discussed warning signs of skin cancer and she will return if changes occur.   - Cryo performed today. See procedure note below     # SKs. Solar lentigines.   - Discussed the natural history and benign nature of this lesion. Reassurance provided that no additional treatment is necessary.      # History of NMSC. No evidence of recurrent disease.  - Continue photoprotection - recommend SPF 30 or higher with frequent reapplication  - Continue yearly skin exams  - Advised to monitor for changing, non-healing, bleeding, painful, changing, or otherwise symptomatic lesions      Procedures Performed:   - Cryotherapy procedure note, location(s): left medial cheek. After verbal consent and discussion of risks and benefits including, but not limited to, dyspigmentation/scar, blister, and pain, 1 lesion(s) was(were)  treated with 1-2 mm freeze border for 1-2 cycles with liquid nitrogen. Post cryotherapy instructions were provided.    - Shave biopsy procedure note, location(s): left superior shoulder and right mid-back. After discussion of benefits and risks including but not limited to bleeding, infection, scar, incomplete removal, recurrence, and non-diagnostic biopsy, written consent and photographs were obtained. The area was cleaned with isopropyl alcohol. 0.5mL of 1% lidocaine with epinephrine was injected to obtain adequate anesthesia of lesion(s). Shave biopsy at site(s) performed. Hemostasis was achieved with aluminium chloride. Petrolatum ointment and a sterile dressing were applied. The patient tolerated the procedure and no complications were noted. The patient was provided with verbal and written post care instructions.     Follow-up: 1 year, sooner if concerns.     Staff and Scribe:     Scribe Disclosure:  Ary DANG, am serving as a scribe to document services personally performed by Sarah Tillman MD based on data collection and the provider's statements to me.     Provider Disclosure:   The documentation recorded by the scribe accurately reflects the services I personally performed and the decisions made by me.    Provider Time: Established: (31131) 20-29 minutes in total spent on day of encounter in chart review, patient visit including counseling, review of tests, documentation and/or discussion with other providers about the issues documented above, excluding any procedures performed.     Sarah Tillman MD    Department of Dermatology  [facility]: Phone: [phone], Fax: [phone]  9/28/2021     ____________________________________________    CC: Skin Check (damion is coming in today for a skin check, states that she has a spot of concern on the cheek)    HPI:  Ms. Damion Rock is a(n) 76 year old  female who presents today as a return patient for a skin check. The patient was last seen in dermatology on 9/10/20 by Rita Hicks PA-C at which point she was started o protopic 0.1% ointment, and advised to continue triamcinolone 0.1% ointment for xerosis/dermatitis/pruritis. Labs were also ordered for this. Additionally, a shave biopsy of the left lateral lower leg was performed which demonstrated SCCis focally extending to the deep margin. The was removed on 11/2/20. She was started on Efudex 5% cream for an AK on the chest. Cryo was used to treat 2 ISKs.      Today, the patient expresses concern over one red spot to her left cheek that has been itchy for the past 6 months. She denies any pain, itchiness, bleeding, growing, or changing to this spot. She denies any other concerning spots or changes to her health since her last visit.     Patient is otherwise feeling well, without additional skin concerns.    Labs Reviewed:  N/A    Physical Exam:  Vitals: There were no vitals taken for this visit.  SKIN: Total skin excluding the undergarment areas was performed. The exam included the head/face, neck, both arms, chest, back, abdomen, both legs, digits and/or nails.   - Left superior shoulder pink scaly papule   - Right mid-back light pink macule   - Scattered brown macules on sun exposed areas..   - There are waxy stuck on tan to brown papules on the trunk and extremities.   - Left medial cheek telangiectasia, centrally there is a subtle waxy stuck on papule   - No other lesions of concern on areas examined.     Medications:  Current Outpatient Medications   Medication     amoxicillin (AMOXIL) 500 MG capsule     Emollient (CERAVE) CREA     metroNIDAZOLE (METROGEL) 1 % external gel     triamcinolone (KENALOG) 0.1 % external ointment     fluorouracil (EFUDEX) 5 % external cream     tacrolimus (PROTOPIC) 0.1 % external ointment     triamcinolone (KENALOG) 0.1 % external ointment     Current Facility-Administered  Medications   Medication     lidocaine 1% with EPINEPHrine 1:100,000 injection 3 mL     lidocaine 1% with EPINEPHrine 1:100,000 injection 3 mL      Past Medical History:   Patient Active Problem List   Diagnosis     Hyperlipidemia with target LDL less than 130     OA (osteoarthritis)     Rosacea     Impaired fasting glucose     POSTERIOR VITREOUS DETACHMENT, os     Advanced directives, counseling/discussion     Cataract     S/P total knee arthroplasty - left     Vitamin D deficiency     AK (actinic keratosis)     Xerosis of skin     Intertrigo     Inflamed seborrheic keratosis     Status post total left knee replacement     Rash     Diffuse photodamage of skin     Trochanteric bursitis of left hip     Keratoconjunctivitis of both eyes     Myogenic ptosis of left eyelid     Dermatochalasis of both upper eyelids     Past Medical History:   Diagnosis Date     AK (actinic keratosis) 2/2/2016     Allergic rhinitis      Hyperlipidemia LDL goal < 130      Impaired fasting glucose      Lyme disease      Nonsenile cataract      OA (osteoarthritis)      Obesity      PONV (postoperative nausea and vomiting)      Rosacea      Vitamin D deficiency 10/12        CC Josue Shah MD  [address] [facility]  [place] [address] on close of this encounter.

## 2021-09-28 NOTE — LETTER
9/28/2021       RE: Rafaela Rock  [address] [facility] [address]     Dear Colleague,    Thank you for referring your patient, Rafaela Rock, to the [facility] [place] [facility]. Please see a copy of my visit note below.    [facility] Dermatology Note  Encounter Date: Sep 28, 2021  Office Visit     Dermatology Problem List:  1. 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 AKs. Cryo  - S/p Efudex to chest 9/2020  3. Nummular eczema/xerosis.  - Rx. TMC 0.1% ointment. Cerave cream.  4. Rosacea.  - Rx. Metronidazole gel.  5. Contact dermatitis to metals - gold, silver?  6. Lichenoid keratoses - several have been biopsied in the past  7. SK in background of telangiectasia, left medial cheek  - s/p cryo 9/28/21  - monitor    # NUB, left superior shoulder  - s/p shave biopsy 9/28/21  #  NUB, right-mid back  - s/p shave biopsy 9/28/21    ____________________________________________    Assessment & Plan:    # NUB, left superior shoulder. Ddx SCC versus ISK    - Shave biopsy performed today. See procedure note below.      # NUB, right-mid back. Ddx BCC versus AK  - Shave biopsy performed today. See procedure note below.     # SK in background of telangiectasia, left medial cheek. Lesion very subtle, favor SK and will treat as such. Advised cannot definitively r/o NMSC but suspect less likely. Discussed warning signs of skin cancer and she will return if changes occur.   - Cryo performed today. See procedure note below     # SKs. Solar lentigines.   - Discussed the natural history and benign nature of this lesion. Reassurance provided that no additional treatment is necessary.      # History of NMSC. No evidence of recurrent disease.  - Continue photoprotection - recommend SPF 30 or higher with frequent reapplication  - Continue yearly skin exams  - Advised to monitor for changing,  non-healing, bleeding, painful, changing, or otherwise symptomatic lesions      Procedures Performed:   - Cryotherapy procedure note, location(s): left medial cheek. After verbal consent and discussion of risks and benefits including, but not limited to, dyspigmentation/scar, blister, and pain, 1 lesion(s) was(were) treated with 1-2 mm freeze border for 1-2 cycles with liquid nitrogen. Post cryotherapy instructions were provided.    - Shave biopsy procedure note, location(s): left superior shoulder and right mid-back. After discussion of benefits and risks including but not limited to bleeding, infection, scar, incomplete removal, recurrence, and non-diagnostic biopsy, written consent and photographs were obtained. The area was cleaned with isopropyl alcohol. 0.5mL of 1% lidocaine with epinephrine was injected to obtain adequate anesthesia of lesion(s). Shave biopsy at site(s) performed. Hemostasis was achieved with aluminium chloride. Petrolatum ointment and a sterile dressing were applied. The patient tolerated the procedure and no complications were noted. The patient was provided with verbal and written post care instructions.     Follow-up: 1 year, sooner if concerns.     Staff and Scribe:     Scribe Disclosure:  Ary DANG, am serving as a scribe to document services personally performed by Sarah Tillman MD based on data collection and the provider's statements to me.     Provider Disclosure:   The documentation recorded by the scribe accurately reflects the services I personally performed and the decisions made by me.    Provider Time: Established: (22465) 20-29 minutes in total spent on day of encounter in chart review, patient visit including counseling, review of tests, documentation and/or discussion with other providers about the issues documented above, excluding any procedures performed.     Sarah Tillman MD    Department of Dermatology  [facility]  [facility]: Phone: [phone], Fax: [phone]  9/28/2021     ____________________________________________    CC: Skin Check (damion is coming in today for a skin check, states that she has a spot of concern on the cheek)    HPI:  Ms. Damion Rock is a(n) 76 year old female who presents today as a return patient for a skin check. The patient was last seen in dermatology on 9/10/20 by Rita Hicks PA-C at which point she was started o protopic 0.1% ointment, and advised to continue triamcinolone 0.1% ointment for xerosis/dermatitis/pruritis. Labs were also ordered for this. Additionally, a shave biopsy of the left lateral lower leg was performed which demonstrated SCCis focally extending to the deep margin. The was removed on 11/2/20. She was started on Efudex 5% cream for an AK on the chest. Cryo was used to treat 2 ISKs.      Today, the patient expresses concern over one red spot to her left cheek that has been itchy for the past 6 months. She denies any pain, itchiness, bleeding, growing, or changing to this spot. She denies any other concerning spots or changes to her health since her last visit.     Patient is otherwise feeling well, without additional skin concerns.    Labs Reviewed:  N/A    Physical Exam:  Vitals: There were no vitals taken for this visit.  SKIN: Total skin excluding the undergarment areas was performed. The exam included the head/face, neck, both arms, chest, back, abdomen, both legs, digits and/or nails.   - Left superior shoulder pink scaly papule   - Right mid-back light pink macule   - Scattered brown macules on sun exposed areas..   - There are waxy stuck on tan to brown papules on the trunk and extremities.   - Left medial cheek telangiectasia, centrally there is a subtle waxy stuck on papule   - No other lesions of concern on areas examined.     Medications:  Current Outpatient Medications   Medication     amoxicillin  (AMOXIL) 500 MG capsule     Emollient (CERAVE) CREA     metroNIDAZOLE (METROGEL) 1 % external gel     triamcinolone (KENALOG) 0.1 % external ointment     fluorouracil (EFUDEX) 5 % external cream     tacrolimus (PROTOPIC) 0.1 % external ointment     triamcinolone (KENALOG) 0.1 % external ointment     Current Facility-Administered Medications   Medication     lidocaine 1% with EPINEPHrine 1:100,000 injection 3 mL     lidocaine 1% with EPINEPHrine 1:100,000 injection 3 mL      Past Medical History:   Patient Active Problem List   Diagnosis     Hyperlipidemia with target LDL less than 130     OA (osteoarthritis)     Rosacea     Impaired fasting glucose     POSTERIOR VITREOUS DETACHMENT, os     Advanced directives, counseling/discussion     Cataract     S/P total knee arthroplasty - left     Vitamin D deficiency     AK (actinic keratosis)     Xerosis of skin     Intertrigo     Inflamed seborrheic keratosis     Status post total left knee replacement     Rash     Diffuse photodamage of skin     Trochanteric bursitis of left hip     Keratoconjunctivitis of both eyes     Myogenic ptosis of left eyelid     Dermatochalasis of both upper eyelids     Past Medical History:   Diagnosis Date     AK (actinic keratosis) 2/2/2016     Allergic rhinitis      Hyperlipidemia LDL goal < 130      Impaired fasting glucose      Lyme disease      Nonsenile cataract      OA (osteoarthritis)      Obesity      PONV (postoperative nausea and vomiting)      Rosacea      Vitamin D deficiency 10/12        CC Josue Shah MD  [address] [facility] [place] [address] on close of this encounter.

## 2021-09-28 NOTE — PATIENT INSTRUCTIONS
Wound Care After a Biopsy    What is a skin biopsy?  A skin biopsy allows the doctor to examine a very small piece of tissue under the microscope to determine the diagnosis and the best treatment for the skin condition. A local anesthetic (numbing medicine)  is injected with a very small needle into the skin area to be tested. A small piece of skin is taken from the area. Sometimes a suture (stitch) is used.     What are the risks of a skin biopsy?  I will experience scar, bleeding, swelling, pain, crusting and redness. I may experience incomplete removal or recurrence. Risks of this procedure are excessive bleeding, bruising, infection, nerve damage, numbness, thick (hypertrophic or keloidal) scar and non-diagnostic biopsy.    How should I care for my wound for the first 24 hours?    Keep the wound dry and covered for 24 hours    If it bleeds, hold direct pressure on the area for 15 minutes. If bleeding does not stop then go to the emergency room    Avoid strenuous exercise the first 1-2 days or as your doctor instructs you    How should I care for the wound after 24 hours?    After 24 hours, remove the bandage    You may bathe or shower as normal    If you had a scalp biopsy, you can shampoo as usual and can use shower water to clean the biopsy site daily    Clean the wound twice a day with gentle soap and water    Do not scrub, be gentle    Apply white petroleum/Vaseline after cleaning the wound with a cotton swab or a clean finger, and keep the site covered with a Bandaid /bandage. Bandages are not necessary with a scalp biopsy    If you are unable to cover the site with a Bandaid /bandage, re-apply ointment 2-3 times a day to keep the site moist. Moisture will help with healing    Avoid strenuous activity for first 1-2 days    Avoid lakes, rivers, pools, and oceans until the stitches are removed or the site is healed    How do I clean my wound?    Wash hands thoroughly with soap or use hand  before all  wound care    Clean the wound with gentle soap and water    Apply white petroleum/Vaseline  to wound after it is clean    Replace the Bandaid /bandage to keep the wound covered for the first few days or as instructed by your doctor    If you had a scalp biopsy, warm shower water to the area on a daily basis should suffice    What should I use to clean my wound?     Cotton-tipped applicators (Qtips )    White petroleum jelly (Vaseline ). Use a clean new container and use Q-tips to apply.    Bandaids   as needed    Gentle soap     How should I care for my wound long term?    Do not get your wound dirty    Keep up with wound care for one week or until the area is healed.    A small scab will form and fall off by itself when the area is completely healed. The area will be red and will become pink in color as it heals. Sun protection is very important for how your scar will turn out. Sunscreen with an SPF 30 or greater is recommended once the area is healed.    If you have stitches, stitches need to be removed in 14 days. You may return to our clinic for this or you may have it done locally at your doctor s office.    You should have some soreness but it should be mild and slowly go away over several days. Talk to your doctor about using tylenol for pain,    When should I call my doctor?  If you have increased:     Pain or swelling    Pus or drainage (clear or slightly yellow drainage is ok)    Temperature over 100F    Spreading redness or warmth around wound    When will I hear about my results?  The biopsy results can take 2 weeks to come back.  Your results will automatically release to [facility] [employer] before your provider has even reviewed them.  The clinic will call you with the results, send you a [facility] message, or have you schedule a follow-up clinic or phone time to discuss the results.  Contact our clinics if you do not hear from us in 2 weeks.    Who should I call with questions?    [facility],  [place]: [phone]    [facility]: [phone]    For urgent needs outside of business hours call the [facility] at [phone] and ask for the dermatology resident on call    Cryotherapy    What is it?    Use of a very cold liquid, such as liquid nitrogen, to freeze and destroy abnormal skin cells that need to be removed    What should I expect?    Tenderness and redness    A small blister that might grow and fill with dark purple blood. There may be crusting.    More than one treatment may be needed if the lesions do not go away.    How do I care for the treated area?    Gently wash the area with your hands when bathing.    Use a thin layer of Vaseline to help with healing. You may use a Band-Aid.     The area should heal within 7-10 days and may leave behind a pink or lighter color.     Do not use an antibiotic or Neosporin ointment.     You may take acetaminophen (Tylenol) for pain.     Call your doctor if you have:    Severe pain    Signs of infection (warmth, redness, cloudy yellow drainage, and or a bad smell)    Questions or concerns    Who should I call with questions?       [facility]: [phone]       [facility]: [phone]       For urgent needs outside of business hours call the [facility] at [phone] and ask for the dermatology resident on call

## 2021-09-30 LAB
PATH REPORT.COMMENTS IMP SPEC: NORMAL
PATH REPORT.COMMENTS IMP SPEC: NORMAL
PATH REPORT.FINAL DX SPEC: NORMAL
PATH REPORT.GROSS SPEC: NORMAL
PATH REPORT.MICROSCOPIC SPEC OTHER STN: NORMAL
PATH REPORT.RELEVANT HX SPEC: NORMAL

## 2021-11-10 ENCOUNTER — ANCILLARY PROCEDURE (OUTPATIENT)
Dept: MAMMOGRAPHY | Facility: CLINIC | Age: 76
End: 2021-11-10
Attending: FAMILY MEDICINE
Payer: COMMERCIAL

## 2021-11-10 DIAGNOSIS — Z12.31 VISIT FOR SCREENING MAMMOGRAM: ICD-10-CM

## 2021-11-10 PROCEDURE — 77063 BREAST TOMOSYNTHESIS BI: CPT | Mod: TC | Performed by: RADIOLOGY

## 2021-11-10 PROCEDURE — 77067 SCR MAMMO BI INCL CAD: CPT | Mod: TC | Performed by: RADIOLOGY

## 2022-01-12 ENCOUNTER — OFFICE VISIT (OUTPATIENT)
Dept: DERMATOLOGY | Facility: CLINIC | Age: 77
End: 2022-01-12
Payer: COMMERCIAL

## 2022-01-12 DIAGNOSIS — L82.1 SEBORRHEIC KERATOSIS: ICD-10-CM

## 2022-01-12 DIAGNOSIS — L57.0 ACTINIC KERATOSIS: Primary | ICD-10-CM

## 2022-01-12 PROCEDURE — 17003 DESTRUCT PREMALG LES 2-14: CPT | Performed by: DERMATOLOGY

## 2022-01-12 PROCEDURE — 17000 DESTRUCT PREMALG LESION: CPT | Performed by: DERMATOLOGY

## 2022-01-12 ASSESSMENT — PAIN SCALES - GENERAL: PAINLEVEL: NO PAIN (0)

## 2022-01-12 NOTE — LETTER
1/12/2022     RE: Rafaela Rock  [address] [facility] [address]     Dear Colleague,    Thank you for referring your patient, Rafaela Rock, to the [facility] DERMATOLOGY [facility] [place] at [facility]. Please see a copy of my visit note below.    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 Cryo  - S/p Efudex to chest 9/2020  # Nummular eczema/xerosis.  - Rx. TMC 0.1% ointment. Cerave cream.  # Rosacea.  - Rx. Metronidazole gel.  # Contact dermatitis to metals - gold, silver?  # Lichenoid keratoses - several have been biopsied in the past  # SK in background of telangiectasia, left medial cheek  - s/p cryo 9/28/21  - monitor  # Hypertrophic actinic keratosis, left superior shoulder  - s/p shave biopsy 9/28/21, s/p cryo  #  Lichenoid actinic keratosis, right-mid back  - s/p shave biopsy 9/28/21, s/p cryo  # Lesion to monitor, left medial calf, photos 1/12/2022   ____________________________________________    Assessment & Plan:    # Actinic keratoses, previously biopsied, right mid back and left superior shoulder.  - Cryo today to treat residual scale, see procedure below.    # SK, left medial calf  She does note some changes to coloration but reassuringly clinical/dermoscopic appearance is of SK, ddx lentigo. Will take photos for monitoring.    Procedures Performed:   - Cryotherapy procedure note, location(s): see above. After verbal consent and discussion of risks and benefits including, but not limited to, dyspigmentation/scar, blister, and pain, 2 lesion(s) was(were) treated with 1-2 mm freeze border for 1-2 cycles with liquid nitrogen. Post cryotherapy instructions were provided.    Follow-up: ~9/2023 for FBSE, earlier for new or changing lesions    Staff and Scribe:     Scribe  Disclosure:  I, Rashel Clement, am serving as a scribe to document services personally performed by Sarah Tillman MD based on data collection and the provider's statements to me.     Provider Disclosure:   The documentation recorded by the scribe accurately reflects the services I personally performed and the decisions made by me.    Sarah Tillman MD    Department of Dermatology  [facility]: Phone: [phone], Fax: [phone]  1/13/2022   ____________________________________________    CC: Derm Problem (follow up on bx done on the back)    HPI:  Ms. Rafaela Rock is a(n) 76 year old female who presents today as a return patient for follow-up. Last seen by me on 9/28/2021, at which time patient underwent shave biopsy x2 to the left superior shoulder which returned as hypertrophic AK and to the right mid back which returned as lichenoid AK. Additionally, patient underwent cryo for treatment of a suspected SK in background of telangiectasias.    Today, patient reports a spot on the left calf that she would like examined. It has gotten darker.    Patient is otherwise feeling well, without additional skin concerns.    Labs Reviewed:  N/A    Physical Exam:  Vitals: There were no vitals taken for this visit.  SKIN: Focused examination of left calf, back, and left shoulder was performed.  - Left medial upper calf, light brown slightly waxy thin papule dermoscopy with cerebriform architecture.  - Well healed biopsy sites on the right mid back and left superior shoulder with slight remaining scale.  - No other lesions of concern on areas examined.     Medications:  Current Outpatient Medications   Medication     amoxicillin (AMOXIL) 500 MG capsule     Emollient (CERAVE) CREA     metroNIDAZOLE (METROGEL) 1 % external gel     triamcinolone (KENALOG) 0.1 % external ointment     Current Facility-Administered Medications    Medication     lidocaine 1% with EPINEPHrine 1:100,000 injection 3 mL     lidocaine 1% with EPINEPHrine 1:100,000 injection 3 mL      Past Medical History:   Patient Active Problem List   Diagnosis     Hyperlipidemia with target LDL less than 130     OA (osteoarthritis)     Rosacea     Impaired fasting glucose     POSTERIOR VITREOUS DETACHMENT, os     Advanced directives, counseling/discussion     Cataract     S/P total knee arthroplasty - left     Vitamin D deficiency     AK (actinic keratosis)     Xerosis of skin     Intertrigo     Inflamed seborrheic keratosis     Status post total left knee replacement     Rash     Diffuse photodamage of skin     Trochanteric bursitis of left hip     Keratoconjunctivitis of both eyes     Myogenic ptosis of left eyelid     Dermatochalasis of both upper eyelids     Past Medical History:   Diagnosis Date     AK (actinic keratosis) 2/2/2016     Allergic rhinitis      Hyperlipidemia LDL goal < 130      Impaired fasting glucose      Lyme disease      Nonsenile cataract      OA (osteoarthritis)      Obesity      PONV (postoperative nausea and vomiting)      Rosacea      Vitamin D deficiency 10/12      CC No referring provider defined for this encounter. on close of this encounter.

## 2022-01-12 NOTE — PROGRESS NOTES
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 Cryo  - S/p Efudex to chest 9/2020  # Nummular eczema/xerosis.  - Rx. TMC 0.1% ointment. Cerave cream.  # Rosacea.  - Rx. Metronidazole gel.  # Contact dermatitis to metals - gold, silver?  # Lichenoid keratoses - several have been biopsied in the past  # SK in background of telangiectasia, left medial cheek  - s/p cryo 9/28/21  - monitor  # Hypertrophic actinic keratosis, left superior shoulder  - s/p shave biopsy 9/28/21, s/p cryo  #  Lichenoid actinic keratosis, right-mid back  - s/p shave biopsy 9/28/21, s/p cryo  # Lesion to monitor, left medial calf, photos 1/12/2022   ____________________________________________    Assessment & Plan:    # Actinic keratoses, previously biopsied, right mid back and left superior shoulder.  - Cryo today to treat residual scale, see procedure below.    # SK, left medial calf  She does note some changes to coloration but reassuringly clinical/dermoscopic appearance is of SK, ddx lentigo. Will take photos for monitoring.    Procedures Performed:   - Cryotherapy procedure note, location(s): see above. After verbal consent and discussion of risks and benefits including, but not limited to, dyspigmentation/scar, blister, and pain, 2 lesion(s) was(were) treated with 1-2 mm freeze border for 1-2 cycles with liquid nitrogen. Post cryotherapy instructions were provided.    Follow-up: ~9/2023 for FBSE, earlier for new or changing lesions    Staff and Scribe:     Scribe Disclosure:  I, Rashel Vaughan, am serving as a scribe to document services personally performed by Sarah Tillman MD based on data collection and the provider's statements to me.     Provider Disclosure:   The documentation recorded by the scribe accurately reflects the services I personally performed and the decisions  made by me.    Sarah Tillman MD    Department of Dermatology  [facility]: Phone: [phone], Fax: [phone]  1/13/2022     ____________________________________________    CC: Derm Problem (follow up on bx done on the back)    HPI:  Ms. Rafaela Rock is a(n) 76 year old female who presents today as a return patient for follow-up. Last seen by me on 9/28/2021, at which time patient underwent shave biopsy x2 to the left superior shoulder which returned as hypertrophic AK and to the right mid back which returned as lichenoid AK. Additionally, patient underwent cryo for treatment of a suspected SK in background of telangiectasias.    Today, patient reports a spot on the left calf that she would like examined. It has gotten darker.    Patient is otherwise feeling well, without additional skin concerns.    Labs Reviewed:  N/A    Physical Exam:  Vitals: There were no vitals taken for this visit.  SKIN: Focused examination of left calf, back, and left shoulder was performed.  - Left medial upper calf, light brown slightly waxy thin papule dermoscopy with cerebriform architecture.  - Well healed biopsy sites on the right mid back and left superior shoulder with slight remaining scale.  - No other lesions of concern on areas examined.     Medications:  Current Outpatient Medications   Medication     amoxicillin (AMOXIL) 500 MG capsule     Emollient (CERAVE) CREA     metroNIDAZOLE (METROGEL) 1 % external gel     triamcinolone (KENALOG) 0.1 % external ointment     Current Facility-Administered Medications   Medication     lidocaine 1% with EPINEPHrine 1:100,000 injection 3 mL     lidocaine 1% with EPINEPHrine 1:100,000 injection 3 mL      Past Medical History:   Patient Active Problem List   Diagnosis     Hyperlipidemia with target LDL less than 130     OA (osteoarthritis)     Rosacea     Impaired fasting glucose     POSTERIOR  VITREOUS DETACHMENT, os     Advanced directives, counseling/discussion     Cataract     S/P total knee arthroplasty - left     Vitamin D deficiency     AK (actinic keratosis)     Xerosis of skin     Intertrigo     Inflamed seborrheic keratosis     Status post total left knee replacement     Rash     Diffuse photodamage of skin     Trochanteric bursitis of left hip     Keratoconjunctivitis of both eyes     Myogenic ptosis of left eyelid     Dermatochalasis of both upper eyelids     Past Medical History:   Diagnosis Date     AK (actinic keratosis) 2/2/2016     Allergic rhinitis      Hyperlipidemia LDL goal < 130      Impaired fasting glucose      Lyme disease      Nonsenile cataract      OA (osteoarthritis)      Obesity      PONV (postoperative nausea and vomiting)      Rosacea      Vitamin D deficiency 10/12        CC No referring provider defined for this encounter. on close of this encounter.

## 2022-01-12 NOTE — NURSING NOTE
Dermatology Rooming Note    Rafaela Rock's goals for this visit include:   Chief Complaint   Patient presents with     Derm Problem     follow up on bx done on the back     Laurita John CMA on 1/12/2022 at 11:39 AM

## 2022-01-14 NOTE — PATIENT INSTRUCTIONS
Cryotherapy    What is it?    Use of a very cold liquid, such as liquid nitrogen, to freeze and destroy abnormal skin cells that need to be removed    What should I expect?    Tenderness and redness    A small blister that might grow and fill with dark purple blood. There may be crusting.    More than one treatment may be needed if the lesions do not go away.    How do I care for the treated area?    Gently wash the area with your hands when bathing.    Use a thin layer of Vaseline to help with healing. You may use a Band-Aid.     The area should heal within 7-10 days and may leave behind a pink or lighter color.     Do not use an antibiotic or Neosporin ointment.     You may take acetaminophen (Tylenol) for pain.     Call your doctor if you have:    Severe pain    Signs of infection (warmth, redness, cloudy yellow drainage, and or a bad smell)    Questions or concerns    Who should I call with questions?       [facility]: [phone]       [facility]: [phone]       For urgent needs outside of business hours call the [facility] at [phone] and ask for the dermatology resident on call

## 2022-01-18 ENCOUNTER — OFFICE VISIT (OUTPATIENT)
Dept: OTHER | Facility: CLINIC | Age: 77
End: 2022-01-18
Payer: COMMERCIAL

## 2022-01-18 VITALS
OXYGEN SATURATION: 94 % | SYSTOLIC BLOOD PRESSURE: 131 MMHG | BODY MASS INDEX: 32.1 KG/M2 | TEMPERATURE: 98.3 F | RESPIRATION RATE: 16 BRPM | DIASTOLIC BLOOD PRESSURE: 88 MMHG | WEIGHT: 187 LBS | HEART RATE: 70 BPM

## 2022-01-18 DIAGNOSIS — Z00.00 ENCOUNTER FOR MEDICARE ANNUAL WELLNESS EXAM: Primary | ICD-10-CM

## 2022-01-18 PROCEDURE — G0439 PPPS, SUBSEQ VISIT: HCPCS | Performed by: FAMILY MEDICINE

## 2022-01-18 NOTE — PROGRESS NOTES
"Assessment & Plan   Recommend that patient follow up with PCP for care of chronic conditions and health maintenance.  Follow Up/Next Steps  Return in about 53 weeks (around 1/31/2023) for Annual Wellness Visit.  Recommended that patient follow up with PCP to address the following : please follow up with PCP for care of chronic conditions and preventative care.          Counseling and Education  Reviewed preventive health counseling, as reflected in patient instructions        BMI:  Estimated body mass index is 32.10 kg/m  as calculated from the following:  Height as of 3/5/21: 5'4\"  Weight as of this encounter: 187 lb.  Weight management plan: Patient was referred to their PCP to discuss a diet and exercise plan.          Appropriate preventive services were discussed with this patient, including applicable screening as appropriate for cardiovascular disease, diabetes, osteopenia/osteoporosis, and glaucoma. As appropriate for age/gender, discussed screening for colorectal cancer, prostate cancer, breast cancer, and cervical cancer. Checklist reviewing preventive services available has been given to the patient.        Reviewed patients plan of care and provided an AVS. The Basic Care Plan (routine screening as documented in Health Maintenance) for Y[place] meets the Care Plan requirement. This Care Plan has been established and reviewed with the Patient.        Visit Provider: Criss Andujar RN  Supervising Provider: Yoon Kolb MD, MD  [facility]      Jerald Russo is a 76 year old who is being seen for an Annual medicare wellness visit in her home.  Healthy Habits:     In general, how would you rate your overall health?  Excellent    Frequency of exercise:  6-7 days/week    Duration of exercise:  Greater than 60 minutes    Do you usually eat at least 4 servings of fruit and vegetables a day, include whole grains    & fiber and avoid regularly eating high " "fat or \"junk\" foods?  No    Taking medications regularly:  Yes    Medication side effects:  None    Ability to successfully perform activities of daily living:  No assistance needed    Home Safety:  No safety concerns identified    Hearing Impairment:  No hearing concerns    In the past 6 months, have you been bothered by leaking of urine?  No    In general, how would you rate your overall mental or emotional health?  Excellent      PHQ-2 Total Score: 0    Do you feel safe in your environment? Yes    Have you ever done Advance Care Planning? (For example, a Health Directive, POLST, or a discussion with a medical provider or your loved ones about your wishes): Yes, advance care planning is on file.    Fall risk  Fallen 2 or more times in the past year?: No  Any fall with injury in the past year?: No  Cognitive Screening   1) Repeat 3 items (Leader, Season, Table)    2) Clock draw: NORMAL  3) 3 item recall: Recalls 3 objects  Results: 3 items recalled: COGNITIVE IMPAIRMENT LESS LIKELY    Mini-CogTM Copyright S Patti. Licensed by the author for use in [facility]; reprinted with permission (soob@[facility]). All rights reserved.      Do you have sleep apnea, excessive snoring or daytime drowsiness?: no    Reviewed and updated as needed this visit by clinical staff  Tobacco  Allergies  Meds  Problems  Med Hx  Surg Hx  Fam Hx  Soc Hx         Social History     Tobacco Use     Smoking status: Never Smoker     Smokeless tobacco: Never Used   Substance Use Topics     Alcohol use: Yes     Comment: weekends       Current providers sharing in care for this patient include:   Patient Care Team:  Josue Shah MD as PCP - General  Self, Referred, MD as Marya Chatterjee MD as MD (Student in organized health care education/training program)  Meseret Jay MD as MD (Ophthalmology)  Cristopher Pepper MD as MD (Ophthalmology)  Blue Rojas OD as MD (Optometry)  Rita Hicks, PAKandiceC as Physician " "Assistant (Physician Assistant - Medical)  Rita Hicks PA-C as Referring Physician (Physician Assistant - Medical)  Theodore Noyola MD as MD (Dermatology)  Cristino Rivera DPM as Assigned Musculoskeletal Provider  Josue Shah MD as Assigned PCP  Cristopher Pepper MD as Assigned Surgical Provider    The following health maintenance items are reviewed in Epic and correct as of today:  Health Maintenance Due   Topic Date Due     ANNUAL REVIEW OF  ORDERS  Never done     ZOSTER IMMUNIZATION (3 of 3) 12/09/2019     COLORECTAL CANCER SCREENING  10/22/2021     EYE EXAM  01/19/2022     FALL RISK ASSESSMENT  01/19/2022       Mammogram Screening - Patient over age 75, has elected to discontinue screenings.  Pertinent mammograms are reviewed under the imaging tab.        Objective    /88 (BP Location: Right arm, Patient Position: Sitting)   Pulse 70   Temp 98.3  F (36.8  C) (Temporal)   Resp 16   Wt 84.8 kg (187 lb)   SpO2 94%   BMI 32.10 kg/m   Estimated body mass index is 32.1 kg/m  as calculated from the following:    Height as of 3/5/21: 1.626 m (5' 4\").    Weight as of this encounter: 84.8 kg (187 lb).  Physical Exam  Patient appears comfortable and in no acute distress.        Identified Health Risks:  "

## 2022-01-19 NOTE — PATIENT INSTRUCTIONS
Patient Education   Personalized Prevention Plan  You are due for the preventive services outlined below.  Your care team is available to assist you in scheduling these services.  If you have already completed any of these items, please share that information with your care team to update in your medical record.  Health Maintenance Due   Topic Date Due     ANNUAL REVIEW OF HM ORDERS  Never done     Zoster (Shingles) Vaccine (3 of 3) 12/09/2019     Colorectal Cancer Screening  10/22/2021     Eye Exam  01/19/2022     FALL RISK ASSESSMENT  01/19/2022

## 2022-01-21 ASSESSMENT — ACTIVITIES OF DAILY LIVING (ADL): CURRENT_FUNCTION: NO ASSISTANCE NEEDED

## 2022-01-23 ASSESSMENT — ACTIVITIES OF DAILY LIVING (ADL): CURRENT_FUNCTION: NO ASSISTANCE NEEDED

## 2022-03-02 ENCOUNTER — OFFICE VISIT (OUTPATIENT)
Dept: OPHTHALMOLOGY | Facility: CLINIC | Age: 77
End: 2022-03-02
Payer: COMMERCIAL

## 2022-03-02 DIAGNOSIS — Z96.1 PSEUDOPHAKIA OF BOTH EYES: ICD-10-CM

## 2022-03-02 DIAGNOSIS — L71.8 OCULAR ROSACEA: Primary | ICD-10-CM

## 2022-03-02 DIAGNOSIS — H02.831 DERMATOCHALASIS OF BOTH UPPER EYELIDS: ICD-10-CM

## 2022-03-02 DIAGNOSIS — H52.223 REGULAR ASTIGMATISM OF BOTH EYES: ICD-10-CM

## 2022-03-02 DIAGNOSIS — H02.834 DERMATOCHALASIS OF BOTH UPPER EYELIDS: ICD-10-CM

## 2022-03-02 DIAGNOSIS — H18.523 ANTERIOR BASEMENT MEMBRANE DYSTROPHY (ABMD) OF BOTH EYES: ICD-10-CM

## 2022-03-02 DIAGNOSIS — H02.402 PTOSIS OF EYELID, LEFT: ICD-10-CM

## 2022-03-02 DIAGNOSIS — Z86.69 HISTORY OF EPISCLERITIS: ICD-10-CM

## 2022-03-02 PROCEDURE — 92014 COMPRE OPH EXAM EST PT 1/>: CPT | Performed by: OPTOMETRIST

## 2022-03-02 ASSESSMENT — REFRACTION_WEARINGRX
OS_ADD: +2.75
OS_CYLINDER: +1.25
SPECS_TYPE: PAL
OD_SPHERE: -1.00
OS_AXIS: 015
OD_CYLINDER: +1.25
OD_AXIS: 156
OS_SPHERE: -1.00
OD_ADD: +2.75

## 2022-03-02 ASSESSMENT — VISUAL ACUITY
OS_CC: 20/20
CORRECTION_TYPE: GLASSES
OS_CC: J1+
OD_CC: 20/20
METHOD: SNELLEN - LINEAR
OD_CC: J1+

## 2022-03-02 ASSESSMENT — EXTERNAL EXAM - LEFT EYE: OS_EXAM: NORMAL

## 2022-03-02 ASSESSMENT — TONOMETRY
IOP_METHOD: ICARE
OS_IOP_MMHG: 7
OD_IOP_MMHG: 7

## 2022-03-02 ASSESSMENT — CONF VISUAL FIELD
OS_NORMAL: 1
OD_NORMAL: 1
METHOD: COUNTING FINGERS

## 2022-03-02 ASSESSMENT — CUP TO DISC RATIO
OS_RATIO: 0.4
OD_RATIO: 0.4

## 2022-03-02 ASSESSMENT — SLIT LAMP EXAM - LIDS
COMMENTS: 1+ MGD, LID TELANGIECTASIA
COMMENTS: 1+ MGD, LID TELANGIECTASIA

## 2022-03-02 ASSESSMENT — EXTERNAL EXAM - RIGHT EYE: OD_EXAM: NORMAL

## 2022-03-02 NOTE — NURSING NOTE
Chief Complaints and History of Present Illnesses   Patient presents with     COMPREHENSIVE EYE EXAM     Chief Complaint(s) and History of Present Illness(es)     COMPREHENSIVE EYE EXAM     Laterality: both eyes    Associated symptoms: Negative for itching, tearing, dryness, floaters, flashes and eye pain    Treatments tried: eye drops and glasses    Pain scale: 0/10              Comments     Pt here today for annual routine eye exam.  GABBY was here 1/19/2021 - no concerns last visit.  Pt states vision is stable. Got new glasses after last exam .  Pt reports having some dry eyes but Systane seems to be taking care of it.    Ocular meds = Systane bid ou    Camila Patterson COA, DOUGIE 2:45 PM 03/02/2022

## 2022-03-02 NOTE — PROGRESS NOTES
History  HPI     COMPREHENSIVE EYE EXAM     In both eyes.  Associated symptoms include Negative for itching, tearing, dryness, floaters, flashes and eye pain.  Treatments tried include eye drops and glasses.  Pain was noted as 0/10.              Comments     Pt here today for annual routine eye exam.  GABBY was here 1/19/2021 - no concerns last visit.  Pt states vision is stable. Got new glasses after last exam .  Pt reports having some dry eyes but Systane seems to be taking care of it.    Ocular meds = Systane bid ou    Camila Patterson COA, DOUGIE 2:45 PM 03/02/2022             Last edited by Camila Patterson on 3/2/2022  2:47 PM. 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 Continue use of artificial tears as needed. Monitor annually.    (H02.402) Ptosis of eyelid, left  (H02.831,  H02.834) Dermatochalasis of both upper eyelids  Comment: s/p oculoplastic surgery with Dr. Pepper, happy with results  Plan:  No treatment indicated at this time. Monitor annually.    (Z96.1) Pseudophakia of both eyes  Comment: s/p YAG both eyes, stable  Plan:  No treatment indicated at this time. Monitor annually.    (Z86.69) History of episcleritis  Comment: History of 2 episodes, no recent recurrences  Plan:  No treatment indicated at this time. Monitor annually or sooner if symptoms recur.    (H52.223) Regular astigmatism of both eyes  Comment: Patient declined refraction, not assessed at this visit  Plan:  Monitor at next comprehensive eye exam.    Return to clinic in 1 year for comprehensive eye exam.    Complete documentation of historical and exam elements from today's encounter can  be found in the full encounter summary report (not reduplicated in this progress  note). I personally obtained the  chief complaint(s) and history of present illness. I  confirmed and edited as necessary the review of systems, past medical/surgical  history, family history, social history, and examination findings as documented by  others; and I examined the patient myself. I personally reviewed the relevant tests,  images, and reports as documented above. I formulated and edited as necessary the  assessment and plan and discussed the findings and management plan with the  patient and family.    Blue Rojas OD, FAAO

## 2022-03-24 ENCOUNTER — OFFICE VISIT (OUTPATIENT)
Dept: PODIATRY | Facility: CLINIC | Age: 77
End: 2022-03-24
Payer: COMMERCIAL

## 2022-03-24 ENCOUNTER — ANCILLARY PROCEDURE (OUTPATIENT)
Dept: GENERAL RADIOLOGY | Facility: CLINIC | Age: 77
End: 2022-03-24
Attending: PODIATRIST
Payer: COMMERCIAL

## 2022-03-24 VITALS — SYSTOLIC BLOOD PRESSURE: 128 MMHG | HEART RATE: 74 BPM | DIASTOLIC BLOOD PRESSURE: 78 MMHG

## 2022-03-24 DIAGNOSIS — M21.619 BUNION: ICD-10-CM

## 2022-03-24 DIAGNOSIS — M20.40 HAMMER TOE, UNSPECIFIED LATERALITY: ICD-10-CM

## 2022-03-24 DIAGNOSIS — M20.40 HAMMER TOE, UNSPECIFIED LATERALITY: Primary | ICD-10-CM

## 2022-03-24 PROCEDURE — 99214 OFFICE O/P EST MOD 30 MIN: CPT | Performed by: PODIATRIST

## 2022-03-24 PROCEDURE — 73630 X-RAY EXAM OF FOOT: CPT | Mod: RT | Performed by: RADIOLOGY

## 2022-03-24 NOTE — LETTER
3/24/2022         RE: Rafaela Rock  [address] [place] [id number]        Dear Colleague,    Thank you for referring your patient, Rafaela Rock, to the [facility]. Please see a copy of my visit note below.     Subjective:    Pt is seen today with the c/c of right foot pain.  She has a bunion that is painful.  Slowly been getting worse over time.  Pain aggravated by activity and relieved by rest.  Also has second and third hammertoes.  We saw patient for this year and a half ago.  She has been trying to reduce these.  There is still elevated and painful at times.  No pain in the ball of her foot.  She is retired.  Has never smoked.      ROS: See above     No Known Allergies    Current Outpatient Medications   Medication Sig Dispense Refill     amoxicillin (AMOXIL) 500 MG capsule TAKE 4 CAPSULES(2000 MG) BY MOUTH 1 TIME 1 HOUR BEFORE PROCEDURE FOR 1 DOSE (Patient not taking: Reported on 1/18/2022) 8 capsule 3     Emollient (CERAVE) CREA Externally apply topically 3 times daily as needed 453 g 11     metroNIDAZOLE (METROGEL) 1 % external gel Apply topically once or twice daily daily to the face for rosacea. Please call Dermatology to schedule appointment at [phone]. Thank you. (Patient not taking: Reported on 1/18/2022) 60 g 0     triamcinolone (KENALOG) 0.1 % external ointment Apply topically 2 times daily 453.6 g 3       Patient Active Problem List   Diagnosis     Hyperlipidemia with target LDL less than 130     OA (osteoarthritis)     Rosacea     Impaired fasting glucose     POSTERIOR VITREOUS DETACHMENT, os     Advanced directives, counseling/discussion     Cataract     S/P total knee arthroplasty - left     Vitamin D deficiency     AK (actinic keratosis)     Xerosis of skin     Intertrigo     Inflamed seborrheic keratosis     Status post total left knee replacement     Rash     Diffuse photodamage of skin     Trochanteric bursitis of left hip      Keratoconjunctivitis of both eyes     Myogenic ptosis of left eyelid     Dermatochalasis of both upper eyelids       Past Medical History:   Diagnosis Date     AK (actinic keratosis) 2/2/2016     Allergic rhinitis      Hyperlipidemia LDL goal < 130      Impaired fasting glucose      Lyme disease      Nonsenile cataract      OA (osteoarthritis)      Obesity      PONV (postoperative nausea and vomiting)      Rosacea      Vitamin D deficiency 10/12       Past Surgical History:   Procedure Laterality Date     CATARACT IOL, RT/LT  12/27/2013    [facility]- RE     CATARACT IOL, RT/LT  12/05/2013    LE     COLONOSCOPY       COLONOSCOPY  5-5-14    Return for Colonoscopy in 1 yrs     PHACOEMULSIFICATION CLEAR CORNEA WITH TORIC INTRAOCULAR LENS IMPLANT  12/6/2013    Procedure: PHACOEMULSIFICATION CLEAR CORNEA WITH TORIC INTRAOCULAR LENS IMPLANT;  LEFT PHACOEMULSIFICATION CLEAR CORNEA WITH TORIC INTRAOCULAR LENS IMPLANT  ;  Surgeon: Otoniel Schuster MD;  [facility]     PHACOEMULSIFICATION CLEAR CORNEA WITH TORIC INTRAOCULAR LENS IMPLANT  12/27/2013    Procedure: PHACOEMULSIFICATION CLEAR CORNEA WITH TORIC INTRAOCULAR LENS IMPLANT;  RIGHT PHACOEMULSIFICATION CLEAR CORNEA WITH TORIC INTRAOCULAR LENS IMPLANT  ;  Surgeon: Otoniel Schuster MD;  Location:  [facility]     REPAIR PTOSIS BILATERAL Bilateral 3/5/2021    Procedure: Bilateral Upper Eyelid Mechanical Ptosis Repair;  Surgeon: Cristopher Pepper MD;  Location:  OR     TONSILLECTOMY  Age 11     [facility] NONSPECIFIC PROCEDURE  9/19/2003    Left knee scope/OA,MMT     [facility] TOTAL KNEE ARTHROPLASTY Left 4/2/2008    Left     [facility] TOTAL KNEE ARTHROPLASTY Right 8/17/16    DML       Family History   Problem Relation Age of Onset     Cancer Father         lung smoker     Skin Cancer Son         skin cancer     Glaucoma No family hx of      Macular Degeneration No family hx of      Eye Surgery No family hx of      Retinal detachment No family hx of      Melanoma No family hx of         Social History     Tobacco Use     Smoking status: Never Smoker     Smokeless tobacco: Never Used   Substance Use Topics     Alcohol use: Yes     Comment: weekends       Objective:    O:  /78   Pulse 74 .      Constitutional/ general:  Pt is in no apparent distress, appears well-nourished.  Cooperative with history and physical exam.     Psych:  The patient answered questions appropriately.  Normal affect.  Seems to have reasonable expectations, in terms of treatment.     Lungs:  Non labored breathing, non labored speech. No cough.  No audible wheezing. Even, quiet breathing.       Vascular:  positive pedal pulses bilaterally for both the DP and PT arteries.  CFT < 3 sec.  positive ankle edema.  positive pedal hair growth.    Neuro:  Alert and oriented x 3. Coordinated gait.  Light touch sensation is intact    Derm: Normal texture and turgor.  No erythema, ecchymosis, or cyanosis.      Musculoskeletal:    Lower extremity muscle strength is normal.  Patient is ambulatory without an assistive device or brace.  Pronated arch.  Right bunion deformity noted.  Slight decrease in range of motion.  Right second hammertoe.  Difficult to totally reduce this.  Right third toe laterally deviated.    X-rays reveal right bunion deformity with IM angle of 17 degrees.  Right second and third toes consistent with above findings.  Mild first MTPJ degenerative changes    Assessment:    Right HAV  Right 2,3 hammertoes    Plan: X-ray taken today of right foot.  Pointed out because of her bunion.  Also discussed causes of her hammertoes.  Patient is considering surgery on this.  Discussed Lapidus fusion.  Discussed recovery.  Also discussed head osteotomy.  Discussed would not reduce the bunion as well but with her age may be adequate and would be an easier recovery.  She could have partial weightbearing on her heel.  Discuss surgical correction of the right second and third toes.  Would fuse PIPJ's and release MTPJ's.   Would have pain in toes for 6 weeks.  Discussed possible complications.  She would like to think about this for now.  If considering surgery in the future she will return the clinic to reevaluate her foot and set up a surgical time.  30 minutes spent in total time counseling patient, reviewing medical records, and coordinating care      Cristino Rivera DPM DPM, FACFAS          Again, thank you for allowing me to participate in the care of your patient.        Sincerely,        Cristino Rivera DPM

## 2022-03-24 NOTE — PATIENT INSTRUCTIONS
We wish you continued good healing. If you have any questions or concerns, please do not hesitate to contact us at  [phone]    [employer] (secure e-mail communication and access to your chart) to send a message or to make an appointment.    Please remember to call and schedule a follow up appointment if one was recommended at your earliest convenience.     PODIATRY CLINIC HOURS  TELEPHONE NUMBER    Dr. Cristino GUILLORYPYASSINE [facility]        Clinics:  Jn Reese CMA   Tuesday 1PM-6PM  [place]  Wednesday 745AM-330PM  [place]  Thursday/Friday 745AM-230PM  [place] DAVIS/JN APPOINTMENTS  [phone]    [place] APPOINTMENTS  [phone]          If you need a medication refill, please contact us you may need lab work and/or a follow up visit prior to your refill (i.e. Antifungal medications).    If MRI needed please call Imaging at [phone] or [phone]    HOW DO I GET MY KNEE SCOOTER? Knee scooters can be picked up at ANY Medical Supply stores with your knee scooter Prescription.  OR    Bring your signed prescription to an [facility] showroom.

## 2022-03-25 NOTE — PROGRESS NOTES
Subjective:    Pt is seen today with the c/c of right foot pain.  She has a bunion that is painful.  Slowly been getting worse over time.  Pain aggravated by activity and relieved by rest.  Also has second and third hammertoes.  We saw patient for this year and a half ago.  She has been trying to reduce these.  There is still elevated and painful at times.  No pain in the ball of her foot.  She is retired.  Has never smoked.      ROS: See above     No Known Allergies    Current Outpatient Medications   Medication Sig Dispense Refill     amoxicillin (AMOXIL) 500 MG capsule TAKE 4 CAPSULES(2000 MG) BY MOUTH 1 TIME 1 HOUR BEFORE PROCEDURE FOR 1 DOSE (Patient not taking: Reported on 1/18/2022) 8 capsule 3     Emollient (CERAVE) CREA Externally apply topically 3 times daily as needed 453 g 11     metroNIDAZOLE (METROGEL) 1 % external gel Apply topically once or twice daily daily to the face for rosacea. Please call Dermatology to schedule appointment at [phone]. Thank you. (Patient not taking: Reported on 1/18/2022) 60 g 0     triamcinolone (KENALOG) 0.1 % external ointment Apply topically 2 times daily 453.6 g 3       Patient Active Problem List   Diagnosis     Hyperlipidemia with target LDL less than 130     OA (osteoarthritis)     Rosacea     Impaired fasting glucose     POSTERIOR VITREOUS DETACHMENT, os     Advanced directives, counseling/discussion     Cataract     S/P total knee arthroplasty - left     Vitamin D deficiency     AK (actinic keratosis)     Xerosis of skin     Intertrigo     Inflamed seborrheic keratosis     Status post total left knee replacement     Rash     Diffuse photodamage of skin     Trochanteric bursitis of left hip     Keratoconjunctivitis of both eyes     Myogenic ptosis of left eyelid     Dermatochalasis of both upper eyelids       Past Medical History:   Diagnosis Date     AK (actinic keratosis) 2/2/2016     Allergic rhinitis      Hyperlipidemia LDL goal < 130      Impaired fasting  glucose      Lyme disease      Nonsenile cataract      OA (osteoarthritis)      Obesity      PONV (postoperative nausea and vomiting)      Rosacea      Vitamin D deficiency 10/12       Past Surgical History:   Procedure Laterality Date     CATARACT IOL, RT/LT  12/27/2013    [facility]- RE     CATARACT IOL, RT/LT  12/05/2013    LE     COLONOSCOPY       COLONOSCOPY  5-5-14    Return for Colonoscopy in 1 yrs     PHACOEMULSIFICATION CLEAR CORNEA WITH TORIC INTRAOCULAR LENS IMPLANT  12/6/2013    Procedure: PHACOEMULSIFICATION CLEAR CORNEA WITH TORIC INTRAOCULAR LENS IMPLANT;  LEFT PHACOEMULSIFICATION CLEAR CORNEA WITH TORIC INTRAOCULAR LENS IMPLANT  ;  Surgeon: Otoniel Schuster MD;  [facility]:  [facility]     PHACOEMULSIFICATION CLEAR CORNEA WITH TORIC INTRAOCULAR LENS IMPLANT  12/27/2013    Procedure: PHACOEMULSIFICATION CLEAR CORNEA WITH TORIC INTRAOCULAR LENS IMPLANT;  RIGHT PHACOEMULSIFICATION CLEAR CORNEA WITH TORIC INTRAOCULAR LENS IMPLANT  ;  Surgeon: Otoniel Schuster MD;  Location:  [facility]     REPAIR PTOSIS BILATERAL Bilateral 3/5/2021    Procedure: Bilateral Upper Eyelid Mechanical Ptosis Repair;  Surgeon: Cristopher Pepper MD;  Location:  OR     TONSILLECTOMY  Age 11     [facility] NONSPECIFIC PROCEDURE  9/19/2003    Left knee scope/OA,MMT     [facility] TOTAL KNEE ARTHROPLASTY Left 4/2/2008    Left     C TOTAL KNEE ARTHROPLASTY Right 8/17/16    DML       Family History   Problem Relation Age of Onset     Cancer Father         lung smoker     Skin Cancer Son         skin cancer     Glaucoma No family hx of      Macular Degeneration No family hx of      Eye Surgery No family hx of      Retinal detachment No family hx of      Melanoma No family hx of        Social History     Tobacco Use     Smoking status: Never Smoker     Smokeless tobacco: Never Used   Substance Use Topics     Alcohol use: Yes     Comment: weekends       Objective:    O:  /78   Pulse 74 .      Constitutional/ general:  Pt is in no apparent distress,  appears well-nourished.  Cooperative with history and physical exam.     Psych:  The patient answered questions appropriately.  Normal affect.  Seems to have reasonable expectations, in terms of treatment.     Lungs:  Non labored breathing, non labored speech. No cough.  No audible wheezing. Even, quiet breathing.       Vascular:  positive pedal pulses bilaterally for both the DP and PT arteries.  CFT < 3 sec.  positive ankle edema.  positive pedal hair growth.    Neuro:  Alert and oriented x 3. Coordinated gait.  Light touch sensation is intact    Derm: Normal texture and turgor.  No erythema, ecchymosis, or cyanosis.      Musculoskeletal:    Lower extremity muscle strength is normal.  Patient is ambulatory without an assistive device or brace.  Pronated arch.  Right bunion deformity noted.  Slight decrease in range of motion.  Right second hammertoe.  Difficult to totally reduce this.  Right third toe laterally deviated.    X-rays reveal right bunion deformity with IM angle of 17 degrees.  Right second and third toes consistent with above findings.  Mild first MTPJ degenerative changes    Assessment:    Right HAV  Right 2,3 hammertoes    Plan: X-ray taken today of right foot.  Pointed out because of her bunion.  Also discussed causes of her hammertoes.  Patient is considering surgery on this.  Discussed Lapidus fusion.  Discussed recovery.  Also discussed head osteotomy.  Discussed would not reduce the bunion as well but with her age may be adequate and would be an easier recovery.  She could have partial weightbearing on her heel.  Discuss surgical correction of the right second and third toes.  Would fuse PIPJ's and release MTPJ's.  Would have pain in toes for 6 weeks.  Discussed possible complications.  She would like to think about this for now.  If considering surgery in the future she will return the clinic to reevaluate her foot and set up a surgical time.  30 minutes spent in total time counseling patient,  reviewing medical records, and coordinating care      Cristino Rivera, DPM DPM, FACFAS

## 2022-04-25 ENCOUNTER — TELEPHONE (OUTPATIENT)
Dept: FAMILY MEDICINE | Facility: CLINIC | Age: 77
End: 2022-04-25
Payer: COMMERCIAL

## 2022-04-25 DIAGNOSIS — Z86.0100 HISTORY OF COLONIC POLYPS: Primary | ICD-10-CM

## 2022-04-25 NOTE — TELEPHONE ENCOUNTER
I reviewed her chart and see that her last colonoscopy was done in October 2018.  Based on that exam and those results, she was advised to have a repeat colonoscopy in 5 years.  Prior to that it was a repeat exam in 3 years (from her prior one done in July of 2015), which her chart was still stating and I presume she was reading, but I corrected and updated that information to reflect that she is not due for one now until October 2023. Please advise her of that.    Josue Shah MD

## 2022-04-25 NOTE — TELEPHONE ENCOUNTER
Reason for call:  Other   Patient called regarding (reason for call): Colonoscopy order  Additional comments: Patient is calling in and says she is due for her Colonoscopy she would like an order placed. Please contact her back with scheduling information.     Phone number to reach patient:  Cell number on file:    Telephone Information:   Mobile [phone]     Best Time:      Can we leave a detailed message on this number?  YES    Travel screening: Not Applicable

## 2022-05-05 ENCOUNTER — TELEPHONE (OUTPATIENT)
Dept: FAMILY MEDICINE | Facility: CLINIC | Age: 77
End: 2022-05-05
Payer: COMMERCIAL

## 2022-05-05 NOTE — TELEPHONE ENCOUNTER
Patient calling to schedule colonoscopy. Reviewed chart with patient.   Colonoscopy done 10/2018, recommended follow up in 2023  She will discuss immunizations with Dr Shah at upcoming visits.     Dinorah SALCEDO RN  [facility]

## 2022-05-24 ENCOUNTER — MYC REFILL (OUTPATIENT)
Dept: FAMILY MEDICINE | Facility: CLINIC | Age: 77
End: 2022-05-24
Payer: COMMERCIAL

## 2022-05-24 DIAGNOSIS — L71.9 ROSACEA: ICD-10-CM

## 2022-05-24 RX ORDER — METRONIDAZOLE 10 MG/G
GEL TOPICAL
Qty: 60 G | Refills: 2 | Status: SHIPPED | OUTPATIENT
Start: 2022-05-24 | End: 2023-09-14

## 2022-05-24 NOTE — TELEPHONE ENCOUNTER
metroNIDAZOLE (METROGEL) 1 % external gel   Apply topically once or twice daily daily to the face for rosacea     Last Written Prescription Date:  8/2/21  Last Fill Quantity: 60 g,   # refills: 0  Last Office Visit : 1/12/22  Future Office visit: 9/20/22    Process# 1    Refill to pharmacy.

## 2022-06-21 ENCOUNTER — OFFICE VISIT (OUTPATIENT)
Dept: FAMILY MEDICINE | Facility: CLINIC | Age: 77
End: 2022-06-21
Payer: COMMERCIAL

## 2022-06-21 VITALS
WEIGHT: 194 LBS | HEART RATE: 77 BPM | OXYGEN SATURATION: 96 % | HEIGHT: 64 IN | BODY MASS INDEX: 33.12 KG/M2 | SYSTOLIC BLOOD PRESSURE: 130 MMHG | DIASTOLIC BLOOD PRESSURE: 82 MMHG | TEMPERATURE: 98.5 F

## 2022-06-21 DIAGNOSIS — R73.01 IMPAIRED FASTING GLUCOSE: ICD-10-CM

## 2022-06-21 DIAGNOSIS — E55.9 VITAMIN D DEFICIENCY: ICD-10-CM

## 2022-06-21 DIAGNOSIS — L85.3 XEROSIS OF SKIN: ICD-10-CM

## 2022-06-21 DIAGNOSIS — Z00.00 ENCOUNTER FOR MEDICARE ANNUAL WELLNESS EXAM: Primary | ICD-10-CM

## 2022-06-21 DIAGNOSIS — E78.5 HYPERLIPIDEMIA WITH TARGET LDL LESS THAN 130: ICD-10-CM

## 2022-06-21 DIAGNOSIS — L71.9 ROSACEA: ICD-10-CM

## 2022-06-21 DIAGNOSIS — L57.8 DIFFUSE PHOTODAMAGE OF SKIN: ICD-10-CM

## 2022-06-21 LAB
DEPRECATED CALCIDIOL+CALCIFEROL SERPL-MC: 26 UG/L (ref 20–75)
HBA1C MFR BLD: 5.8 % (ref 0–5.6)

## 2022-06-21 PROCEDURE — 82947 ASSAY GLUCOSE BLOOD QUANT: CPT | Performed by: FAMILY MEDICINE

## 2022-06-21 PROCEDURE — 80061 LIPID PANEL: CPT | Performed by: FAMILY MEDICINE

## 2022-06-21 PROCEDURE — 83036 HEMOGLOBIN GLYCOSYLATED A1C: CPT | Performed by: FAMILY MEDICINE

## 2022-06-21 PROCEDURE — 99397 PER PM REEVAL EST PAT 65+ YR: CPT | Performed by: FAMILY MEDICINE

## 2022-06-21 PROCEDURE — 82306 VITAMIN D 25 HYDROXY: CPT | Performed by: FAMILY MEDICINE

## 2022-06-21 PROCEDURE — 36415 COLL VENOUS BLD VENIPUNCTURE: CPT | Performed by: FAMILY MEDICINE

## 2022-06-21 ASSESSMENT — ENCOUNTER SYMPTOMS
DIZZINESS: 0
COUGH: 0
JOINT SWELLING: 0
HEARTBURN: 0
DIARRHEA: 0
SORE THROAT: 0
DYSURIA: 0
ABDOMINAL PAIN: 0
WEAKNESS: 0
PARESTHESIAS: 0
CHILLS: 0
NAUSEA: 0
HEADACHES: 0
CONSTIPATION: 0
EYE PAIN: 0
HEMATURIA: 0
PALPITATIONS: 0
MYALGIAS: 0
ARTHRALGIAS: 0
SHORTNESS OF BREATH: 0
NERVOUS/ANXIOUS: 0
FEVER: 0
HEMATOCHEZIA: 0
FREQUENCY: 0

## 2022-06-21 ASSESSMENT — ACTIVITIES OF DAILY LIVING (ADL): CURRENT_FUNCTION: NO ASSISTANCE NEEDED

## 2022-06-21 ASSESSMENT — PAIN SCALES - GENERAL: PAINLEVEL: NO PAIN (0)

## 2022-06-21 NOTE — PROGRESS NOTES
"SUBJECTIVE:   Rafaela Rock is a 77 year old female who presents for a Preventive Visit and to have some forms completed.  She is taking a trip to [facility] in August for safari and is getting extra insurance that would medically evacuate her from [place] in the case of healthcare needs.  There is a form that needs to be filled out for that.  She plans to go to a travel clinic next month for malaria pills and any specific vaccines.    She continues to see dermatology twice a year for rosacea and a history of sun damaged skin.    She is generally feeling well.  She is on no routine oral medications.    Patient has been advised of split billing requirements and indicates understanding: Yes  Are you in the first 12 months of your Medicare coverage?      Healthy Habits:     In general, how would you rate your overall health?  Excellent    Frequency of exercise:  4-5 days/week    Duration of exercise:  45-60 minutes    Do you usually eat at least 4 servings of fruit and vegetables a day, include whole grains    & fiber and avoid regularly eating high fat or \"junk\" foods?  No    Taking medications regularly:  Not Applicable    Medication side effects:  Not applicable and Muscle aches    Ability to successfully perform activities of daily living:  No assistance needed    Home Safety:  No safety concerns identified    Hearing Impairment:  No hearing concerns    In the past 6 months, have you been bothered by leaking of urine?  No    In general, how would you rate your overall mental or emotional health?  Excellent      PHQ-2 Total Score: 0    Additional concerns today:  No    Do you feel safe in your environment? Yes    Have you ever done Advance Care Planning? (For example, a Health Directive, POLST, or a discussion with a medical provider or your loved ones about your wishes): Yes, advance care planning is on file.       Fall risk  Fallen 2 or more times in the past year?: No  Any fall with injury in the past year?: " No    Cognitive Screening   1) Repeat 3 items (Leader, Season, Table)    2) Clock draw: NORMAL  3) 3 item recall: Recalls 3 objects  Results: NORMAL clock, 1-2 items recalled: COGNITIVE IMPAIRMENT LESS LIKELY    Mini-CogTM Copyright S Patti. Licensed by the author for use in [facility]; reprinted with permission (nikolai@[email or web address]). All rights reserved.      Do you have sleep apnea, excessive snoring or daytime drowsiness?: no    Reviewed and updated as needed this visit by clinical staff                    Reviewed and updated as needed this visit by Provider                   Social History     Tobacco Use     Smoking status: Never Smoker     Smokeless tobacco: Never Used   Substance Use Topics     Alcohol use: Yes     Comment: weekends     If you drink alcohol do you typically have >3 drinks per day or >7 drinks per week? No    Alcohol Use 6/21/2022   Prescreen: >3 drinks/day or >7 drinks/week? No   Prescreen: >3 drinks/day or >7 drinks/week? -         Current providers sharing in care for this patient include:   Patient Care Team:  Josue Shah MD as PCP - General  Self, Referred, MD as Marya Chatterjee MD as MD (Student in organized health care education/training program)  Meseret Jay MD as MD (Ophthalmology)  Cristopher Pepper MD as MD (Ophthalmology)  Blue Rojas OD as MD (Optometry)  Rita Hicks PA-C as Physician Assistant (Physician Assistant - Medical)  Rita Hicks PA-C as Referring Physician (Physician Assistant - Medical)  Theodore Noyola MD as MD (Dermatology)  Josue Shah MD as Assigned PCP  Cristopher Pepper MD as Assigned Surgical Provider  Blue Rojas OD as MD (Optometry)  Cristino Rivera DPM as Assigned Musculoskeletal Provider    The following health maintenance items are reviewed in Epic and correct as of today:  Health Maintenance Due   Topic Date Due     ANNUAL REVIEW OF HM ORDERS  Never done     ZOSTER IMMUNIZATION (3 of 3)  12/09/2019     Patient Active Problem List   Diagnosis     Hyperlipidemia with target LDL less than 130     OA (osteoarthritis)     Rosacea     POSTERIOR VITREOUS DETACHMENT, os     Advanced directives, counseling/discussion     Cataract     S/P total knee arthroplasty - left     Vitamin D deficiency     AK (actinic keratosis)     Xerosis of skin     Intertrigo     Inflamed seborrheic keratosis     Status post total left knee replacement     Rash     Diffuse photodamage of skin     Trochanteric bursitis of left hip     Keratoconjunctivitis of both eyes     Myogenic ptosis of left eyelid     Dermatochalasis of both upper eyelids     History of colonic polyps     Past Surgical History:   Procedure Laterality Date     CATARACT IOL, RT/LT  12/27/2013    Toric- RE     CATARACT IOL, RT/LT  12/05/2013    LE     COLONOSCOPY       COLONOSCOPY  5-5-14    Return for Colonoscopy in 1 yrs     PHACOEMULSIFICATION CLEAR CORNEA WITH TORIC INTRAOCULAR LENS IMPLANT  12/6/2013    Procedure: PHACOEMULSIFICATION CLEAR CORNEA WITH TORIC INTRAOCULAR LENS IMPLANT;  LEFT PHACOEMULSIFICATION CLEAR CORNEA WITH TORIC INTRAOCULAR LENS IMPLANT  ;  Surgeon: Otoniel Schuster MD;  [facility]:  [facility]     PHACOEMULSIFICATION CLEAR CORNEA WITH TORIC INTRAOCULAR LENS IMPLANT  12/27/2013    Procedure: PHACOEMULSIFICATION CLEAR CORNEA WITH TORIC INTRAOCULAR LENS IMPLANT;  RIGHT PHACOEMULSIFICATION CLEAR CORNEA WITH TORIC INTRAOCULAR LENS IMPLANT  ;  Surgeon: Otoniel Schuster MD;  Location:  [facility]     REPAIR PTOSIS BILATERAL Bilateral 3/5/2021    Procedure: Bilateral Upper Eyelid Mechanical Ptosis Repair;  Surgeon: Cristopher Pepper MD;  Location:  OR     TONSILLECTOMY  Age 11     [facility] NONSPECIFIC PROCEDURE  9/19/2003    Left knee scope/OA,MMT     [facility] TOTAL KNEE ARTHROPLASTY Left 4/2/2008    Left     [facility] TOTAL KNEE ARTHROPLASTY Right 8/17/16    DML       Social History     Tobacco Use     Smoking status: Never Smoker     Smokeless tobacco: Never  Used   Substance Use Topics     Alcohol use: Yes     Comment: weekends     Family History   Problem Relation Age of Onset     Cancer Father         lung smoker     Skin Cancer Son         skin cancer     Glaucoma No family hx of      Macular Degeneration No family hx of      Eye Surgery No family hx of      Retinal detachment No family hx of      Melanoma No family hx of          Current Outpatient Medications   Medication Sig Dispense Refill     Emollient (CERAVE) CREA Externally apply topically 3 times daily as needed 453 g 11     metroNIDAZOLE (METROGEL) 1 % external gel Apply topically once or twice daily daily to the face for rosacea. 60 g 2     triamcinolone (KENALOG) 0.1 % external ointment Apply topically 2 times daily 453.6 g 3     amoxicillin (AMOXIL) 500 MG capsule TAKE 4 CAPSULES(2000 MG) BY MOUTH 1 TIME 1 HOUR BEFORE PROCEDURE FOR 1 DOSE (Patient not taking: No sig reported) 8 capsule 3     No Known Allergies    Pertinent mammograms are reviewed under the imaging tab.    Review of Systems   Constitutional: Negative for chills and fever.   HENT: Negative for congestion, ear pain, hearing loss and sore throat.    Eyes: Negative for pain and visual disturbance.   Respiratory: Negative for cough and shortness of breath.    Cardiovascular: Negative for chest pain, palpitations and peripheral edema.   Gastrointestinal: Negative for abdominal pain, constipation, diarrhea, heartburn, hematochezia and nausea.   Genitourinary: Negative for dysuria, frequency, genital sores, hematuria, pelvic pain, urgency and vaginal bleeding.   Musculoskeletal: Negative for arthralgias, joint swelling and myalgias.   Skin: Negative for rash.   Neurological: Negative for dizziness, weakness, headaches and paresthesias.   Psychiatric/Behavioral: Negative for mood changes. The patient is not nervous/anxious.          OBJECTIVE:   /82 (BP Location: Right arm, Patient Position: Sitting, Cuff Size: Adult Regular)   Pulse 77    "Temp 98.5  F (36.9  C) (Oral)   Ht 1.615 m (5' 3.58\")   Wt 88 kg (194 lb)   SpO2 96%   BMI 33.74 kg/m   Estimated body mass index is 33.74 kg/m  as calculated from the following:    Height as of this encounter: 1.615 m (5' 3.58\").    Weight as of this encounter: 88 kg (194 lb).  Physical Exam  GENERAL: alert, no distress and over weight  EYES: Eyes grossly normal to inspection, PERRL and conjunctivae and sclerae normal  HENT: Grossly normal  NECK: no adenopathy, no asymmetry, masses, or scars and thyroid normal to palpation  RESP: lungs clear to auscultation - no rales, rhonchi or wheezes  CV: regular rate and rhythm, normal S1 S2, no S3 or S4, no murmur, click or rub, trace lower extremity peripheral edema and peripheral pulses intact  ABDOMEN: soft, nontender, no hepatosplenomegaly, no masses   MS: no gross musculoskeletal defects noted  SKIN: She has some hemosiderin deposition of the lower legs from venous stasis and some other scattered actinic changes  NEURO: Normal strength and tone, mentation intact and speech normal  PSYCH: mentation appears normal, affect normal/bright    Diagnostic Test Results:  Labs reviewed in Epic    ASSESSMENT / PLAN:       ICD-10-CM    1. Encounter for Medicare annual wellness exam  Z00.00 Glucose     Lipid panel reflex to direct LDL Fasting   2. Vitamin D deficiency  E55.9 Vitamin D Deficiency   3. Hyperlipidemia with target LDL less than 130  E78.5    4. Impaired fasting glucose  R73.01 Hemoglobin A1c   5. Diffuse photodamage of skin  L57.8    6. Rosacea  L71.9    7. Xerosis of skin  L85.3      Blood pressure and other vital signs look good  We discussed the above items  We will check fasting lab work today as above  I completed her medical insurance paperwork for her  She never did get a second Shingrix vaccine, so advised her to do that in the near future  Follow through with the travel clinic as well  Plan a tentative recheck in 1 year, or sooner " "prn    COUNSELING:  Reviewed preventive health counseling, as reflected in patient instructions       Regular exercise       Healthy diet/nutrition    Estimated body mass index is 33.74 kg/m  as calculated from the following:    Height as of this encounter: 1.615 m (5' 3.58\").    Weight as of this encounter: 88 kg (194 lb).    Weight management plan: Discussed healthy diet and exercise guidelines    She reports that she has never smoked. She has never used smokeless tobacco.      Appropriate preventive services were discussed with this patient, including applicable screening as appropriate for cardiovascular disease, diabetes, osteopenia/osteoporosis, and glaucoma.  As appropriate for age/gender, discussed screening for colorectal cancer, prostate cancer, breast cancer, and cervical cancer. Checklist reviewing preventive services available has been given to the patient.    Reviewed patients plan of care and provided an AVS. The Basic Care Plan (routine screening as documented in Health Maintenance) for Rafaela meets the Care Plan requirement. This Care Plan has been established and reviewed with the Patient.    Counseling Resources:  ATP IV Guidelines  Pooled Cohorts Equation Calculator  Breast Cancer Risk Calculator  Breast Cancer: Medication to Reduce Risk  FRAX Risk Assessment  ICSI Preventive Guidelines  Dietary Guidelines for Americans, 2010  USDA's MyPlate  ASA Prophylaxis  Lung CA Screening    Josue Shah MD  [facility]    Identified Health Risks:  "

## 2022-06-21 NOTE — PATIENT INSTRUCTIONS
Patient Education   Personalized Prevention Plan  You are due for the preventive services outlined below.  Your care team is available to assist you in scheduling these services.  If you have already completed any of these items, please share that information with your care team to update in your medical record.  Health Maintenance Due   Topic Date Due     ANNUAL REVIEW OF HM ORDERS  Never done     Zoster (Shingles) Vaccine (3 of 3) 12/09/2019       Understanding USDA MyPlate  The USDA has guidelines to help you make healthy food choices. These are called MyPlate. MyPlate shows the food groups that make up healthy meals using the image of a place setting. Before you eat, think about the healthiest choices for what to put on your plate or in your cup or bowl. To learn more about building a healthy plate, visit www.choosemyplate.gov.    The food groups    Fruits. Any fruit or 100% fruit juice counts as part of the Fruit Group. Fruits may be fresh, canned, frozen, or dried, and may be whole, cut-up, or pureed. Make 1/2 of your plate fruits and vegetables.    Vegetables. Any vegetable or 100% vegetable juice counts as a member of the Vegetable Group. Vegetables may be fresh, frozen, canned, or dried. They can be served raw or cooked and may be whole, cut-up, or mashed. Make 1/2 of your plate fruits and vegetables.    Grains. All foods made from grains are part of the Grains Group. These include wheat, rice, oats, cornmeal, and barley. Grains are often used to make foods such as bread, pasta, oatmeal, cereal, tortillas, and grits. Grains should be no more than 1/4 of your plate. At least half of your grains should be whole grains.    Protein. This group includes meat, poultry, seafood, beans and peas, eggs, processed soy products (such as tofu), nuts (including nut butters), and seeds. Make protein choices no more than 1/4 of your plate. Meat and poultry choices should be lean or low fat.    Dairy. The Dairy Group  includes all fluid milk products and foods made from milk that contain calcium, such as yogurt and cheese. (Foods that have little calcium, such as cream, butter, and cream cheese, are not part of this group.) Most dairy choices should be low-fat or fat-free.    Oils. Oils aren't a food group, but they do contain essential nutrients. However it's important to watch your intake of oils. These are fats that are liquid at room temperature. They include canola, corn, olive, soybean, vegetable, and sunflower oil. Foods that are mainly oil include mayonnaise, certain salad dressings, and soft margarines. You likely already get your daily oil allowance from the foods you eat.  Things to limit  Eating healthy also means limiting these things in your diet:       Salt (sodium). Many processed foods have a lot of sodium. To keep sodium intake down, eat fresh vegetables, meats, poultry, and seafood when possible. Purchase low-sodium, reduced-sodium, or no-salt-added food products at the store. And don't add salt to your meals at home. Instead, season them with herbs and spices such as dill, oregano, cumin, and paprika. Or try adding flavor with lemon or lime zest and juice.    Saturated fat. Saturated fats are most often found in animal products such as beef, pork, and chicken. They are often solid at room temperature, such as butter. To reduce your saturated fat intake, choose leaner cuts of meat and poultry. And try healthier cooking methods such as grilling, broiling, roasting, or baking. For a simple lower-fat swap, use plain nonfat yogurt instead of mayonnaise when making potato salad or macaroni salad.    Added sugars. These are sugars added to foods. They are in foods such as ice cream, candy, soda, fruit drinks, sports drinks, energy drinks, cookies, pastries, jams, and syrups. Cut down on added sugars by sharing sweet treats with a family member or friend. You can also choose fruit for dessert, and drink water or other  unsweetened beverages.     [employer] last reviewed this educational content on 6/1/2020 2000-2021 [employer]. All rights reserved. This information is not intended as a substitute for professional medical care. Always follow your healthcare professional's instructions.

## 2022-06-22 LAB
CHOLEST SERPL-MCNC: 213 MG/DL
FASTING STATUS PATIENT QL REPORTED: YES
FASTING STATUS PATIENT QL REPORTED: YES
GLUCOSE BLD-MCNC: 90 MG/DL (ref 70–99)
HDLC SERPL-MCNC: 82 MG/DL
LDLC SERPL CALC-MCNC: 110 MG/DL
NONHDLC SERPL-MCNC: 131 MG/DL
TRIGL SERPL-MCNC: 105 MG/DL

## 2022-06-22 NOTE — RESULT ENCOUNTER NOTE
"Rafaela,  Your lab work shows that your vitamin D level is somewhat on the low side, so taking a vitamin D3 supplement of 1000 international units daily would be advisable.  Your cholesterol values are a bit higher than the past, but you still have a high level of the HDL \"good cholesterol\", so your overall numbers are still fine.  Your fasting glucose is normal, but your 3-month average A1c blood sugar test is slightly elevated, so continued efforts at healthy eating and regular exercise to control weight would be advisable, as we discussed.  Hope you have a wonderful trip to [place]!  :)    Josue Shah MD    "

## 2022-07-13 ENCOUNTER — OFFICE VISIT (OUTPATIENT)
Dept: FAMILY MEDICINE | Facility: CLINIC | Age: 77
End: 2022-07-13
Payer: COMMERCIAL

## 2022-07-13 VITALS
BODY MASS INDEX: 33.58 KG/M2 | HEIGHT: 64 IN | OXYGEN SATURATION: 98 % | DIASTOLIC BLOOD PRESSURE: 71 MMHG | WEIGHT: 196.7 LBS | HEART RATE: 70 BPM | TEMPERATURE: 98.7 F | SYSTOLIC BLOOD PRESSURE: 119 MMHG

## 2022-07-13 DIAGNOSIS — Z71.84 TRAVEL ADVICE ENCOUNTER: Primary | ICD-10-CM

## 2022-07-13 DIAGNOSIS — Z87.898 H/O DIARRHEA: ICD-10-CM

## 2022-07-13 PROCEDURE — 90472 IMMUNIZATION ADMIN EACH ADD: CPT | Performed by: NURSE PRACTITIONER

## 2022-07-13 PROCEDURE — 90636 HEP A/HEP B VACC ADULT IM: CPT | Performed by: NURSE PRACTITIONER

## 2022-07-13 PROCEDURE — 90471 IMMUNIZATION ADMIN: CPT | Performed by: NURSE PRACTITIONER

## 2022-07-13 PROCEDURE — 99403 PREV MED CNSL INDIV APPRX 45: CPT | Mod: 25 | Performed by: NURSE PRACTITIONER

## 2022-07-13 PROCEDURE — 90691 TYPHOID VACCINE IM: CPT | Performed by: NURSE PRACTITIONER

## 2022-07-13 RX ORDER — AZITHROMYCIN 500 MG/1
500 TABLET, FILM COATED ORAL DAILY
Qty: 3 TABLET | Refills: 0 | Status: SHIPPED | OUTPATIENT
Start: 2022-07-13 | End: 2022-07-16

## 2022-07-13 RX ORDER — ATOVAQUONE AND PROGUANIL HYDROCHLORIDE 250; 100 MG/1; MG/1
1 TABLET, FILM COATED ORAL DAILY
Qty: 25 TABLET | Refills: 0 | Status: SHIPPED | OUTPATIENT
Start: 2022-07-13 | End: 2022-09-22

## 2022-07-13 NOTE — PROGRESS NOTES
"Nurse Note      Itinerary:  [facility] [place]       Departure Date: 8-20-22      Return Date: 9-7-22      Length of Trip 28 days       Reason for Travel: Pleasure           Urban or rural: urban      Accommodations: Hotel    Safari (tent)        IMMUNIZATION HISTORY  Have you received any immunizations within the past 4 weeks?  No  Have you ever fainted from having your blood drawn or from an injection?  No  Have you ever had a fever reaction to vaccination?  No  Have you ever had any bad reaction or side effect from any vaccination?  No  Have you ever had hepatitis A or B vaccine?  No  Do you live (or work closely) with anyone who has AIDS, an AIDS-like condition, any other immune disorder or who is on chemotherapy for cancer?  No  Do you have a family history of immunodeficiency?  No  Have you received any injection of immune globulin or any blood products during the past 12 months?  No    Patient roomed by Valeri Rock is a 77 year old female seen today alone for counsultation for international travel.   Patient will be departing in  5 week(s) and  traveling with family member(s).      Patient itinerary :  will be in the [facility] and other [facility] [employer] [place]  which risk for Malaria, food borne illnesses, motor vehicle accidents, Typhoid and Covid. exposure.      Patient's activities will include sightseeing, [employer] and travel by car or other vehicle.    Patient's country of birth is [place]    Special medical concerns: recent episode of severe diarrhea after eating possible raw pork juice.   Patient is concerned about diarrhea during travel.  She is asymptomatic at this time.    ROS  Denies diarrhea, vomitting, nausea, fever       Pre-travel questionnaire was completed by patient and reviewed by provider.     Vitals: /71   Pulse 70   Temp 98.7  F (37.1  C)   Ht 1.615 m (5' 3.58\")   Wt 89.2 kg (196 lb 11.2 oz)   SpO2 98%   BMI 34.21 " kg/m    BMI= Body mass index is 34.21 kg/m .    EXAM:  General:  Well-nourished, well-developed in no acute distress.  Appears to be stated age, interacts appropriately and expresses understanding of information given to patient.    Current Outpatient Medications   Medication Sig Dispense Refill     amoxicillin (AMOXIL) 500 MG capsule TAKE 4 CAPSULES(2000 MG) BY MOUTH 1 TIME 1 HOUR BEFORE PROCEDURE FOR 1 DOSE (Patient not taking: No sig reported) 8 capsule 3     Emollient (CERAVE) CREA Externally apply topically 3 times daily as needed 453 g 11     metroNIDAZOLE (METROGEL) 1 % external gel Apply topically once or twice daily daily to the face for rosacea. 60 g 2     triamcinolone (KENALOG) 0.1 % external ointment Apply topically 2 times daily 453.6 g 3     Patient Active Problem List   Diagnosis     Hyperlipidemia with target LDL less than 130     OA (osteoarthritis)     Rosacea     POSTERIOR VITREOUS DETACHMENT, os     Advanced directives, counseling/discussion     Cataract     S/P total knee arthroplasty - left     Vitamin D deficiency     AK (actinic keratosis)     Xerosis of skin     Intertrigo     Inflamed seborrheic keratosis     Status post total left knee replacement     Rash     Diffuse photodamage of skin     Trochanteric bursitis of left hip     Keratoconjunctivitis of both eyes     Myogenic ptosis of left eyelid     Dermatochalasis of both upper eyelids     History of colonic polyps     No Known Allergies      Immunizations discussed include:   Covid 19: Up to date  Hepatitis A:  Twin Kana series started today  Hepatitis B: Twin Kana series started today  Influenza: vaccine is not available  Typhoid: Ordered/given today, risks, benefits and side effects reviewed  Rabies: Declined  reviewed managment of a animal bite or scratch (washing wound, seek medical care within 24 hours for post exposure prophylaxis )  Yellow Fever: Not indicated  Japanese Encephalitis: Not indicated  Meningococcus: Not  indicated  Tetanus/Diphtheria: Up to date  Measles/Mumps/Rubella: Immune by disease history per patient report  Cholera: Not needed  Polio: Not indicated  Pneumococcal: Up to date  Varicella: Immune by disease history per patient report  Shingrix: Up to date  HPV:  Not indicated     TB: low risk     Altitude Exposure on this trip: no  Past tolerance to Altitude: na    ASSESSMENT/PLAN:  Rafaela was seen today for travel clinic.    Diagnoses and all orders for this visit:    Travel advice encounter  -     azithromycin (ZITHROMAX) 500 MG tablet; Take 1 tablet (500 mg) by mouth daily for 3 doses Take 1 tablet a day for up to 3 days for severe diarrhea  -     atovaquone-proguanil (MALARONE) 250-100 MG tablet; Take 1 tablet by mouth daily Start 2 days before exposure to Malaria and continue daily till  7 days after exposure.    Other orders  -     HEP A-HEP B  -     HEP A-HEP B; Standing  -     TYPHOID VACCINE, IM      I have reviewed general recommendations for safe travel   including: food/water precautions, insect precautions, roadway safety. Educational materials and [employer] report provided.    Malaraia prophylaxis recommended: Malarone  Symptomatic treatment for traveler's diarrhea: azithromycin  Altitude illness prevention and treatment: None    Counseling on prevention of Travelers Diarrhea:  Diet, probiotics, Pepto and treatment of severe diarrhea during travel with Zithromax, Immodium and ORS to prevent dehydration      Evacuation insurance advised and resources were provided to patient.    Total visit time 45 minutes  with over 50% of time spent counseling patient as detailed above.    Erica Wood CNP  Certificate in Travel Health

## 2022-07-13 NOTE — PATIENT INSTRUCTIONS
Thank you for visiting the [facility] : [phone]  Today July 13, 2022 you received the    Twinrix (Hepatitis A & B combo) Vaccine - Please return on 8/10/22 for your 2nd dose and 1/9/23 or later for your 3rd and final dose.    Typhoid - injectable. This vaccine is valid for two years.     Follow up vaccine appointments can be made as a NURSE ONLY visit at the Travel Clinic, (BE PREPARED TO WAIT, ) or at designated [place].    If you are receiving the Rabies vaccines series, it is important that you follow the exact schedule ordered.     Pre-travel     We recommend that you purchase Medical Evacuation Insurance prior to your departure.  Https://wwwnc.cdc.gov/travel/page/insurance    [place] your travel plans with the [employer] Department of State through STEP ( Smart Traveler Enrollment Program ) https://step.state.gov.  STEP is a free service to allow U[place] citizens and nationals traveling and living abroad to enroll their trip with the nearest U.S. Embassy or Consulate.    Animal Exposure: Avoid all mammals even if they look healthy.  If there is a bite, scratch or even a lick, wash area immediately with soap and water for 15 minutes and seek medical care within 24 hours for evaluation of Rabies post exposure treatment.  Contact your Medical Evacuation Insurance.    COVID 19 (Sars Cov2) prevention strategies  Physical distancing: Maintain 6 foot (2m) from others.              Avoid large gatherings and public transportation.   Avoid indoor shopping malls, theaters and restaurants   Practice consistent mask wearing covering the nose, mouth and underneath the chin when unable to maintain 6 foot distance from others.  Hand washing: frequent, thorough handwashing with soap and water for 20 seconds (or using a hand  containing 60% alcohol)   Avoid touching face, nose, eyes, mouth unless you have done appropriate hand washing as above.   Clean high touch surfaces  with approved disinfectant against Covid 19  (70% Ethanol ) or a bleach solution (add 20 mL (4 teaspoons) of bleach to 1 L (1 quart) of water;)  Be careful not to breath or touch bleach.      Travel Covid 19 Testing:  updated 12/06/2021  International travelers: Pre-travel: diagnostic testing (antigen or PCR) may be required for entry:  See country specific Embassy websites or airline websites.    Post travel: CDC recommends getting tested 3-5 days after your trip     COVID-19 testing scheduling number for pre-travel through [facility]  [phone] (Must have an order). Available 24 hours a day.  You can also schedule through My Chart.     Post-travel illness:  Contact your provider or [facility] if you develop a fever, rash, cough, diarrhea or other symptoms for up to 1 year after travel.  Inform your healthcare provider when and where you traveled to.    Please call the [facility] with any questions [phone]  Or send your provider a 'My Chart' note.

## 2022-08-15 ENCOUNTER — ALLIED HEALTH/NURSE VISIT (OUTPATIENT)
Dept: FAMILY MEDICINE | Facility: CLINIC | Age: 77
End: 2022-08-15
Payer: COMMERCIAL

## 2022-08-15 DIAGNOSIS — Z71.84 TRAVEL ADVICE ENCOUNTER: ICD-10-CM

## 2022-08-15 PROCEDURE — 90636 HEP A/HEP B VACC ADULT IM: CPT

## 2022-08-15 PROCEDURE — 90471 IMMUNIZATION ADMIN: CPT

## 2022-09-20 ENCOUNTER — LAB (OUTPATIENT)
Dept: LAB | Facility: CLINIC | Age: 77
End: 2022-09-20

## 2022-09-20 ENCOUNTER — OFFICE VISIT (OUTPATIENT)
Dept: DERMATOLOGY | Facility: CLINIC | Age: 77
End: 2022-09-20
Payer: COMMERCIAL

## 2022-09-20 DIAGNOSIS — R60.0 LOCALIZED EDEMA: ICD-10-CM

## 2022-09-20 DIAGNOSIS — L30.9 DERMATITIS: ICD-10-CM

## 2022-09-20 DIAGNOSIS — L29.9 PRURITUS: ICD-10-CM

## 2022-09-20 DIAGNOSIS — L72.0 MILIA: ICD-10-CM

## 2022-09-20 DIAGNOSIS — Z85.828 HISTORY OF NONMELANOMA SKIN CANCER: Primary | ICD-10-CM

## 2022-09-20 DIAGNOSIS — R23.8 BULLA: ICD-10-CM

## 2022-09-20 DIAGNOSIS — L57.0 ACTINIC KERATOSIS: ICD-10-CM

## 2022-09-20 LAB
TOTAL PROTEIN SERUM FOR ELP: 6.9 G/DL (ref 6.4–8.3)
TSH SERPL DL<=0.005 MIU/L-ACNC: 1.31 UIU/ML (ref 0.3–4.2)

## 2022-09-20 PROCEDURE — 84155 ASSAY OF PROTEIN SERUM: CPT | Performed by: DERMATOLOGY

## 2022-09-20 PROCEDURE — 84166 PROTEIN E-PHORESIS/URINE/CSF: CPT | Performed by: PATHOLOGY

## 2022-09-20 PROCEDURE — 36415 COLL VENOUS BLD VENIPUNCTURE: CPT | Performed by: PATHOLOGY

## 2022-09-20 PROCEDURE — 84165 PROTEIN E-PHORESIS SERUM: CPT | Mod: TC | Performed by: PATHOLOGY

## 2022-09-20 PROCEDURE — 99214 OFFICE O/P EST MOD 30 MIN: CPT | Mod: 25 | Performed by: DERMATOLOGY

## 2022-09-20 PROCEDURE — 17003 DESTRUCT PREMALG LES 2-14: CPT | Mod: XS | Performed by: DERMATOLOGY

## 2022-09-20 PROCEDURE — 84165 PROTEIN E-PHORESIS SERUM: CPT | Mod: 26

## 2022-09-20 PROCEDURE — 84443 ASSAY THYROID STIM HORMONE: CPT | Performed by: DERMATOLOGY

## 2022-09-20 PROCEDURE — 84166 PROTEIN E-PHORESIS/URINE/CSF: CPT | Mod: 26

## 2022-09-20 PROCEDURE — 17110 DESTRUCTION B9 LES UP TO 14: CPT | Performed by: DERMATOLOGY

## 2022-09-20 PROCEDURE — 17000 DESTRUCT PREMALG LESION: CPT | Mod: XS | Performed by: DERMATOLOGY

## 2022-09-20 PROCEDURE — 87798 DETECT AGENT NOS DNA AMP: CPT | Performed by: DERMATOLOGY

## 2022-09-20 RX ORDER — TRIAMCINOLONE ACETONIDE 1 MG/G
OINTMENT TOPICAL 2 TIMES DAILY
Qty: 320 G | Refills: 4 | Status: SHIPPED | OUTPATIENT
Start: 2022-09-20 | End: 2023-10-16

## 2022-09-20 RX ORDER — TRIAMCINOLONE ACETONIDE 1 MG/G
OINTMENT TOPICAL 2 TIMES DAILY
Qty: 80 G | Refills: 4 | Status: SHIPPED | OUTPATIENT
Start: 2022-09-20 | End: 2022-09-20

## 2022-09-20 RX ORDER — DOXYCYCLINE 100 MG/1
100 CAPSULE ORAL 2 TIMES DAILY
Qty: 14 CAPSULE | Refills: 0 | Status: SHIPPED | OUTPATIENT
Start: 2022-09-20 | End: 2022-10-28

## 2022-09-20 NOTE — PROGRESS NOTES
[facility] Dermatology Note  Encounter Date: Sep 20, 2022  Office Visit     Dermatology Problem List:  # History of NMSC  - SCCis, left lateral lower leg, s/p Mohs 11/2/20   # AKs. Cryo  - S/p Efudex to chest 9/2020  - Hypertrophic actinic keratosis, left superior shoulder, s/p shave biopsy 9/28/21, s/p cryo  - Lichenoid actinic keratosis, right-mid back, s/p shave biopsy 9/28/21, s/p cryo  - HAK, right chest, s/p bx 2/2016  - AK, right midline neck, s/p bx 2/2016  # Nummular eczema/xerosis.  - Rx. TMC 0.1% ointment. Cerave cream.  #. Rosacea.  - Rx. Metronidazole gel.  # Contact dermatitis to metals - gold, silver?  # Benign bx:  - Epidermoid cyst, R lower eyelid, sp bx 1/4/2020  - BLK, L upper back, s/p bx 5/2019  - BLK, R upper back, s/p bx 5/2019  - SK, left popliteal fossa, s/p bx 5/2019  - BLK, L upper chest, s/ bx 5/2018  - Sebaceous hyperplasia, L cheek, s/p bx 2/2016  - Ulcer and dermal inflammation, left midline back, s/p bx 2/2016  #. Edema, RLE with R thigh bulla - ddx cellulitis, zoster, DVT  - DVT r/o U/S 9/20/22  - VZV CPR 9/20/22 pending  - Empiric doxycycline   ____________________________________________    Assessment & Plan:     # Actinic keratoses.  - Cryo today x2, see procedure below.    # Milia central chest  - Lesion incised with 11 blade and cystic contents expressed out  - Discussed risks of infection and scar    # Bulla of right thigh  - Unclear etiology. Unclear if related to RLE edema (see below). Many possible causes such as contact dermatitis but no clear history; edema blisters considered but very proximal to her RLE edema; infectious etiology potentially but feeling healthy and no other lesions. Will do viral swab for VZV PCR today if were to be an early dermatomal eruption.  - VZV PCR obtained from base of blister     # RLE edema  - States occurred a few days after returning from flight from [place]. Is tender. No trouble breathing. Solely of right leg. No  purulence. Will get ultrasound to rule out DVT and refer to appropriate cares if positive. If negative, will have patient start doxycycline 100 mg BID for 1 week.  - RLE DVT r/o U/S  - Will start doxycycline 100 mg BID for 1 week if no DVT present. Discussed need for sun protection and take with food and avoid dairy   - Compression socks daily    # Pruritus, well controlled on triamcinolone 0.1% ointment BID PRN but has to use frequently. No rash. Unclear etiology of pruritus today so will order lab work-up to complete intrinsic causes.  - Reviewed prior CBC, BMP from 3/2021  - Labs today: CMP, TSH, SPEP, UPEP --> for some reason CMP not drawn??? Need to place compass report  - Continue triamcinolone 0.1% ointment BID PRN  - Continue daily moisturizer  - Consider CXR    # Multiple benign nevi.   - No concerning lesions today  - Monitor for ABCDEs of melanoma   - Continue sun protection - recommend SPF 30 or higher with frequent application   - Return sooner if noticing changing or symptomatic lesions    # Benign lesions - SKs, cherry angiomas, lentigenes.  - No treatment required    # History of NMSC. No evidence of recurrent disease.  - Continue photoprotection - recommend SPF 30 or higher with frequent reapplication  - Continue yearly skin exams  - Advised to monitor for changing, non-healing, bleeding, painful, changing, or otherwise symptomatic lesions    Follow-up: 1 year(s) in-person for FBSE, sooner pending course for right leg, or earlier for new or changing lesions      Staff and Resident:    I, Javed Cronin MD, discussed and evaluated the patient with Dr. Tillman.    Staff Physician Comments:   I saw and evaluated the patient with the resident and I agree with the assessment and plan.  I was present for the examination.    Sarah Tillman MD    Department of Dermatology  [facility] [place]: Phone: [phone], Fax:  [phone]  9/22/2022       ____________________________________________    CC: Derm Problem (Rafaela is here today for a skin check. Has a spot on R thigh that she would like looked at. Is wondering about allergy testing due to itching.)    HPI:  Ms. Rafaela Rock is a(n) 77 year old female who presents today as a return patient for skin check. Last seen 1/12/22, at which time patient underwent cryotherapy for treatment of residual AKs on the right mid back and left superior shoulder.     Today, notes painful swelling and redness of right lower leg. Started a few days after retuning from transatlantic flight. No trouble breathing. Thinks it might be getting a little better.     Also has an itchy blister on right thigh. Scratched it yesterday and now has blister. Feeling totally healthy with no other lesions elsewhere.    Uses triam about 3-4 times weekly for itch that goes around her legs, arms and back. Never with any skin rash. Triamcinolone helps well.    Wondering about white dot on chest. No new skin lesions that are tender or painful.    Patient is otherwise feeling well, without additional skin concerns.    Labs Reviewed:  N/A    Physical Exam:  Vitals: There were no vitals taken for this visit.  SKIN: Total skin excluding the undergarment areas was performed. The exam included the head/face, neck, both arms, chest, back, abdomen, both legs, digits and/or nails.   - 1 cm serous filled bulla of right thigh with erythema medially  - At right medial ankle extending up to lower leg there is an ill defined erythematous, warm patch that is tender to the touch. There is no visible trauma or purulence identified. There is noticeable significant edema of right leg compared to left  - There are dome shaped bright red papules on the trunk and extremities.   - Multiple regular brown pigmented macules and papules are identified on the trunk and extremities.   - Scattered brown macules on sun exposed areas.  - There are  waxy stuck on tan to brown papules on the trunk and extremities.   - There are erythematous macules with overyling adherent scale on the nose and cheek.   - There are white 1-2 mm papules on the central chest.   - No other lesions of concern on areas examined.     Medications:  Current Outpatient Medications   Medication     Emollient (CERAVE) CREA     metroNIDAZOLE (METROGEL) 1 % external gel     triamcinolone (KENALOG) 0.1 % external ointment     amoxicillin (AMOXIL) 500 MG capsule     atovaquone-proguanil (MALARONE) 250-100 MG tablet     Current Facility-Administered Medications   Medication     lidocaine 1% with EPINEPHrine 1:100,000 injection 3 mL     lidocaine 1% with EPINEPHrine 1:100,000 injection 3 mL      Past Medical History:   Patient Active Problem List   Diagnosis     Hyperlipidemia with target LDL less than 130     OA (osteoarthritis)     Rosacea     POSTERIOR VITREOUS DETACHMENT, os     Advanced directives, counseling/discussion     Cataract     S/P total knee arthroplasty - left     Vitamin D deficiency     AK (actinic keratosis)     Xerosis of skin     Intertrigo     Inflamed seborrheic keratosis     Status post total left knee replacement     Rash     Diffuse photodamage of skin     Trochanteric bursitis of left hip     Keratoconjunctivitis of both eyes     Myogenic ptosis of left eyelid     Dermatochalasis of both upper eyelids     History of colonic polyps     Past Medical History:   Diagnosis Date     AK (actinic keratosis) 2/2/2016     Allergic rhinitis      Hyperlipidemia LDL goal < 130      Impaired fasting glucose      Lyme disease      Nonsenile cataract      OA (osteoarthritis)      Obesity      PONV (postoperative nausea and vomiting)      Rosacea      Vitamin D deficiency 10/12        CC Josue Shah MD  [address] [facility] [place] [address] on close of this encounter.

## 2022-09-20 NOTE — PATIENT INSTRUCTIONS
Dr. Tillman will call you with the results of your ultrasound. If negative, anticipate starting doxycycline 1 pill twice daily for 1 week. Will also contact regarding viral swab and lab results for itch.    OK to continue using triamcinolone twice daily for itch.

## 2022-09-20 NOTE — NURSING NOTE
Dermatology Rooming Note    Rafaela Rock's goals for this visit include:   Chief Complaint   Patient presents with     Derm Problem     Rafaela is here today for a skin check. Has a spot on R thigh that she would like looked at. Is wondering about allergy testing due to itching.

## 2022-09-20 NOTE — LETTER
9/20/2022       RE: Rafaela Rock  [address] [place], [place] [address] [facility] [address]     Dear Colleague,    Thank you for referring your patient, Rafaela Rock, to the [facility] [place] at [facility]. Please see a copy of my visit note below.    [facility] Dermatology Note  Encounter Date: Sep 20, 2022  Office Visit     Dermatology Problem List:  # History of NMSC  - SCCis, left lateral lower leg, s/p Mohs 11/2/20   # AKs. Cryo  - S/p Efudex to chest 9/2020  - Hypertrophic actinic keratosis, left superior shoulder, s/p shave biopsy 9/28/21, s/p cryo  - Lichenoid actinic keratosis, right-mid back, s/p shave biopsy 9/28/21, s/p cryo  - HAK, right chest, s/p bx 2/2016  - AK, right midline neck, s/p bx 2/2016  # Nummular eczema/xerosis.  - Rx. TMC 0.1% ointment. Cerave cream.  #. Rosacea.  - Rx. Metronidazole gel.  # Contact dermatitis to metals - gold, silver?  # Benign bx:  - Epidermoid cyst, R lower eyelid, sp bx 1/4/2020  - BLK, L upper back, s/p bx 5/2019  - BLK, R upper back, s/p bx 5/2019  - SK, left popliteal fossa, s/p bx 5/2019  - BLK, L upper chest, s/ bx 5/2018  - Sebaceous hyperplasia, L cheek, s/p bx 2/2016  - Ulcer and dermal inflammation, left midline back, s/p bx 2/2016  #. Edema, RLE with R thigh bulla - ddx cellulitis, zoster, DVT  - DVT r/o U/S 9/20/22  - VZV CPR 9/20/22 pending  - Empiric doxycycline   ____________________________________________    Assessment & Plan:     # Actinic keratoses.  - Cryo today x2, see procedure below.    # Milia central chest  - Lesion incised with 11 blade and cystic contents expressed out  - Discussed risks of infection and scar    # Bulla of right thigh  - Unclear etiology. Unclear if related to RLE edema (see below). Many possible causes such as contact dermatitis but no clear history; edema blisters considered but very proximal to her RLE edema; infectious etiology  potentially but feeling healthy and no other lesions. Will do viral swab for VZV PCR today if were to be an early dermatomal eruption.  - VZV PCR obtained from base of blister     # RLE edema  - States occurred a few days after returning from flight from [place]. Is tender. No trouble breathing. Solely of right leg. No purulence. Will get ultrasound to rule out DVT and refer to appropriate cares if positive. If negative, will have patient start doxycycline 100 mg BID for 1 week.  - RLE DVT r/o U/S  - Will start doxycycline 100 mg BID for 1 week if no DVT present. Discussed need for sun protection and take with food and avoid dairy   - Compression socks daily    # Pruritus, well controlled on triamcinolone 0.1% ointment BID PRN but has to use frequently. No rash. Unclear etiology of pruritus today so will order lab work-up to complete intrinsic causes.  - Reviewed prior CBC, BMP from 3/2021  - Labs today: CMP, TSH, SPEP, UPEP --> for some reason CMP not drawn??? Need to place compass report  - Continue triamcinolone 0.1% ointment BID PRN  - Continue daily moisturizer  - Consider CXR    # Multiple benign nevi.   - No concerning lesions today  - Monitor for ABCDEs of melanoma   - Continue sun protection - recommend SPF 30 or higher with frequent application   - Return sooner if noticing changing or symptomatic lesions    # Benign lesions - SKs, cherry angiomas, lentigenes.  - No treatment required    # History of NMSC. No evidence of recurrent disease.  - Continue photoprotection - recommend SPF 30 or higher with frequent reapplication  - Continue yearly skin exams  - Advised to monitor for changing, non-healing, bleeding, painful, changing, or otherwise symptomatic lesions    Follow-up: 1 year(s) in-person for FBSE, sooner pending course for right leg, or earlier for new or changing lesions      Staff and Resident:    IJaved MD, discussed and evaluated the patient with Dr. Tillman.    Staff Physician  Comments:   I saw and evaluated the patient with the resident and I agree with the assessment and plan.  I was present for the examination.    Sarah Tillman MD    Department of Dermatology  [facility]: Phone: [phone], Fax: [phone]  9/22/2022       ____________________________________________    CC: Derm Problem (Rafaela is here today for a skin check. Has a spot on R thigh that she would like looked at. Is wondering about allergy testing due to itching.)    HPI:  Ms. Rafaela Rock is a(n) 77 year old female who presents today as a return patient for skin check. Last seen 1/12/22, at which time patient underwent cryotherapy for treatment of residual AKs on the right mid back and left superior shoulder.     Today, notes painful swelling and redness of right lower leg. Started a few days after retuning from [place] flight. No trouble breathing. Thinks it might be getting a little better.     Also has an itchy blister on right thigh. Scratched it yesterday and now has blister. Feeling totally healthy with no other lesions elsewhere.    Uses triam about 3-4 times weekly for itch that goes around her legs, arms and back. Never with any skin rash. Triamcinolone helps well.    Wondering about white dot on chest. No new skin lesions that are tender or painful.    Patient is otherwise feeling well, without additional skin concerns.    Labs Reviewed:  N/A    Physical Exam:  Vitals: There were no vitals taken for this visit.  SKIN: Total skin excluding the undergarment areas was performed. The exam included the head/face, neck, both arms, chest, back, abdomen, both legs, digits and/or nails.   - 1 cm serous filled bulla of right thigh with erythema medially  - At right medial ankle extending up to lower leg there is an ill defined erythematous, warm patch that is tender to the touch. There is no visible trauma or  purulence identified. There is noticeable significant edema of right leg compared to left  - There are dome shaped bright red papules on the trunk and extremities.   - Multiple regular brown pigmented macules and papules are identified on the trunk and extremities.   - Scattered brown macules on sun exposed areas.  - There are waxy stuck on tan to brown papules on the trunk and extremities.   - There are erythematous macules with overyling adherent scale on the nose and cheek.   - There are white 1-2 mm papules on the central chest.   - No other lesions of concern on areas examined.     Medications:  Current Outpatient Medications   Medication     Emollient (CERAVE) CREA     metroNIDAZOLE (METROGEL) 1 % external gel     triamcinolone (KENALOG) 0.1 % external ointment     amoxicillin (AMOXIL) 500 MG capsule     atovaquone-proguanil (MALARONE) 250-100 MG tablet     Current Facility-Administered Medications   Medication     lidocaine 1% with EPINEPHrine 1:100,000 injection 3 mL     lidocaine 1% with EPINEPHrine 1:100,000 injection 3 mL      Past Medical History:   Patient Active Problem List   Diagnosis     Hyperlipidemia with target LDL less than 130     OA (osteoarthritis)     Rosacea     POSTERIOR VITREOUS DETACHMENT, os     Advanced directives, counseling/discussion     Cataract     S/P total knee arthroplasty - left     Vitamin D deficiency     AK (actinic keratosis)     Xerosis of skin     Intertrigo     Inflamed seborrheic keratosis     Status post total left knee replacement     Rash     Diffuse photodamage of skin     Trochanteric bursitis of left hip     Keratoconjunctivitis of both eyes     Myogenic ptosis of left eyelid     Dermatochalasis of both upper eyelids     History of colonic polyps     Past Medical History:   Diagnosis Date     AK (actinic keratosis) 2/2/2016     Allergic rhinitis      Hyperlipidemia LDL goal < 130      Impaired fasting glucose      Lyme disease      Nonsenile cataract      OA  (osteoarthritis)      Obesity      PONV (postoperative nausea and vomiting)      Rosacea      Vitamin D deficiency 10/12        CC Josue Shah MD  [address] [facility]  [place] [address] on close of this encounter.

## 2022-09-20 NOTE — Clinical Note
Pt had several labs ordered on 9/20/22 - they did not draw CMP for unclear reasons, please place compass report, thank you!

## 2022-09-21 ENCOUNTER — ANCILLARY PROCEDURE (OUTPATIENT)
Dept: ULTRASOUND IMAGING | Facility: CLINIC | Age: 77
End: 2022-09-21
Attending: DERMATOLOGY
Payer: COMMERCIAL

## 2022-09-21 ENCOUNTER — MYC MEDICAL ADVICE (OUTPATIENT)
Dept: DERMATOLOGY | Facility: CLINIC | Age: 77
End: 2022-09-21

## 2022-09-21 DIAGNOSIS — R60.0 LOCALIZED EDEMA: ICD-10-CM

## 2022-09-21 DIAGNOSIS — R77.8 ABNORMAL SPEP: Primary | ICD-10-CM

## 2022-09-21 LAB
ALBUMIN SERPL ELPH-MCNC: 4 G/DL (ref 3.7–5.1)
ALPHA1 GLOB SERPL ELPH-MCNC: 0.3 G/DL (ref 0.2–0.4)
ALPHA2 GLOB SERPL ELPH-MCNC: 0.6 G/DL (ref 0.5–0.9)
B-GLOBULIN SERPL ELPH-MCNC: 1 G/DL (ref 0.6–1)
GAMMA GLOB SERPL ELPH-MCNC: 1 G/DL (ref 0.7–1.6)
M PROTEIN SERPL ELPH-MCNC: 0.1 G/DL
PROT PATTERN SERPL ELPH-IMP: ABNORMAL
PROT PATTERN UR ELPH-IMP: NORMAL
VZV DNA SPEC QL NAA+PROBE: NOT DETECTED

## 2022-09-21 PROCEDURE — 93971 EXTREMITY STUDY: CPT | Mod: RT | Performed by: RADIOLOGY

## 2022-09-23 NOTE — TELEPHONE ENCOUNTER
"From 9/20/2022 results note:    \"HI again, sorry for all the messages!     Your blood and urine test that we looked into for causes of itching showed that there might be an abnormal protein in the blood, which can sometimes also go along with itching. They recommend a follow-up blood and urine test to double check this. I will order them for you and you can get them performed at your convenience. You can call [phone] to schedule a lab appointment at the [facility] or call any [facility] lab closer to home. Sorry for another trip and mika let me know what questions you have!     Sincerely,        Sarah Tillman MD\"  "

## 2022-09-23 NOTE — TELEPHONE ENCOUNTER
Correct she just needs the follow-up blood/urine tests  I am guessing that message came before I sent the result note    If her leg isn't improving on the doxycycline though, she should let me and/or her primary know

## 2022-09-26 NOTE — PROGRESS NOTES
Hi,    It looks like the lab was placed later in the day. Was the lab called and informed to add this lab on?        Raquel Gomez CMA

## 2022-09-27 ENCOUNTER — OFFICE VISIT (OUTPATIENT)
Dept: PODIATRY | Facility: CLINIC | Age: 77
End: 2022-09-27
Payer: COMMERCIAL

## 2022-09-27 VITALS — DIASTOLIC BLOOD PRESSURE: 74 MMHG | HEART RATE: 88 BPM | SYSTOLIC BLOOD PRESSURE: 118 MMHG

## 2022-09-27 DIAGNOSIS — R60.0 EDEMA LEG: ICD-10-CM

## 2022-09-27 DIAGNOSIS — I73.9 PERIPHERAL ARTERIAL DISEASE (H): Primary | ICD-10-CM

## 2022-09-27 DIAGNOSIS — M20.40 HAMMER TOE, UNSPECIFIED LATERALITY: ICD-10-CM

## 2022-09-27 DIAGNOSIS — M21.619 BUNION: ICD-10-CM

## 2022-09-27 PROCEDURE — 99214 OFFICE O/P EST MOD 30 MIN: CPT | Performed by: PODIATRIST

## 2022-09-27 NOTE — PATIENT INSTRUCTIONS
We wish you continued good healing. If you have any questions or concerns, please do not hesitate to contact us at  [phone]    [employer] (secure e-mail communication and access to your chart) to send a message or to make an appointment.    Please remember to call and schedule a follow up appointment if one was recommended at your earliest convenience.     PODIATRY CLINIC HOURS  TELEPHONE NUMBER    Dr. Cristino GUILLORYPYASSINE [facility]        Clinics:  [place] Re[place] CMA   Tuesday 1PM-6PM  [place]  Wednesday 745AM-330PM  [place]  Thursday/Friday 745AM-230PM  [place] APPOINTMENTS  [phone]    [place] APPOINTMENTS  [phone]        If you need a medication refill, please contact us you may need lab work and/or a follow up visit prior to your refill (i.e. Antifungal medications).  If MRI needed please call Imaging at [phone] or [phone]  HOW DO I GET MY KNEE SCOOTER? Knee scooters can be picked up at ANY Medical Supply stores with your knee scooter Prescription.  OR  Bring your signed prescription to an [facility] Medical Equipment showroom.

## 2022-09-27 NOTE — LETTER
9/27/2022         RE: Rafaela Rock  [address] [place] [id number]        Dear Colleague,    Thank you for referring your patient, Rafaela Rock, to the [facility]. Please see a copy of my visit note below.     Subjective:    3/24/22   Pt is seen today with the c/c of right foot pain.  She has a bunion that is painful.  Slowly been getting worse over time.  Pain aggravated by activity and relieved by rest.  Also has second and third hammertoes.  We saw patient for this year and a half ago.  She has been trying to reduce these.  There is still elevated and painful at times.  No pain in the ball of her foot.  She is retired.  Has never smoked.    9/27/22   patient presents today to again discussed bunion and hammertoe surgery.  Patient recently flew back from [place].  She was not wearing her compression stockings.  Had significant swelling in her right lower extremity.  Had venous ultrasound by another provider which did not show blood clot.  The swelling is going down.  She has never smoked.      ROS: See above     No Known Allergies    Current Outpatient Medications   Medication Sig Dispense Refill     doxycycline monohydrate (MONODOX) 100 MG capsule Take 1 capsule (100 mg) by mouth 2 times daily 14 capsule 0     Emollient (CERAVE) CREA Externally apply topically 3 times daily as needed 453 g 11     metroNIDAZOLE (METROGEL) 1 % external gel Apply topically once or twice daily daily to the face for rosacea. 60 g 2     triamcinolone (KENALOG) 0.1 % external ointment Apply topically 2 times daily 320 g 4       Patient Active Problem List   Diagnosis     Hyperlipidemia with target LDL less than 130     OA (osteoarthritis)     Rosacea     POSTERIOR VITREOUS DETACHMENT, os     Advanced directives, counseling/discussion     Cataract     S/P total knee arthroplasty - left     Vitamin D deficiency     AK (actinic keratosis)     Xerosis of skin     Intertrigo     Inflamed seborrheic  keratosis     Status post total left knee replacement     Rash     Diffuse photodamage of skin     Trochanteric bursitis of left hip     Keratoconjunctivitis of both eyes     Myogenic ptosis of left eyelid     Dermatochalasis of both upper eyelids     History of colonic polyps       Past Medical History:   Diagnosis Date     AK (actinic keratosis) 2/2/2016     Allergic rhinitis      Hyperlipidemia LDL goal < 130      Impaired fasting glucose      Lyme disease      Nonsenile cataract      OA (osteoarthritis)      Obesity      PONV (postoperative nausea and vomiting)      Rosacea      Vitamin D deficiency 10/12       Past Surgical History:   Procedure Laterality Date     CATARACT IOL, RT/LT  12/27/2013    [facility]- RE     CATARACT IOL, RT/LT  12/05/2013    LE     COLONOSCOPY       COLONOSCOPY  5-5-14    Return for Colonoscopy in 1 yrs     PHACOEMULSIFICATION CLEAR CORNEA WITH TORIC INTRAOCULAR LENS IMPLANT  12/6/2013    Procedure: PHACOEMULSIFICATION CLEAR CORNEA WITH TORIC INTRAOCULAR LENS IMPLANT;  LEFT PHACOEMULSIFICATION CLEAR CORNEA WITH TORIC INTRAOCULAR LENS IMPLANT  ;  Surgeon: Otoniel Schuster MD;  [facility]:  [facility]     PHACOEMULSIFICATION CLEAR CORNEA WITH TORIC INTRAOCULAR LENS IMPLANT  12/27/2013    Procedure: PHACOEMULSIFICATION CLEAR CORNEA WITH TORIC INTRAOCULAR LENS IMPLANT;  RIGHT PHACOEMULSIFICATION CLEAR CORNEA WITH TORIC INTRAOCULAR LENS IMPLANT  ;  Surgeon: Otoniel Schuster MD;  Location:  [facility]     REPAIR PTOSIS BILATERAL Bilateral 3/5/2021    Procedure: Bilateral Upper Eyelid Mechanical Ptosis Repair;  Surgeon: Cristopher Pepper MD;  Location:  OR     TONSILLECTOMY  Age 11     [email or web address] NONSPECIFIC PROCEDURE  9/19/2003    Left knee scope/OA,MMT     ZZ TOTAL KNEE ARTHROPLASTY Left 4/2/2008    Left     C TOTAL KNEE ARTHROPLASTY Right 8/17/16    DML       Family History   Problem Relation Age of Onset     Cancer Father         lung smoker     Skin Cancer Son         skin cancer     Glaucoma No  family hx of      Macular Degeneration No family hx of      Eye Surgery No family hx of      Retinal detachment No family hx of      Melanoma No family hx of        Social History     Tobacco Use     Smoking status: Never Smoker     Smokeless tobacco: Never Used   Substance Use Topics     Alcohol use: Yes     Comment: weekends       Objective:    O:  /74   Pulse 88 .      Constitutional/ general:  Pt is in no apparent distress, appears well-nourished.  Cooperative with history and physical exam.     Psych:  The patient answered questions appropriately.  Normal affect.  Seems to have reasonable expectations, in terms of treatment.     Lungs:  Non labored breathing, non labored speech. No cough.  No audible wheezing. Even, quiet breathing.       Vascular: Bilateral foot ankle and leg edema being worse on the right.  Difficult to palpate pedal pulses on the right because of edema.  Capillary refill less than 3 seconds in the digits.  No pain with palpation of calf or around ankle.    Neuro:  Alert and oriented x 3. Coordinated gait.  Light touch sensation is intact    Derm: Somewhat thin    Musculoskeletal:    Lower extremity muscle strength is normal.  Patient is ambulatory without an assistive device or brace.  Pronated arch.  Right bunion deformity noted.  Slight decrease in range of motion.  Right second hammertoe.  Difficult to totally reduce this.  Right third toe laterally deviated.    X-rays reveal right bunion deformity with IM angle of 17 degrees.  Right second and third toes consistent with above findings.  Mild first MTPJ degenerative changes    Assessment:    Right HAV  Right 2,3 hammertoes  Right lower extremity edema    Plan: Patient's edema is reducing.  Personally reviewed ultrasound venous results and provider note.  She will continue strategies to help reduce edema.  Discussed importance of compression stockings.  X-ray from right foot personally reviewed.  We again briefly discussed foot  surgery..  Pointed out because of her bunion.  Also discussed causes of her hammertoes.  Patient is considering surgery on this.  Discussed Lapidus fusion.  Discussed recovery.  Also discussed head osteotomy.  Discussed would not reduce the bunion as well but with her age may be adequate and would be an easier recovery.  She could have partial weightbearing on her heel.  Discuss surgical correction of the right second and third toes.  Would fuse PIPJ's and release MTPJ's.  Would have pins in toes for 6 weeks.  Discussed possible complications.  Will order arterial ultrasound to ensure of adequate circulation right lower extremity.  Once patient has ultrasound and edema has resolved she will return to clinic to discuss surgery.  30 minutes spent in total time counseling patient, reviewing medical records, and coordinating care      Cristino Rivera DPM DPM, FACFAS          Again, thank you for allowing me to participate in the care of your patient.        Sincerely,        Cristino Rivera DPM

## 2022-09-30 ENCOUNTER — LAB (OUTPATIENT)
Dept: LAB | Facility: CLINIC | Age: 77
End: 2022-09-30
Payer: COMMERCIAL

## 2022-09-30 DIAGNOSIS — R77.8 ABNORMAL SPEP: ICD-10-CM

## 2022-09-30 DIAGNOSIS — L29.9 PRURITUS: ICD-10-CM

## 2022-09-30 LAB
ALBUMIN SERPL BCG-MCNC: 4.1 G/DL (ref 3.5–5.2)
ALP SERPL-CCNC: 108 U/L (ref 35–104)
ALT SERPL W P-5'-P-CCNC: 12 U/L (ref 10–35)
ANION GAP SERPL CALCULATED.3IONS-SCNC: 7 MMOL/L (ref 7–15)
AST SERPL W P-5'-P-CCNC: 21 U/L (ref 10–35)
BILIRUB SERPL-MCNC: 0.5 MG/DL
BUN SERPL-MCNC: 19.8 MG/DL (ref 8–23)
CALCIUM SERPL-MCNC: 9.9 MG/DL (ref 8.8–10.2)
CHLORIDE SERPL-SCNC: 103 MMOL/L (ref 98–107)
CREAT SERPL-MCNC: 0.69 MG/DL (ref 0.51–0.95)
DEPRECATED HCO3 PLAS-SCNC: 29 MMOL/L (ref 22–29)
GFR SERPL CREATININE-BSD FRML MDRD: 88 ML/MIN/1.73M2
GLUCOSE SERPL-MCNC: 93 MG/DL (ref 70–99)
POTASSIUM SERPL-SCNC: 4.6 MMOL/L (ref 3.4–5.3)
PROT SERPL-MCNC: 7.2 G/DL (ref 6.4–8.3)
SODIUM SERPL-SCNC: 139 MMOL/L (ref 136–145)

## 2022-09-30 PROCEDURE — 86335 IMMUNFIX E-PHORSIS/URINE/CSF: CPT | Performed by: STUDENT IN AN ORGANIZED HEALTH CARE EDUCATION/TRAINING PROGRAM

## 2022-09-30 PROCEDURE — 36415 COLL VENOUS BLD VENIPUNCTURE: CPT | Performed by: PATHOLOGY

## 2022-09-30 PROCEDURE — 86334 IMMUNOFIX E-PHORESIS SERUM: CPT | Mod: 26

## 2022-09-30 PROCEDURE — 80053 COMPREHEN METABOLIC PANEL: CPT | Performed by: PATHOLOGY

## 2022-09-30 PROCEDURE — 86335 IMMUNFIX E-PHORSIS/URINE/CSF: CPT | Mod: 26

## 2022-09-30 PROCEDURE — 86334 IMMUNOFIX E-PHORESIS SERUM: CPT | Performed by: STUDENT IN AN ORGANIZED HEALTH CARE EDUCATION/TRAINING PROGRAM

## 2022-10-03 ENCOUNTER — MYC MEDICAL ADVICE (OUTPATIENT)
Dept: DERMATOLOGY | Facility: CLINIC | Age: 77
End: 2022-10-03

## 2022-10-03 LAB — PROT ELPH PNL UR ELPH: NORMAL

## 2022-10-04 DIAGNOSIS — D47.2 MGUS (MONOCLONAL GAMMOPATHY OF UNKNOWN SIGNIFICANCE): Primary | ICD-10-CM

## 2022-10-04 LAB — PROT PATTERN SERPL IFE-IMP: NORMAL

## 2022-10-06 ENCOUNTER — PATIENT OUTREACH (OUTPATIENT)
Dept: ONCOLOGY | Facility: CLINIC | Age: 77
End: 2022-10-06

## 2022-10-06 NOTE — PROGRESS NOTES
October 5, 2022    Hematology/Oncology  referral reviewed.       Pertinent labs -- BOOKMARKED    Referring provider visit note -- BOOKMARKED    Hematology intake scheduling team (NPS phone number [phone] Monday - Friday 8am - 4:30 pm) will contact pt in the next 1-2 business days to schedule the consultation.    Iraida Crook, RN, BSN, OCN  [facility] Hematology/Oncology Nurse Navigator

## 2022-10-16 NOTE — TELEPHONE ENCOUNTER
RECORDS STATUS - ALL OTHER DIAGNOSIS      RECORDS RECEIVED FROM: [facility]   DATE RECEIVED: 10/16/22   NOTES STATUS DETAILS   OFFICE NOTE from referring provider EPIC 09/20/22: Dr. Sarah Tillman   MEDICATION LIST [facility]    LABS     ANYTHING RELATED TO DIAGNOSIS Epic Most recent 09/30/22

## 2022-10-25 ENCOUNTER — OFFICE VISIT (OUTPATIENT)
Dept: PODIATRY | Facility: CLINIC | Age: 77
End: 2022-10-25
Payer: COMMERCIAL

## 2022-10-25 VITALS
BODY MASS INDEX: 34.09 KG/M2 | WEIGHT: 196 LBS | HEART RATE: 92 BPM | SYSTOLIC BLOOD PRESSURE: 126 MMHG | DIASTOLIC BLOOD PRESSURE: 76 MMHG

## 2022-10-25 DIAGNOSIS — M21.619 BUNION: ICD-10-CM

## 2022-10-25 DIAGNOSIS — M72.2 PLANTAR FASCIITIS: ICD-10-CM

## 2022-10-25 DIAGNOSIS — R60.0 EDEMA LEG: ICD-10-CM

## 2022-10-25 DIAGNOSIS — M20.40 HAMMER TOE, UNSPECIFIED LATERALITY: Primary | ICD-10-CM

## 2022-10-25 PROCEDURE — 99213 OFFICE O/P EST LOW 20 MIN: CPT | Performed by: PODIATRIST

## 2022-10-25 NOTE — PATIENT INSTRUCTIONS
We wish you continued good healing. If you have any questions or concerns, please do not hesitate to contact us at  [phone]    [facility] (secure e-mail communication and access to your chart) to send a message or to make an appointment.    Please remember to call and schedule a follow up appointment if one was recommended at your earliest convenience.     PODIATRY CLINIC HOURS  TELEPHONE NUMBER    Dr. Cristino GUILLORYPYASSINE [facility]        Clinics:  [place] Re[place] CMA   Tuesday 1PM-6PM  [place]  Wednesday 745AM-330PM  [place]  Thursday/Friday 745AM-230PM  [place] DAVIS/JN APPOINTMENTS  [phone]    [place] APPOINTMENTS  [phone]        If you need a medication refill, please contact us you may need lab work and/or a follow up visit prior to your refill (i.e. Antifungal medications).  If MRI needed please call Imaging at [phone] or [phone]  HOW DO I GET MY KNEE SCOOTER? Knee scooters can be picked up at ANY Medical Supply stores with your knee scooter Prescription.  OR  Bring your signed prescription to an [facility] Medical Equipment showroom.

## 2022-10-25 NOTE — PROGRESS NOTES
Subjective:    3/24/22   Pt is seen today with the c/c of right foot pain.  She has a bunion that is painful.  Slowly been getting worse over time.  Pain aggravated by activity and relieved by rest.  Also has second and third hammertoes.  We saw patient for this year and a half ago.  She has been trying to reduce these.  There is still elevated and painful at times.  No pain in the ball of her foot.  She is retired.  Has never smoked.    9/27/22   patient presents today to again discussed bunion and hammertoe surgery.  Patient recently flew back from [place].  She was not wearing her compression stockings.  Had significant swelling in her right lower extremity.  Had venous ultrasound by another provider which did not show blood clot.  The swelling is going down.  She has never smoked.    10/25/22   patient returns for evaluation of her feet.  States swelling has gone down.  She is wearing over-the-counter compression stockings.  She has custom pair of compression stockings but does not like to wear these because they are hard to get off and on.  States she has been icing stretching wearing good shoes and her heel pain is virtually resolved.  She is wondering if she can delay her foot surgery until later in the future.      ROS: See above     No Known Allergies    Current Outpatient Medications   Medication Sig Dispense Refill     doxycycline monohydrate (MONODOX) 100 MG capsule Take 1 capsule (100 mg) by mouth 2 times daily 14 capsule 0     Emollient (CERAVE) CREA Externally apply topically 3 times daily as needed 453 g 11     metroNIDAZOLE (METROGEL) 1 % external gel Apply topically once or twice daily daily to the face for rosacea. 60 g 2     triamcinolone (KENALOG) 0.1 % external ointment Apply topically 2 times daily 320 g 4       Patient Active Problem List   Diagnosis     Hyperlipidemia with target LDL less than 130     OA (osteoarthritis)     Rosacea     POSTERIOR VITREOUS DETACHMENT, os     Advanced  directives, counseling/discussion     Cataract     S/P total knee arthroplasty - left     Vitamin D deficiency     AK (actinic keratosis)     Xerosis of skin     Intertrigo     Inflamed seborrheic keratosis     Status post total left knee replacement     Rash     Diffuse photodamage of skin     Trochanteric bursitis of left hip     Keratoconjunctivitis of both eyes     Myogenic ptosis of left eyelid     Dermatochalasis of both upper eyelids     History of colonic polyps       Past Medical History:   Diagnosis Date     AK (actinic keratosis) 2/2/2016     Allergic rhinitis      Hyperlipidemia LDL goal < 130      Impaired fasting glucose      Lyme disease      Nonsenile cataract      OA (osteoarthritis)      Obesity      PONV (postoperative nausea and vomiting)      Rosacea      Vitamin D deficiency 10/12       Past Surgical History:   Procedure Laterality Date     CATARACT IOL, RT/LT  12/27/2013    [facility]- RE     CATARACT IOL, RT/LT  12/05/2013    LE     COLONOSCOPY       COLONOSCOPY  5-5-14    Return for Colonoscopy in 1 yrs     PHACOEMULSIFICATION CLEAR CORNEA WITH TORIC INTRAOCULAR LENS IMPLANT  12/6/2013    Procedure: PHACOEMULSIFICATION CLEAR CORNEA WITH TORIC INTRAOCULAR LENS IMPLANT;  LEFT PHACOEMULSIFICATION CLEAR CORNEA WITH TORIC INTRAOCULAR LENS IMPLANT  ;  Surgeon: Otoniel Schuster MD;  [facility]:  [facility]     PHACOEMULSIFICATION CLEAR CORNEA WITH TORIC INTRAOCULAR LENS IMPLANT  12/27/2013    Procedure: PHACOEMULSIFICATION CLEAR CORNEA WITH TORIC INTRAOCULAR LENS IMPLANT;  RIGHT PHACOEMULSIFICATION CLEAR CORNEA WITH TORIC INTRAOCULAR LENS IMPLANT  ;  Surgeon: Otoniel Schuster MD;  Location:  [facility]     REPAIR PTOSIS BILATERAL Bilateral 3/5/2021    Procedure: Bilateral Upper Eyelid Mechanical Ptosis Repair;  Surgeon: Cristopher Pepper MD;  Location:  OR     TONSILLECTOMY  Age 11     ZZ NONSPECIFIC PROCEDURE  9/19/2003    Left knee scope/OA,MMT     Z TOTAL KNEE ARTHROPLASTY Left 4/2/2008    Left      ZZC TOTAL KNEE ARTHROPLASTY Right 8/17/16    DML       Family History   Problem Relation Age of Onset     Cancer Father         lung smoker     Skin Cancer Son         skin cancer     Glaucoma No family hx of      Macular Degeneration No family hx of      Eye Surgery No family hx of      Retinal detachment No family hx of      Melanoma No family hx of        Social History     Tobacco Use     Smoking status: Never     Smokeless tobacco: Never   Substance Use Topics     Alcohol use: Yes     Comment: weekends       Objective:    O:  There were no vitals taken for this visit..      Constitutional/ general:  Pt is in no apparent distress, appears well-nourished.  Cooperative with history and physical exam.     Psych:  The patient answered questions appropriately.  Normal affect.  Seems to have reasonable expectations, in terms of treatment.     Lungs:  Non labored breathing, non labored speech. No cough.  No audible wheezing. Even, quiet breathing.       Vascular: Bilateral foot ankle and leg edema.  This is symmetrical and right no longer has increased edema.    Arterial ultrasound triphasic waveforms    Neuro:  Alert and oriented x 3. Coordinated gait.  Light touch sensation is intact    Derm: Somewhat thin    Musculoskeletal:    Lower extremity muscle strength is normal.  Patient is ambulatory without an assistive device or brace.  Pronated arch.  Right bunion deformity noted.  Slight decrease in range of motion.  Right second hammertoe.  Difficult to totally reduce this.  Right third toe laterally deviated.    X-rays reveal right bunion deformity with IM angle of 17 degrees.  Right second and third toes consistent with above findings.  Mild first MTPJ degenerative changes    Assessment:    Right HAV  Right 2,3 hammertoes  Right lower extremity edema  Planter fasciitis    Plan: Patient's edema is equal and symmetrical right now.  Continue to wear compression as much as possible.  Discussed elevating and not eating  foods with salt.  Continue treatments for heel pain.  Discussed with patient arterial ultrasound shows excellent circulation.  We discussed this is fine if she decides to not do surgery until next year as this is elective.  Patient will return to clinic when she is ready to set this up.  RTC as needed.      Cristino Rivera, MARGARITO DPM, FACFAS

## 2022-10-25 NOTE — LETTER
10/25/2022         RE: Rafaela Rock  [address] [place] [id number]        Dear Colleague,    Thank you for referring your patient, Rafaela Rock, to the [facility]. Please see a copy of my visit note below.     Subjective:    3/24/22   Pt is seen today with the c/c of right foot pain.  She has a bunion that is painful.  Slowly been getting worse over time.  Pain aggravated by activity and relieved by rest.  Also has second and third hammertoes.  We saw patient for this year and a half ago.  She has been trying to reduce these.  There is still elevated and painful at times.  No pain in the ball of her foot.  She is retired.  Has never smoked.    9/27/22   patient presents today to again discussed bunion and hammertoe surgery.  Patient recently flew back from [place].  She was not wearing her compression stockings.  Had significant swelling in her right lower extremity.  Had venous ultrasound by another provider which did not show blood clot.  The swelling is going down.  She has never smoked.    10/25/22   patient returns for evaluation of her feet.  States swelling has gone down.  She is wearing over-the-counter compression stockings.  She has custom pair of compression stockings but does not like to wear these because they are hard to get off and on.  States she has been icing stretching wearing good shoes and her heel pain is virtually resolved.  She is wondering if she can delay her foot surgery until later in the future.      ROS: See above     No Known Allergies    Current Outpatient Medications   Medication Sig Dispense Refill     doxycycline monohydrate (MONODOX) 100 MG capsule Take 1 capsule (100 mg) by mouth 2 times daily 14 capsule 0     Emollient (CERAVE) CREA Externally apply topically 3 times daily as needed 453 g 11     metroNIDAZOLE (METROGEL) 1 % external gel Apply topically once or twice daily daily to the face for rosacea. 60 g 2     triamcinolone (KENALOG)  0.1 % external ointment Apply topically 2 times daily 320 g 4       Patient Active Problem List   Diagnosis     Hyperlipidemia with target LDL less than 130     OA (osteoarthritis)     Rosacea     POSTERIOR VITREOUS DETACHMENT, os     Advanced directives, counseling/discussion     Cataract     S/P total knee arthroplasty - left     Vitamin D deficiency     AK (actinic keratosis)     Xerosis of skin     Intertrigo     Inflamed seborrheic keratosis     Status post total left knee replacement     Rash     Diffuse photodamage of skin     Trochanteric bursitis of left hip     Keratoconjunctivitis of both eyes     Myogenic ptosis of left eyelid     Dermatochalasis of both upper eyelids     History of colonic polyps       Past Medical History:   Diagnosis Date     AK (actinic keratosis) 2/2/2016     Allergic rhinitis      Hyperlipidemia LDL goal < 130      Impaired fasting glucose      Lyme disease      Nonsenile cataract      OA (osteoarthritis)      Obesity      PONV (postoperative nausea and vomiting)      Rosacea      Vitamin D deficiency 10/12       Past Surgical History:   Procedure Laterality Date     CATARACT IOL, RT/LT  12/27/2013    [facility]- RE     CATARACT IOL, RT/LT  12/05/2013    LE     COLONOSCOPY       COLONOSCOPY  5-5-14    Return for Colonoscopy in 1 yrs     PHACOEMULSIFICATION CLEAR CORNEA WITH TORIC INTRAOCULAR LENS IMPLANT  12/6/2013    Procedure: PHACOEMULSIFICATION CLEAR CORNEA WITH TORIC INTRAOCULAR LENS IMPLANT;  LEFT PHACOEMULSIFICATION CLEAR CORNEA WITH TORIC INTRAOCULAR LENS IMPLANT  ;  Surgeon: Otoniel Schuster MD;  [facility]:  [facility]     PHACOEMULSIFICATION CLEAR CORNEA WITH TORIC INTRAOCULAR LENS IMPLANT  12/27/2013    Procedure: PHACOEMULSIFICATION CLEAR CORNEA WITH TORIC INTRAOCULAR LENS IMPLANT;  RIGHT PHACOEMULSIFICATION CLEAR CORNEA WITH TORIC INTRAOCULAR LENS IMPLANT  ;  Surgeon: Otoniel Schuster MD;  Location:  [facility]     REPAIR PTOSIS BILATERAL Bilateral 3/5/2021    Procedure:  Bilateral Upper Eyelid Mechanical Ptosis Repair;  Surgeon: Cristopher Pepper MD;  Location: [facility]     TONSILLECTOMY  Age 11     [facility] NONSPECIFIC PROCEDURE  9/19/2003    Left knee scope/OA,MMT     [facility] TOTAL KNEE ARTHROPLASTY Left 4/2/2008    Left     [facility] TOTAL KNEE ARTHROPLASTY Right 8/17/16    DML       Family History   Problem Relation Age of Onset     Cancer Father         lung smoker     Skin Cancer Son         skin cancer     Glaucoma No family hx of      Macular Degeneration No family hx of      Eye Surgery No family hx of      Retinal detachment No family hx of      Melanoma No family hx of        Social History     Tobacco Use     Smoking status: Never     Smokeless tobacco: Never   Substance Use Topics     Alcohol use: Yes     Comment: weekends       Objective:    O:  There were no vitals taken for this visit..      Constitutional/ general:  Pt is in no apparent distress, appears well-nourished.  Cooperative with history and physical exam.     Psych:  The patient answered questions appropriately.  Normal affect.  Seems to have reasonable expectations, in terms of treatment.     Lungs:  Non labored breathing, non labored speech. No cough.  No audible wheezing. Even, quiet breathing.       Vascular: Bilateral foot ankle and leg edema.  This is symmetrical and right no longer has increased edema.    Arterial ultrasound triphasic waveforms    Neuro:  Alert and oriented x 3. Coordinated gait.  Light touch sensation is intact    Derm: Somewhat thin    Musculoskeletal:    Lower extremity muscle strength is normal.  Patient is ambulatory without an assistive device or brace.  Pronated arch.  Right bunion deformity noted.  Slight decrease in range of motion.  Right second hammertoe.  Difficult to totally reduce this.  Right third toe laterally deviated.    X-rays reveal right bunion deformity with IM angle of 17 degrees.  Right second and third toes consistent with above findings.  Mild first MTPJ degenerative  changes    Assessment:    Right HAV  Right 2,3 hammertoes  Right lower extremity edema  Planter fasciitis    Plan: Patient's edema is equal and symmetrical right now.  Continue to wear compression as much as possible.  Discussed elevating and not eating foods with salt.  Continue treatments for heel pain.  Discussed with patient arterial ultrasound shows excellent circulation.  We discussed this is fine if she decides to not do surgery until next year as this is elective.  Patient will return to clinic when she is ready to set this up.  RTC as needed.      Cristino Rivera DPM DPM, FACFAS          Again, thank you for allowing me to participate in the care of your patient.        Sincerely,        Cristino Rivera DPM

## 2022-10-28 ENCOUNTER — ONCOLOGY VISIT (OUTPATIENT)
Dept: ONCOLOGY | Facility: CLINIC | Age: 77
End: 2022-10-28
Attending: DERMATOLOGY
Payer: COMMERCIAL

## 2022-10-28 ENCOUNTER — PRE VISIT (OUTPATIENT)
Dept: ONCOLOGY | Facility: CLINIC | Age: 77
End: 2022-10-28

## 2022-10-28 VITALS
HEIGHT: 64 IN | BODY MASS INDEX: 33.02 KG/M2 | WEIGHT: 193.4 LBS | RESPIRATION RATE: 16 BRPM | TEMPERATURE: 98.5 F | DIASTOLIC BLOOD PRESSURE: 87 MMHG | OXYGEN SATURATION: 98 % | HEART RATE: 69 BPM | SYSTOLIC BLOOD PRESSURE: 145 MMHG

## 2022-10-28 DIAGNOSIS — D47.2 MGUS (MONOCLONAL GAMMOPATHY OF UNKNOWN SIGNIFICANCE): ICD-10-CM

## 2022-10-28 DIAGNOSIS — D47.2 MONOCLONAL GAMMOPATHY: Primary | ICD-10-CM

## 2022-10-28 LAB
BASOPHILS # BLD AUTO: 0.1 10E3/UL (ref 0–0.2)
BASOPHILS NFR BLD AUTO: 1 %
EOSINOPHIL # BLD AUTO: 0.1 10E3/UL (ref 0–0.7)
EOSINOPHIL NFR BLD AUTO: 1 %
ERYTHROCYTE [DISTWIDTH] IN BLOOD BY AUTOMATED COUNT: 13.2 % (ref 10–15)
HCT VFR BLD AUTO: 41.2 % (ref 35–47)
HGB BLD-MCNC: 13.3 G/DL (ref 11.7–15.7)
IMM GRANULOCYTES # BLD: 0.1 10E3/UL
IMM GRANULOCYTES NFR BLD: 1 %
LYMPHOCYTES # BLD AUTO: 2.3 10E3/UL (ref 0.8–5.3)
LYMPHOCYTES NFR BLD AUTO: 26 %
MCH RBC QN AUTO: 29.4 PG (ref 26.5–33)
MCHC RBC AUTO-ENTMCNC: 32.3 G/DL (ref 31.5–36.5)
MCV RBC AUTO: 91 FL (ref 78–100)
MONOCYTES # BLD AUTO: 0.6 10E3/UL (ref 0–1.3)
MONOCYTES NFR BLD AUTO: 7 %
NEUTROPHILS # BLD AUTO: 5.7 10E3/UL (ref 1.6–8.3)
NEUTROPHILS NFR BLD AUTO: 64 %
NRBC # BLD AUTO: 0 10E3/UL
NRBC BLD AUTO-RTO: 0 /100
PLATELET # BLD AUTO: 231 10E3/UL (ref 150–450)
RBC # BLD AUTO: 4.52 10E6/UL (ref 3.8–5.2)
WBC # BLD AUTO: 8.7 10E3/UL (ref 4–11)

## 2022-10-28 PROCEDURE — 36415 COLL VENOUS BLD VENIPUNCTURE: CPT | Performed by: INTERNAL MEDICINE

## 2022-10-28 PROCEDURE — 82784 ASSAY IGA/IGD/IGG/IGM EACH: CPT | Performed by: INTERNAL MEDICINE

## 2022-10-28 PROCEDURE — 83521 IG LIGHT CHAINS FREE EACH: CPT | Performed by: INTERNAL MEDICINE

## 2022-10-28 PROCEDURE — 99204 OFFICE O/P NEW MOD 45 MIN: CPT | Performed by: INTERNAL MEDICINE

## 2022-10-28 PROCEDURE — 85025 COMPLETE CBC W/AUTO DIFF WBC: CPT | Performed by: INTERNAL MEDICINE

## 2022-10-28 ASSESSMENT — PAIN SCALES - GENERAL: PAINLEVEL: NO PAIN (0)

## 2022-10-28 NOTE — PROGRESS NOTES
[facility]    Hematology/Oncology New Patient Note      Today's Date: 10/28/2022    Referring provider:Sarah Tillman MD  Reason for Consultation: MGUS    HISTORY OF PRESENT ILLNESS: Rafaela Rock is a 77 year old female who was referred to the Hematology/Oncology Clinic for MGUS.    Pt has medical history including hld, prediabetes, osteoarthritis, cataracts, eczema, left lower leg SCC s/p Mohs, hx of lyme disease.      Pt was incidentally found to have monoclonal gammopathy on 9/22 SPEP that was done for workup of pruritis.  Patient reports that she has been having full body pruritus for many years, it is not associated with hives.  Reports that it is intermittent and typically localized to 1 area of her body at a time.  It commonly occurs at night.  Patient has tried to remove all perfumes and scents, without improvement.  Patient has changed all of her detergents and soaps to hypoallergenic, which has also not helped. Patient has kept an eye on her food intake and allergen exposure to determine if she can find a cause, however there is no identifiable pattern.  Patient typically uses a topical steroid which helps.  Patient has noticed that she does not have pruritus when she travels.    Patient denies bone pain, weight loss, night sweats.  No family history of any hematological diseases.    -9/22 labs reviewed-   CBC N/A (last CBC from 3/2021 WNL)  Cr normal, Calcium normal w/normal albumin, no protein gap (total protein 7.2, albumin 4.1)  SPEP- 0.1 g/dL   SIFE-  monoclonal IgG lambda  UPEP- only trace albumin and trace globulins   UIFE- no monoclonal protein      REVIEW OF SYSTEMS:   A 14 point ROS was reviewed with pertinent positives and negatives in the HPI.        HOME MEDICATIONS:  Current Outpatient Medications   Medication Sig Dispense Refill     doxycycline monohydrate (MONODOX) 100 MG capsule Take 1 capsule (100 mg) by mouth 2 times daily 14 capsule 0     Emollient (CERAVE)  CREA Externally apply topically 3 times daily as needed 453 g 11     metroNIDAZOLE (METROGEL) 1 % external gel Apply topically once or twice daily daily to the face for rosacea. 60 g 2     triamcinolone (KENALOG) 0.1 % external ointment Apply topically 2 times daily 320 g 4         ALLERGIES:  No Known Allergies      PAST MEDICAL HISTORY:  Past Medical History:   Diagnosis Date     AK (actinic keratosis) 2/2/2016     Allergic rhinitis      Hyperlipidemia LDL goal < 130      Impaired fasting glucose      Lyme disease      Nonsenile cataract      OA (osteoarthritis)      Obesity      PONV (postoperative nausea and vomiting)      Rosacea      Vitamin D deficiency 10/12         PAST SURGICAL HISTORY:  Past Surgical History:   Procedure Laterality Date     CATARACT IOL, RT/LT  12/27/2013    Toric- RE     CATARACT IOL, RT/LT  12/05/2013    LE     COLONOSCOPY       COLONOSCOPY  5-5-14    Return for Colonoscopy in 1 yrs     PHACOEMULSIFICATION CLEAR CORNEA WITH TORIC INTRAOCULAR LENS IMPLANT  12/6/2013    Procedure: PHACOEMULSIFICATION CLEAR CORNEA WITH TORIC INTRAOCULAR LENS IMPLANT;  LEFT PHACOEMULSIFICATION CLEAR CORNEA WITH TORIC INTRAOCULAR LENS IMPLANT  ;  Surgeon: Otoniel Schuster MD;  Location:  [facility]     PHACOEMULSIFICATION CLEAR CORNEA WITH TORIC INTRAOCULAR LENS IMPLANT  12/27/2013    Procedure: PHACOEMULSIFICATION CLEAR CORNEA WITH TORIC INTRAOCULAR LENS IMPLANT;  RIGHT PHACOEMULSIFICATION CLEAR CORNEA WITH TORIC INTRAOCULAR LENS IMPLANT  ;  Surgeon: Otoniel Schuster MD;  Location:  [facility]     REPAIR PTOSIS BILATERAL Bilateral 3/5/2021    Procedure: Bilateral Upper Eyelid Mechanical Ptosis Repair;  Surgeon: Cristopher Pepper MD;  Location:  OR     TONSILLECTOMY  Age 11     [facility] NONSPECIFIC PROCEDURE  9/19/2003    Left knee scope/OA,MMT     [facility] TOTAL KNEE ARTHROPLASTY Left 4/2/2008    Left     [facility] TOTAL KNEE ARTHROPLASTY Right 8/17/16    DML         SOCIAL HISTORY:  Social History     Socioeconomic  "History     Marital status:      Spouse name: Not on file     Number of children: 2s     Years of education: Not on file     Highest education level: Not on file   Occupational History     Employer: [employer]   Tobacco Use     Smoking status: Never     Smokeless tobacco: Never   Substance and Sexual Activity     Alcohol use: Yes     Comment: weekends     Drug use: No     Sexual activity: Never     Partners: Male   Other Topics Concern     Parent/sibling w/ CABG, MI or angioplasty before 65F 55M? No   Social History Narrative     Not on file     Social Determinants of Health     Financial Resource Strain: Not on file   Food Insecurity: Not on file   Transportation Needs: Not on file   Physical Activity: Not on file   Stress: Not on file   Social Connections: Not on file   Intimate Partner Violence: Not on file   Housing Stability: Not on file         FAMILY HISTORY:  Family History   Problem Relation Age of Onset     Cancer Father         lung smoker     Skin Cancer Son         skin cancer     Glaucoma No family hx of      Macular Degeneration No family hx of      Eye Surgery No family hx of      Retinal detachment No family hx of      Melanoma No family hx of          PHYSICAL EXAM:  Vital signs:   /87   BP Location Right arm   Pulse 69   Resp 16   Temp 98.5  F (36.9  C)   Temp src Oral   SpO2 98 %   Weight 87.7 kg (193 lb 6.4 oz)   Height 1.615 m (5' 3.58\")   Pain Score No Pain (0)         ECO  GENERAL/CONSTITUTIONAL: No acute distress.  EYES: Pupils are equal and round. Extraocular movements intact without nystagmus.  No scleral icterus.  RESPIRATORY: Equal chest rise.   MUSCULOSKELETAL: Warm and well-perfused, no cyanosis, clubbing, or edema.   NEUROLOGIC: Cranial nerves are grossly intact. Alert, oriented to person, place and time, answers questions appropriately.  INTEGUMENTARY: No rashes or jaundice.  GAIT: Steady, does not use assistive device        LABS:  CBC RESULTS:   Recent Labs   Lab " Test 03/02/21  1036   WBC 6.4   RBC 4.40   HGB 13.0   HCT 41.6   MCV 95   MCH 29.5   MCHC 31.3*   RDW 13.8          Recent Labs   Lab Test 09/30/22  1120 06/21/22  0940 03/02/21  1036     --  138   POTASSIUM 4.6  --  4.9   CHLORIDE 103  --  107   CO2 29  --  29   ANIONGAP 7  --  2*   GLC 93 90 89   BUN 19.8  --  20   CR 0.69  --  0.86   PALOMA 9.9  --  9.3     Component 4 wk ago    Immunofixation ELP Urine No monoclonal protein seen on immunofixation. Pathologic significance requires clinical correlation.  Rita Stephens M.D., Ph.D.   Resulting Agency SCORE              Specimen Collected: 09/30/22 11:23 AM Last Resulted: 10/03/22  1:30 PM            0 Result Notes     1 Patient Communication  Component 1 mo ago    ELP Interpretation Urine Only trace albumin and trace globulins. No obvious monoclonal protein seen. Pathological significance requires clinical correlation.  Paul Fong M.D., Ph.D., Pathologist   Resulting Agency SCORE              Specimen Collected: 09/20/22  2:48 PM Last Resulted: 09/21/22  1:29 PM              0 Result Notes     1 Patient Communication  Component 4 wk ago    Immunofixation ELP Monoclonal IgG immunoglobulin of lambda light chain type. Pathologic significance requires clinical correlation.  Rita Stephens M.D., Ph.D.   Resulting Agency SCORE              Specimen Collected: 09/30/22 11:20 AM Last Resulted: 10/04/22  2:35 PM           Component Ref Range & Units 1 mo ago      Albumin 3.7 - 5.1 g/dL 4.0     Alpha 1 0.2 - 0.4 g/dL 0.3     Alpha 2 0.5 - 0.9 g/dL 0.6     Beta Globulin 0.6 - 1.0 g/dL 1.0     Gamma Globulin 0.7 - 1.6 g/dL 1.0     Monoclonal Peak <=0.0 g/dL 0.1 High      ELP Interpretation  Small monoclonal protein (0.1 g/dL) seen in the gamma fraction, not previously characterized in our laboratory. Recommend serum and urine immunofixation for confirmation and further characterization if not previously performed elsewhere. Pathologic significance requires  "clinical correlation. Paul Fong M.D., Ph.D., Pathologist.   Resulting Agency  [facility]              Specimen Collected: 09/20/22  2:44 PM Last Resulted: 09/21/22  1:35 PM             PATHOLOGY:      IMAGING:      ASSESSMENT/PLAN:  Rafaela Rock is a 77 year old female with:    #monoclonal gammopathy  - 9/22 SPEP and SIFE: 0.1g/dL IgG lambda  - check 24-hour urine with protein to creatinine ratio, 24 hr urine UPEP and UIFE, kappa and light chain ratio, quantitative serum immunoglobulins  - check CBC  - await the above studies to determine if pt has low-risk MGUS and if bone marrow biopsy and imaging can be deferred per  NELIA Dia; How I manage monoclonal gammopathy of undetermined significance. Blood 2018; 131 (2): 163-173. doi: https://[email or web address]/10.1182/blood-[email or web address]:  \"A bone marrow can be deferred in patients with small (<1.5 gm/dL) IgM MGUS, low-risk MGUS (IgG type, M- protein <1.5 gm/dL, normal free light-chain ratio), and small (involved/uninvolved serum-free light-chain ratio <8) light-chain MGUS in whom there are no clinical features concerning for myeloma or lymphoplasmacytic malignancy.\"     -in terms of prognosis: absolute risk of disease progression over 20 years in patients with no risk factors (low risk MGUS) - 5 percent or less, d/w pt     -NELIA Dia; How I manage monoclonal gammopathy of undetermined significance. Blood 2018; 131 (2): 163-173. doi: https://[email or web address]/[email or web address]/blood-[email or web address]:  \"Our recommendations for follow-up largely conform to the guidelines of the International Myeloma Working Group.15  We recommend that all patients with MGUS be reassessed in 6 months with complete blood count, SPEP, FLC, calcium, and creatinine to determine clinical stability and detect rapidly evolving LPM. Although this practice is sensible and recommended by all clinical practice guidelines,14-17  evidence for this approach was unavailable until " "recently. A large retrospective nationwide study performed in the [place] (N = [id number]) suggests that the risk of MGUS transformation is highest during the first year (2.1%) and gradually declines thereafter (1.6%, 1.2%, 1.0%, and 0.8%, for years 2-5, respectively).39  After initial follow-up, patients with low-risk MGUS need additional follow-up of MGUS only if symptoms concerning for LPMs develop.28  These patients have only a 2% risk of progression over a 20-year period. All other patients with MGUS should have an annual follow-up (Figure 2).\"      RTC 2-3 weeks for follow-up with me      Saskia Vogel DO  Hematology/Oncology  [facility]    Future Appointments   Date Time Provider Department Center   10/28/2022  1:45 PM [facility]   1/23/2023  2:00 PM NE MA/LPN [facility]      "

## 2022-10-28 NOTE — LETTER
10/28/2022         RE: Rafaela Rock  [address] [place] [id number]        Dear Colleague,    Thank you for referring your patient, Rafaela Rock, to the [facility]. Please see a copy of my visit note below.    [facility]    Hematology/Oncology New Patient Note      Today's Date: 10/28/2022    Referring provider:Sarah Tillman MD  Reason for Consultation: MGUS    HISTORY OF PRESENT ILLNESS: Rafaela Rock is a 77 year old female who was referred to the Hematology/Oncology Clinic for MGUS.    Pt has medical history including hld, prediabetes, osteoarthritis, cataracts, eczema, left lower leg SCC s/p Mohs, hx of lyme disease.      Pt was incidentally found to have monoclonal gammopathy on 9/22 SPEP that was done for workup of pruritis.  Patient reports that she has been having full body pruritus for many years, it is not associated with hives.  Reports that it is intermittent and typically localized to 1 area of her body at a time.  It commonly occurs at night.  Patient has tried to remove all perfumes and scents, without improvement.  Patient has changed all of her detergents and soaps to hypoallergenic, which has also not helped. Patient has kept an eye on her food intake and allergen exposure to determine if she can find a cause, however there is no identifiable pattern.  Patient typically uses a topical steroid which helps.  Patient has noticed that she does not have pruritus when she travels.    Patient denies bone pain, weight loss, night sweats.  No family history of any hematological diseases.    -9/22 labs reviewed-   CBC N/A (last CBC from 3/2021 WNL)  Cr normal, Calcium normal w/normal albumin, no protein gap (total protein 7.2, albumin 4.1)  SPEP- 0.1 g/dL   SIFE-  monoclonal IgG lambda  UPEP- only trace albumin and trace globulins   UIFE- no monoclonal protein      REVIEW OF SYSTEMS:   A 14 point ROS was reviewed with pertinent  positives and negatives in the HPI.        HOME MEDICATIONS:  Current Outpatient Medications   Medication Sig Dispense Refill     doxycycline monohydrate (MONODOX) 100 MG capsule Take 1 capsule (100 mg) by mouth 2 times daily 14 capsule 0     Emollient (CERAVE) CREA Externally apply topically 3 times daily as needed 453 g 11     metroNIDAZOLE (METROGEL) 1 % external gel Apply topically once or twice daily daily to the face for rosacea. 60 g 2     triamcinolone (KENALOG) 0.1 % external ointment Apply topically 2 times daily 320 g 4         ALLERGIES:  No Known Allergies      PAST MEDICAL HISTORY:  Past Medical History:   Diagnosis Date     AK (actinic keratosis) 2/2/2016     Allergic rhinitis      Hyperlipidemia LDL goal < 130      Impaired fasting glucose      Lyme disease      Nonsenile cataract      OA (osteoarthritis)      Obesity      PONV (postoperative nausea and vomiting)      Rosacea      Vitamin D deficiency 10/12         PAST SURGICAL HISTORY:  Past Surgical History:   Procedure Laterality Date     CATARACT IOL, RT/LT  12/27/2013    [facility]- RE     CATARACT IOL, RT/LT  12/05/2013    LE     COLONOSCOPY       COLONOSCOPY  5-5-14    Return for Colonoscopy in 1 yrs     PHACOEMULSIFICATION CLEAR CORNEA WITH TORIC INTRAOCULAR LENS IMPLANT  12/6/2013    Procedure: PHACOEMULSIFICATION CLEAR CORNEA WITH TORIC INTRAOCULAR LENS IMPLANT;  LEFT PHACOEMULSIFICATION CLEAR CORNEA WITH TORIC INTRAOCULAR LENS IMPLANT  ;  Surgeon: Otoniel Schuster MD;  Location:  [facility]     PHACOEMULSIFICATION CLEAR CORNEA WITH TORIC INTRAOCULAR LENS IMPLANT  12/27/2013    Procedure: PHACOEMULSIFICATION CLEAR CORNEA WITH TORIC INTRAOCULAR LENS IMPLANT;  RIGHT PHACOEMULSIFICATION CLEAR CORNEA WITH TORIC INTRAOCULAR LENS IMPLANT  ;  Surgeon: Otoniel Schuster MD;  Location:  [facility]     REPAIR PTOSIS BILATERAL Bilateral 3/5/2021    Procedure: Bilateral Upper Eyelid Mechanical Ptosis Repair;  Surgeon: Cristopher Pepper MD;  Location:  OR  "    TONSILLECTOMY  Age 11     [facility] NONSPECIFIC PROCEDURE  2003    Left knee scope/OA,MMT     [facility] TOTAL KNEE ARTHROPLASTY Left 2008    Left     [facility] TOTAL KNEE ARTHROPLASTY Right 14    DML         SOCIAL HISTORY:  Social History     Socioeconomic History     Marital status:      Spouse name: Not on file     Number of children: 2s     Years of education: Not on file     Highest education level: Not on file   Occupational History     Employer: [employer]   Tobacco Use     Smoking status: Never     Smokeless tobacco: Never   Substance and Sexual Activity     Alcohol use: Yes     Comment: weekends     Drug use: No     Sexual activity: Never     Partners: Male   Other Topics Concern     Parent/sibling w/ CABG, MI or angioplasty before 65F 55M? No   Social History Narrative     Not on file     Social Determinants of Health     Financial Resource Strain: Not on file   Food Insecurity: Not on file   Transportation Needs: Not on file   Physical Activity: Not on file   Stress: Not on file   Social Connections: Not on file   Intimate Partner Violence: Not on file   Housing Stability: Not on file         FAMILY HISTORY:  Family History   Problem Relation Age of Onset     Cancer Father         lung smoker     Skin Cancer Son         skin cancer     Glaucoma No family hx of      Macular Degeneration No family hx of      Eye Surgery No family hx of      Retinal detachment No family hx of      Melanoma No family hx of          PHYSICAL EXAM:  Vital signs:   /87   BP Location Right arm   Pulse 69   Resp 16   Temp 98.5  F (36.9  C)   Temp src Oral   SpO2 98 %   Weight 87.7 kg (193 lb 6.4 oz)   Height 1.615 m (5' 3.58\")   Pain Score No Pain (0)         ECO  GENERAL/CONSTITUTIONAL: No acute distress.  EYES: Pupils are equal and round. Extraocular movements intact without nystagmus.  No scleral icterus.  RESPIRATORY: Equal chest rise.   MUSCULOSKELETAL: Warm and well-perfused, no cyanosis, clubbing, or " edema.   NEUROLOGIC: Cranial nerves are grossly intact. Alert, oriented to person, place and time, answers questions appropriately.  INTEGUMENTARY: No rashes or jaundice.  GAIT: Steady, does not use assistive device        LABS:  CBC RESULTS:   Recent Labs   Lab Test 03/02/21  1036   WBC 6.4   RBC 4.40   HGB 13.0   HCT 41.6   MCV 95   MCH 29.5   MCHC 31.3*   RDW 13.8          Recent Labs   Lab Test 09/30/22  1120 06/21/22  0940 03/02/21  1036     --  138   POTASSIUM 4.6  --  4.9   CHLORIDE 103  --  107   CO2 29  --  29   ANIONGAP 7  --  2*   GLC 93 90 89   BUN 19.8  --  20   CR 0.69  --  0.86   PALOMA 9.9  --  9.3     Component 4 wk ago    Immunofixation ELP Urine No monoclonal protein seen on immunofixation. Pathologic significance requires clinical correlation.  Rita Stephens M.D., Ph.D.   Resulting Agency SCORE              Specimen Collected: 09/30/22 11:23 AM Last Resulted: 10/03/22  1:30 PM            0 Result Notes     1 Patient Communication  Component 1 mo ago    ELP Interpretation Urine Only trace albumin and trace globulins. No obvious monoclonal protein seen. Pathological significance requires clinical correlation.  Paul Fong M.D., Ph.D., Pathologist   Resulting Agency SCORE              Specimen Collected: 09/20/22  2:48 PM Last Resulted: 09/21/22  1:29 PM              0 Result Notes     1 Patient Communication  Component 4 wk ago    Immunofixation ELP Monoclonal IgG immunoglobulin of lambda light chain type. Pathologic significance requires clinical correlation.  Rita Stephens M.D., Ph.D.   Resulting Agency SCORE              Specimen Collected: 09/30/22 11:20 AM Last Resulted: 10/04/22  2:35 PM           Component Ref Range & Units 1 mo ago      Albumin 3.7 - 5.1 g/dL 4.0     Alpha 1 0.2 - 0.4 g/dL 0.3     Alpha 2 0.5 - 0.9 g/dL 0.6     Beta Globulin 0.6 - 1.0 g/dL 1.0     Gamma Globulin 0.7 - 1.6 g/dL 1.0     Monoclonal Peak <=0.0 g/dL 0.1 High      ELP Interpretation  Small  "monoclonal protein (0.1 g/dL) seen in the gamma fraction, not previously characterized in our laboratory. Recommend serum and urine immunofixation for confirmation and further characterization if not previously performed elsewhere. Pathologic significance requires clinical correlation. Paul Fong M.D., Ph.D., Pathologist.   Resulting Agency  [facility]              Specimen Collected: 09/20/22  2:44 PM Last Resulted: 09/21/22  1:35 PM             PATHOLOGY:      IMAGING:      ASSESSMENT/PLAN:  Rafaela Rock is a 77 year old female with:    #monoclonal gammopathy  - 9/22 SPEP and SIFE: 0.1g/dL IgG lambda  - check 24-hour urine with protein to creatinine ratio, 24 hr urine UPEP and UIFE, kappa and light chain ratio, quantitative serum immunoglobulins  - check CBC  - await the above studies to determine if pt has low-risk MGUS and if bone marrow biopsy and imaging can be deferred per  NELIA Dia; How I manage monoclonal gammopathy of undetermined significance. Blood 2018; 131 (2): 163-173. doi: https://[email or web address]/[email or web address]/blood-2017-09-889997:  \"A bone marrow can be deferred in patients with small (<1.5 gm/dL) IgM MGUS, low-risk MGUS (IgG type, M- protein <1.5 gm/dL, normal free light-chain ratio), and small (involved/uninvolved serum-free light-chain ratio <8) light-chain MGUS in whom there are no clinical features concerning for myeloma or lymphoplasmacytic malignancy.\"     -in terms of prognosis: absolute risk of disease progression over 20 years in patients with no risk factors (low risk MGUS) - 5 percent or less, d/w pt     -NELIA Dia; How I manage monoclonal gammopathy of undetermined significance. Blood 2018; 131 (2): 163-173. doi: https://[email or web address]/[email or web address]/blood-[email or web address]:  \"Our recommendations for follow-up largely conform to the guidelines of the International Myeloma Working Group.15  We recommend that all patients with MGUS be reassessed in 6 months with " "complete blood count, SPEP, FLC, calcium, and creatinine to determine clinical stability and detect rapidly evolving LPM. Although this practice is sensible and recommended by all clinical practice guidelines,14-17  evidence for this approach was unavailable until recently. A large retrospective nationwide study performed in the [place] (N = [id number]) suggests that the risk of MGUS transformation is highest during the first year (2.1%) and gradually declines thereafter (1.6%, 1.2%, 1.0%, and 0.8%, for years 2-5, respectively).39  After initial follow-up, patients with low-risk MGUS need additional follow-up of MGUS only if symptoms concerning for LPMs develop.28  These patients have only a 2% risk of progression over a 20-year period. All other patients with MGUS should have an annual follow-up (Figure 2).\"      RTC 2-3 weeks for follow-up with me      Cheryl Sanchez DO  Hematology/Oncology  [facility]    Future Appointments   Date Time Provider Department Center   10/28/2022  1:45 PM Cheryl Sanchez DO [facility]   1/23/2023  2:00 PM NE MA/LPN [facility] NE          Again, thank you for allowing me to participate in the care of your patient.        Sincerely,        CHERYL SANCHEZ DO    "

## 2022-10-28 NOTE — NURSING NOTE
"Oncology Rooming Note    October 28, 2022 1:55 PM   Rafaela Rock is a 77 year old female who presents for:    Chief Complaint   Patient presents with     Oncology Clinic Visit     New patient- MGUS     Initial Vitals: BP (!) 145/87 (BP Location: Right arm)   Pulse 69   Temp 98.5  F (36.9  C) (Oral)   Resp 16   Ht 1.615 m (5' 3.58\")   Wt 87.7 kg (193 lb 6.4 oz)   SpO2 98%   BMI 33.63 kg/m   Estimated body mass index is 33.63 kg/m  as calculated from the following:    Height as of this encounter: 1.615 m (5' 3.58\").    Weight as of this encounter: 87.7 kg (193 lb 6.4 oz). Body surface area is 1.98 meters squared.  No Pain (0) Comment: Data Unavailable   No LMP recorded. Patient is postmenopausal.  Allergies reviewed: Yes  Medications reviewed: Yes    Medications: Medication refills not needed today.  Pharmacy name entered into [facility]:    [id number] - [place], [place] - [address] [id number] - [place], [place] - [address] AT [facility]    Clinical concerns: new patient- MGUS       Amanda Gomez LPN            "

## 2022-10-31 LAB
IGA SERPL-MCNC: 433 MG/DL (ref 84–499)
IGG SERPL-MCNC: 1008 MG/DL (ref 610–1616)
IGM SERPL-MCNC: 34 MG/DL (ref 35–242)
KAPPA LC FREE SER-MCNC: 2 MG/DL (ref 0.33–1.94)
KAPPA LC FREE/LAMBDA FREE SER NEPH: 1.23 {RATIO} (ref 0.26–1.65)
LAMBDA LC FREE SERPL-MCNC: 1.63 MG/DL (ref 0.57–2.63)

## 2022-11-01 ENCOUNTER — LAB (OUTPATIENT)
Dept: LAB | Facility: CLINIC | Age: 77
End: 2022-11-01
Payer: COMMERCIAL

## 2022-11-01 DIAGNOSIS — D47.2 MONOCLONAL GAMMOPATHY: Primary | ICD-10-CM

## 2022-11-01 LAB
ALBUMIN MFR UR ELPH: 6.6 MG/DL
COLLECT DURATION TIME UR: 24 H
PROT 24H UR-MRATE: 105.6 MG/SPECIMEN
SPECIMEN VOL UR: 1600 ML

## 2022-11-01 PROCEDURE — 81050 URINALYSIS VOLUME MEASURE: CPT

## 2022-11-01 PROCEDURE — 84166 PROTEIN E-PHORESIS/URINE/CSF: CPT

## 2022-11-01 PROCEDURE — 84156 ASSAY OF PROTEIN URINE: CPT

## 2022-11-01 PROCEDURE — 86335 IMMUNFIX E-PHORSIS/URINE/CSF: CPT

## 2022-11-02 LAB
ALBUMIN MFR UR ELPH: 0 %
ALPHA1 GLOB MFR UR ELPH: 0 %
ALPHA2 GLOB MFR UR ELPH: 0 %
B-GLOBULIN MFR UR ELPH: 0 %
GAMMA GLOB MFR UR ELPH: 0 %
M PROTEIN MFR UR ELPH: 0 %
PROT ELPH PNL UR ELPH: NORMAL
PROT PATTERN UR ELPH-IMP: NORMAL

## 2022-11-16 ENCOUNTER — ANCILLARY PROCEDURE (OUTPATIENT)
Dept: MAMMOGRAPHY | Facility: CLINIC | Age: 77
End: 2022-11-16
Attending: FAMILY MEDICINE
Payer: COMMERCIAL

## 2022-11-16 DIAGNOSIS — Z12.31 VISIT FOR SCREENING MAMMOGRAM: ICD-10-CM

## 2022-11-16 PROCEDURE — 77063 BREAST TOMOSYNTHESIS BI: CPT | Mod: TC | Performed by: RADIOLOGY

## 2022-11-16 PROCEDURE — 77067 SCR MAMMO BI INCL CAD: CPT | Mod: TC | Performed by: RADIOLOGY

## 2022-11-21 ENCOUNTER — TELEPHONE (OUTPATIENT)
Dept: ONCOLOGY | Facility: CLINIC | Age: 77
End: 2022-11-21

## 2022-11-21 NOTE — TELEPHONE ENCOUNTER
Left voicemail for patient to call us back to reschedule Dr. Vogel follow up     Carolina Borrero  Procedure   [phone]

## 2023-01-23 ENCOUNTER — ALLIED HEALTH/NURSE VISIT (OUTPATIENT)
Dept: FAMILY MEDICINE | Facility: CLINIC | Age: 78
End: 2023-01-23
Payer: COMMERCIAL

## 2023-01-23 DIAGNOSIS — Z23 NEED FOR VACCINATION: Primary | ICD-10-CM

## 2023-01-23 DIAGNOSIS — Z23 NEED FOR HEPATITIS VACCINATION: ICD-10-CM

## 2023-01-23 PROCEDURE — 90471 IMMUNIZATION ADMIN: CPT

## 2023-01-23 PROCEDURE — 99207 PR DROP WITH A PROCEDURE: CPT | Mod: 25

## 2023-01-23 PROCEDURE — 90636 HEP A/HEP B VACC ADULT IM: CPT

## 2023-02-10 ENCOUNTER — APPOINTMENT (OUTPATIENT)
Dept: LAB | Facility: CLINIC | Age: 78
End: 2023-02-10
Payer: COMMERCIAL

## 2023-02-14 ENCOUNTER — OFFICE VISIT (OUTPATIENT)
Dept: PEDIATRICS | Facility: CLINIC | Age: 78
End: 2023-02-14
Payer: COMMERCIAL

## 2023-02-14 ENCOUNTER — ANCILLARY PROCEDURE (OUTPATIENT)
Dept: ULTRASOUND IMAGING | Facility: CLINIC | Age: 78
End: 2023-02-14
Attending: INTERNAL MEDICINE
Payer: COMMERCIAL

## 2023-02-14 ENCOUNTER — OFFICE VISIT (OUTPATIENT)
Dept: URGENT CARE | Facility: URGENT CARE | Age: 78
End: 2023-02-14
Payer: COMMERCIAL

## 2023-02-14 VITALS
DIASTOLIC BLOOD PRESSURE: 83 MMHG | HEART RATE: 65 BPM | RESPIRATION RATE: 18 BRPM | OXYGEN SATURATION: 95 % | TEMPERATURE: 97.2 F | SYSTOLIC BLOOD PRESSURE: 153 MMHG

## 2023-02-14 VITALS
SYSTOLIC BLOOD PRESSURE: 149 MMHG | TEMPERATURE: 97.8 F | OXYGEN SATURATION: 96 % | HEART RATE: 68 BPM | WEIGHT: 196 LBS | DIASTOLIC BLOOD PRESSURE: 74 MMHG | BODY MASS INDEX: 34.09 KG/M2

## 2023-02-14 DIAGNOSIS — E66.811 CLASS 1 OBESITY WITHOUT SERIOUS COMORBIDITY WITH BODY MASS INDEX (BMI) OF 34.0 TO 34.9 IN ADULT, UNSPECIFIED OBESITY TYPE: ICD-10-CM

## 2023-02-14 DIAGNOSIS — R06.09 DOE (DYSPNEA ON EXERTION): ICD-10-CM

## 2023-02-14 DIAGNOSIS — R22.42 LOCALIZED SWELLING OF LEFT LOWER LEG: ICD-10-CM

## 2023-02-14 DIAGNOSIS — Z96.652 STATUS POST TOTAL LEFT KNEE REPLACEMENT: ICD-10-CM

## 2023-02-14 DIAGNOSIS — I82.412 ACUTE DEEP VEIN THROMBOSIS (DVT) OF FEMORAL VEIN OF LEFT LOWER EXTREMITY (H): Primary | ICD-10-CM

## 2023-02-14 DIAGNOSIS — M79.89 SWELLING OF LEFT LOWER EXTREMITY: Primary | ICD-10-CM

## 2023-02-14 LAB — RADIOLOGIST FLAGS: ABNORMAL

## 2023-02-14 PROCEDURE — 99207 REFERRAL TO ACUTE AND DIAGNOSTIC SERVICES: CPT | Performed by: PHYSICIAN ASSISTANT

## 2023-02-14 PROCEDURE — 99417 PROLNG OP E/M EACH 15 MIN: CPT | Performed by: INTERNAL MEDICINE

## 2023-02-14 PROCEDURE — 93971 EXTREMITY STUDY: CPT | Mod: LT | Performed by: RADIOLOGY

## 2023-02-14 PROCEDURE — 99215 OFFICE O/P EST HI 40 MIN: CPT | Performed by: INTERNAL MEDICINE

## 2023-02-14 ASSESSMENT — ENCOUNTER SYMPTOMS
DIZZINESS: 0
PALPITATIONS: 0
FEVER: 0
DIAPHORESIS: 0
APPETITE CHANGE: 0
HEADACHES: 0
CHILLS: 0
SLEEP DISTURBANCE: 0
ACTIVITY CHANGE: 0
WOUND: 0
COLOR CHANGE: 0
NAUSEA: 0

## 2023-02-14 NOTE — PATIENT INSTRUCTIONS
ultrasound of leg - positive  for blood clot       There is not time to complete workup needed due to time of day that is needed for possible blood clot to lungs.      You are being referred to ER for your trouble breathing with evaluation for possible blood clot to lungs.  You will need blood tests and need to be started on blood thinners.    Your trip to [place] will need to be cancelled tomorrow      Narrative & Impression                                                                    RESIDENT PRELIMINARY INTERPRETATION  IMPRESSION:     1. Occlusive deep venous thrombosis extending from the distal left  femoral vein through the popliteal vein. Additional note of occlusive  thrombus in the left peroneal vein.  2. Nonocclusive deep venous thrombosis in the left posterior tibial  vein.  3. Subcutaneous edema of the lower extremity.     [Urgent Result: DVT]     Finding was identified on 2/14/2023 4:51 PM.      Dr. Jameson was contacted by Dr. Spicer at 2/14/2023 4:56 PM and  verbalized understanding of the urgent finding.    This result has not been signed. Information might be incomplete.

## 2023-02-14 NOTE — Clinical Note
Jason Russo has a extensive left DVT.  She also had some dyspnea on exertion.  At the time of day when I had results for positive DVT, I needed to send her on to the ER to complete a work-up due to timing.  I was hoping your office could reach out to her for recheck in 1 week.  Thank you.  Harriet

## 2023-02-14 NOTE — PROGRESS NOTES
Chief Complaint   Patient presents with     Leg Swelling     Swollen left leg for couple of weeks.          ASSESSMENT:    ICD-10-CM    1. Swelling of left lower extremity  M79.89           Due to severity of swelling, previous DVT workup, and pending flight travel, patient needs to be further evaluated for DVT.      PLAN: Consulted and referred to [facility] for an ultrasound of patient's left lower extremity to assess for DVT.  Patient was additionally assessed by Alyse BARROS.     I independently interviewed and examined the patient. I reviewed and agree with above.     Renee Maldonado PA-C        SUBJECTIVE:  Rafaela Rock is an 77 year old female who presents with left lower extremity swelling x2 weeks.  Onset of pain in popliteal region extending down to the posterior ankle.  The pain has subsided but the swelling remains.  Patient is prone to bilateral lower extremity swelling, but it has never been this severe or persistent.  The patient had a DVT work-up completed in September 2022.  She was flying back from [place], and forgot to put on her compression stockings.  During that work-up no DVT was found, and the  swelling slowly subsided.  During that episode in September 2022, patient also noticed a change in her baseline breathing.  She is more prone to becoming short of breath.  No acute change to her breathing reported with this current episode of lower extremity swelling.  The patient presented to care today over concern of a DVT and the fact that she is going on a vacation tomorrow that includes multiple flights.  Denies long drives, flights, periods of inactivity, recent surgery.  Endorses tingling in the toes on her left foot.  Patient is not a smoker.  She has been elevating her lower extremities for approximately 20 minutes today, which seems to help her symptoms.    Past Medical History:   Diagnosis Date     AK (actinic keratosis) 2/2/2016     Allergic rhinitis       Hyperlipidemia LDL goal < 130      Impaired fasting glucose      Lyme disease      Nonsenile cataract      OA (osteoarthritis)      Obesity      PONV (postoperative nausea and vomiting)      Rosacea      Vitamin D deficiency 10/12     History   Smoking Status     Never   Smokeless Tobacco     Never       ROS:  GEN no fevers, nausea, or vomitting  SKIN no erythema  Musculoskeletal:  See HPI.  Resp: as above.  Cardiology: No chest pain or palpitations.  Neuro: No headaches.    OBJECTIVE:  BP (!) 149/74 (BP Location: Left arm, Patient Position: Sitting, Cuff Size: Adult Large)   Pulse 68   Temp 97.8  F (36.6  C) (Tympanic)   Wt 88.9 kg (196 lb)   SpO2 96%   BMI 34.09 kg/m      Patient is alert and NAD.  EYES: conjunctiva clear  Leg Exam (left):  Inspection: significant swelling on left lower extremity from knee down.  Palpation: Patient reports tenderness on anterior tibialis.Taut.  Skin: dry, warm, and taught. Not erythematous.  Cap refill: unable to assess due to presence of nail polish.  Good doralis pedis.  Neurovascularly Intact Distally.         Rachel Maldonado PA-C

## 2023-02-14 NOTE — PROGRESS NOTES
"  Assessment & Plan   Problem List Items Addressed This Visit     S/P total knee arthroplasty - left   Other Visit Diagnoses     Acute deep vein thrombosis (DVT) of femoral vein of left lower extremity (H)    -  Primary    Localized swelling of left lower leg        Relevant Orders    US Lower Extremity Venous Duplex Left (Completed)    Class 1 obesity without serious comorbidity with body mass index (BMI) of 34.0 to 34.9 in adult, unspecified obesity type        Relevant Orders    US Lower Extremity Venous Duplex Left (Completed)    BELL (dyspnea on exertion)                 Assessment plan 77-year-old female with past medical history significant for obesity, history of bilateral knee arthroplasties, who has baseline lower extremity edema with notable left lower extremity edema greater than right for the past 2 weeks.  Of note she did have right  lower leg ultrasound followed by bilateral ultrasounds of lower extremities and ABIs that were normal last September 2022.    Ultrasound of left lower extremity ordered to rule out DVT.    Differential with swelling related to osteoarthritis, cellulitis, deep vein thrombosis    Patient also complains change in breathing capacity with exercise for the past few months.  Today's Ultrasound positive for extensive left occlusive DVT    As it is at the end of day for acute diagnostic center , Rafaela was referred to [facility] ER as not enough time to complete further work-up which would be CT chest pulmonary protocol, CBC, CMP with liver function test PT and PTT.  With initiation of anticoagulation.  Called and discussed with [place] ER physician.  She was driving directly to hospital from here.      She understands she will need to cancel her vacation.      Review of external notes as documented elsewhere in note       BMI:   Estimated body mass index is 34.09 kg/m  as calculated from the following:    Height as of 10/28/22: 1.615 m (5' 3.58\").    Weight as of " "an earlier encounter on 2/14/23: 88.9 kg (196 lb).       FUTURE APPOINTMENTS:       - Follow-up visit in 1 weeks    Patient Instructions       ultrasound of leg - positive  for blood clot       There is not time to complete workup needed due to time of day that is needed for possible blood clot to lungs.      You are being referred to ER for your trouble breathing with evaluation for possible blood clot to lungs.  You will need blood tests and need to be started on blood thinners.    Your trip to [place] will need to be cancelled tomorrow      Narrative & Impression                                                                    RESIDENT PRELIMINARY INTERPRETATION  IMPRESSION:     1. Occlusive deep venous thrombosis extending from the distal left  femoral vein through the popliteal vein. Additional note of occlusive  thrombus in the left peroneal vein.  2. Nonocclusive deep venous thrombosis in the left posterior tibial  vein.  3. Subcutaneous edema of the lower extremity.     [Urgent Result: DVT]     Finding was identified on 2/14/2023 4:51 PM.      Dr. Jameson was contacted by Dr. Spicer at 2/14/2023 4:56 PM and  verbalized understanding of the urgent finding.    This result has not been signed. Information might be incomplete.       Return in about 2 weeks (around 2/28/2023).    [facility] PROVIDER  [facility]    >90 minutes spent on the date of the encounter doing chart review, history and exam, documentation and further activities per the note    Subjective   Rafaela is a 77 year old, presenting for the following health issues:  Musculoskeletal Problem      HPI     Pain History:  When did you first notice your pain? - Acute Pain   Have you seen anyone else for your pain? Yes - urgent care  Where in your body do you have pain? Musculoskeletal problem/pain  Onset/Duration: 2 weeks ago  Description  Location: leg - left  Joint Swelling: YES  Redness: YES  Pain: YES- \"skin feels " "tight\"  Warmth: No  Intensity:  0/10, tender to the touch  Progression of Symptoms:  same  Accompanying signs and symptoms:   Fevers: No  Numbness/tingling/weakness: YES- tingling down in the toes on left leg  History  Trauma to the area: YES- scratch from neighbors dog in October 2022  Recent illness:  No  Previous similar problem: YES- hx of swelling in right leg  Previous evaluation:  YES- on the right leg, did and MRI and tests, states did not have a blood clot in right leg  Precipitating or alleviating factors:  Aggravating factors include: none  Therapies tried and outcome: rest/inactivity and elevating, stretching  BELL since 13 hour flight in September 2022, and would rate SHORTNESS OF BREATH as mild, does not notice SHORTNESS OF BREATH when doing house hold activities but notices it more when going to the gym and with exercise.    Ann Marie Olea RN, BSN  [facility]          Per Provider :    Acute diagnostic service referral from [facility] urgent care provider Renee Maldonado.  77-year-old female who complains of left lower leg swelling for 2 weeks.  Left leg swelling is greater than right leg and worse over her baseline.  She has had a negative work-up for DVT in the past.  She is traveling tomorrow and would like ultrasound to rule out blood clot.    Pain started back of knee then went down to ankle    Further chart review:  Work-up for blood clot after long overseas flight September 2022 9/21/2022 ultrasound of right lower extremity was negative for DVT.  September 30, 2022 she had bilateral ultrasound negative for DVT.  She also had normal ABHI studies.    Review of chart past medical history significant for bilateral knee arthroplasties with history of osteoarthritis  Also significant for obesity          Current Outpatient Medications   Medication Sig Dispense Refill     Emollient (CERAVE) CREA Externally apply topically 3 times daily as needed 453 g 11 "     metroNIDAZOLE (METROGEL) 1 % external gel Apply topically once or twice daily daily to the face for rosacea. 60 g 2     triamcinolone (KENALOG) 0.1 % external ointment Apply topically 2 times daily 320 g 4       Review of Systems   Constitutional: Negative for activity change, appetite change, chills, diaphoresis and fever.   HENT: Negative.    Eyes: Negative for visual disturbance.   Respiratory: Shortness of breath: More shortness of breath at gym, needs to slow down when walking with younger friends for few months.    Cardiovascular: Negative for chest pain and palpitations.   Gastrointestinal: Negative for nausea.   Skin: Negative for color change, rash and wound.        In legs.  Slightly red both sides   Neurological: Negative for dizziness, syncope and headaches.   Psychiatric/Behavioral: Negative for sleep disturbance.        Past Medical History:   Diagnosis Date     AK (actinic keratosis) 2/2/2016     Allergic rhinitis      Hyperlipidemia LDL goal < 130      Impaired fasting glucose      Lyme disease      Nonsenile cataract      OA (osteoarthritis)      Obesity      PONV (postoperative nausea and vomiting)      Rosacea      Vitamin D deficiency 10/12             Objective    BP (!) 153/83 (BP Location: Left arm, Patient Position: Sitting, Cuff Size: Adult Regular)   Pulse 65   Temp 97.2  F (36.2  C) (Oral)   Resp 18   SpO2 95%   There is no height or weight on file to calculate BMI.  Physical Exam  Vitals reviewed.   Constitutional:       Appearance: Normal appearance. She is not ill-appearing or toxic-appearing.   Cardiovascular:      Rate and Rhythm: Normal rate and regular rhythm.      Pulses: Normal pulses.      Heart sounds: Normal heart sounds. No murmur heard.  Pulmonary:      Effort: Pulmonary effort is normal.      Breath sounds: Normal breath sounds.   Skin:     Comments: Lower legs  left leg 1 + lower edema > right  leg with slight pitting.    Brown discoloration with slight redness  bilateral but greater on left   No warmth   Neurological:      Mental Status: She is alert.                    RESIDENT PRELIMINARY INTERPRETATION  IMPRESSION:     1. Occlusive deep venous thrombosis extending from the distal left  femoral vein through the popliteal vein. Additional note of occlusive  thrombus in the left peroneal vein.  2. Nonocclusive deep venous thrombosis in the left posterior tibial  vein.  3. Subcutaneous edema of the lower extremity.     [Urgent Result: DVT]     Finding was identified on 2/14/2023 4:51 PM.      Dr. Jameson was contacted by Dr. Spicer at 2/14/2023 4:56 PM and  verbalized understanding of the urgent finding.    This result has not been signed. Information might be incomplete.

## 2023-02-15 ENCOUNTER — TELEPHONE (OUTPATIENT)
Dept: FAMILY MEDICINE | Facility: CLINIC | Age: 78
End: 2023-02-15
Payer: COMMERCIAL

## 2023-02-15 ENCOUNTER — NURSE TRIAGE (OUTPATIENT)
Dept: NURSING | Facility: CLINIC | Age: 78
End: 2023-02-15
Payer: COMMERCIAL

## 2023-02-15 DIAGNOSIS — I82.4Y9 DEEP VEIN THROMBOSIS (DVT) OF PROXIMAL LOWER EXTREMITY, UNSPECIFIED CHRONICITY, UNSPECIFIED LATERALITY (H): Primary | ICD-10-CM

## 2023-02-15 NOTE — TELEPHONE ENCOUNTER
Patient calling in stating that she had been in ED 2/14/23 with blood clot in leg, discharged home on Eliquis starter pack but she has called to multiple [facility] pharmacies, all stating they no longer dispense the starter packs and they must be two separate prescriptions.    Patient asking if PCP can sign for new prescription for separate 10 mg and 5 mg as noted in below order, rather than starter pack since it is not carried. Patient has ED follow-up scheduled for 2/20/23 with PCP.    Ordered Prescriptions  - documented in this encounter  Reconcile with Patient's Chart  Ordered Prescriptions  Prescription Sig Dispensed Refills Start Date End Date   apixaban (ELIQUIS) 5 mg oral tablet   Take 1 tablet (5 mg) by mouth twice a day. 60 tablet   0 02/14/2023     apixaban (ELIQUIS) 5 mg (74 tabs) oral tablet starter pack   Take 10 mg (two tablets) by mouth twice daily for 7 days, then 5 mg (one tablet) by mouth twice daily. 74 tablet   0 02/14/2023         Routing to PCP to please advise. Order/pharmacy cued if appropriate    CB#: [phone], dvm CORDELIA Garza RN  [facility]

## 2023-02-16 ENCOUNTER — TELEPHONE (OUTPATIENT)
Dept: OTHER | Facility: CLINIC | Age: 78
End: 2023-02-16
Payer: COMMERCIAL

## 2023-02-16 ENCOUNTER — TELEPHONE (OUTPATIENT)
Dept: FAMILY MEDICINE | Facility: CLINIC | Age: 78
End: 2023-02-16
Payer: COMMERCIAL

## 2023-02-16 DIAGNOSIS — I82.402 ACUTE DEEP VEIN THROMBOSIS (DVT) OF LEFT LOWER EXTREMITY, UNSPECIFIED VEIN (H): Primary | ICD-10-CM

## 2023-02-16 NOTE — TELEPHONE ENCOUNTER
Referral received via inbox outside messages from [facility].      Pt referred to [facility] by Lamont Lockhart MD, [facility] ED provider for Acute deep vein thrombosis (DVT) of femoral vein of left lower extremity (HCC) - Primary.     Pt is on Eliquis. D-dimer 2/14/23 in [facility]. Med recs in [facility].     Pt needs to be scheduled for new pt in clinic consult with vascular medicine.  Will route to scheduling to coordinate an appointment at next available.     SHANICE DillN, RN  [facility]

## 2023-02-16 NOTE — TELEPHONE ENCOUNTER
Patient called back.  She stated that she misspoke and said that she got the US done at [place] but she was actually sent to [facility] for this.  She stated that she was advise to have a repeat US done after the 21st (1 week after starting eliquis)  She would like to ask covering provider to place the order so that she can get this scheduled at our [facility]    Hannah Osman RN  [facility]

## 2023-02-16 NOTE — TELEPHONE ENCOUNTER
Patient was notified by FNA. See nurse triage encounter from 2/15/2023    Hannah Osman RN  [facility]

## 2023-02-16 NOTE — TELEPHONE ENCOUNTER
Dr. Shah,     Patient calling again asking for an doppler venous ultrasound of the left lower extremity left side.     Patient was in ED at [facility] for DVT. Was told to do follow up and have another US done after the 21st as this will moshe her one week use of eliquis.     Patient was also confused about having to see vascular medicine, per ED notes they stated she does not.     Please advise. OK to wait for PCP    Thanks,  DENIS De La Paz  [facility]

## 2023-02-16 NOTE — TELEPHONE ENCOUNTER
Seen in ED last night. Found blood clot in Left Leg last night. Eliquis 10 mg 2 tabs given last night. Rx sent to [facility] for starter pack. [facility] does not have such a thing. Patient called PCP office today to get different Rx sent off and one of PCP's partners did send to [facility] in [place]. Patient was never informed. This writer informed her and will go now to [facility].     HALIE JETT RN  [facility] nurse advisors  2/15/2023  6:49 PM    Reason for Disposition    Health Information question, no triage required and triager able to answer question    Additional Information    Negative: RN needs further essential information from caller in order to complete triage    Negative: Requesting regular office appointment    Negative: [1] Caller requesting NON-URGENT health information AND [2] PCP's office is the best resource    Protocols used: INFORMATION ONLY CALL - NO TRIAGE-A-

## 2023-02-16 NOTE — TELEPHONE ENCOUNTER
"Pt calling. Was in [facility] ER on 2/14 and was diagnosed with a blood clot. Pt states per discharge instructions she is to follow up with PCP in 1 week, schedule with general vascular surgery and have an US to see if blood clot is \"shrinking\". Pt does not have order for US. Pt is scheduled to see Dr. Shah for follow up on 2/20. Pt is aware that Dr. Shah is out of office returning on 2/20. Does provider want to order US to be done prior to appt on 2/20, or wait until appt on 2/20 to discuss? Pt states she is curious to know if the medication she is taking is helping with the blood clot. Please advise.    Nereyda Mckinnon RN  [facility]    "

## 2023-02-16 NOTE — TELEPHONE ENCOUNTER
"Patient did not want to schedule - Pt was under the impression that she would be going to [place] and stated that \"this is just getting out of wack\". Pt will call her referring doctor to discuss.   "

## 2023-02-17 NOTE — TELEPHONE ENCOUNTER
Writer called and relayed note to patient, provided imaging scheduling number. Patient asked if she should wait to be seen until after ultrasound completed, as she will have follow-up appointment on 2/20/23 with PCP and ultrasound 2/21/23. Writer advised patient to keep appointment to discuss and follow-up on 2/20/23 and Dr. Josue Shah will be able to look over results of ultrasound and further advise from there after her visit. Patient stated understanding.    CORDELIA Dennison RN  [facility]

## 2023-02-20 ENCOUNTER — OFFICE VISIT (OUTPATIENT)
Dept: FAMILY MEDICINE | Facility: CLINIC | Age: 78
End: 2023-02-20
Payer: COMMERCIAL

## 2023-02-20 VITALS
DIASTOLIC BLOOD PRESSURE: 77 MMHG | WEIGHT: 194 LBS | OXYGEN SATURATION: 96 % | HEIGHT: 64 IN | SYSTOLIC BLOOD PRESSURE: 124 MMHG | HEART RATE: 74 BPM | TEMPERATURE: 98.2 F | BODY MASS INDEX: 33.12 KG/M2

## 2023-02-20 DIAGNOSIS — I82.412 ACUTE DEEP VEIN THROMBOSIS (DVT) OF FEMORAL VEIN OF LEFT LOWER EXTREMITY (H): Primary | ICD-10-CM

## 2023-02-20 PROCEDURE — 99214 OFFICE O/P EST MOD 30 MIN: CPT | Performed by: FAMILY MEDICINE

## 2023-02-20 ASSESSMENT — PAIN SCALES - GENERAL: PAINLEVEL: NO PAIN (0)

## 2023-02-20 NOTE — PROGRESS NOTES
"  Assessment & Plan       ICD-10-CM    1. Acute deep vein thrombosis (DVT) of femoral vein of left lower extremity (H)  I82.412 apixaban ANTICOAGULANT (ELIQUIS) 5 MG tablet        We discussed her DVT in some detail  Given her prolonged immobilization and flight back from [place] in September, I am suspicious that this DVT was \"provoked\" from that plane flight and slowly progressed from there  She does have a repeat ultrasound scheduled in a few days from which one of her providers from last week advised her to do, but I advised her to stay on the Eliquis for a 3-month treatment course and we will proceed with the 5 mg twice daily dosing starting tomorrow for the remainder of the treatment  I advised her to wear compression stockings as much as possible and to gradually resume normal activities  Plan a tentative recheck in early to mid May as she completes the 3-month course of treatment      30 minutes spent on the date of the encounter doing chart review, history and exam, documentation and further activities per the note     MED REC REQUIRED  Post Medication Reconciliation Status: discharge medications reconciled, continue medications without change      Return in about 3 months (around 5/12/2023) for Follow-up on the DVT.    Josue Shah MD  [facility] RYAN Russo is a 77 year old, presenting for the following health issues:  ER F/U      History of Present Illness       Reason for visit:  Blood clot check up  Symptom onset:  3-7 days ago  Symptoms include:  Ultra sounds  Symptom intensity:  Moderate  Symptom progression:  Staying the same    She eats 0-1 servings of fruits and vegetables daily.She consumes 0 sweetened beverage(s) daily.She exercises with enough effort to increase her heart rate 60 or more minutes per day.  She exercises with enough effort to increase her heart rate 5 days per week.   She is taking medications regularly.       ED/ Followup:    Facility:  [facility]  Date of visit: 02/14/23  Reason for visit: leg pain  Current Status: Left DVT      She was seen last Tuesday, February 14 initially in the urgent care setting and then ultimately in the ER setting for left leg swelling which proved to be a DVT.  See urgent care and ER notes in chart for further details.  They elected not to do a chest CT scan to look for PE.  They did start her on Eliquis 10 mg twice daily for 1 week and then 5 mg twice daily thereafter.  This is her last day of the 10 mg twice daily dosing.  Her leg swelling has gone down in the last week.  She has been wearing compression stockings.    She did have a long trip to [facility] early last fall and on the return trip she apparently never left her seat for the 12-hour flight.  She slept most of the time.  Ironically, she had had some right leg swelling after the trip and was checked for a DVT of the right leg which was negative.  She has no prior known personal or family history of blood clots.    She has not been on a routine oral prescription medications in the past.  She does see dermatology for history of nonmelanoma skin cancer and is on some topical meds from them.      Patient Active Problem List   Diagnosis     Hyperlipidemia with target LDL less than 130     OA (osteoarthritis)     Rosacea     POSTERIOR VITREOUS DETACHMENT, os     Advanced directives, counseling/discussion     Cataract     S/P total knee arthroplasty - left     Vitamin D deficiency     AK (actinic keratosis)     Xerosis of skin     Intertrigo     Inflamed seborrheic keratosis     Status post total left knee replacement     Rash     Diffuse photodamage of skin     Trochanteric bursitis of left hip     Keratoconjunctivitis of both eyes     Myogenic ptosis of left eyelid     Dermatochalasis of both upper eyelids     History of colonic polyps     Current Outpatient Medications   Medication     apixaban ANTICOAGULANT (ELIQUIS) 5 MG tablet     Emollient (CERAVE)  "CREA     metroNIDAZOLE (METROGEL) 1 % external gel     triamcinolone (KENALOG) 0.1 % external ointment     Current Facility-Administered Medications   Medication     lidocaine 1% with EPINEPHrine 1:100,000 injection 3 mL     lidocaine 1% with EPINEPHrine 1:100,000 injection 3 mL         Review of Systems   Noncontributory except as above.      Objective    /77 (BP Location: Left arm, Patient Position: Sitting, Cuff Size: Adult Regular)   Pulse 74   Temp 98.2  F (36.8  C) (Oral)   Ht 1.613 m (5' 3.5\")   Wt 88 kg (194 lb)   SpO2 96%   BMI 33.83 kg/m    Body mass index is 33.83 kg/m .  Physical Exam   GENERAL: alert, no distress and over weight  EXT: Trace to 1+ nonpitting edema of the left lower extremity.  Minimal discomfort to palpation over the left upper calf.    Recent Results (from the past 744 hour(s))   US Lower Extremity Venous Duplex Left   Result Value    Radiologist flags DVT (Urgent)    Narrative    EXAMINATION: DOPPLER VENOUS ULTRASOUND OF THE LEFT LOWER EXTREMITY,  2/14/2023 4:47 PM     COMPARISON: Left lower extremity venous ultrasound 9/21/2022.    HISTORY: Left lower extremity swelling    TECHNIQUE:  Gray-scale evaluation with compression, spectral flow and  color Doppler assessment of the deep venous system of the left lower  extremity from groin to knee, and ankle. Similar evaluation of the  left common femoral vein obtained for comparison.     FINDINGS:  Left: the common femoral vein demonstrates normal compressibility and  blood flow. Occlusive thrombosis involving the distal femoral vein  through the popliteal vein. Additional occlusive thrombosis involving  the left peroneal vein. Partial compressibility of the left posterior  tibial vein.    Right: the common femoral vein demonstrates normal compressibility and  blood flow.    Subcutaneous edema of the lower extremity along the left calf.      Impression    IMPRESSION:    1. Occlusive deep venous thrombosis extending from the " distal left  femoral vein through the popliteal vein. Additional note of occlusive  thrombus in the left peroneal vein.  2. Nonocclusive deep venous thrombosis in the left posterior tibial  vein.  3. Subcutaneous edema of the lower extremity.    [Urgent Result: DVT]    Finding was identified on 2/14/2023 4:51 PM.     Dr. Jameson was contacted by Dr. Spicer at 2/14/2023 4:56 PM and  verbalized understanding of the urgent finding.     I have personally reviewed the examination and initial interpretation  and I agree with the findings.    SARAH QUINTERO MD         SYSTEM ID:  [id number]

## 2023-03-08 ENCOUNTER — OFFICE VISIT (OUTPATIENT)
Dept: OPHTHALMOLOGY | Facility: CLINIC | Age: 78
End: 2023-03-08
Payer: COMMERCIAL

## 2023-03-08 DIAGNOSIS — Z98.890 HISTORY OF PTOSIS REPAIR: ICD-10-CM

## 2023-03-08 DIAGNOSIS — Z96.1 PSEUDOPHAKIA OF BOTH EYES: ICD-10-CM

## 2023-03-08 DIAGNOSIS — L71.8 OCULAR ROSACEA: Primary | ICD-10-CM

## 2023-03-08 DIAGNOSIS — Z86.69 HISTORY OF EPISCLERITIS: ICD-10-CM

## 2023-03-08 DIAGNOSIS — H18.523 ANTERIOR BASEMENT MEMBRANE DYSTROPHY (ABMD) OF BOTH EYES: ICD-10-CM

## 2023-03-08 DIAGNOSIS — H52.223 REGULAR ASTIGMATISM OF BOTH EYES: ICD-10-CM

## 2023-03-08 PROCEDURE — 92014 COMPRE OPH EXAM EST PT 1/>: CPT | Performed by: OPTOMETRIST

## 2023-03-08 ASSESSMENT — CONF VISUAL FIELD
OD_SUPERIOR_NASAL_RESTRICTION: 0
OD_INFERIOR_TEMPORAL_RESTRICTION: 0
OD_INFERIOR_NASAL_RESTRICTION: 0
OS_INFERIOR_TEMPORAL_RESTRICTION: 0
OS_NORMAL: 1
OS_SUPERIOR_NASAL_RESTRICTION: 0
METHOD: COUNTING FINGERS
OS_SUPERIOR_TEMPORAL_RESTRICTION: 0
OD_NORMAL: 1
OD_SUPERIOR_TEMPORAL_RESTRICTION: 0
OS_INFERIOR_NASAL_RESTRICTION: 0

## 2023-03-08 ASSESSMENT — REFRACTION_WEARINGRX
OD_SPHERE: -1.00
OD_CYLINDER: +1.25
OS_SPHERE: -1.00
OD_AXIS: 156
OS_ADD: +2.75
OD_ADD: +2.75
OS_AXIS: 015
OS_CYLINDER: +1.25

## 2023-03-08 ASSESSMENT — CUP TO DISC RATIO
OS_RATIO: 0.4
OD_RATIO: 0.4

## 2023-03-08 ASSESSMENT — TONOMETRY
IOP_METHOD: ICARE
OD_IOP_MMHG: 09
OS_IOP_MMHG: 09

## 2023-03-08 ASSESSMENT — VISUAL ACUITY
OD_CC: 20/20
CORRECTION_TYPE: GLASSES
OS_CC: 20/20
METHOD_MR: DECLINED MRX
METHOD: SNELLEN - LINEAR

## 2023-03-08 ASSESSMENT — SLIT LAMP EXAM - LIDS
COMMENTS: 1+ MGD, LID TELANGIECTASIA
COMMENTS: 1+ MGD, LID TELANGIECTASIA

## 2023-03-08 ASSESSMENT — EXTERNAL EXAM - RIGHT EYE: OD_EXAM: NORMAL

## 2023-03-08 ASSESSMENT — EXTERNAL EXAM - LEFT EYE: OS_EXAM: NORMAL

## 2023-03-08 NOTE — PROGRESS NOTES
History  HPI     Annual Eye Exam    In both eyes.  Associated symptoms include Negative for eye pain, redness, flashes and floaters.  Treatments tried include artificial tears.  Pain was noted as 0/10.           Comments    Patient present for annual eye exam. Patient states some eye fatigue in the evenings.   WELLINGTON Robison March 8, 2023 1:19 PM           Last edited by Yee Wu on 3/8/2023  1:19 PM.          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 Continue use of artificial tears as needed. Monitor annually.    (Z96.1) Pseudophakia of both eyes  Comment: s/p YAG both eyes, stable  Plan:  Monitor annually.    (Z86.69) History of episcleritis  Comment: No recent recurrences  Plan:  Monitor annually, or sooner if symptoms recur.    (H52.223) Regular astigmatism of both eyes  Comment: Declined refraction, happy with progressive lenses  Plan:  Recommended reading glasses at night, given eye strain symptoms. Monitor annually.    (Z98.890) History of ptosis repair  Comment: s/p blepharoplasty and ptosis repair with Dr. Pepper, happy with results  Plan:    No treatment indicated at this time. Monitor annually.    Return to clinic in 1 year for comprehensive eye exam.    Complete documentation of historical and exam elements from today's encounter can  be found in the full encounter summary report (not reduplicated in this progress  note). I personally obtained the chief complaint(s) and history of present illness. I  confirmed and edited as necessary the review of systems, past medical/surgical  history, family history, social history, and examination findings as documented by  others; and I examined the patient myself. I personally reviewed the relevant tests,  images, and reports as documented above. I  formulated and edited as necessary the  assessment and plan and discussed the findings and management plan with the  patient and family.    Blue Rojas OD, FAAO

## 2023-03-08 NOTE — NURSING NOTE
Chief Complaints and History of Present Illnesses   Patient presents with     Annual Eye Exam     Chief Complaint(s) and History of Present Illness(es)     Annual Eye Exam            Laterality: both eyes    Associated symptoms: Negative for eye pain, redness, flashes and floaters    Treatments tried: artificial tears    Pain scale: 0/10          Comments    Patient present for annual eye exam. Patient states some eye fatigue in the evenings.   WELLINTGON Robison March 8, 2023 1:19 PM

## 2023-04-03 ENCOUNTER — APPOINTMENT (OUTPATIENT)
Dept: LAB | Facility: CLINIC | Age: 78
End: 2023-04-03
Payer: COMMERCIAL

## 2023-04-03 ENCOUNTER — DOCUMENTATION ONLY (OUTPATIENT)
Dept: LAB | Facility: CLINIC | Age: 78
End: 2023-04-03

## 2023-04-03 NOTE — PROGRESS NOTES
This patient has a future lab only appointment on 04/05/2024 and needs orders. The orders on board now are expected on 05/08/24. The lab can release orders only up to 2 weeks prior to the expected date. Please send new orders or change the expected date on these orders. Thanks [facility].

## 2023-04-04 DIAGNOSIS — D47.2 MGUS (MONOCLONAL GAMMOPATHY OF UNKNOWN SIGNIFICANCE): Primary | ICD-10-CM

## 2023-04-05 ENCOUNTER — LAB (OUTPATIENT)
Dept: LAB | Facility: CLINIC | Age: 78
End: 2023-04-05
Payer: COMMERCIAL

## 2023-04-05 DIAGNOSIS — D47.2 MGUS (MONOCLONAL GAMMOPATHY OF UNKNOWN SIGNIFICANCE): ICD-10-CM

## 2023-04-05 LAB
ALBUMIN SERPL BCG-MCNC: 4.2 G/DL (ref 3.5–5.2)
ALP SERPL-CCNC: 102 U/L (ref 35–104)
ALT SERPL W P-5'-P-CCNC: 16 U/L (ref 10–35)
ANION GAP SERPL CALCULATED.3IONS-SCNC: 12 MMOL/L (ref 7–15)
AST SERPL W P-5'-P-CCNC: 24 U/L (ref 10–35)
BASOPHILS # BLD AUTO: 0 10E3/UL (ref 0–0.2)
BASOPHILS NFR BLD AUTO: 0 %
BILIRUB SERPL-MCNC: 0.9 MG/DL
BUN SERPL-MCNC: 17.1 MG/DL (ref 8–23)
CALCIUM SERPL-MCNC: 9.6 MG/DL (ref 8.8–10.2)
CHLORIDE SERPL-SCNC: 107 MMOL/L (ref 98–107)
CREAT SERPL-MCNC: 0.74 MG/DL (ref 0.51–0.95)
DEPRECATED HCO3 PLAS-SCNC: 23 MMOL/L (ref 22–29)
EOSINOPHIL # BLD AUTO: 0.2 10E3/UL (ref 0–0.7)
EOSINOPHIL NFR BLD AUTO: 2 %
ERYTHROCYTE [DISTWIDTH] IN BLOOD BY AUTOMATED COUNT: 14.1 % (ref 10–15)
GFR SERPL CREATININE-BSD FRML MDRD: 82 ML/MIN/1.73M2
GLUCOSE SERPL-MCNC: 91 MG/DL (ref 70–99)
HCT VFR BLD AUTO: 42 % (ref 35–47)
HGB BLD-MCNC: 13.5 G/DL (ref 11.7–15.7)
LYMPHOCYTES # BLD AUTO: 2.3 10E3/UL (ref 0.8–5.3)
LYMPHOCYTES NFR BLD AUTO: 34 %
MCH RBC QN AUTO: 29.8 PG (ref 26.5–33)
MCHC RBC AUTO-ENTMCNC: 32.1 G/DL (ref 31.5–36.5)
MCV RBC AUTO: 93 FL (ref 78–100)
MONOCYTES # BLD AUTO: 0.5 10E3/UL (ref 0–1.3)
MONOCYTES NFR BLD AUTO: 7 %
NEUTROPHILS # BLD AUTO: 4 10E3/UL (ref 1.6–8.3)
NEUTROPHILS NFR BLD AUTO: 57 %
PLATELET # BLD AUTO: 214 10E3/UL (ref 150–450)
POTASSIUM SERPL-SCNC: 4.3 MMOL/L (ref 3.4–5.3)
PROT SERPL-MCNC: 7 G/DL (ref 6.4–8.3)
RBC # BLD AUTO: 4.53 10E6/UL (ref 3.8–5.2)
SODIUM SERPL-SCNC: 142 MMOL/L (ref 136–145)
TOTAL PROTEIN SERUM FOR ELP: 6.8 G/DL (ref 6.4–8.3)
WBC # BLD AUTO: 7 10E3/UL (ref 4–11)

## 2023-04-05 PROCEDURE — 85025 COMPLETE CBC W/AUTO DIFF WBC: CPT

## 2023-04-05 PROCEDURE — 83521 IG LIGHT CHAINS FREE EACH: CPT

## 2023-04-05 PROCEDURE — 84165 PROTEIN E-PHORESIS SERUM: CPT

## 2023-04-05 PROCEDURE — 80053 COMPREHEN METABOLIC PANEL: CPT

## 2023-04-05 PROCEDURE — 82784 ASSAY IGA/IGD/IGG/IGM EACH: CPT

## 2023-04-05 PROCEDURE — 36415 COLL VENOUS BLD VENIPUNCTURE: CPT

## 2023-04-05 PROCEDURE — 86334 IMMUNOFIX E-PHORESIS SERUM: CPT

## 2023-04-05 PROCEDURE — 84155 ASSAY OF PROTEIN SERUM: CPT | Mod: 59

## 2023-04-06 LAB
ALBUMIN SERPL ELPH-MCNC: 4 G/DL (ref 3.7–5.1)
ALPHA1 GLOB SERPL ELPH-MCNC: 0.3 G/DL (ref 0.2–0.4)
ALPHA2 GLOB SERPL ELPH-MCNC: 0.7 G/DL (ref 0.5–0.9)
B-GLOBULIN SERPL ELPH-MCNC: 0.9 G/DL (ref 0.6–1)
GAMMA GLOB SERPL ELPH-MCNC: 0.9 G/DL (ref 0.7–1.6)
IGA SERPL-MCNC: 375 MG/DL (ref 84–499)
IGG SERPL-MCNC: 900 MG/DL (ref 610–1616)
IGM SERPL-MCNC: 31 MG/DL (ref 35–242)
KAPPA LC FREE SER-MCNC: 2 MG/DL (ref 0.33–1.94)
KAPPA LC FREE/LAMBDA FREE SER NEPH: 1.18 {RATIO} (ref 0.26–1.65)
LAMBDA LC FREE SERPL-MCNC: 1.7 MG/DL (ref 0.57–2.63)
M PROTEIN SERPL ELPH-MCNC: 0.1 G/DL
PROT PATTERN SERPL ELPH-IMP: ABNORMAL
PROT PATTERN SERPL IFE-IMP: NORMAL

## 2023-04-18 ENCOUNTER — MYC MEDICAL ADVICE (OUTPATIENT)
Dept: FAMILY MEDICINE | Facility: CLINIC | Age: 78
End: 2023-04-18
Payer: COMMERCIAL

## 2023-04-19 NOTE — PROGRESS NOTES
Virtual Visit Details    Video Start Time: 2:46PM    Type of service:  Video Visit    Video End Time: 2:59PM    Originating Location (pt. Location): Home    Distant Location (provider location): On site at [facility]    Platform used for Video Visit: Quin      [facility]    Hematology/Oncology Established Patient Follow-up Note    Today's Date: 4/20/2023    Reason for Follow-up: IgG MGUS        HISTORY OF PRESENT ILLNESS: Rafaela Rock is a 78 year old female who presents for follow up regarding IgG MGUS, low risk.    Pt has medical history including hld, prediabetes, osteoarthritis, cataracts, eczema, left lower leg SCC s/p Mohs, hx of lyme disease.       Pt was incidentally found to have monoclonal gammopathy on 9/22 SPEP that was done for workup of pruritis.  Patient reports that she has been having full body pruritus for many years, it is not associated with hives.  Reports that it is intermittent and typically localized to 1 area of her body at a time.  It commonly occurs at night.  Patient has tried to remove all perfumes and scents, without improvement.  Patient has changed all of her detergents and soaps to hypoallergenic, which has also not helped. Patient has kept an eye on her food intake and allergen exposure to determine if she can find a cause, however there is no identifiable pattern.  Patient typically uses a topical steroid which helps.  Patient has noticed that she does not have pruritus when she travels. No family history of any hematological diseases.     -9/22 labs reviewed-   CBC N/A (last CBC from 3/2021 WNL)  Cr normal, Calcium normal w/normal albumin, no protein gap (total protein 7.2, albumin 4.1)  SPEP- 0.1 g/dL   SIFE-  monoclonal IgG lambda  UPEP- only trace albumin and trace globulins   UIFE- no monoclonal protein     - 10/22 labs:  CBC WNL  24 hr urine UPEP trace albumin and few trace globulin bands, no obvious monoclonal protein seen   24 hr  urine UIFE no monoclonal protein seen  24 hr urine volume= 1600mL, 24 hr total protein 105 mg/specimen,   Kappa= 2.00, lambda=1.63, kappa/lambda ratio=1.23 (normal)   IgG 1008,  IgA 433,  IgM 34 (low normal)    INTERIM HISTORY:  -  prolonged immobilization and flight back from [place] (traveled for multiple months to [facility], [place], [place]) and was immobilized for 12 hours in flight and did not get up to use bathroom or anything at all, did not wear compression socks, right lower extremity edema and pain and palpable lump in right lower leg   - 22 right lower extremity Doppler negative for DVT, only left common femoral vein evaluated in left lower extremity for symmetry and also negative for DVT  - 2022 bilateral lower extremity Doppler negative for DVT  -  bilateral breast screening mammogram: No evidence of malignancy, BI-RADS 1  -  left lower extremity Doppler for left lower extremity swelling and lump in left lower le.  Occlusive DVT extending from distal left femoral vein through popliteal vein, occlusive thrombus in left peroneal vein  2.  Nonocclusive DVT in left posterior tibial vein  -  started on Eliquis with plan for 3-month course, tolerating well, no bleeding    Pt feels great, she reports no B sx, bone pain, new or concerning sx.       REVIEW OF SYSTEMS:   A 14 point ROS was reviewed with pertinent positives and negatives in the HPI.       HOME MEDICATIONS:  Current Outpatient Medications   Medication Sig Dispense Refill     apixaban ANTICOAGULANT (ELIQUIS) 5 MG tablet Take 1 tablet (5 mg) by mouth 2 times daily 60 tablet 1     Emollient (CERAVE) CREA Externally apply topically 3 times daily as needed 453 g 11     metroNIDAZOLE (METROGEL) 1 % external gel Apply topically once or twice daily daily to the face for rosacea. 60 g 2     triamcinolone (KENALOG) 0.1 % external ointment Apply topically 2 times daily 320 g 4         ALLERGIES:  No Known Allergies      PAST  MEDICAL HISTORY:  Past Medical History:   Diagnosis Date     AK (actinic keratosis) 2/2/2016     Allergic rhinitis      Hyperlipidemia LDL goal < 130      Impaired fasting glucose      Lyme disease      Nonsenile cataract      OA (osteoarthritis)      Obesity      PONV (postoperative nausea and vomiting)      Rosacea      Vitamin D deficiency 10/12         PAST SURGICAL HISTORY:  Past Surgical History:   Procedure Laterality Date     CATARACT IOL, RT/LT  12/27/2013    Toric- RE     CATARACT IOL, RT/LT  12/05/2013    LE     COLONOSCOPY       COLONOSCOPY  5-5-14    Return for Colonoscopy in 1 yrs     PHACOEMULSIFICATION CLEAR CORNEA WITH TORIC INTRAOCULAR LENS IMPLANT  12/6/2013    Procedure: PHACOEMULSIFICATION CLEAR CORNEA WITH TORIC INTRAOCULAR LENS IMPLANT;  LEFT PHACOEMULSIFICATION CLEAR CORNEA WITH TORIC INTRAOCULAR LENS IMPLANT  ;  Surgeon: Otoniel Schuster MD;  Location:  [facility]     PHACOEMULSIFICATION CLEAR CORNEA WITH TORIC INTRAOCULAR LENS IMPLANT  12/27/2013    Procedure: PHACOEMULSIFICATION CLEAR CORNEA WITH TORIC INTRAOCULAR LENS IMPLANT;  RIGHT PHACOEMULSIFICATION CLEAR CORNEA WITH TORIC INTRAOCULAR LENS IMPLANT  ;  Surgeon: Otoniel Schuster MD;  Location:  [facility]     REPAIR PTOSIS BILATERAL Bilateral 3/5/2021    Procedure: Bilateral Upper Eyelid Mechanical Ptosis Repair;  Surgeon: Cristopher Pepper MD;  Location:  OR     TONSILLECTOMY  Age 11     Z NONSPECIFIC PROCEDURE  9/19/2003    Left knee scope/OA,MMT     ZZC TOTAL KNEE ARTHROPLASTY Left 4/2/2008    Left     ZZC TOTAL KNEE ARTHROPLASTY Right 8/17/16    DML         SOCIAL HISTORY:  Social History     Socioeconomic History     Marital status:      Spouse name: Not on file     Number of children: 2s     Years of education: Not on file     Highest education level: Not on file   Occupational History     Employer: [employer]   Tobacco Use     Smoking status: Never     Smokeless tobacco: Never   Vaping Use     Vaping status: Not on  file   Substance and Sexual Activity     Alcohol use: Yes     Comment: weekends     Drug use: No     Sexual activity: Never     Partners: Male   Other Topics Concern     Parent/sibling w/ CABG, MI or angioplasty before 65F 55M? No   Social History Narrative     Not on file     Social Determinants of Health     Financial Resource Strain: Not on file   Food Insecurity: Not on file   Transportation Needs: Not on file   Physical Activity: Not on file   Stress: Not on file   Social Connections: Not on file   Intimate Partner Violence: Not on file   Housing Stability: Not on file         FAMILY HISTORY:  Family History   Problem Relation Age of Onset     Cancer Father         lung smoker     Skin Cancer Son         skin cancer     Glaucoma No family hx of      Macular Degeneration No family hx of      Eye Surgery No family hx of      Retinal detachment No family hx of      Melanoma No family hx of          PHYSICAL EXAM:  Vital signs:  There were no vitals taken for this visit.   Virtual visit      LABS:   Latest Reference Range & Units 04/05/23 11:35   Sodium 136 - 145 mmol/L 142   Potassium 3.4 - 5.3 mmol/L 4.3   Chloride 98 - 107 mmol/L 107   Carbon Dioxide (CO2) 22 - 29 mmol/L 23   Urea Nitrogen 8.0 - 23.0 mg/dL 17.1   Creatinine 0.51 - 0.95 mg/dL 0.74   GFR Estimate >60 mL/min/1.73m2 82   Calcium 8.8 - 10.2 mg/dL 9.6   Anion Gap 7 - 15 mmol/L 12   Albumin 3.5 - 5.2 g/dL 4.2   Protein Total 6.4 - 8.3 g/dL 7.0   Alkaline Phosphatase 35 - 104 U/L 102   ALT 10 - 35 U/L 16   AST 10 - 35 U/L 24   Bilirubin Total <=1.2 mg/dL 0.9   Glucose 70 - 99 mg/dL 91   WBC 4.0 - 11.0 10e3/uL 7.0   Hemoglobin 11.7 - 15.7 g/dL 13.5   Hematocrit 35.0 - 47.0 % 42.0   Platelet Count 150 - 450 10e3/uL 214   RBC Count 3.80 - 5.20 10e6/uL 4.53   MCV 78 - 100 fL 93   MCH 26.5 - 33.0 pg 29.8   MCHC 31.5 - 36.5 g/dL 32.1   RDW 10.0 - 15.0 % 14.1   % Neutrophils % 57   % Lymphocytes % 34   % Monocytes % 7   % Eosinophils % 2   % Basophils % 0    Absolute Basophils 0.0 - 0.2 10e3/uL 0.0   Absolute Eosinophils 0.0 - 0.7 10e3/uL 0.2   Absolute Lymphocytes 0.8 - 5.3 10e3/uL 2.3   Absolute Monocytes 0.0 - 1.3 10e3/uL 0.5   Absolute Neutrophils 1.6 - 8.3 10e3/uL 4.0   Albumin Fraction 3.7 - 5.1 g/dL 4.0   Alpha 1 Fraction 0.2 - 0.4 g/dL 0.3   Alpha 2 Fraction 0.5 - 0.9 g/dL 0.7   Beta Fraction 0.6 - 1.0 g/dL 0.9   ELP Interpretation:  Very small monoclonal protein (about 0.1 g/dL or less) seen in the gamma fraction. See immunofixation report on same specimen. Pathologic significance requires clinical correlation. HERLINDA Reich M.D., Ph.D., Pathologist ().   Gamma Fraction 0.7 - 1.6 g/dL 0.9   IGA 84 - 499 mg/dL 375    - 1,616 mg/dL 900   IGM 35 - 242 mg/dL 31 (L)   Immunofixation ELP  Very small monoclonal IgG immunoglobulin of lambda light chain type.  Pathologic significance requires clinical correlation.  HERLINDA Reich M.D., Ph.D., Pathologist ()   Kappa Free Lt Chain 0.33 - 1.94 mg/dL 2.00 (H)   Kappa Lambda Ratio 0.26 - 1.65  1.18   Lambda Free Lt Chain 0.57 - 2.63 mg/dL 1.70   Monoclonal Peak <=0.0 g/dL 0.1 (H)   Total Protein Serum for ELP 6.4 - 8.3 g/dL 6.8   (L): Data is abnormally low  (H): Data is abnormally high    PATHOLOGY:      IMAGING:      ASSESSMENT/PLAN:  Rafaela Rock is a 77 year old female with:    #low risk IgG lambda MGUS  - 9/22 SPEP and SIFE: 0.1g/dL IgG lambda  - 10/22 labs:  24 hr urine UPEP trace albumin and few trace globulin bands, no obvious monoclonal protein seen   24 hr urine UIFE no monoclonal protein seen  24 hr urine volume= 1600mL, 24 hr total protein 105 mg/specimen,   Kappa= 2.00, lambda=1.63, kappa/lambda ratio=1.23 (normal)   IgG 1008,  IgA 433,  IgM 34 (low normal)  -4/23 labs:  CBC normal, CMP normal  SIFE: Very small monoclonal IgG lambda  SPEP: 0.1 g/dL very small monoclonal protein  Kappa/lambda ratio: Kappa= 2.0, lambda= 1.7, kappa/lambda ratio= 1.18 (normal)  Quantitative  immunoglobulins: Ig   IgA: 375    IgM: 31    PLAN:  -  protein studies stable  - pt has low-risk IgG lambda MGUS (IgG type, M- protein <1.5 gm/dL, normal free light-chain ratio), bone marrow biopsy and imaging can be deferred  (see my prior notes for details)   -in terms of prognosis: absolute risk of disease progression over 20 years in patients with no risk factors (low risk MGUS) is 5 percent or less, d/w pt  - Follow-up CBC, CMP protein studies every 6 months       #Left lower extremity DVT  - Evaluated by PCP, thought to be a provoked incident related to prior prolonged immobilization and flight back from [place] in , of note patient did have bilateral lower extremity Doppler  that was negative for DVT  -Cancer screenin/22 bilateral breast screening mammogram KATARINA, 10/2018 colonoscopy with one 4 mm sessile polyp removed from transverse colon (pathology not available for my review) with plan to follow-up in 5 years which would be due when 10/2023  -  occlusive DVT from left distal femoral vein to popliteal vein, left peroneal vein, nonocclusive DVT left posterior tibial vein  -  started Eliquis with plan for 3-month anticoagulation with PCP    RTC 6 months for follow-up with me and labs 1 week prior to visit    Saskia Vogel DO  Hematology/Oncology  [facility]    Future Appointments   Date Time Provider Department Center   2022  1:00 PM [id number]   2022  3:30 PM Saskia Vogel DO [facility]   2023  2:00 PM NE MA/LPN [facility]

## 2023-04-19 NOTE — TELEPHONE ENCOUNTER
Patient has DVT follow up appointment scheduled on 5/15/23    Last US 2/14/23 dx with occlusive DVT; taking 5mg eliquis BID    Patient wondering if she needs an US prior to appointment on 5/15/23    Alma Delia Maier RN  [facility]

## 2023-04-20 ENCOUNTER — VIRTUAL VISIT (OUTPATIENT)
Dept: ONCOLOGY | Facility: CLINIC | Age: 78
End: 2023-04-20
Payer: COMMERCIAL

## 2023-04-20 DIAGNOSIS — D47.2 MGUS (MONOCLONAL GAMMOPATHY OF UNKNOWN SIGNIFICANCE): Primary | ICD-10-CM

## 2023-04-20 PROCEDURE — 99213 OFFICE O/P EST LOW 20 MIN: CPT | Mod: VID | Performed by: INTERNAL MEDICINE

## 2023-04-20 NOTE — LETTER
4/20/2023         RE: Rafaela Rock  [address] [place] [id number]        Dear Colleague,    Thank you for referring your patient, Rafaela Rock, to the [facility]. Please see a copy of my visit note below.    Virtual Visit Details    Video Start Time: 2:46PM    Type of service:  Video Visit    Video End Time: 2:59PM    Originating Location (pt. Location): Home    Distant Location (provider location): On site at [facility]    Platform used for Video Visit: [employer]      [facility]    Hematology/Oncology Established Patient Follow-up Note    Today's Date: 4/20/2023    Reason for Follow-up: IgG MGUS        HISTORY OF PRESENT ILLNESS: Rafaela Rock is a 78 year old female who presents for follow up regarding IgG MGUS, low risk.    Pt has medical history including hld, prediabetes, osteoarthritis, cataracts, eczema, left lower leg SCC s/p Mohs, hx of lyme disease.       Pt was incidentally found to have monoclonal gammopathy on 9/22 SPEP that was done for workup of pruritis.  Patient reports that she has been having full body pruritus for many years, it is not associated with hives.  Reports that it is intermittent and typically localized to 1 area of her body at a time.  It commonly occurs at night.  Patient has tried to remove all perfumes and scents, without improvement.  Patient has changed all of her detergents and soaps to hypoallergenic, which has also not helped. Patient has kept an eye on her food intake and allergen exposure to determine if she can find a cause, however there is no identifiable pattern.  Patient typically uses a topical steroid which helps.  Patient has noticed that she does not have pruritus when she travels. No family history of any hematological diseases.     -9/22 labs reviewed-   CBC N/A (last CBC from 3/2021 WNL)  Cr normal, Calcium normal w/normal albumin, no protein gap (total protein 7.2, albumin  4.1)  SPEP- 0.1 g/dL   SIFE-  monoclonal IgG lambda  UPEP- only trace albumin and trace globulins   UIFE- no monoclonal protein     - 10/22 labs:  CBC WNL  24 hr urine UPEP trace albumin and few trace globulin bands, no obvious monoclonal protein seen   24 hr urine UIFE no monoclonal protein seen  24 hr urine volume= 1600mL, 24 hr total protein 105 mg/specimen,   Kappa= 2.00, lambda=1.63, kappa/lambda ratio=1.23 (normal)   IgG 1008,  IgA 433,  IgM 34 (low normal)    INTERIM HISTORY:  -  prolonged immobilization and flight back from [place] (traveled for multiple months to [facility] [place]) and was immobilized for 12 hours in flight and did not get up to use bathroom or anything at all, did not wear compression socks, right lower extremity edema and pain and palpable lump in right lower leg   - 22 right lower extremity Doppler negative for DVT, only left common femoral vein evaluated in left lower extremity for symmetry and also negative for DVT  - 2022 bilateral lower extremity Doppler negative for DVT  -  bilateral breast screening mammogram: No evidence of malignancy, BI-RADS 1  -  left lower extremity Doppler for left lower extremity swelling and lump in left lower le.  Occlusive DVT extending from distal left femoral vein through popliteal vein, occlusive thrombus in left peroneal vein  2.  Nonocclusive DVT in left posterior tibial vein  -  started on Eliquis with plan for 3-month course, tolerating well, no bleeding    Pt feels great, she reports no B sx, bone pain, new or concerning sx.       REVIEW OF SYSTEMS:   A 14 point ROS was reviewed with pertinent positives and negatives in the HPI.       HOME MEDICATIONS:  Current Outpatient Medications   Medication Sig Dispense Refill     apixaban ANTICOAGULANT (ELIQUIS) 5 MG tablet Take 1 tablet (5 mg) by mouth 2 times daily 60 tablet 1     Emollient (CERAVE) CREA Externally apply topically 3 times daily as needed 453 g 11      metroNIDAZOLE (METROGEL) 1 % external gel Apply topically once or twice daily daily to the face for rosacea. 60 g 2     triamcinolone (KENALOG) 0.1 % external ointment Apply topically 2 times daily 320 g 4         ALLERGIES:  No Known Allergies      PAST MEDICAL HISTORY:  Past Medical History:   Diagnosis Date     AK (actinic keratosis) 2/2/2016     Allergic rhinitis      Hyperlipidemia LDL goal < 130      Impaired fasting glucose      Lyme disease      Nonsenile cataract      OA (osteoarthritis)      Obesity      PONV (postoperative nausea and vomiting)      Rosacea      Vitamin D deficiency 10/12         PAST SURGICAL HISTORY:  Past Surgical History:   Procedure Laterality Date     CATARACT IOL, RT/LT  12/27/2013    Toric- RE     CATARACT IOL, RT/LT  12/05/2013    LE     COLONOSCOPY       COLONOSCOPY  5-5-14    Return for Colonoscopy in 1 yrs     PHACOEMULSIFICATION CLEAR CORNEA WITH TORIC INTRAOCULAR LENS IMPLANT  12/6/2013    Procedure: PHACOEMULSIFICATION CLEAR CORNEA WITH TORIC INTRAOCULAR LENS IMPLANT;  LEFT PHACOEMULSIFICATION CLEAR CORNEA WITH TORIC INTRAOCULAR LENS IMPLANT  ;  Surgeon: Otoniel Schuster MD;  Location:  [facility]     PHACOEMULSIFICATION CLEAR CORNEA WITH TORIC INTRAOCULAR LENS IMPLANT  12/27/2013    Procedure: PHACOEMULSIFICATION CLEAR CORNEA WITH TORIC INTRAOCULAR LENS IMPLANT;  RIGHT PHACOEMULSIFICATION CLEAR CORNEA WITH TORIC INTRAOCULAR LENS IMPLANT  ;  Surgeon: Otoniel Schuster MD;  Location:  [facility]     REPAIR PTOSIS BILATERAL Bilateral 3/5/2021    Procedure: Bilateral Upper Eyelid Mechanical Ptosis Repair;  Surgeon: Cristopher Pepper MD;  Location:  OR     TONSILLECTOMY  Age 11     [facility] NONSPECIFIC PROCEDURE  9/19/2003    Left knee scope/OA,MMT     [facility] TOTAL KNEE ARTHROPLASTY Left 4/2/2008    Left     [facility] TOTAL KNEE ARTHROPLASTY Right 8/17/16    DML         SOCIAL HISTORY:  Social History     Socioeconomic History     Marital status:      Spouse name: Not on file      Number of children: 2s     Years of education: Not on file     Highest education level: Not on file   Occupational History     Employer: [employer]   Tobacco Use     Smoking status: Never     Smokeless tobacco: Never   Vaping Use     Vaping status: Not on file   Substance and Sexual Activity     Alcohol use: Yes     Comment: weekends     Drug use: No     Sexual activity: Never     Partners: Male   Other Topics Concern     Parent/sibling w/ CABG, MI or angioplasty before 65F 55M? No   Social History Narrative     Not on file     Social Determinants of Health     Financial Resource Strain: Not on file   Food Insecurity: Not on file   Transportation Needs: Not on file   Physical Activity: Not on file   Stress: Not on file   Social Connections: Not on file   Intimate Partner Violence: Not on file   Housing Stability: Not on file         FAMILY HISTORY:  Family History   Problem Relation Age of Onset     Cancer Father         lung smoker     Skin Cancer Son         skin cancer     Glaucoma No family hx of      Macular Degeneration No family hx of      Eye Surgery No family hx of      Retinal detachment No family hx of      Melanoma No family hx of          PHYSICAL EXAM:  Vital signs:  There were no vitals taken for this visit.   Virtual visit      LABS:   Latest Reference Range & Units 04/05/23 11:35   Sodium 136 - 145 mmol/L 142   Potassium 3.4 - 5.3 mmol/L 4.3   Chloride 98 - 107 mmol/L 107   Carbon Dioxide (CO2) 22 - 29 mmol/L 23   Urea Nitrogen 8.0 - 23.0 mg/dL 17.1   Creatinine 0.51 - 0.95 mg/dL 0.74   GFR Estimate >60 mL/min/1.73m2 82   Calcium 8.8 - 10.2 mg/dL 9.6   Anion Gap 7 - 15 mmol/L 12   Albumin 3.5 - 5.2 g/dL 4.2   Protein Total 6.4 - 8.3 g/dL 7.0   Alkaline Phosphatase 35 - 104 U/L 102   ALT 10 - 35 U/L 16   AST 10 - 35 U/L 24   Bilirubin Total <=1.2 mg/dL 0.9   Glucose 70 - 99 mg/dL 91   WBC 4.0 - 11.0 10e3/uL 7.0   Hemoglobin 11.7 - 15.7 g/dL 13.5   Hematocrit 35.0 - 47.0 % 42.0   Platelet Count 150 -  450 10e3/uL 214   RBC Count 3.80 - 5.20 10e6/uL 4.53   MCV 78 - 100 fL 93   MCH 26.5 - 33.0 pg 29.8   MCHC 31.5 - 36.5 g/dL 32.1   RDW 10.0 - 15.0 % 14.1   % Neutrophils % 57   % Lymphocytes % 34   % Monocytes % 7   % Eosinophils % 2   % Basophils % 0   Absolute Basophils 0.0 - 0.2 10e3/uL 0.0   Absolute Eosinophils 0.0 - 0.7 10e3/uL 0.2   Absolute Lymphocytes 0.8 - 5.3 10e3/uL 2.3   Absolute Monocytes 0.0 - 1.3 10e3/uL 0.5   Absolute Neutrophils 1.6 - 8.3 10e3/uL 4.0   Albumin Fraction 3.7 - 5.1 g/dL 4.0   Alpha 1 Fraction 0.2 - 0.4 g/dL 0.3   Alpha 2 Fraction 0.5 - 0.9 g/dL 0.7   Beta Fraction 0.6 - 1.0 g/dL 0.9   ELP Interpretation:  Very small monoclonal protein (about 0.1 g/dL or less) seen in the gamma fraction. See immunofixation report on same specimen. Pathologic significance requires clinical correlation. HERLINDA Reich M.D., Ph.D., Pathologist ().   Gamma Fraction 0.7 - 1.6 g/dL 0.9   IGA 84 - 499 mg/dL 375    - 1,616 mg/dL 900   IGM 35 - 242 mg/dL 31 (L)   Immunofixation ELP  Very small monoclonal IgG immunoglobulin of lambda light chain type.  Pathologic significance requires clinical correlation.  HERLINDA Reich M.D., Ph.D., Pathologist ()   Kappa Free Lt Chain 0.33 - 1.94 mg/dL 2.00 (H)   Kappa Lambda Ratio 0.26 - 1.65  1.18   Lambda Free Lt Chain 0.57 - 2.63 mg/dL 1.70   Monoclonal Peak <=0.0 g/dL 0.1 (H)   Total Protein Serum for ELP 6.4 - 8.3 g/dL 6.8   (L): Data is abnormally low  (H): Data is abnormally high    PATHOLOGY:      IMAGING:      ASSESSMENT/PLAN:  Rafaela Rock is a 77 year old female with:    #low risk IgG lambda MGUS  - 9/22 SPEP and SIFE: 0.1g/dL IgG lambda  - 10/22 labs:  24 hr urine UPEP trace albumin and few trace globulin bands, no obvious monoclonal protein seen   24 hr urine UIFE no monoclonal protein seen  24 hr urine volume= 1600mL, 24 hr total protein 105 mg/specimen,   Kappa= 2.00, lambda=1.63, kappa/lambda ratio=1.23 (normal)   IgG 1008,   IgA 433,  IgM 34 (low normal)  - labs:  CBC normal, CMP normal  SIFE: Very small monoclonal IgG lambda  SPEP: 0.1 g/dL very small monoclonal protein  Kappa/lambda ratio: Kappa= 2.0, lambda= 1.7, kappa/lambda ratio= 1.18 (normal)  Quantitative immunoglobulins: Ig   IgA: 375    IgM: 31    PLAN:  -  protein studies stable  - pt has low-risk IgG lambda MGUS (IgG type, M- protein <1.5 gm/dL, normal free light-chain ratio), bone marrow biopsy and imaging can be deferred  (see my prior notes for details)   -in terms of prognosis: absolute risk of disease progression over 20 years in patients with no risk factors (low risk MGUS) is 5 percent or less, d/w pt  - Follow-up CBC, CMP protein studies every 6 months       #Left lower extremity DVT  - Evaluated by PCP, thought to be a provoked incident related to prior prolonged immobilization and flight back from [place] in , of note patient did have bilateral lower extremity Doppler  that was negative for DVT  -Cancer screenin/22 bilateral breast screening mammogram KATARINA, 10/2018 colonoscopy with one 4 mm sessile polyp removed from transverse colon (pathology not available for my review) with plan to follow-up in 5 years which would be due when 10/2023  -  occlusive DVT from left distal femoral vein to popliteal vein, left peroneal vein, nonocclusive DVT left posterior tibial vein  -  started Eliquis with plan for 3-month anticoagulation with PCP    RTC 6 months for follow-up with me and labs 1 week prior to visit    Cheryl Sanchez DO  Hematology/Oncology  [facility]    Future Appointments   Date Time Provider Department Center   2022  1:00 PM [id number]   2022  3:30 PM Cheryl Sanchez DO [facility]   2023  2:00 PM NE MA/LPN [facility]          Again, thank you for allowing me to participate in the care of your patient.        Sincerely,        CHERYL SANCHEZ DO     no

## 2023-04-20 NOTE — NURSING NOTE
Is the patient currently in the state of [place]? YES    Visit mode:VIDEO    If the visit is dropped, the patient can be reconnected by: VIDEO VISIT: Text to cell phone: [phone]    Will anyone else be joining the visit? NO      How would you like to obtain your AVS? MyChart    Are changes needed to the allergy or medication list? NO    Reason for visit: Video Visit (Follow up )

## 2023-05-01 ENCOUNTER — MYC MEDICAL ADVICE (OUTPATIENT)
Dept: DERMATOLOGY | Facility: CLINIC | Age: 78
End: 2023-05-01
Payer: COMMERCIAL

## 2023-05-01 DIAGNOSIS — I82.412 ACUTE DEEP VEIN THROMBOSIS (DVT) OF FEMORAL VEIN OF LEFT LOWER EXTREMITY (H): ICD-10-CM

## 2023-05-01 NOTE — TELEPHONE ENCOUNTER
Patient is scheduled for the 15th. Will  Be out of medication before appointment. Requesting refill.     Lianne Maciel -    [facility]     Z Plasty Text: The lesion was extirpated to the level of the fat with a #15 scalpel blade.  Given the location of the defect, shape of the defect and the proximity to free margins a Z-plasty was deemed most appropriate for repair.  Using a sterile surgical marker, the appropriate transposition arms of the Z-plasty were drawn incorporating the defect and placing the expected incisions within the relaxed skin tension lines where possible.    The area thus outlined was incised deep to adipose tissue with a #15 scalpel blade.  The skin margins were undermined to an appropriate distance in all directions utilizing iris scissors.  The opposing transposition arms were then transposed into place in opposite direction and anchored with interrupted buried subcutaneous sutures.

## 2023-05-02 NOTE — TELEPHONE ENCOUNTER
After reviewing photo, called patient and scheduled a sooner appointment.     Appointment is for lesion of concern and patient will have their full body sin check in July with Dr. Tillman.    Raquel PICKENS CMA    Principal Discharge DX:	Drug rash Principal Discharge DX:	Kerion, occipital scalp

## 2023-05-04 ENCOUNTER — OFFICE VISIT (OUTPATIENT)
Dept: DERMATOLOGY | Facility: CLINIC | Age: 78
End: 2023-05-04
Payer: COMMERCIAL

## 2023-05-04 DIAGNOSIS — D49.2 NEOPLASM OF UNSPECIFIED BEHAVIOR OF BONE, SOFT TISSUE, AND SKIN: Primary | ICD-10-CM

## 2023-05-04 DIAGNOSIS — I82.412 ACUTE DEEP VEIN THROMBOSIS (DVT) OF FEMORAL VEIN OF LEFT LOWER EXTREMITY (H): ICD-10-CM

## 2023-05-04 PROCEDURE — 11102 TANGNTL BX SKIN SINGLE LES: CPT | Mod: GC | Performed by: DERMATOLOGY

## 2023-05-04 PROCEDURE — 88305 TISSUE EXAM BY PATHOLOGIST: CPT | Mod: TC | Performed by: DERMATOLOGY

## 2023-05-04 PROCEDURE — 88305 TISSUE EXAM BY PATHOLOGIST: CPT | Mod: 26 | Performed by: DERMATOLOGY

## 2023-05-04 RX ORDER — APIXABAN 5 MG/1
TABLET, FILM COATED ORAL
Qty: 60 TABLET | Refills: 0 | OUTPATIENT
Start: 2023-05-04

## 2023-05-04 RX ORDER — LIDOCAINE HYDROCHLORIDE AND EPINEPHRINE 10; 10 MG/ML; UG/ML
3 INJECTION, SOLUTION INFILTRATION; PERINEURAL ONCE
Status: DISCONTINUED | OUTPATIENT
Start: 2023-05-04 | End: 2023-10-16

## 2023-05-04 ASSESSMENT — PAIN SCALES - GENERAL: PAINLEVEL: NO PAIN (0)

## 2023-05-04 NOTE — NURSING NOTE
Lidocaine-epinephrine 1-1:884489 % injection   4.5 mL once for one use, starting 5/4/2023 ending 5/4/2023,  2mL disp, R-0, injection  Injected by Dr. Glass.

## 2023-05-04 NOTE — NURSING NOTE
Dermatology Rooming Note    Rafaela Rock's goals for this visit include:   Chief Complaint   Patient presents with     Derm Problem     Rafaela is here today for a lesion of concern on her left leg     Raquel PICKENS CMA

## 2023-05-04 NOTE — PATIENT INSTRUCTIONS
Wound Care After a Biopsy    What is a skin biopsy?  A skin biopsy allows the doctor to examine a very small piece of tissue under the microscope to determine the diagnosis and the best treatment for the skin condition. A local anesthetic (numbing medicine) is injected with a very small needle into the skin area to be tested. A small piece of skin is taken from the area. Sometimes a suture (stitch) is used.     What are the risks of a skin biopsy?  I will experience scar, bleeding, swelling, pain, crusting and redness. I may experience incomplete removal or recurrence. Risks of this procedure are excessive bleeding, bruising, infection, nerve damage, numbness, thick (hypertrophic or keloidal) scar and non-diagnostic biopsy.    How should I care for my wound for the first 24 hours?  Keep the wound dry and covered for 24 hours  If it bleeds, hold direct pressure on the area for 15 minutes. If bleeding does not stop, call us or go to the emergency room  Avoid strenuous exercise the first 1-2 days or as your doctor instructs you    How should I care for the wound after 24 hours?  After 24 hours, remove the bandage  You may bathe or shower as normal  If you had a scalp biopsy, you can shampoo as usual and can use shower water to clean the biopsy site daily  Clean the wound once a day with gentle soap and water  Do not scrub, be gentle  Apply white petroleum/Vaseline after cleaning the wound with a cotton swab or a clean finger, and keep the site covered with a Bandaid /bandage. Bandages are not necessary with a scalp biopsy  If you are unable to cover the site with a Bandaid /bandage, re-apply ointment 2-3 times a day to keep the site moist. Moisture will help with healing  Avoid strenuous activity for first 1-2 days  Avoid lakes, rivers, pools, and oceans until the stitches are removed or the site is healed    How do I clean my wound?  Wash hands thoroughly with soap or use hand  before all wound care  Clean  the wound with gentle soap and water  Apply white petroleum/Vaseline  to wound after it is clean  Replace the Bandaid /bandage to keep the wound covered for the first few days or as instructed by your doctor  If you had a scalp biopsy, warm shower water to the area on a daily basis should suffice    What should I use to clean my wound?   Cotton-tipped applicators (Qtips )  White petroleum jelly (Vaseline ). Use a clean new container and use Q-tips to apply.  Bandaids  as needed  Gentle soap     How should I care for my wound long term?  Do not get your wound dirty  Keep up with wound care for one week or until the area is healed.  A small scab will form and fall off by itself when the area is completely healed. The area will be red and will become pink in color as it heals. Sun protection is very important for how your scar will turn out. Sunscreen with an SPF 30 or greater is recommended once the area is healed.  You should have some soreness but it should be mild and slowly go away over several days. Talk to your doctor about using tylenol for pain,    When should I call my doctor?  If you have increased:   Pain or swelling  Pus or drainage (clear or slightly yellow drainage is ok)  Temperature over 100F  Spreading redness or warmth around wound    When will I hear about my results?  The biopsy results can take 2 weeks to come back.  Your results will automatically release to [employer] before your provider has even reviewed them.  The clinic will call you with the results, send you a [employer] message, or have you schedule a follow-up clinic or phone time to discuss the results.  Contact our clinics if you do not hear from us in 2 weeks.    Who should I call with questions?  [facility]: [phone]  [facility]: [phone]  For urgent needs outside of business hours call the [facility] at [phone] and ask for the dermatology resident on call

## 2023-05-04 NOTE — PROGRESS NOTES
[facility] Dermatology Note   Encounter Date: May 4, 2023  Office visit    Dermatology Problem List:    # NUB  - L superior medial lower leg, s/p shave bx 5/4/23  # History of NMSC  - SCCis, left lateral lower leg, s/p Mohs 11/2/20   # AKs. Cryo  - S/p Efudex to chest 9/2020  - Hypertrophic actinic keratosis, left superior shoulder, s/p shave biopsy 9/28/21, s/p cryo  - Lichenoid actinic keratosis, right-mid back, s/p shave biopsy 9/28/21, s/p cryo  - HAK, right chest, s/p bx 2/2016  - AK, right midline neck, s/p bx 2/2016  # Nummular eczema/xerosis.  - Rx. TMC 0.1% ointment. Cerave cream.  #. Rosacea.  - Rx. Metronidazole gel.  # Contact dermatitis to metals - gold, silver?  # Benign bx:  - Epidermoid cyst, R lower eyelid, sp bx 1/4/2020  - BLK, L upper back, s/p bx 5/2019  - BLK, R upper back, s/p bx 5/2019  - SK, left popliteal fossa, s/p bx 5/2019  - BLK, L upper chest, s/ bx 5/2018  - Sebaceous hyperplasia, L cheek, s/p bx 2/2016  - Ulcer and dermal inflammation, left midline back, s/p bx 2/2016  #. Edema, RLE with R thigh bulla - ddx cellulitis, zoster, DVT  - DVT r/o U/S 9/20/22  - VZV CPR 9/20/22 pending  - Empiric doxycycline     ___________________________________________    Assessment & Plan:    # NUB  Ddx: SCC (KA type) vs other. Agreeable to shave biopsy  - See procedure note below  - Photodocumentation obtained in clinic     Procedures Performed:  Shave biopsy procedure note  - location(s): L superior medial lower leg  After discussion of benefits and risks including but not limited to bleeding/bruising, pain/swelling, infection, scar, incomplete removal, nerve damage/numbness, recurrence, and non-diagnostic biopsy, written consent, verbal consent and photographs obtained, time-out performed, area cleaned with alcohol, 1% lidocaine injected to obtain anesthesia, shave biopsy performed, hemostasis achieved with cautery, Vaseline and bandage applied, post-care instructions  provided    Follow-up: at previously scheduled interval    Staff Involved:  Patient was seen and staffed with attending physician Dr. Caroline Glass MD  Med/Derm Resident PGY-5  P:[phone]    I have personally examined this patient and agree with the resident doctor's documentation and plan of care. I have reviewed and amended the resident's note above. The documentation accurately reflects my clinical observations, diagnoses, treatment and follow-up plans. I was present for key portions of the procedure.       Angela Velazquez MD  Dermatology Staff    ___________________________________________      CC: Derm Problem (Rafaela is here today for a lesion of concern on her left leg)      HPI:  Ms. Rafaela Rock is a(n) 78 year old female who presents to clinic today for evaluation of a spot on her left leg. Reports:  - been present for a couple weeks  - started flat and rapidly increased in size  - associated with pain with touching and some bleeding  - otherwise feeling well in usual state of health    Physical exam:  General: in no acute distress, well-developed, well-nourished  Skin:  - skin type: medium fair  - 1.3 x 1.0cm hyperkeratotic tender purple nodule on the L medial superior lower leg  - No other lesions of concern on areas examined.         Medications:  Current Outpatient Medications   Medication     apixaban ANTICOAGULANT (ELIQUIS) 5 MG tablet     Emollient (CERAVE) CREA     metroNIDAZOLE (METROGEL) 1 % external gel     triamcinolone (KENALOG) 0.1 % external ointment     Current Facility-Administered Medications   Medication     lidocaine 1% with EPINEPHrine 1:100,000 injection 3 mL     lidocaine 1% with EPINEPHrine 1:100,000 injection 3 mL      Past Medical History:   Patient Active Problem List   Diagnosis     Hyperlipidemia with target LDL less than 130     OA (osteoarthritis)     Rosacea     POSTERIOR VITREOUS DETACHMENT, os     Advanced directives, counseling/discussion     Cataract      S/P total knee arthroplasty - left     Vitamin D deficiency     AK (actinic keratosis)     Xerosis of skin     Intertrigo     Inflamed seborrheic keratosis     Status post total left knee replacement     Rash     Diffuse photodamage of skin     Trochanteric bursitis of left hip     Keratoconjunctivitis of both eyes     Myogenic ptosis of left eyelid     Dermatochalasis of both upper eyelids     History of colonic polyps     Past Medical History:   Diagnosis Date     AK (actinic keratosis) 2/2/2016     Allergic rhinitis      Hyperlipidemia LDL goal < 130      Impaired fasting glucose      Lyme disease      Nonsenile cataract      OA (osteoarthritis)      Obesity      PONV (postoperative nausea and vomiting)      Rosacea      Vitamin D deficiency 10/12       CC No referring provider defined for this encounter. on close of this encounter.

## 2023-05-04 NOTE — LETTER
5/4/2023       RE: Rafaela Rock  [address] [place] [id number]     Dear Colleague,    Thank you for referring your patient, Rafaela Rock, to the [facility] DERMATOLOGY CLINIC [place] at [facility]. Please see a copy of my visit note below.    [facility] Dermatology Note   Encounter Date: May 4, 2023  Office visit    Dermatology Problem List:    # NUB  - L superior medial lower leg, s/p shave bx 5/4/23  # History of NMSC  - SCCis, left lateral lower leg, s/p Mohs 11/2/20   # AKs. Cryo  - S/p Efudex to chest 9/2020  - Hypertrophic actinic keratosis, left superior shoulder, s/p shave biopsy 9/28/21, s/p cryo  - Lichenoid actinic keratosis, right-mid back, s/p shave biopsy 9/28/21, s/p cryo  - HAK, right chest, s/p bx 2/2016  - AK, right midline neck, s/p bx 2/2016  # Nummular eczema/xerosis.  - Rx. TMC 0.1% ointment. Cerave cream.  #. Rosacea.  - Rx. Metronidazole gel.  # Contact dermatitis to metals - gold, silver?  # Benign bx:  - Epidermoid cyst, R lower eyelid, sp bx 1/4/2020  - BLK, L upper back, s/p bx 5/2019  - BLK, R upper back, s/p bx 5/2019  - SK, left popliteal fossa, s/p bx 5/2019  - BLK, L upper chest, s/ bx 5/2018  - Sebaceous hyperplasia, L cheek, s/p bx 2/2016  - Ulcer and dermal inflammation, left midline back, s/p bx 2/2016  #. Edema, RLE with R thigh bulla - ddx cellulitis, zoster, DVT  - DVT r/o U/S 9/20/22  - VZV CPR 9/20/22 pending  - Empiric doxycycline     ___________________________________________    Assessment & Plan:    # NUB  Ddx: SCC (KA type) vs other. Agreeable to shave biopsy  - See procedure note below  - Photodocumentation obtained in clinic     Procedures Performed:  Shave biopsy procedure note  - location(s): L superior medial lower leg  After discussion of benefits and risks including but not limited to bleeding/bruising, pain/swelling, infection, scar, incomplete removal, nerve  damage/numbness, recurrence, and non-diagnostic biopsy, written consent, verbal consent and photographs obtained, time-out performed, area cleaned with alcohol, 1% lidocaine injected to obtain anesthesia, shave biopsy performed, hemostasis achieved with cautery, Vaseline and bandage applied, post-care instructions provided    Follow-up: at previously scheduled interval    Staff Involved:  Patient was seen and staffed with attending physician Dr. Caroline Glass MD  Med/Derm Resident PGY-5  P:[phone]    I have personally examined this patient and agree with the resident doctor's documentation and plan of care. I have reviewed and amended the resident's note above. The documentation accurately reflects my clinical observations, diagnoses, treatment and follow-up plans. I was present for key portions of the procedure.       Angela Velazquez MD  Dermatology Staff    ___________________________________________      CC: Derm Problem (Rafaela is here today for a lesion of concern on her left leg)      HPI:  Ms. Rafaela Rock is a(n) 78 year old female who presents to clinic today for evaluation of a spot on her left leg. Reports:  - been present for a couple weeks  - started flat and rapidly increased in size  - associated with pain with touching and some bleeding  - otherwise feeling well in usual state of health    Physical exam:  General: in no acute distress, well-developed, well-nourished  Skin:  - skin type: medium fair  - 1.3 x 1.0cm hyperkeratotic tender purple nodule on the L medial superior lower leg  - No other lesions of concern on areas examined.         Medications:  Current Outpatient Medications   Medication    apixaban ANTICOAGULANT (ELIQUIS) 5 MG tablet    Emollient (CERAVE) CREA    metroNIDAZOLE (METROGEL) 1 % external gel    triamcinolone (KENALOG) 0.1 % external ointment     Current Facility-Administered Medications   Medication    lidocaine 1% with EPINEPHrine 1:100,000 injection 3 mL     lidocaine 1% with EPINEPHrine 1:100,000 injection 3 mL      Past Medical History:   Patient Active Problem List   Diagnosis    Hyperlipidemia with target LDL less than 130    OA (osteoarthritis)    Rosacea    POSTERIOR VITREOUS DETACHMENT, os    Advanced directives, counseling/discussion    Cataract    S/P total knee arthroplasty - left    Vitamin D deficiency    AK (actinic keratosis)    Xerosis of skin    Intertrigo    Inflamed seborrheic keratosis    Status post total left knee replacement    Rash    Diffuse photodamage of skin    Trochanteric bursitis of left hip    Keratoconjunctivitis of both eyes    Myogenic ptosis of left eyelid    Dermatochalasis of both upper eyelids    History of colonic polyps     Past Medical History:   Diagnosis Date    AK (actinic keratosis) 2/2/2016    Allergic rhinitis     Hyperlipidemia LDL goal < 130     Impaired fasting glucose     Lyme disease     Nonsenile cataract     OA (osteoarthritis)     Obesity     PONV (postoperative nausea and vomiting)     Rosacea     Vitamin D deficiency 10/12       CC No referring provider defined for this encounter. on close of this encounter.

## 2023-05-05 DIAGNOSIS — C44.729 SQUAMOUS CELL CARCINOMA OF LEFT LOWER LEG: Primary | ICD-10-CM

## 2023-05-05 LAB
PATH REPORT.COMMENTS IMP SPEC: ABNORMAL
PATH REPORT.COMMENTS IMP SPEC: ABNORMAL
PATH REPORT.COMMENTS IMP SPEC: YES
PATH REPORT.FINAL DX SPEC: ABNORMAL
PATH REPORT.GROSS SPEC: ABNORMAL
PATH REPORT.MICROSCOPIC SPEC OTHER STN: ABNORMAL
PATH REPORT.RELEVANT HX SPEC: ABNORMAL

## 2023-05-05 NOTE — PROGRESS NOTES
Placed Derm Surg referral for excision of SCC on L superior medial lower leg. Updated Derm problem list below    # History of NMSC  - SCC (well diff),  L superior medial lower leg, excision pending**  - SCCis, left lateral lower leg, s/p Mohs 11/2/20   # AKs. Cryo  - S/p Efudex to chest 9/2020  - Hypertrophic actinic keratosis, left superior shoulder, s/p shave biopsy 9/28/21, s/p cryo  - Lichenoid actinic keratosis, right-mid back, s/p shave biopsy 9/28/21, s/p cryo  - HAK, right chest, s/p bx 2/2016  - AK, right midline neck, s/p bx 2/2016  # Nummular eczema/xerosis.  - Rx. TMC 0.1% ointment. Cerave cream.  #. Rosacea.  - Rx. Metronidazole gel.  # Contact dermatitis to metals - gold, silver?  # Benign bx:  - Epidermoid cyst, R lower eyelid, sp bx 1/4/2020  - BLK, L upper back, s/p bx 5/2019  - BLK, R upper back, s/p bx 5/2019  - SK, left popliteal fossa, s/p bx 5/2019  - BLK, L upper chest, s/ bx 5/2018  - Sebaceous hyperplasia, L cheek, s/p bx 2/2016  - Ulcer and dermal inflammation, left midline back, s/p bx 2/2016  #. Edema, RLE with R thigh bulla - ddx cellulitis, zoster, DVT  - DVT r/o U/S 9/20/22  - VZV CPR 9/20/22 pending  - Empiric doxycycline      Tree Glass MD  Med/Derm Resident PGY-5  P:[phone]

## 2023-05-05 NOTE — RESULT ENCOUNTER NOTE
Reviewed results showing well-diff SCC. Discussed these results with patient over MyChart on 5/2/23. Will plan for excision of SCC. Will place derm surg referral for excision    Follow up at previously scheduled interval    CC'ing Dr. Velazquez as FYI only

## 2023-05-09 ENCOUNTER — TELEPHONE (OUTPATIENT)
Dept: DERMATOLOGY | Facility: CLINIC | Age: 78
End: 2023-05-09
Payer: COMMERCIAL

## 2023-05-09 NOTE — TELEPHONE ENCOUNTER
Called patient to schedule surgery with Dr. Roman    Date of Surgery: 08/01    Surgery type: excision    Consult scheduled: Not Applicable    Has patient had mohs with us before? Not Applicable    Additional comments: juan Almaraz on 5/9/2023 at 8:50 AM

## 2023-05-15 ENCOUNTER — OFFICE VISIT (OUTPATIENT)
Dept: FAMILY MEDICINE | Facility: CLINIC | Age: 78
End: 2023-05-15
Payer: COMMERCIAL

## 2023-05-15 VITALS
HEART RATE: 72 BPM | BODY MASS INDEX: 33.46 KG/M2 | HEIGHT: 64 IN | TEMPERATURE: 98.5 F | DIASTOLIC BLOOD PRESSURE: 73 MMHG | WEIGHT: 196 LBS | OXYGEN SATURATION: 97 % | SYSTOLIC BLOOD PRESSURE: 113 MMHG

## 2023-05-15 DIAGNOSIS — I82.4Y2 DEEP VEIN THROMBOSIS (DVT) OF PROXIMAL VEIN OF LEFT LOWER EXTREMITY, UNSPECIFIED CHRONICITY (H): Primary | ICD-10-CM

## 2023-05-15 DIAGNOSIS — E66.811 OBESITY (BMI 30.0-34.9): ICD-10-CM

## 2023-05-15 PROCEDURE — 99213 OFFICE O/P EST LOW 20 MIN: CPT | Performed by: FAMILY MEDICINE

## 2023-05-15 ASSESSMENT — PAIN SCALES - GENERAL: PAINLEVEL: NO PAIN (0)

## 2023-05-15 NOTE — PROGRESS NOTES
"  Assessment & Plan       ICD-10-CM    1. Deep vein thrombosis (DVT) of proximal vein of left lower extremity, unspecified chronicity (H)  I82.4Y2       2. Obesity (BMI 30.0-34.9)  E66.9         I advised her to finish off her current months worth of Eliquis and then discontinue the medication and she will have completed almost 4 months worth of treatment  Discussed the difference between a \"provoked\" DVT and an \"unprovoked\" DVT  She had the prolonged travel at the end of the summer this last year which may have predisposed her to the DVT, but we acknowledged that is somewhat of a \"soft call\"  She is already seeing Dr. Vogel of hematology/oncology for her MGUS, so I suggested the patient get Dr. Vogel's opinion on whether to do any coagulopathy work-up with her next blood work when she is off the Eliquis  I would be reluctant to commit her to lifelong anticoagulation at this point with her history above and given the potential increased risk of falling in the future, etc.     BMI:   Estimated body mass index is 34.18 kg/m  as calculated from the following:    Height as of this encounter: 1.613 m (5' 3.5\").    Weight as of this encounter: 88.9 kg (196 lb).   Weight management plan: Discussed healthy diet and exercise guidelines    Plan a follow-up visit sometime in the upcoming months for another annual wellness exam    Josue Shah MD  [facility] RYAN Russo is a 78 year old, presenting for the following health issues:  RECHECK (Follow up on her left leg blood clot.)        5/15/2023    12:15 PM   Additional Questions   Roomed by cam   Accompanied by none         5/15/2023    12:15 PM   Patient Reported Additional Medications   Patient reports taking the following new medications cam     HPI     Patient is here today to follow up on blood clot.    She was diagnosed with a left lower extremity DVT about 3 months ago.  She has been on Eliquis during this time.  She just got another " "months worth of medication refill a couple of days ago.  She does still get some mild left lower leg swelling.  A little bit of right lower leg swelling at times as well.  She is not having any significant discomfort in the legs.  She continues to try to lose weight, but that has been a challenge.    Patient Active Problem List   Diagnosis    Hyperlipidemia with target LDL less than 130    OA (osteoarthritis)    Rosacea    POSTERIOR VITREOUS DETACHMENT, os    Advanced directives, counseling/discussion    Cataract    S/P total knee arthroplasty - left    Vitamin D deficiency    AK (actinic keratosis)    Xerosis of skin    Intertrigo    Inflamed seborrheic keratosis    Status post total left knee replacement    Rash    Diffuse photodamage of skin    Trochanteric bursitis of left hip    Keratoconjunctivitis of both eyes    Myogenic ptosis of left eyelid    Dermatochalasis of both upper eyelids    History of colonic polyps     Current Outpatient Medications   Medication    apixaban ANTICOAGULANT (ELIQUIS) 5 MG tablet    Emollient (CERAVE) CREA    metroNIDAZOLE (METROGEL) 1 % external gel    triamcinolone (KENALOG) 0.1 % external ointment     Current Facility-Administered Medications   Medication    lidocaine 1% with EPINEPHrine 1:100,000 injection 3 mL    lidocaine 1% with EPINEPHrine 1:100,000 injection 3 mL    lidocaine 1% with EPINEPHrine 1:100,000 injection 3 mL           Review of Systems   Noncontributory except as above.      Objective    /73 (BP Location: Right arm, Patient Position: Sitting, Cuff Size: Adult Large)   Pulse 72   Temp 98.5  F (36.9  C) (Oral)   Ht 1.613 m (5' 3.5\")   Wt 88.9 kg (196 lb)   SpO2 97%   BMI 34.18 kg/m    Body mass index is 34.18 kg/m .  Physical Exam   GENERAL: alert, no distress and over weight  EXT: She has trace to 1+ nonpitting edema of the left lower leg and trace edema of the right lower leg.  She has some mild venous stasis changes of both lower extremities with " mild stasis dermatitis of the lower pretibial region.  No tenderness to palpation of the lower extremities.

## 2023-05-16 PROBLEM — I82.4Y2 DEEP VEIN THROMBOSIS (DVT) OF PROXIMAL VEIN OF LEFT LOWER EXTREMITY, UNSPECIFIED CHRONICITY (H): Status: ACTIVE | Noted: 2023-05-16

## 2023-05-22 ENCOUNTER — PATIENT OUTREACH (OUTPATIENT)
Dept: CARE COORDINATION | Facility: CLINIC | Age: 78
End: 2023-05-22
Payer: COMMERCIAL

## 2023-06-06 ENCOUNTER — PATIENT OUTREACH (OUTPATIENT)
Dept: CARE COORDINATION | Facility: CLINIC | Age: 78
End: 2023-06-06
Payer: COMMERCIAL

## 2023-07-26 ENCOUNTER — OFFICE VISIT (OUTPATIENT)
Dept: DERMATOLOGY | Facility: CLINIC | Age: 78
End: 2023-07-26
Payer: COMMERCIAL

## 2023-07-26 DIAGNOSIS — D18.01 CHERRY ANGIOMA: ICD-10-CM

## 2023-07-26 DIAGNOSIS — L57.0 ACTINIC KERATOSIS: Primary | ICD-10-CM

## 2023-07-26 DIAGNOSIS — Z85.828 HISTORY OF NONMELANOMA SKIN CANCER: ICD-10-CM

## 2023-07-26 DIAGNOSIS — D22.9 MULTIPLE BENIGN NEVI: ICD-10-CM

## 2023-07-26 DIAGNOSIS — D49.2 NEOPLASM OF SKIN: ICD-10-CM

## 2023-07-26 DIAGNOSIS — L29.9 PRURITUS: ICD-10-CM

## 2023-07-26 DIAGNOSIS — L81.4 LENTIGINES: ICD-10-CM

## 2023-07-26 DIAGNOSIS — L82.1 SEBORRHEIC KERATOSIS: ICD-10-CM

## 2023-07-26 PROCEDURE — 99213 OFFICE O/P EST LOW 20 MIN: CPT | Mod: 25 | Performed by: DERMATOLOGY

## 2023-07-26 PROCEDURE — 88305 TISSUE EXAM BY PATHOLOGIST: CPT | Mod: 26 | Performed by: DERMATOLOGY

## 2023-07-26 PROCEDURE — 17003 DESTRUCT PREMALG LES 2-14: CPT | Mod: XS | Performed by: DERMATOLOGY

## 2023-07-26 PROCEDURE — 88342 IMHCHEM/IMCYTCHM 1ST ANTB: CPT | Mod: 26 | Performed by: DERMATOLOGY

## 2023-07-26 PROCEDURE — 88341 IMHCHEM/IMCYTCHM EA ADD ANTB: CPT | Mod: TC | Performed by: DERMATOLOGY

## 2023-07-26 PROCEDURE — 88341 IMHCHEM/IMCYTCHM EA ADD ANTB: CPT | Mod: 26 | Performed by: DERMATOLOGY

## 2023-07-26 PROCEDURE — 11102 TANGNTL BX SKIN SINGLE LES: CPT | Performed by: DERMATOLOGY

## 2023-07-26 PROCEDURE — 17000 DESTRUCT PREMALG LESION: CPT | Mod: XS | Performed by: DERMATOLOGY

## 2023-07-26 ASSESSMENT — PAIN SCALES - GENERAL: PAINLEVEL: NO PAIN (0)

## 2023-07-26 NOTE — NURSING NOTE
Lidocaine-epinephrine 1-1:078712 % injection   0.5mL once for one use, starting 7/26/2023 ending 7/26/2023,  2mL disp, R-0, injection  Injected by Eunice Alicia RN

## 2023-07-26 NOTE — LETTER
7/26/2023       RE: Rafaela Rock  [address] [place] [id number]     Dear Colleague,    Thank you for referring your patient, Rafaela Rock, to the [facility] DERMATOLOGY CLINIC [place] at [facility]. Please see a copy of my visit note below.    [facility] Dermatology Note  Encounter Date: Jul 26, 2023  Office Visit     Dermatology Problem List:  # History of NMSC  - SCC, left superior medial lower leg, pending Mohs 8/1/23  - SCCis, left lateral lower leg, s/p Mohs 11/2/20   # AKs. Cryo  - S/p Efudex to chest 9/2020  - Hypertrophic actinic keratosis, left superior shoulder, s/p shave biopsy 9/28/21, s/p cryo  - Lichenoid actinic keratosis, right-mid back, s/p shave biopsy 9/28/21, s/p cryo  - HAK, right chest, s/p bx 2/2016  - AK, right midline neck, s/p bx 2/2016  # Nummular eczema/xerosis.  - Rx. TMC 0.1% ointment. Cerave cream.  #. Rosacea.  - Rx. Metronidazole gel.  # Contact dermatitis to metals - gold, silver?  # Benign bx:  - Epidermoid cyst, R lower eyelid, sp bx 1/4/2020  - BLK, L upper back, s/p bx 5/2019  - BLK, R upper back, s/p bx 5/2019  - SK, left popliteal fossa, s/p bx 5/2019  - BLK, L upper chest, s/ bx 5/2018  - Sebaceous hyperplasia, L cheek, s/p bx 2/2016  - Ulcer and dermal inflammation, left midline back, s/p bx 2/2016  #. Edema, RLE with R thigh bulla - ddx cellulitis, zoster, DVT  - DVT r/o U/S 9/20/22  - VZV CPR 9/20/22 pending  - Empiric doxycycline   # Pruritus.  - Found to have MGUS, following with heme/onc    # Neoplasm of uncertain behavior, L mid back, s/p shave bx 7/26/23  ____________________________________________    Assessment & Plan:     # Neoplasm of uncertain behavior, L mid back, s/p shave bx 7/26/23, ddx DN.  - see shave bx note    # Pruritus, found to have MGUS   - saw heme onc 4/20/23, reivewed note, on surveillance  - Continue triamcinolone 0.1% ointment BID PRN  - Continue daily  moisturizer    # Actinic keratoses - R upper forehead, nasal bridge  - Cryo today x2, see procedure below.    # Multiple benign nevi.   - No concerning lesions today  - Monitor for ABCDEs of melanoma   - Continue sun protection - recommend SPF 30 or higher with frequent application   - Return sooner if noticing changing or symptomatic lesions    # Benign lesions - SKs, cherry angiomas, lentigenes.  - No treatment required    # History of NMSC. No evidence of recurrent disease.  - Continue photoprotection - recommend SPF 30 or higher with frequent reapplication  - Continue yearly skin exams  - Advised to monitor for changing, non-healing, bleeding, painful, changing, or otherwise symptomatic lesions    Follow-up: 1 year(s) in-person for FBSE, sooner pending path or if concerns      Staff:    Sarah Tillman MD    Department of Dermatology  [facility]: Phone: [phone], Fax: [phone]  7/26/2023    ____________________________________________    CC: Skin Check (Patient reports lesion of concern on their right calf. The patient reports first noticing this spot about 5-6 months ago. The patient reports dryness at the site.The patient would like a full body skin check. )    HPI:  Ms. Rafaela Rock is a(n) 78 year old female who presents today as a return patient for skin check.    Right leg swelling last visit - negative DVT, just improved  But then in February, had DVT    Spot on left leg    Patient is otherwise feeling well, without additional skin concerns.    Labs Reviewed:  N/A    Physical Exam:  Vitals: There were no vitals taken for this visit.  SKIN: Total skin excluding the undergarment areas was performed. The exam included the head/face, neck, both arms, chest, back, abdomen, both legs, digits and/or nails.   - irregular brown macule on left mid back.  - There are dome shaped bright red papules on the trunk  and extremities.   - Multiple regular brown pigmented macules and papules are identified on the trunk and extremities.   - Scattered brown macules on sun exposed areas.  - There are waxy stuck on tan to brown papules on the trunk and extremities.   - There are erythematous macules with overyling adherent scale on the R upper forehead and nasal bridge.  - No other lesions of concern on areas examined.     Medications:  Current Outpatient Medications   Medication    Emollient (CERAVE) CREA    metroNIDAZOLE (METROGEL) 1 % external gel    triamcinolone (KENALOG) 0.1 % external ointment    apixaban ANTICOAGULANT (ELIQUIS) 5 MG tablet     Current Facility-Administered Medications   Medication    lidocaine 1% with EPINEPHrine 1:100,000 injection 3 mL    lidocaine 1% with EPINEPHrine 1:100,000 injection 3 mL    lidocaine 1% with EPINEPHrine 1:100,000 injection 3 mL      Past Medical History:   Patient Active Problem List   Diagnosis    Hyperlipidemia with target LDL less than 130    OA (osteoarthritis)    Rosacea    POSTERIOR VITREOUS DETACHMENT, os    Advanced directives, counseling/discussion    Obesity (BMI 30.0-34.9)    Cataract    S/P total knee arthroplasty - left    Vitamin D deficiency    AK (actinic keratosis)    Xerosis of skin    Intertrigo    Inflamed seborrheic keratosis    Status post total left knee replacement    Rash    Diffuse photodamage of skin    Trochanteric bursitis of left hip    Keratoconjunctivitis of both eyes    Myogenic ptosis of left eyelid    Dermatochalasis of both upper eyelids    History of colonic polyps    Deep vein thrombosis (DVT) of proximal vein of left lower extremity, unspecified chronicity (H)     Past Medical History:   Diagnosis Date    AK (actinic keratosis) 2/2/2016    Allergic rhinitis     Hyperlipidemia LDL goal < 130     Impaired fasting glucose     Lyme disease     Nonsenile cataract     OA (osteoarthritis)     Obesity     PONV (postoperative nausea and vomiting)      Rosacea     Vitamin D deficiency 10/12        CC Josue Shah MD  [address] [facility] [place] [id number] on close of this encounter.

## 2023-07-26 NOTE — PROGRESS NOTES
[facility] Dermatology Note  Encounter Date: Jul 26, 2023  Office Visit     Dermatology Problem List:  # History of NMSC  - SCC, left superior medial lower leg, pending Mohs 8/1/23  - SCCis, left lateral lower leg, s/p Mohs 11/2/20   # AKs. Cryo  - S/p Efudex to chest 9/2020  - Hypertrophic actinic keratosis, left superior shoulder, s/p shave biopsy 9/28/21, s/p cryo  - Lichenoid actinic keratosis, right-mid back, s/p shave biopsy 9/28/21, s/p cryo  - HAK, right chest, s/p bx 2/2016  - AK, right midline neck, s/p bx 2/2016  # Nummular eczema/xerosis.  - Rx. TMC 0.1% ointment. Cerave cream.  #. Rosacea.  - Rx. Metronidazole gel.  # Contact dermatitis to metals - gold, silver?  # Benign bx:  - Epidermoid cyst, R lower eyelid, sp bx 1/4/2020  - BLK, L upper back, s/p bx 5/2019  - BLK, R upper back, s/p bx 5/2019  - SK, left popliteal fossa, s/p bx 5/2019  - BLK, L upper chest, s/ bx 5/2018  - Sebaceous hyperplasia, L cheek, s/p bx 2/2016  - Ulcer and dermal inflammation, left midline back, s/p bx 2/2016  #. Edema, RLE with R thigh bulla - resolved, unclear etiology.  - DVT r/o U/S 9/20/22 negative for DVT (interestingly later was dx with DVT in LLE)  - VZV CPR 9/20/22 negative  - Empiric doxycycline   # Pruritus.  - Found to have MGUS, following with heme/onc    # Neoplasm of uncertain behavior, L mid back, s/p shave bx 7/26/23  ____________________________________________    Assessment & Plan:     # Neoplasm of uncertain behavior, L mid back, s/p shave bx 7/26/23, ddx DN.  - see shave bx note    # Pruritus, found to have MGUS   - saw heme onc 4/20/23, reivewed note, on surveillance  - Continue triamcinolone 0.1% ointment BID PRN  - Continue daily moisturizer    # Actinic keratoses - R upper forehead, nasal bridge  - Cryo today x2, see procedure below.    # Multiple benign nevi.   - No concerning lesions today  - Monitor for ABCDEs of melanoma   - Continue sun protection - recommend SPF 30 or higher  with frequent application   - Return sooner if noticing changing or symptomatic lesions    # Benign lesions - SKs, cherry angiomas, lentigenes.  - No treatment required    # History of NMSC. No evidence of recurrent disease.  - Continue photoprotection - recommend SPF 30 or higher with frequent reapplication  - Continue yearly skin exams  - Advised to monitor for changing, non-healing, bleeding, painful, changing, or otherwise symptomatic lesions    Follow-up: 1 year(s) in-person for FBSE, sooner pending path or if concerns      Procedures:  Shave biopsy: Location(s) see above.  After discussion of benefits and risks including but not limited to bleeding/bruising, pain/swelling, infection, scar, incomplete removal, nerve damage/numbness, recurrence, and non-diagnostic biopsy,  verbal consent and photographs were obtained. Time-out was performed. The area was cleaned with isopropyl alcohol. 0.5mL of 1% lidocaine with epinephrine was injected to obtain adequate anesthesia of each lesion. Shave biopsy was performed. Hemostasis was achieved with aluminium chloride. Vaseline and a sterile dressing were applied. The patient tolerated the procedure and no complications were noted. The patient was provided with verbal and written post care instructions.   Cryotherapy procedure note: After verbal consent and discussion of risks and benefits including but no limited to dyspigmentation/scar, blister, and pain, 2was(were) treated with 1-2mm freeze border for 2 cycles with liquid nitrogen. Post cryotherapy instructions were provided.         Staff:    Sarah Tillman MD    Department of Dermatology  [facility]: Phone: [phone], Fax: [phone]  7/26/2023    ____________________________________________    CC: Skin Check (Patient reports lesion of concern on their right calf. The patient reports first noticing this spot about 5-6  months ago. The patient reports dryness at the site.The patient would like a full body skin check. )    HPI:  Ms. Rafaela Rock is a(n) 78 year old female who presents today as a return patient for skin check.    Right leg swelling last visit - negative DVT, just improved  But then in February, had DVT    Spot on left leg    Patient is otherwise feeling well, without additional skin concerns.    Labs Reviewed:  N/A    Physical Exam:  Vitals: There were no vitals taken for this visit.  SKIN: Total skin excluding the undergarment areas was performed. The exam included the head/face, neck, both arms, chest, back, abdomen, both legs, digits and/or nails.   - irregular brown macule on left mid back.  - There are dome shaped bright red papules on the trunk and extremities.   - Multiple regular brown pigmented macules and papules are identified on the trunk and extremities.   - Scattered brown macules on sun exposed areas.  - There are waxy stuck on tan to brown papules on the trunk and extremities.   - There are erythematous macules with overyling adherent scale on the R upper forehead and nasal bridge.  - No other lesions of concern on areas examined.     Medications:  Current Outpatient Medications   Medication    Emollient (CERAVE) CREA    metroNIDAZOLE (METROGEL) 1 % external gel    triamcinolone (KENALOG) 0.1 % external ointment    apixaban ANTICOAGULANT (ELIQUIS) 5 MG tablet     Current Facility-Administered Medications   Medication    lidocaine 1% with EPINEPHrine 1:100,000 injection 3 mL    lidocaine 1% with EPINEPHrine 1:100,000 injection 3 mL    lidocaine 1% with EPINEPHrine 1:100,000 injection 3 mL      Past Medical History:   Patient Active Problem List   Diagnosis    Hyperlipidemia with target LDL less than 130    OA (osteoarthritis)    Rosacea    POSTERIOR VITREOUS DETACHMENT, os    Advanced directives, counseling/discussion    Obesity (BMI 30.0-34.9)    Cataract    S/P total knee arthroplasty - left     Vitamin D deficiency    AK (actinic keratosis)    Xerosis of skin    Intertrigo    Inflamed seborrheic keratosis    Status post total left knee replacement    Rash    Diffuse photodamage of skin    Trochanteric bursitis of left hip    Keratoconjunctivitis of both eyes    Myogenic ptosis of left eyelid    Dermatochalasis of both upper eyelids    History of colonic polyps    Deep vein thrombosis (DVT) of proximal vein of left lower extremity, unspecified chronicity (H)     Past Medical History:   Diagnosis Date    AK (actinic keratosis) 2/2/2016    Allergic rhinitis     Hyperlipidemia LDL goal < 130     Impaired fasting glucose     Lyme disease     Nonsenile cataract     OA (osteoarthritis)     Obesity     PONV (postoperative nausea and vomiting)     Rosacea     Vitamin D deficiency 10/12        CC Josue Shah MD  [address] [facility] [place] [address] on close of this encounter.

## 2023-07-26 NOTE — NURSING NOTE
Dermatology Rooming Note    Rafaela Rock's goals for this visit include:   Chief Complaint   Patient presents with    Skin Check     Patient reports lesion of concern on their right calf. The patient reports first noticing this spot about 5-6 months ago. The patient reports dryness at the site.The patient would like a full body skin check.      Sasha Wayne LPN

## 2023-07-31 ENCOUNTER — HOSPITAL ENCOUNTER (OUTPATIENT)
Facility: HOSPITAL | Age: 78
End: 2023-07-31
Attending: INTERNAL MEDICINE | Admitting: INTERNAL MEDICINE
Payer: COMMERCIAL

## 2023-07-31 LAB
PATH REPORT.COMMENTS IMP SPEC: ABNORMAL
PATH REPORT.COMMENTS IMP SPEC: YES
PATH REPORT.FINAL DX SPEC: ABNORMAL
PATH REPORT.GROSS SPEC: ABNORMAL
PATH REPORT.MICROSCOPIC SPEC OTHER STN: ABNORMAL
PATH REPORT.RELEVANT HX SPEC: ABNORMAL

## 2023-08-01 ENCOUNTER — TELEPHONE (OUTPATIENT)
Dept: DERMATOLOGY | Facility: CLINIC | Age: 78
End: 2023-08-01

## 2023-08-01 ENCOUNTER — OFFICE VISIT (OUTPATIENT)
Dept: DERMATOLOGY | Facility: CLINIC | Age: 78
End: 2023-08-01
Payer: COMMERCIAL

## 2023-08-01 VITALS — SYSTOLIC BLOOD PRESSURE: 135 MMHG | HEART RATE: 73 BPM | DIASTOLIC BLOOD PRESSURE: 78 MMHG

## 2023-08-01 DIAGNOSIS — D48.9 NEOPLASM OF UNCERTAIN BEHAVIOR: Primary | ICD-10-CM

## 2023-08-01 DIAGNOSIS — L81.8 ATYPICAL MELANOCYTIC HYPERPLASIA: ICD-10-CM

## 2023-08-01 DIAGNOSIS — C44.729 SQUAMOUS CELL CARCINOMA OF LEFT LOWER LEG: Primary | ICD-10-CM

## 2023-08-01 PROCEDURE — 12034 INTMD RPR S/TR/EXT 7.6-12.5: CPT | Mod: 79 | Performed by: DERMATOLOGY

## 2023-08-01 PROCEDURE — 11402 EXC TR-EXT B9+MARG 1.1-2 CM: CPT | Mod: 79 | Performed by: DERMATOLOGY

## 2023-08-01 PROCEDURE — 88305 TISSUE EXAM BY PATHOLOGIST: CPT | Mod: 26 | Performed by: DERMATOLOGY

## 2023-08-01 PROCEDURE — 88305 TISSUE EXAM BY PATHOLOGIST: CPT | Mod: TC | Performed by: DERMATOLOGY

## 2023-08-01 PROCEDURE — 11602 EXC TR-EXT MAL+MARG 1.1-2 CM: CPT | Mod: 79 | Performed by: DERMATOLOGY

## 2023-08-01 ASSESSMENT — PAIN SCALES - GENERAL: PAINLEVEL: NO PAIN (0)

## 2023-08-01 NOTE — LETTER
8/1/2023       RE: Rafaela Rock  [address] [place] [id number]     Dear Colleague,    Thank you for referring your patient, Rafaela Rock, to the [facility] DERMATOLOGIC SURGERY CLINIC [place] at [facility]. Please see a copy of my visit note below.    [facility] Dermatologic Surgery Excision Note    Case #: 1  Date of Service:  Aug 1, 2023  Surgery: Wide local excision  Staff Surgeon: Jules Roman MD  Fellow Surgeon: Erica Patton MD  Resident Surgeon: none  Nurse: Bob Wayne LPN    Tumor Type:  atypical melanocytic proliferation   Location: left mid back  Derm-Path Accession #: [id number]    Skin Lesion Size:  0.9 cm x 0.9 cm  Margin: 5 mm  Excision size: 1.4 cm x 1.4 cm  Level of Defect: Fat  Repair Type: Intermediate linear  Repair Size: 5.0 cm  Suture Material: 4-0 Monocryl    INDICATIONS:  The patient was scheduled for wide local excision of the skin lesion documented above. We discussed the principles of treatment and most likely complications including scarring, bleeding, infection, incomplete excision, wound dehiscence, pain, nerve damage, and recurrence. Informed consent was obtained and the patient underwent the procedure as follows:    PROCEDURE:  With the patient in a prone position, the lesion was outlined with the margin documented above.  The lesion and the necessary margin (excised diameter) was measured and documented as above.  An ellipse was designed around the lesion to conform to relaxed skin tension lines in an effort to minimize scarring and deformity.  The lesion and surrounding skin were prepped with chlorhexidine, draped, and anesthetized with lidocaine with epinephrine. Using a #15 blade, the skin was excised along premarked lines.  The level of the defect is documented as above.  Wound margins were undermined to limit functional deformity/impairment of adjacent structures. Bleeding vessels  were controlled with electrocoagulation.  The dermis and subcutaneous tissue were closed with buried vertical mattress sutures using 4-0 Monocryl.  Epidermal approximation was meticulously refined with 4-0 Monocryl subcuticular sutures, resulting in a linear closure with little to no wound tension.  Blood loss was estimated to be less than 5 mL.  The area was coated with petrolatum and covered with a non-adherent dressing followed by gauze and tape. Postoperative instructions were reviewed per protocol.  The patient left alert and fully oriented.    Follow-up for suture removal: Not applicable as only dissolving sutures used    Jules Roman MD was immediately available for the entire surgery and was physicially present for the key portions of the procedure.    Erica Patton MD  Micrographic Surgery and Dermatologic Oncology (MSDO) Fellow, PGY-5    [facility] Dermatologic Surgery Excision Note    Case #: [id number]  Date of Service:  Aug 1, 2023  Surgery: Wide local excision  Staff Surgeon: Jules Roman MD  Fellow Surgeon: Erica Patton MD  Resident Surgeon: none  Nurse: Bob Wayne LPN    Tumor Type: SCC  Location: L superior medial lower leg  Derm-Path Accession #: [id number]    Skin Lesion Size:  0.8 cm x 0.8 cm  Margin: 0.4 mm  Excision size: 1.2 cm x 1.2 cm  Level of Defect: Fat  Repair Type: Intermediate linear  Repair Size: 4.9 cm  Suture Material: 4-0 Monocryl    INDICATIONS:  The patient was scheduled for wide local excision of the skin lesion documented above. We discussed the principles of treatment and most likely complications including scarring, bleeding, infection, incomplete excision, wound dehiscence, pain, nerve damage, and recurrence. Informed consent was obtained and the patient underwent the procedure as follows:    PROCEDURE:  With the patient in a prone position, the lesion was outlined with the margin documented above.  The lesion and the necessary margin (excised diameter) was  measured and documented as above.  An ellipse was designed around the lesion to conform to relaxed skin tension lines in an effort to minimize scarring and deformity.  The lesion and surrounding skin were prepped with chlorhexidine, draped, and anesthetized with lidocaine with epinephrine. Using a #15 blade, the skin was excised along premarked lines.  The level of the defect is documented as above.  Wound margins were undermined to limit functional deformity/impairment of adjacent structures. Bleeding vessels were controlled with electrocoagulation.  The dermis and subcutaneous tissue were closed with buried vertical mattress sutures using 4-0 Monocryl.  Epidermal approximation was meticulously refined with 4-0 Monocryl subcuticular sutures, resulting in a linear closure with little to no wound tension.  Blood loss was estimated to be less than 5 mL.  The area was coated with petrolatum and covered with a non-adherent dressing followed by gauze and tape. Postoperative instructions were reviewed per protocol.  The patient left alert and fully oriented.    Follow-up for suture removal: Not applicable as only dissolving sutures used    Jules Roman MD was immediately available for the entire surgery and was physicially present for the key portions of the procedure.    Erica Patton MD  Micrographic Surgery and Dermatologic Oncology (MSDO) Fellow, PGY-5      Attestation signed by Jules Roman MD at 8/3/2023  9:07 AM:    Attending attestation:  I was present for key elements of the procedure and immediately available for all other portions of the procedure.  I have reviewed the note and edited it as necessary.    Jules Roman M.D.  Professor  Director of Dermatologic Surgery  Department of Dermatology  [facility]    Dermatology Surgery Clinic  [facility] [place] [id number]

## 2023-08-01 NOTE — PROGRESS NOTES
[facility] Dermatologic Surgery Excision Note    Case #: 1  Date of Service:  Aug 1, 2023  Surgery: Wide local excision  Staff Surgeon: Jules Roman MD  Fellow Surgeon: Erica Patton MD  Resident Surgeon: none  Nurse: Bob Wayne LPN    Tumor Type:  atypical melanocytic proliferation   Location: left mid back  Derm-Path Accession #: [id number]    Skin Lesion Size:  0.9 cm x 0.9 cm  Margin: 5 mm  Excision size: 1.4 cm x 1.4 cm  Level of Defect: Fat  Repair Type: Intermediate linear  Repair Size: 5.0 cm  Suture Material: 4-0 Monocryl    INDICATIONS:  The patient was scheduled for wide local excision of the skin lesion documented above. We discussed the principles of treatment and most likely complications including scarring, bleeding, infection, incomplete excision, wound dehiscence, pain, nerve damage, and recurrence. Informed consent was obtained and the patient underwent the procedure as follows:    PROCEDURE:  With the patient in a prone position, the lesion was outlined with the margin documented above.  The lesion and the necessary margin (excised diameter) was measured and documented as above.  An ellipse was designed around the lesion to conform to relaxed skin tension lines in an effort to minimize scarring and deformity.  The lesion and surrounding skin were prepped with chlorhexidine, draped, and anesthetized with lidocaine with epinephrine. Using a #15 blade, the skin was excised along premarked lines.  The level of the defect is documented as above.  Wound margins were undermined to limit functional deformity/impairment of adjacent structures. Bleeding vessels were controlled with electrocoagulation.  The dermis and subcutaneous tissue were closed with buried vertical mattress sutures using 4-0 Monocryl.  Epidermal approximation was meticulously refined with 4-0 Monocryl subcuticular sutures, resulting in a linear closure with little to no wound tension.  Blood loss was estimated  to be less than 5 mL.  The area was coated with petrolatum and covered with a non-adherent dressing followed by gauze and tape. Postoperative instructions were reviewed per protocol.  The patient left alert and fully oriented.    Follow-up for suture removal: Not applicable as only dissolving sutures used    Jules Roman MD was immediately available for the entire surgery and was physicially present for the key portions of the procedure.    Erica Patton MD  Micrographic Surgery and Dermatologic Oncology (MSDO) Fellow, PGY-5    [facility] Dermatologic Surgery Excision Note    Case #: [id number]  Date of Service:  Aug 1, 2023  Surgery: Wide local excision  Staff Surgeon: Jules Roman MD  Fellow Surgeon: Erica Patton MD  Resident Surgeon: none  Nurse: Bob Wayne LPN    Tumor Type: SCC  Location: L superior medial lower leg  Derm-Path Accession #: [id number]    Skin Lesion Size:  0.8 cm x 0.8 cm  Margin: 0.4 mm  Excision size: 1.2 cm x 1.2 cm  Level of Defect: Fat  Repair Type: Intermediate linear  Repair Size: 4.9 cm  Suture Material: 4-0 Monocryl    INDICATIONS:  The patient was scheduled for wide local excision of the skin lesion documented above. We discussed the principles of treatment and most likely complications including scarring, bleeding, infection, incomplete excision, wound dehiscence, pain, nerve damage, and recurrence. Informed consent was obtained and the patient underwent the procedure as follows:    PROCEDURE:  With the patient in a prone position, the lesion was outlined with the margin documented above.  The lesion and the necessary margin (excised diameter) was measured and documented as above.  An ellipse was designed around the lesion to conform to relaxed skin tension lines in an effort to minimize scarring and deformity.  The lesion and surrounding skin were prepped with chlorhexidine, draped, and anesthetized with lidocaine with epinephrine. Using a #15 blade, the skin was  excised along premarked lines.  The level of the defect is documented as above.  Wound margins were undermined to limit functional deformity/impairment of adjacent structures. Bleeding vessels were controlled with electrocoagulation.  The dermis and subcutaneous tissue were closed with buried vertical mattress sutures using 4-0 Monocryl.  Epidermal approximation was meticulously refined with 4-0 Monocryl subcuticular sutures, resulting in a linear closure with little to no wound tension.  Blood loss was estimated to be less than 5 mL.  The area was coated with petrolatum and covered with a non-adherent dressing followed by gauze and tape. Postoperative instructions were reviewed per protocol.  The patient left alert and fully oriented.    Follow-up for suture removal: Not applicable as only dissolving sutures used    Jules Roman MD was immediately available for the entire surgery and was physicially present for the key portions of the procedure.    Erica Patton MD  Micrographic Surgery and Dermatologic Oncology (MSDO) Fellow, PGY-5

## 2023-08-01 NOTE — NURSING NOTE
Dermatology Rooming Note    Rafaela Rock's goals for this visit include:   Chief Complaint   Patient presents with    Procedure     Patient presents for excision of SCC on Left superior medial lower leg.      Sasha Wayne LPN

## 2023-08-01 NOTE — PATIENT INSTRUCTIONS
Wound care    I will experience scar, bleeding, swelling, pain, crusting and redness. I may experience incomplete removal or recurrence. Risks are bleeding, bruising, swelling, infection, nerve damage, & a large wound. A second procedure may be recommended to obtain the best cosmetic or functional result.       A three month office visit with your Surgeon is recommended for scar evaluation. Please reach out sooner if you have concerns about you surgical site/wound.    Caring for your skin after surgery    After your surgery, a pressure bandage will be placed over the area that has stitches. This is important to prevent bleeding. Please follow these instructions over the next 1 to 2 weeks. Following this regimen will help to prevent complications as your wound heals.     For the first 48 hours after your surgery:    Leave the pressure dressing on and keep it dry. If it should come loose, you may re-tape it, but do not take it off.  Relax and take it easy. Do not do any vigorous exercise or heavy lifting. This could cause the wound to bleed.  Post-Operative pain is usually mild. If you are able to take tylenol, You may take plain or extra-strength Tylenol (acetaminophen) As directed on the bottle (do not take more than 4,000mg in one day). If you are able to take ibuprofen, you can alternate the tylenol and ibuprofen.   Avoid alcohol as this may increase your tendency to bleed.   You may put an ice pack around the bandaged area for 20 minutes at a time as needed. This may help reduce swelling, bruising, and pain. Make sure the ice pack is waterproof so that the pressure bandage doesn t get wet.  If the wound is on the face try to sleep with your head elevated. Either in a recliner or propped up in bed, this will decrease swelling around the eyes.   You may see a small amount of drainage or blood on your pressure bandage. This is normal. However:  If drainage or bleeding continues or saturates the bandage, you will  "need to apply firm pressure over the bandage with a piece of gauze for 15 minutes.  If bleeding continues after applying pressure for 15 minutes, apply an ice pack to the bandaged area for 15 minutes.  If bleeding still continues, call our office or go to the nearest emergency room.    Remove pressure dressing 48 hours after surgery:    Carefully remove the pressure bandage. If it seems sticky or too difficult to get off, you may need to soak it off in the shower.  After the pressure dressing is removed, you may shower and get the wound wet. However, Do Not let the forceful stream of the shower hit the wound directly.  Follow these wound care and dressing change instructions:    You have steri strips, skin glue (purplish/clear), and tegaderm (clear film) over your incision line, you can shower.   You may allow water to run over the site. Do not soak.  Do Not rub or scrub the site.  Pat dry after the shower or bath.  Avoid topical medications, lotions, creams, ointment,or oils.  Do not use tanning lamps or expose the site to sun.   Check wound appearance daily, some swelling and redness is normal after a procedure but should go away as your would is healing. If the swelling and redness or pain increases or if any other signs of infection occur listed below please send in a photo via my chart and or call us to let us know.  The clear film should start peeling off approximately around 2 weeks. By this time your wound should be sufficiently healed. If it still looks to be healing when the glue comes off you can clean the wound with soapy warm water daily in the shower and apply Vaseline to the incision line.   Once the clear film starts peeling off to the point where water is getting trapped under the clear film, please gently peel off.        If you are able to take acetaminophen (\"Tylenol,\" etc.) and ibuprofen (\"Advil\" or \"Motrin,\" etc.), then you may STAGGER these medications by taking 400 mg of ibuprofen (usually " two tabs) every 8 hours and 1,000 mg of acetaminophen (e.g., two tabs of extra-strength Tylenol) every 8 hours.    This means, for example, that you could take the followin,000 mg of Tylenol, followed 4 hours later by 400 mg Ibuprofen, followed 4 hours later with 1,000 mg of Tylenol, followed 4 hours later by 400 mg Ibuprofen, followed 4 hours later with 1,000 mg of Tylenol, and so forth.     Essentially, you can take either 1,000 mg of Tylenol or 400 mg of ibuprofen in alternating fashion EVERY FOUR HOURS.    Do NOT exceed more than 4,000 mg of Tylenol or 3,200 mg of ibuprofen per 24 hours. If you are not able to take Tylenol or ibuprofen as above due to other health issues (or a physician has told you directly that you are not allowed to take one of them, say due to pre-existing severe liver or kidney issues), then disregard the above directions.    Scientific evidence supports that this combination/schedule of pain medications is just as effective, if not more effective, than taking a narcotic pain medicine.       Follow up will be a 3 month scar evaluation either in person or via a telephone visit with you sending in a photo via [employer]. Unless you have been told to follow up sooner or if you have concerns and would like to be see sooner. Please call or send us in a [employer] message if possible and attach a photo.        What to expect:    The first couple of days your wound may be tender and may bleed slightly when doing wound care.  There may be swelling and bruising around the wound, especially if it is near the eyes. For your comfort, you may apply ice or cold compresses to the bruises after your have removed the pressure bandage.  The area around your wound may be numb for several weeks or even months.  You may experience periodic sharp pain or mild itching around the wound as it heals.   The suture line will look dark for a while but will lighten over time.       When to call us:    You have bleeding  that will not stop after applying pressure and ice.  You have pain that is not controlled with Tylenol (acetaminophen.)  You have signs or symptoms of an infection such as:  Fever over 100 degrees Fahrenheit  Redness, warmth or foul-smelling drainage from the wound  If you have any questions, or are not sure how to take care of the wound.    Phone numbers:    During business hours (M-F 8:00-4:30 p.m.)  [facility]-  [phone] Option 1 appt. desk  [phone]  Option 3 nurse triage line  ---------------------------------------------------------  Evenings/Weekends/Holidays  Hospital - [phone]   TTY for hearing fkyskhrf-[phone]  *Ask  to page dermatologist on-call  Emergency Rots-[phone]  TTY for hearing impaired- [phone]             Lower leg Wound care    I will experience scar, bleeding, swelling, pain, crusting and redness. I may experience incomplete removal or recurrence. Risks are bleeding, bruising, swelling, infection, nerve damage, & a large wound. A second procedure may be recommended to obtain the best cosmetic or functional result.       A three month office visit with your Surgeon is recommended for scar evaluation. Please reach out sooner if you have concerns about you surgical site/wound.    Caring for your skin after surgery    After your surgery, a pressure bandage will be placed over the area that has stitches. This is important to prevent bleeding. Please follow these instructions over the next 1 to 2 weeks. Following this regimen will help to prevent complications as your wound heals.     For the first 48 hours after your surgery:    Leave the pressure dressing on and keep it dry. If it should come loose, you may re-tape it, but do not take it off.  Relax and take it easy. Do not do any vigorous exercise or heavy lifting. This could cause the wound to bleed.  Post-Operative pain is usually mild. If you are able to take tylenol, You  may take plain or extra-strength Tylenol (acetaminophen) As directed on the bottle (do not take more than 4,000mg in one day). If you are able to take ibuprofen, you can alternate the tylenol and ibuprofen.   Avoid alcohol as this may increase your tendency to bleed.   You may put an ice pack around the bandaged area for 20 minutes at a time as needed. This may help reduce swelling, bruising, and pain. Make sure the ice pack is waterproof so that the pressure bandage doesn t get wet.  If the wound is on the face try to sleep with your head elevated. Either in a recliner or propped up in bed, this will decrease swelling around the eyes.   You may see a small amount of drainage or blood on your pressure bandage. This is normal. However:  If drainage or bleeding continues or saturates the bandage, you will need to apply firm pressure over the bandage with a piece of gauze for 15 minutes.  If bleeding continues after applying pressure for 15 minutes, apply an ice pack to the bandaged area for 15 minutes.  If bleeding still continues, call our office or go to the nearest emergency room.    Remove pressure dressing 48 hours after surgery:    Carefully remove the pressure bandage. If it seems sticky or too difficult to get off, you may need to soak it off in the shower.  After the pressure dressing is removed, you may shower and get the wound wet. However, Do Not let the forceful stream of the shower hit the wound directly.  Follow these wound care and dressing change instructions:   In the shower wash the surgical site last with its own separate wash cloth.  You may allow water to run over the site. Take a clean wash cloth wet with soapy warm water and gently pat the suture site to help remove any crust or drainage.   Do Not rub or scrub the site    After site is clean pat dry and apply a thin layer of Vaseline ointment  over the suture site with a cotton swab or clean finger.   Cover the suture site with Telfa  (non-stick) dressing. You may tape a piece of gauze over the Telfa for extra protection if you wish.  Continue wound care at least once a day, twice if you are active or around a contaminated environment.  Continue daily wound care until your surgical site is completely healed. To determine this, around 2 weeks post procedure you can take a cotton swab with a small amount of Hydrogen peroxide and roll it on the suture site. If the site bubbles and turns white your wound is still healing. Please continue wound care until the area no longer bubbles up from the hydrogen peroxide.           What to expect:    The first couple of days your wound may be tender and may bleed slightly when doing wound care.  There may be swelling and bruising around the wound, especially if it is near the eyes. For your comfort, you may apply ice or cold compresses to the bruises after your have removed the pressure bandage.  The area around your wound may be numb for several weeks or even months.  You may experience periodic sharp pain or mild itching around the wound as it heals.   The suture line will look dark for a while but will lighten over time.       When to call us:    You have bleeding that will not stop after applying pressure and ice.  You have pain that is not controlled with Tylenol (acetaminophen.)  You have signs or symptoms of an infection such as:  Fever over 100 degrees Fahrenheit  Redness, warmth or foul-smelling drainage from the wound  If you have any questions, or are not sure how to take care of the wound.    Phone numbers:    During business hours (M-F 8:00-4:30 p.m.)  [facility]-  [phone] Option 1 appt. desk  [phone]  Option 3 nurse triage line  ---------------------------------------------------------  Evenings/Weekends/Holidays  Hospital - [phone]   TTY for hearing fifufvcx-[phone]  *Ask  to page dermatologist on-call  Emergency  Vdsi-[phone]  TTY for hearing impaired- [phone]

## 2023-08-09 NOTE — PATIENT INSTRUCTIONS
Cryotherapy    What is it?  Use of a very cold liquid, such as liquid nitrogen, to freeze and destroy abnormal skin cells that need to be removed    What should I expect?  Tenderness and redness  A small blister that might grow and fill with dark purple blood. There may be crusting.  More than one treatment may be needed if the lesions do not go away.    How do I care for the treated area?  Gently wash the area with your hands when bathing.  Use a thin layer of Vaseline to help with healing. You may use a Band-Aid.   The area should heal within 7-10 days and may leave behind a pink or lighter color.   Do not use an antibiotic or Neosporin ointment.   You may take acetaminophen (Tylenol) for pain.     Call your doctor if you have:  Severe pain  Signs of infection (warmth, redness, cloudy yellow drainage, and or a bad smell)  Questions or concerns    Who should I call with questions?      [facility]: [phone]      [facility]: [phone]      For urgent needs outside of business hours call the [facility] at [phone] and ask for the dermatology resident on call     Wound Care After a Biopsy    What is a skin biopsy?  A skin biopsy allows the doctor to examine a very small piece of tissue under the microscope to determine the diagnosis and the best treatment for the skin condition. A local anesthetic (numbing medicine) is injected with a very small needle into the skin area to be tested. A small piece of skin is taken from the area. Sometimes a suture (stitch) is used.     What are the risks of a skin biopsy?  I will experience scar, bleeding, swelling, pain, crusting and redness. I may experience incomplete removal or recurrence. Risks of this procedure are excessive bleeding, bruising, infection, nerve damage, numbness, thick (hypertrophic or keloidal) scar and non-diagnostic biopsy.    How should I care for my wound for the first 24  hours?  Keep the wound dry and covered for 24 hours  If it bleeds, hold direct pressure on the area for 15 minutes. If bleeding does not stop, call us or go to the emergency room  Avoid strenuous exercise the first 1-2 days or as your doctor instructs you    How should I care for the wound after 24 hours?  After 24 hours, remove the bandage  You may bathe or shower as normal  If you had a scalp biopsy, you can shampoo as usual and can use shower water to clean the biopsy site daily  Clean the wound once a day with gentle soap and water  Do not scrub, be gentle  Apply white petroleum/Vaseline after cleaning the wound with a cotton swab or a clean finger, and keep the site covered with a Bandaid /bandage. Bandages are not necessary with a scalp biopsy  If you are unable to cover the site with a Bandaid /bandage, re-apply ointment 2-3 times a day to keep the site moist. Moisture will help with healing  Avoid strenuous activity for first 1-2 days  Avoid lakes, rivers, pools, and oceans until the stitches are removed or the site is healed    How do I clean my wound?  Wash hands thoroughly with soap or use hand  before all wound care  Clean the wound with gentle soap and water  Apply white petroleum/Vaseline  to wound after it is clean  Replace the Bandaid /bandage to keep the wound covered for the first few days or as instructed by your doctor  If you had a scalp biopsy, warm shower water to the area on a daily basis should suffice    What should I use to clean my wound?   Cotton-tipped applicators (Qtips )  White petroleum jelly (Vaseline ). Use a clean new container and use Q-tips to apply.  Bandaids  as needed  Gentle soap     How should I care for my wound long term?  Do not get your wound dirty  Keep up with wound care for one week or until the area is healed.  If you have stitches, stitches need to be removed in 14 days. You may return to our clinic for this or you may have it done locally at your doctor s  office.  A small scab will form and fall off by itself when the area is completely healed. The area will be red and will become pink in color as it heals. Sun protection is very important for how your scar will turn out. Sunscreen with an SPF 30 or greater is recommended once the area is healed.  You should have some soreness but it should be mild and slowly go away over several days. Talk to your doctor about using tylenol for pain,    When should I call my doctor?  If you have increased:   Pain or swelling  Pus or drainage (clear or slightly yellow drainage is ok)  Temperature over 100F  Spreading redness or warmth around wound    When will I hear about my results?  The biopsy results can take 2 weeks to come back.  Your results will automatically release to Get[employer]ue before your provider has even reviewed them.  The clinic will call you with the results, send you a Get[employer] message, or have you schedule a follow-up clinic or phone time to discuss the results.  Contact our clinics if you do not hear from us in 2 weeks.    Who should I call with questions?  [facility]: [phone]  [facility]: [phone]  For urgent needs outside of business hours call the [facility] at [phone] and ask for the dermatology resident on call

## 2023-09-14 ENCOUNTER — NURSE TRIAGE (OUTPATIENT)
Dept: NURSING | Facility: CLINIC | Age: 78
End: 2023-09-14

## 2023-09-14 ENCOUNTER — OFFICE VISIT (OUTPATIENT)
Dept: FAMILY MEDICINE | Facility: CLINIC | Age: 78
End: 2023-09-14
Payer: COMMERCIAL

## 2023-09-14 VITALS
BODY MASS INDEX: 34.35 KG/M2 | HEIGHT: 64 IN | WEIGHT: 201.2 LBS | TEMPERATURE: 97.9 F | DIASTOLIC BLOOD PRESSURE: 73 MMHG | HEART RATE: 73 BPM | OXYGEN SATURATION: 96 % | RESPIRATION RATE: 11 BRPM | SYSTOLIC BLOOD PRESSURE: 116 MMHG

## 2023-09-14 DIAGNOSIS — H81.20 VESTIBULAR NEURONITIS, UNSPECIFIED LATERALITY: Primary | ICD-10-CM

## 2023-09-14 PROCEDURE — 99213 OFFICE O/P EST LOW 20 MIN: CPT | Performed by: INTERNAL MEDICINE

## 2023-09-14 ASSESSMENT — PAIN SCALES - GENERAL: PAINLEVEL: NO PAIN (0)

## 2023-09-14 NOTE — PROGRESS NOTES
Assessment & Plan     1.  Vestibular neuronitis based on clinical presentation.  Patient informed this condition is felt to be related to virus infection and symptoms usually self resolves within a couple of weeks.  Differential diagnoses considered included vertebrobasilar stroke, Ménière's disease, permanent positional vertigo which are all unlikely based on symptoms.  Patient informed that she could consider meclizine for some symptomatic relief.  If symptoms are not currently mild.        Rick Jensen MD  [facility] GLADYS Russo is a 78 year old, presenting for the following health issues:  Dizziness      History of Present Illness       Reason for visit:  Vertigo  Symptom onset:  3-7 days ago  Symptoms include:  Dizzy  Symptom intensity:  Mild  Symptom progression:  Worsening  Had these symptoms before:  No  What makes it worse:  Moving fast up down sideways  What makes it better:  Big deep breaths in and out    She eats 0-1 servings of fruits and vegetables daily.She consumes 1 sweetened beverage(s) daily.She exercises with enough effort to increase her heart rate 30 to 60 minutes per day.  She exercises with enough effort to increase her heart rate 4 days per week.   She is taking medications regularly.       Dizziness  Onset/Duration: 09/08/2023  Description:   Do you feel faint: No  Does it feel like the surroundings (bed, room) are moving: YES  Unsteady/off balance: YES  Have you passed out or fallen: No  Intensity: severe  Progression of Symptoms: constant and intermittent  Accompanying Signs & Symptoms:  Heart palpitations or chest pain: No  Nausea, vomiting: YES  Weakness or lack of coordination in arms or legs: No  Vision or speech changes: No  Numbness or tingling: No  Ringing in ears (Tinnitus): No  Hearing Loss: No  History:   Head trauma/concussion history: No  Previous similar symptoms: No  Recent bleeding history: No  Any new medications (BP?):  No  Precipitating factors:   Worse with activity: YES- fast movement  Worse with head movement: YES  Alleviating factors:   Does staying in a fixed position give relief: YES  Therapies tried and outcome: None    78-year-old lady with no prior history of cardiovascular or cerebrovascular disease, hypertension, dyslipidemia etc. presents with 6-day history of vertiginous type dizziness.  She woke up 1 morning and the room was spinning.  She noticed afterwards that he gets triggered by certain movements such as bending down and then getting up quickly.  The symptoms seem to come and go.  No ear pain.  No lateralizing weakness or tingling numbness.  No visual problems.  The patient was working on her iPad and when she looked up she noticed room spinning and at that point she developed nausea, lightheadedness and felt little sweaty.  Eventually with deep breathing the episode passed.    Review of Systems   Constitutional, HEENT, cardiovascular, pulmonary, GI, , musculoskeletal, neuro, skin, endocrine and psych systems are negative, except as otherwise noted.      Objective    There were no vitals taken for this visit.  There is no height or weight on file to calculate BMI.  Physical Exam   GENERAL: healthy, alert and no distress  HENT: ear canals and TM's normal, nose and mouth without ulcers or lesions  NECK: no adenopathy, no asymmetry, masses, or scars and thyroid normal to palpation  RESP: lungs clear to auscultation - no rales, rhonchi or wheezes  CV: regular rate and rhythm, normal S1 S2, no S3 or S4, no murmur, click or rub, no peripheral edema and peripheral pulses strong  NEURO: Normal strength and tone, mentation intact and speech normal

## 2023-09-14 NOTE — TELEPHONE ENCOUNTER
Nurse Triage SBAR    Is this a 2nd Level Triage? YES, LICENSED PRACTITIONER REVIEW IS REQUIRED    Situation: Patient calling with c/o dizziness.  Consent: not needed    Background: Pt states that she has been having intermittent dizziness with position changes and head movements since 9/8 but this AM she was watching TV and she went to get on I-Pad and she became very dizzy, the room was spinning and pt became nauseated. Pt states that this happened w/in the past hour and lasted 15-20 minutes.     Assessment: Pt states that it took her sometime and it was difficult to get to the bathroom as she felt like she was going to vomit but she got into the bathroom and then sat and took deep breaths and everything eventually calmed down. Pt did not vomit and is now just resting and  has cancelled everything she has planned for today.    Advised pt be seen today and transferred he to [facility] for appt. Advised UC if no available appts. Pt declined UC and requested a msg to PCP.    Protocol Recommended Disposition:   See in Office Today    Recommendation: Advised patient to  wait for call back from care team after discussing with PCP . Reviewed concerning symptoms and when to call back.     Routed to provider - please contact pt to advise @[phone], ok to  dt msg.    Does the patient meet one of the following criteria for ADS visit consideration? 16+ years old, with an [facility] PCP     TIP  Providers, please consider if this condition is appropriate for management at one of our Acute and Diagnostic Services sites.     If patient is a good candidate, please use dotphrase <dot>triageresponse and select Refer to ADS to document.         CESAR ELLER RN [facility] 9/14/2023 9:41 AM    Reason for Disposition   MODERATE dizziness (e.g., interferes with normal activities)  (Exception: Dizziness caused by heat exposure, sudden standing, or poor fluid intake.)    Additional Information   Negative: SEVERE difficulty  breathing (e.g., struggling for each breath, speaks in single words)   Negative: Shock suspected (e.g., cold/pale/clammy skin, too weak to stand, low BP, rapid pulse)   Negative: Difficult to awaken or acting confused (e.g., disoriented, slurred speech)   Negative: Fainted, and still feels dizzy afterwards   Negative: Overdose (accidental or intentional) of medications   Negative: New neurologic deficit that is present now: * Weakness of the face, arm, or leg on one side of the body * Numbness of the face, arm, or leg on one side of the body * Loss of speech or garbled speech   Negative: Heart beating < 50 beats per minute OR > 140 beats per minute   Negative: Sounds like a life-threatening emergency to the triager   Negative: Chest pain   Negative: Rectal bleeding, bloody stool, or tarry-black stool   Negative: Vomiting is main symptom   Negative: Diarrhea is main symptom   Negative: Headache is main symptom   Negative: Heat exhaustion suspected (i.e., dehydration from heat exposure)   Negative: Patient states that they are having an anxiety or panic attack   Negative: Dizziness from low blood sugar (i.e., < 60 mg/dl or 3.5 mmol/l)   Negative: SEVERE dizziness (e.g., unable to stand, requires support to walk, feels like passing out now)   Negative: SEVERE headache or neck pain   Negative: Spinning or tilting sensation (vertigo) present now and one or more stroke risk factors (i.e., hypertension, diabetes mellitus, prior stroke/TIA, heart attack, age over 60) (Exception: Prior physician evaluation for this AND no different/worse than usual.)   Negative: Neurologic deficit that was brief (now gone), ANY of the following:* Weakness of the face, arm, or leg on one side of the body* Numbness of the face, arm, or leg on one side of the body* Loss of speech or garbled speech   Negative: Loss of vision or double vision  (Exception: Similar to previous migraines.)   Negative: Extra heartbeats, irregular heart beating, or  heart is beating very fast (i.e., 'palpitations')   Negative: Difficulty breathing   Negative: Drinking very little and dehydration suspected (e.g., no urine > 12 hours, very dry mouth, very lightheaded)   Negative: Follows bleeding (e.g., stomach, rectum, vagina)  (Exception: Became dizzy from sight of small amount blood.)   Negative: Patient sounds very sick or weak to the triager   Negative: Lightheadedness (dizziness) present now, after 2 hours of rest and fluids   Negative: Spinning or tilting sensation (vertigo) present now   Negative: Fever > 103 F (39.4 C)   Negative: Fever > 100.0 F (37.8 C) and has diabetes mellitus or a weak immune system (e.g., HIV positive, cancer chemotherapy, organ transplant, splenectomy, chronic steroids)    Protocols used: Dizziness-A-OH

## 2023-09-14 NOTE — TELEPHONE ENCOUNTER
I do not have any openings in my schedule, but I would agree it would be helpful and appropriate to have her seen today by someone.      Josue Shah MD

## 2023-09-14 NOTE — TELEPHONE ENCOUNTER
"Called patient and relayed message, \"I do not have any openings in my schedule, but I would agree it would be helpful and appropriate to have her seen today by someone. Josue Shah MD\"    Checked FZ, BE, and NE schedules and there are no appointment openings.     Patient was able to find an appointment opening today with Dr. Rick Jensen for 1:40 pm, so she will plan to see him today for assessment.    Allison Zepeda RN   [facility] [place]  "

## 2023-09-24 ENCOUNTER — HEALTH MAINTENANCE LETTER (OUTPATIENT)
Age: 78
End: 2023-09-24

## 2023-10-12 ENCOUNTER — OFFICE VISIT (OUTPATIENT)
Dept: FAMILY MEDICINE | Facility: CLINIC | Age: 78
End: 2023-10-12
Payer: COMMERCIAL

## 2023-10-12 VITALS
DIASTOLIC BLOOD PRESSURE: 80 MMHG | SYSTOLIC BLOOD PRESSURE: 137 MMHG | TEMPERATURE: 98.3 F | WEIGHT: 203 LBS | HEART RATE: 68 BPM | OXYGEN SATURATION: 96 % | BODY MASS INDEX: 35.97 KG/M2 | HEIGHT: 63 IN

## 2023-10-12 DIAGNOSIS — D47.2 MGUS (MONOCLONAL GAMMOPATHY OF UNKNOWN SIGNIFICANCE): ICD-10-CM

## 2023-10-12 DIAGNOSIS — Z86.0100 HISTORY OF COLONIC POLYPS: ICD-10-CM

## 2023-10-12 DIAGNOSIS — E66.01 CLASS 2 SEVERE OBESITY DUE TO EXCESS CALORIES WITH SERIOUS COMORBIDITY AND BODY MASS INDEX (BMI) OF 35.0 TO 35.9 IN ADULT (H): ICD-10-CM

## 2023-10-12 DIAGNOSIS — Z01.818 PREOP GENERAL PHYSICAL EXAM: Primary | ICD-10-CM

## 2023-10-12 DIAGNOSIS — E66.812 CLASS 2 SEVERE OBESITY DUE TO EXCESS CALORIES WITH SERIOUS COMORBIDITY AND BODY MASS INDEX (BMI) OF 35.0 TO 35.9 IN ADULT (H): ICD-10-CM

## 2023-10-12 DIAGNOSIS — Z86.718 PERSONAL HISTORY OF DVT (DEEP VEIN THROMBOSIS): ICD-10-CM

## 2023-10-12 PROCEDURE — 99213 OFFICE O/P EST LOW 20 MIN: CPT | Performed by: FAMILY MEDICINE

## 2023-10-12 ASSESSMENT — ENCOUNTER SYMPTOMS
CONSTIPATION: 0
FEVER: 0
DIZZINESS: 1
HEARTBURN: 0
NAUSEA: 0
BREAST MASS: 0
ARTHRALGIAS: 0
COUGH: 0
SORE THROAT: 0
JOINT SWELLING: 0
DYSURIA: 0
FREQUENCY: 0
SHORTNESS OF BREATH: 1
PARESTHESIAS: 0
NERVOUS/ANXIOUS: 0
HEMATOCHEZIA: 0
ABDOMINAL PAIN: 0
DIARRHEA: 0
MYALGIAS: 0
EYE PAIN: 0
PALPITATIONS: 0
HEMATURIA: 0
HEADACHES: 0
CHILLS: 0

## 2023-10-12 ASSESSMENT — PAIN SCALES - GENERAL: PAINLEVEL: NO PAIN (0)

## 2023-10-12 ASSESSMENT — ACTIVITIES OF DAILY LIVING (ADL): CURRENT_FUNCTION: NO ASSISTANCE NEEDED

## 2023-10-12 NOTE — COMMUNITY RESOURCES LIST (ENGLISH)
10/12/2023   [facility] [employer]  N/A  For questions about this resource list or additional care needs, please contact your primary care clinic or care manager.  Phone: [phone]   Email: N/A   Address: [address] [place] [address]   Hours: N/A        Financial Stability       Utility payment assistance  1  [employer] - Payment Plan Credit Program Distance: 5.24 miles      Phone/Virtual   [address] [place] [address]  Language: English, Bulgarian  Hours: Mon - Fri 7:00 AM - 7:00 PM  Fees: Free   Phone: (136) 829-7934 Email: inquire@[employer] Website: https://www.[employer]DidIt/     2  [employer] - [place] Distance: 5.32 miles      Phone/Virtual   [address] [place] [address]  Language: English  Hours: Mon - Thu 9:00 AM - 5:00 PM  Fees: Free   Phone: (640) 514-4548 Email: general.questions@[employer] Website: https://www.[employer]          Important Numbers & Websites       Emergency Services   911  City Services   311  Poison Control   (783) 766-6268  Suicide Prevention Lifeline   (871) 478-6078 (TALK)  Child Abuse Hotline   (207) 772-7478 (4-A-Child)  Sexual Assault Hotline   (536) 811-9322 (HOPE)  National Runaway Safeline   (173) 516-4794 (RUNAWAY)  All-Options Talkline   (233) 862-3385  Substance Abuse Referral   (968) 201-8656 (HELP)

## 2023-10-12 NOTE — COMMUNITY RESOURCES LIST (ENGLISH)
10/12/2023   [facility] [employer]  N/A  For questions about this resource list or additional care needs, please contact your primary care clinic or care manager.  Phone: [phone]   Email: N/A   Address: [address] [place] [address]   Hours: N/A        Financial Stability       Utility payment assistance  1  [employer] - Payment Plan Credit Program Distance: 5.24 miles      Phone/Virtual   [address] [place] [address]  Language: English, Welsh  Hours: Mon - Fri 7:00 AM - 7:00 PM  Fees: Free   Phone: (937) 358-1812 Email: inquire@[employer] Website: https://www.[employer]/     2  [employer] - [place] Distance: 5.32 miles      Phone/Virtual   [address] [place] [address]  Language: English  Hours: Mon - Thu 9:00 AM - 5:00 PM  Fees: Free   Phone: (573) 318-8748 Email: general.questions@[employer] Website: https://www.[employer]          Important Numbers & Websites       Emergency Services   911  City Services   311  Poison Control   (348) 444-7614  Suicide Prevention Lifeline   (687) 676-6117 (TALK)  Child Abuse Hotline   (797) 815-6188 (4-A-Child)  Sexual Assault Hotline   (230) 525-1700 (HOPE)  National Runaway Safeline   (136) 222-9665 (RUNAWAY)  All-Options Talkline   (947) 610-9521  Substance Abuse Referral   (684) 171-3089 (HELP)

## 2023-10-12 NOTE — COMMUNITY RESOURCES LIST (ENGLISH)
10/12/2023   [facility]  N/A  For additional resource needs, please contact your health insurance member services or your primary care team.  Phone: [phone]   Email: N/A   Address: [facility] [place] [address]   Hours: N/A        Financial Stability       Utility payment assistance  1  [place] [facility] - [employer] and [employer] Distance: 8.86 miles      In-Person, Phone/Virtual   [address] [place] [address]  Language: English  Hours: Mon - Fri 8:30 AM - 4:30 PM  Fees: Free   Phone: (776) 616-1332 Website: [place]://[place]/[employer]/energy/consumer-assistance/energy-assistance-program/     2  [place] [employer] [facility] [place]'s Telephone Assistance Plan (TAP) and [facility] Lifeline and Affordable Connectivity Program (ACP) Distance: 13.17 miles      Phone/Virtual   [address] [place] [address]  Language: English  Fees: Free   Phone: (105) 960-3282 Email: solange.gian@[facility].mn. Website: https://[place]puc/consumers/telephone/          Important Numbers & Websites       [facility]   211 211unitedway.org  Poison Control   (170) 732-3546 [place]poison.[place]  Suicide and Crisis Lifeline   [address] [place], [place] [address]line.org  Childhelp [place] Child Abuse Hotline   860.297.6144 Childhelphotline.org  National Sexual Assault Hotline   (170) 155-8986 (HOPE) Rainn.org  National Runaway Safeline   (217) 296-6997 (RUNAWAY) 1800runaway.org  Pregnancy & Postpartum Support [place]   Call/text 293-805-3366 Ppsupportmn.org  Substance Abuse National Helpline ([facility]   701-637-HELP (4819) Findtreatment.gov  Emergency Services   911

## 2023-10-12 NOTE — PROGRESS NOTES
[facility]  [address]  [place] [address]  Phone: [phone]  Primary Provider: Josue Pereyra  Pre-op Performing Provider: JOSUE PEREYRA      PREOPERATIVE EVALUATION:  Today's date: 10/12/2023    Rafaela is a 78 year old female who presents for a preoperative evaluation.      10/12/2023     9:34 AM   Additional Questions   Roomed by cam   Accompanied by none         10/12/2023     9:34 AM   Patient Reported Additional Medications   Patient reports taking the following new medications none       Surgical Information:  Surgery/Procedure: Colonoscopy  Surgery Location: [facility]  Surgeon: ?  Surgery Date: 10/30/23  Time of Surgery: 9:30 AM  Where patient plans to recover: At home with family  Fax number for surgical facility: [phone]    Assessment & Plan     The proposed surgical procedure is considered LOW risk.      ICD-10-CM    1. Preop general physical exam  Z01.818       2. History of colonic polyps  Z86.010       3. MGUS (monoclonal gammopathy of unknown significance)  D47.2       4. Class 2 severe obesity due to excess calories with serious comorbidity and body mass index (BMI) of 35.0 to 35.9 in adult (H)  E66.01     Z68.35       5. Personal history of DVT (deep vein thrombosis)  Z86.718            - No identified additional risk factors other than previously addressed    Antiplatelet or Anticoagulation Medication Instructions:   - Patient is on no antiplatelet or anticoagulation medications.    Additional Medication Instructions:  Patient is on no additional chronic medications    RECOMMENDATION:  APPROVAL GIVEN to proceed with proposed procedure, without further diagnostic evaluation.      Subjective       HPI related to upcoming procedure:  78-year-old female with a past history of colon polyps is coming in now for a routine 5-year follow-up colonoscopy for that.        10/12/2023     9:33 AM   Preop Questions   1. Have you ever had a heart attack or  stroke? No   2. Have you ever had surgery on your heart or blood vessels, such as a stent placement, a coronary artery bypass, or surgery on an artery in your head, neck, heart, or legs? No   3. Do you have chest pain with activity? No   4. Do you have a history of  heart failure? No   5. Do you currently have a cold, bronchitis or symptoms of other infection? No   6. Do you have a cough, shortness of breath, or wheezing? No   7. Do you or anyone in your family have previous history of blood clots? YES - she had a left leg DVT earlier this year that was treated with Eliquis for 4 months   8. Do you or does anyone in your family have a serious bleeding problem such as prolonged bleeding following surgeries or cuts? No   9. Have you ever had problems with anemia or been told to take iron pills? No   10. Have you had any abnormal blood loss such as black, tarry or bloody stools, or abnormal vaginal bleeding? No   11. Have you ever had a blood transfusion? No   12. Are you willing to have a blood transfusion if it is medically needed before, during, or after your surgery? Yes   13. Have you or any of your relatives ever had problems with anesthesia? No   14. Do you have sleep apnea, excessive snoring or daytime drowsiness? No   15. Do you have any artifical heart valves or other implanted medical devices like a pacemaker, defibrillator, or continuous glucose monitor? No   16. Do you have artificial joints? YES - prior history of TKA   17. Are you allergic to latex? No         Status of Chronic Conditions:  See problem list for active medical problems.  Problems all longstanding and stable, except as noted/documented.  See ROS for pertinent symptoms related to these conditions.    Review of Systems  CONSTITUTIONAL: NEGATIVE for fever, chills, change in weight  INTEGUMENTARY/SKIN: She sees dermatology for ongoing skin issues  EYES: NEGATIVE for vision changes or irritation  ENT/MOUTH: NEGATIVE for ear, mouth and throat  problems  RESP: NEGATIVE for significant cough or SOB  CV: She gets a little bit of lower leg swelling at times, left more so than right  MUSCULOSKELETAL: NEGATIVE for significant arthralgias or myalgia  NEURO: NEGATIVE for weakness, dizziness or paresthesias  HEME/ALLERGY/IMMUNE: NEGATIVE for bleeding problems  PSYCHIATRIC: NEGATIVE for changes in mood or affect    Patient Active Problem List    Diagnosis Date Noted    Deep vein thrombosis (DVT) of proximal vein of left lower extremity, unspecified chronicity (H) 05/16/2023     Priority: Medium    History of colonic polyps 04/25/2022     Priority: Medium    Myogenic ptosis of left eyelid 01/20/2021     Priority: Medium     Added automatically from request for surgery [id number]      Dermatochalasis of both upper eyelids 01/20/2021     Priority: Medium     Added automatically from request for surgery [id number]      Keratoconjunctivitis of both eyes 12/28/2019     Priority: Medium    Trochanteric bursitis of left hip 01/22/2019     Priority: Medium    Rash 03/26/2017     Priority: Medium    Diffuse photodamage of skin 03/26/2017     Priority: Medium    Status post total left knee replacement 03/21/2016     Priority: Medium    Inflamed seborrheic keratosis 02/29/2016     Priority: Medium    AK (actinic keratosis) 02/02/2016     Priority: Medium    Xerosis of skin 02/02/2016     Priority: Medium    Intertrigo 02/02/2016     Priority: Medium    Vitamin D deficiency 11/27/2013     Priority: Medium    S/P total knee arthroplasty - left 03/05/2013     Priority: Medium    Cataract 10/29/2012     Priority: Medium     Utility update for deleted IMO code  Imo Update utility      Advanced directives, counseling/discussion 10/26/2012     Priority: Medium             Obesity (BMI 30.0-34.9)      Priority: Medium    POSTERIOR VITREOUS DETACHMENT, os 07/14/2011     Priority: Medium    Hyperlipidemia with target LDL less than 130      Priority: Medium     Diagnosis updated by automated  process. Provider to review and confirm.      OA (osteoarthritis)      Priority: Medium    Rosacea      Priority: Medium      Past Medical History:   Diagnosis Date    AK (actinic keratosis) 2/2/2016    Allergic rhinitis     Hyperlipidemia LDL goal < 130     Impaired fasting glucose     Lyme disease     Nonsenile cataract     OA (osteoarthritis)     Obesity     PONV (postoperative nausea and vomiting)     Rosacea     Vitamin D deficiency 10/12     Past Surgical History:   Procedure Laterality Date    CATARACT IOL, RT/LT  12/27/2013    Toric- RE    CATARACT IOL, RT/LT  12/05/2013    LE    COLONOSCOPY      COLONOSCOPY  5-5-14    Return for Colonoscopy in 1 yrs    PHACOEMULSIFICATION CLEAR CORNEA WITH TORIC INTRAOCULAR LENS IMPLANT  12/6/2013    Procedure: PHACOEMULSIFICATION CLEAR CORNEA WITH TORIC INTRAOCULAR LENS IMPLANT;  LEFT PHACOEMULSIFICATION CLEAR CORNEA WITH TORIC INTRAOCULAR LENS IMPLANT  ;  Surgeon: Otoniel Schuster MD;  Location:  [facility]    PHACOEMULSIFICATION CLEAR CORNEA WITH TORIC INTRAOCULAR LENS IMPLANT  12/27/2013    Procedure: PHACOEMULSIFICATION CLEAR CORNEA WITH TORIC INTRAOCULAR LENS IMPLANT;  RIGHT PHACOEMULSIFICATION CLEAR CORNEA WITH TORIC INTRAOCULAR LENS IMPLANT  ;  Surgeon: Otoniel Schuster MD;  Location:  [facility]    REPAIR PTOSIS BILATERAL Bilateral 3/5/2021    Procedure: Bilateral Upper Eyelid Mechanical Ptosis Repair;  Surgeon: Cristopher Pepper MD;  Location:  OR    TONSILLECTOMY  Age 11    [facility] NONSPECIFIC PROCEDURE  9/19/2003    Left knee scope/OA,MMT    [facility] TOTAL KNEE ARTHROPLASTY Left 4/2/2008    Left    [facility] TOTAL KNEE ARTHROPLASTY Right 8/17/16    DML     Current Outpatient Medications   Medication Sig Dispense Refill    triamcinolone (KENALOG) 0.1 % external ointment Apply topically 2 times daily 320 g 4       No Known Allergies     Social History     Tobacco Use    Smoking status: Never    Smokeless tobacco: Never   Substance Use Topics    Alcohol use: Yes     Comment:  "weekends     Family History   Problem Relation Age of Onset    Cancer Father         lung smoker    Skin Cancer Son         skin cancer    Glaucoma No family hx of     Macular Degeneration No family hx of     Eye Surgery No family hx of     Retinal detachment No family hx of     Melanoma No family hx of      History   Drug Use No         Objective     /80 (BP Location: Left arm, Patient Position: Sitting, Cuff Size: Adult Large)   Pulse 68   Temp 98.3  F (36.8  C) (Temporal)   Ht 1.61 m (5' 3.39\")   Wt 92.1 kg (203 lb)   SpO2 96%   BMI 35.52 kg/m      Physical Exam    GENERAL APPEARANCE: alert, no distress, cooperative, and over weight     EYES: EOMI, PERRL     HENT: Grossly normal     NECK: no adenopathy, no asymmetry, masses, or scars and thyroid normal to palpation     RESP: lungs clear to auscultation - no rales, rhonchi or wheezes     CV: regular rates and rhythm, normal S1 S2, no S3 or S4 and no murmur, click or rub     ABDOMEN:  soft, nontender, no HSM or masses      MS: Trace lower extremity edema, left slightly more so than right     SKIN: no suspicious lesions or rashes     NEURO: Normal strength and tone, sensory exam grossly normal, mentation intact and speech normal     PSYCH: mentation appears normal and affect normal/bright     LYMPHATICS: No cervical adenopathy    Recent Labs   Lab Test 04/05/23  1135 10/28/22  1429 09/30/22  1120 06/21/22  0940   HGB 13.5 13.3  --   --     231  --   --      --  139  --    POTASSIUM 4.3  --  4.6  --    CR 0.74  --  0.69  --    A1C  --   --   --  5.8*        Diagnostics:  No labs were ordered during this visit.   No EKG required for low risk surgery (cataract, skin procedure, breast biopsy, etc).    Revised Cardiac Risk Index (RCRI):  The patient has the following serious cardiovascular risks for perioperative complications:   - No serious cardiac risks = 0 points     RCRI Interpretation: 0 points: Class I (very low risk - 0.4% complication " rate)         Signed Electronically by: Josue Shah MD  Copy of this evaluation report is provided to requesting physician.

## 2023-10-12 NOTE — COMMUNITY RESOURCES LIST (ENGLISH)
10/12/2023   [facility] [employer]  N/A  For questions about this resource list or additional care needs, please contact your primary care clinic or care manager.  Phone: [phone]   Email: N/A   Address: [address] [place] [address]   Hours: N/A        Financial Stability       Utility payment assistance  1  [employer] - Payment Plan Credit Program Distance: 5.24 miles      Phone/Virtual   [address] [place] [address]  Language: English, Persian  Hours: Mon - Fri 7:00 AM - 7:00 PM  Fees: Free   Phone: (999) 624-7368 Email: inquire@[employer] Website: https://www.[employer]/     2  [employer] - [place] Distance: 5.32 miles      Phone/Virtual   [address] [place] [address]  Language: English  Hours: Mon - Thu 9:00 AM - 5:00 PM  Fees: Free   Phone: (852) 838-9076 Email: general.questions@[employer] Website: https://www.[employer]          Important Numbers & Websites       Emergency Services   911  City Services   311  Poison Control   (956) 338-8965  Suicide Prevention Lifeline   (895) 934-4271 (TALK)  Child Abuse Hotline   (637) 908-2682 (4-A-Child)  Sexual Assault Hotline   (666) 687-1074 (HOPE)  National Runaway Safeline   (886) 142-2485 (RUNAWAY)  All-Options Talkline   (503) 451-3223  Substance Abuse Referral   (993) 329-6004 (HELP)

## 2023-10-12 NOTE — COMMUNITY RESOURCES LIST (ENGLISH)
10/12/2023   [facility] [employer]  N/A  For questions about this resource list or additional care needs, please contact your primary care clinic or care manager.  Phone: [phone]   Email: N/A   Address: [address] [place] [address]   Hours: N/A        Financial Stability       Utility payment assistance  1  [employer] - Payment Plan Credit Program Distance: 5.24 miles      Phone/Virtual   [address] [place] [address]  Language: English, Italian  Hours: Mon - Fri 7:00 AM - 7:00 PM  Fees: Free   Phone: (200) 890-2454 Email: inquire@[employer] Website: https://www.[employer]/     2  [employer] - [place] Distance: 5.32 miles      Phone/Virtual   [address] [place] [address]  Language: English  Hours: Mon - Thu 9:00 AM - 5:00 PM  Fees: Free   Phone: (709) 759-1564 Email: general.questions@[employer] Website: https://www.[employer]          Important Numbers & Websites       Emergency Services   911  City Services   311  Poison Control   (987) 706-4517  Suicide Prevention Lifeline   (643) 305-4481 (TALK)  Child Abuse Hotline   (460) 109-1046 (4-A-Child)  Sexual Assault Hotline   (378) 921-5295 (HOPE)  National Runaway Safeline   (378) 730-5880 (RUNAWAY)  All-Options Talkline   (792) 695-2150  Substance Abuse Referral   (771) 627-3635 (HELP)

## 2023-10-12 NOTE — COMMUNITY RESOURCES LIST (ENGLISH)
10/12/2023   [facility] [employer]  N/A  For questions about this resource list or additional care needs, please contact your primary care clinic or care manager.  Phone: [phone]   Email: N/A   Address: [address] [place] [address]   Hours: N/A        Financial Stability       Utility payment assistance  1  [employer] - Payment Plan Credit Program Distance: 5.24 miles      Phone/Virtual   [address] [place] [address]  Language: English, Urdu  Hours: Mon - Fri 7:00 AM - 7:00 PM  Fees: Free   Phone: (320) 526-9758 Email: inquire@[employer] Website: https://www.[employer]/     2  [employer] - [place] Distance: 5.32 miles      Phone/Virtual   [address] [place] [address]  Language: English  Hours: Mon - Thu 9:00 AM - 5:00 PM  Fees: Free   Phone: (920) 644-4684 Email: general.questions@[employer] Website: https://www.[employer]          Important Numbers & Websites       Emergency Services   911  City Services   311  Poison Control   (630) 824-5508  Suicide Prevention Lifeline   (860) 358-1748 (TALK)  Child Abuse Hotline   (136) 488-2650 (4-A-Child)  Sexual Assault Hotline   (730) 426-2571 (HOPE)  National Runaway Safeline   (601) 543-1541 (RUNAWAY)  All-Options Talkline   (466) 482-5132  Substance Abuse Referral   (793) 926-5478 (HELP)

## 2023-10-13 ENCOUNTER — TELEPHONE (OUTPATIENT)
Dept: FAMILY MEDICINE | Facility: CLINIC | Age: 78
End: 2023-10-13
Payer: COMMERCIAL

## 2023-10-13 NOTE — TELEPHONE ENCOUNTER
Patient Quality Outreach    Patient is due for the following:   Colon Cancer Screening    Next Steps:   See below    Type of outreach:    Phone, spoke to patient/parent. Discussed HM due and patient in office and said she is havening the colonoscopy on 10/30/23 and needed a pre op px , appt was scheduled wrong, changed this appt to a pre op.    Next Steps:  Reach out within 90 days via  done .    Max number of attempts reached: Yes. Will try again in 90 days if patient still on fail list.    Questions for provider review:    None           Larisa Marley CMA  Chart routed to closing encounter.

## 2023-10-19 ENCOUNTER — LAB (OUTPATIENT)
Dept: LAB | Facility: CLINIC | Age: 78
End: 2023-10-19
Payer: COMMERCIAL

## 2023-10-19 DIAGNOSIS — D47.2 MGUS (MONOCLONAL GAMMOPATHY OF UNKNOWN SIGNIFICANCE): ICD-10-CM

## 2023-10-19 LAB
ALBUMIN SERPL BCG-MCNC: 3.9 G/DL (ref 3.5–5.2)
ALP SERPL-CCNC: 111 U/L (ref 35–104)
ALT SERPL W P-5'-P-CCNC: 14 U/L (ref 0–50)
ANION GAP SERPL CALCULATED.3IONS-SCNC: 11 MMOL/L (ref 7–15)
AST SERPL W P-5'-P-CCNC: 25 U/L (ref 0–45)
BASO+EOS+MONOS # BLD AUTO: NORMAL 10*3/UL
BASO+EOS+MONOS NFR BLD AUTO: NORMAL %
BASOPHILS # BLD AUTO: 0.1 10E3/UL (ref 0–0.2)
BASOPHILS NFR BLD AUTO: 1 %
BILIRUB SERPL-MCNC: 0.6 MG/DL
BUN SERPL-MCNC: 25.5 MG/DL (ref 8–23)
CALCIUM SERPL-MCNC: 9.3 MG/DL (ref 8.8–10.2)
CHLORIDE SERPL-SCNC: 106 MMOL/L (ref 98–107)
CREAT SERPL-MCNC: 0.76 MG/DL (ref 0.51–0.95)
DEPRECATED HCO3 PLAS-SCNC: 24 MMOL/L (ref 22–29)
EGFRCR SERPLBLD CKD-EPI 2021: 79 ML/MIN/1.73M2
EOSINOPHIL # BLD AUTO: 0.2 10E3/UL (ref 0–0.7)
EOSINOPHIL NFR BLD AUTO: 3 %
ERYTHROCYTE [DISTWIDTH] IN BLOOD BY AUTOMATED COUNT: 13.1 % (ref 10–15)
GLUCOSE SERPL-MCNC: 103 MG/DL (ref 70–99)
HCT VFR BLD AUTO: 41.6 % (ref 35–47)
HGB BLD-MCNC: 13.1 G/DL (ref 11.7–15.7)
IMM GRANULOCYTES # BLD: 0 10E3/UL
IMM GRANULOCYTES NFR BLD: 0 %
LYMPHOCYTES # BLD AUTO: 2.1 10E3/UL (ref 0.8–5.3)
LYMPHOCYTES NFR BLD AUTO: 31 %
MCH RBC QN AUTO: 29.4 PG (ref 26.5–33)
MCHC RBC AUTO-ENTMCNC: 31.5 G/DL (ref 31.5–36.5)
MCV RBC AUTO: 94 FL (ref 78–100)
MONOCYTES # BLD AUTO: 0.4 10E3/UL (ref 0–1.3)
MONOCYTES NFR BLD AUTO: 6 %
NEUTROPHILS # BLD AUTO: 4 10E3/UL (ref 1.6–8.3)
NEUTROPHILS NFR BLD AUTO: 60 %
PLATELET # BLD AUTO: 191 10E3/UL (ref 150–450)
POTASSIUM SERPL-SCNC: 4.6 MMOL/L (ref 3.4–5.3)
PROT SERPL-MCNC: 6.7 G/DL (ref 6.4–8.3)
RBC # BLD AUTO: 4.45 10E6/UL (ref 3.8–5.2)
SODIUM SERPL-SCNC: 141 MMOL/L (ref 135–145)
TOTAL PROTEIN SERUM FOR ELP: 6.4 G/DL (ref 6.4–8.3)
WBC # BLD AUTO: 6.8 10E3/UL (ref 4–11)

## 2023-10-19 PROCEDURE — 80053 COMPREHEN METABOLIC PANEL: CPT

## 2023-10-19 PROCEDURE — 83521 IG LIGHT CHAINS FREE EACH: CPT

## 2023-10-19 PROCEDURE — 85025 COMPLETE CBC W/AUTO DIFF WBC: CPT

## 2023-10-19 PROCEDURE — 86334 IMMUNOFIX E-PHORESIS SERUM: CPT | Performed by: PATHOLOGY

## 2023-10-19 PROCEDURE — 82784 ASSAY IGA/IGD/IGG/IGM EACH: CPT

## 2023-10-19 PROCEDURE — 84155 ASSAY OF PROTEIN SERUM: CPT | Mod: 59

## 2023-10-19 PROCEDURE — 36415 COLL VENOUS BLD VENIPUNCTURE: CPT

## 2023-10-19 PROCEDURE — 84165 PROTEIN E-PHORESIS SERUM: CPT | Performed by: PATHOLOGY

## 2023-10-20 LAB
ALBUMIN SERPL ELPH-MCNC: 3.8 G/DL (ref 3.7–5.1)
ALPHA1 GLOB SERPL ELPH-MCNC: 0.3 G/DL (ref 0.2–0.4)
ALPHA2 GLOB SERPL ELPH-MCNC: 0.6 G/DL (ref 0.5–0.9)
B-GLOBULIN SERPL ELPH-MCNC: 0.9 G/DL (ref 0.6–1)
GAMMA GLOB SERPL ELPH-MCNC: 0.8 G/DL (ref 0.7–1.6)
IGA SERPL-MCNC: 375 MG/DL (ref 84–499)
IGG SERPL-MCNC: 871 MG/DL (ref 610–1616)
IGM SERPL-MCNC: 39 MG/DL (ref 35–242)
KAPPA LC FREE SER-MCNC: 2.33 MG/DL (ref 0.33–1.94)
KAPPA LC FREE/LAMBDA FREE SER NEPH: 1.39 {RATIO} (ref 0.26–1.65)
LAMBDA LC FREE SERPL-MCNC: 1.68 MG/DL (ref 0.57–2.63)
M PROTEIN SERPL ELPH-MCNC: 0.1 G/DL
PROT PATTERN SERPL ELPH-IMP: ABNORMAL
PROT PATTERN SERPL IFE-IMP: NORMAL

## 2023-10-26 ENCOUNTER — ONCOLOGY VISIT (OUTPATIENT)
Dept: ONCOLOGY | Facility: CLINIC | Age: 78
End: 2023-10-26
Payer: COMMERCIAL

## 2023-10-26 VITALS
HEIGHT: 63 IN | BODY MASS INDEX: 35.79 KG/M2 | RESPIRATION RATE: 16 BRPM | OXYGEN SATURATION: 94 % | WEIGHT: 202 LBS | DIASTOLIC BLOOD PRESSURE: 76 MMHG | HEART RATE: 74 BPM | SYSTOLIC BLOOD PRESSURE: 137 MMHG

## 2023-10-26 DIAGNOSIS — I82.4Y2 DEEP VEIN THROMBOSIS (DVT) OF PROXIMAL VEIN OF LEFT LOWER EXTREMITY, UNSPECIFIED CHRONICITY (H): ICD-10-CM

## 2023-10-26 DIAGNOSIS — D47.2 MGUS (MONOCLONAL GAMMOPATHY OF UNKNOWN SIGNIFICANCE): Primary | ICD-10-CM

## 2023-10-26 LAB
FACTOR 2 INTERPRETATION: ABNORMAL
FACTOR V INTERPRETATION: ABNORMAL
LAB DIRECTOR COMMENTS: ABNORMAL
LAB DIRECTOR DISCLAIMER: ABNORMAL
LAB DIRECTOR INTERPRETATION: ABNORMAL
LAB DIRECTOR METHODOLOGY: ABNORMAL
LAB DIRECTOR RESULTS: ABNORMAL
SPECIMEN DESCRIPTION: ABNORMAL

## 2023-10-26 PROCEDURE — 85306 CLOT INHIBIT PROT S FREE: CPT | Performed by: INTERNAL MEDICINE

## 2023-10-26 PROCEDURE — 81241 F5 GENE: CPT | Performed by: INTERNAL MEDICINE

## 2023-10-26 PROCEDURE — 81240 F2 GENE: CPT | Performed by: INTERNAL MEDICINE

## 2023-10-26 PROCEDURE — 85300 ANTITHROMBIN III ACTIVITY: CPT | Performed by: INTERNAL MEDICINE

## 2023-10-26 PROCEDURE — 85390 FIBRINOLYSINS SCREEN I&R: CPT | Performed by: PATHOLOGY

## 2023-10-26 PROCEDURE — 99000 SPECIMEN HANDLING OFFICE-LAB: CPT | Performed by: INTERNAL MEDICINE

## 2023-10-26 PROCEDURE — 85730 THROMBOPLASTIN TIME PARTIAL: CPT | Performed by: INTERNAL MEDICINE

## 2023-10-26 PROCEDURE — 85613 RUSSELL VIPER VENOM DILUTED: CPT | Performed by: INTERNAL MEDICINE

## 2023-10-26 PROCEDURE — 86146 BETA-2 GLYCOPROTEIN ANTIBODY: CPT | Performed by: INTERNAL MEDICINE

## 2023-10-26 PROCEDURE — 86147 CARDIOLIPIN ANTIBODY EA IG: CPT | Performed by: INTERNAL MEDICINE

## 2023-10-26 PROCEDURE — 99214 OFFICE O/P EST MOD 30 MIN: CPT | Performed by: INTERNAL MEDICINE

## 2023-10-26 PROCEDURE — 36415 COLL VENOUS BLD VENIPUNCTURE: CPT | Performed by: INTERNAL MEDICINE

## 2023-10-26 PROCEDURE — 85303 CLOT INHIBIT PROT C ACTIVITY: CPT | Performed by: INTERNAL MEDICINE

## 2023-10-26 PROCEDURE — 85306 CLOT INHIBIT PROT S FREE: CPT | Mod: 90 | Performed by: INTERNAL MEDICINE

## 2023-10-26 PROCEDURE — G0452 MOLECULAR PATHOLOGY INTERPR: HCPCS | Mod: 26 | Performed by: PATHOLOGY

## 2023-10-26 ASSESSMENT — PAIN SCALES - GENERAL: PAINLEVEL: NO PAIN (0)

## 2023-10-26 NOTE — PROGRESS NOTES
[facility]    Hematology/Oncology Established Patient Follow-up Note    Today's Date: 10/26/2023    Reason for Follow-up: IgG MGUS        HISTORY OF PRESENT ILLNESS: Rafaela Rock is a 78 year old female who presents for follow up regarding IgG MGUS, low risk.    Pt has medical history including hld, prediabetes, osteoarthritis, cataracts, eczema, left lower leg SCC s/p Mohs, hx of lyme disease.       Pt was incidentally found to have monoclonal gammopathy on 9/22 SPEP that was done for workup of pruritis.  Patient reports that she has been having full body pruritus for many years, it is not associated with hives.  Reports that it is intermittent and typically localized to 1 area of her body at a time.  It commonly occurs at night.  Patient has tried to remove all perfumes and scents, without improvement.  Patient has changed all of her detergents and soaps to hypoallergenic, which has also not helped. Patient has kept an eye on her food intake and allergen exposure to determine if she can find a cause, however there is no identifiable pattern.  Patient typically uses a topical steroid which helps.  Patient has noticed that she does not have pruritus when she travels. No family history of any hematological diseases.     -9/22 labs reviewed-   CBC N/A (last CBC from 3/2021 WNL)  Cr normal, Calcium normal w/normal albumin, no protein gap (total protein 7.2, albumin 4.1)  SPEP- 0.1 g/dL   SIFE-  monoclonal IgG lambda  UPEP- only trace albumin and trace globulins   UIFE- no monoclonal protein     - 10/22 labs:  CBC WNL  24 hr urine UPEP trace albumin and few trace globulin bands, no obvious monoclonal protein seen   24 hr urine UIFE no monoclonal protein seen  24 hr urine volume= 1600mL, 24 hr total protein 105 mg/specimen,   Kappa= 2.00, lambda=1.63, kappa/lambda ratio=1.23 (normal)   IgG 1008,  IgA 433,  IgM 34 (low normal)    - 9/22 prolonged immobilization and flight back from [place]  (traveled for multiple months to [facility], [place], [place]) and was immobilized for 12 hours in flight and did not get up to use bathroom or anything at all, did not wear compression socks, right lower extremity edema and pain and palpable lump in right lower leg   - 22 right lower extremity Doppler negative for DVT, only left common femoral vein evaluated in left lower extremity for symmetry and also negative for DVT  - 2022 bilateral lower extremity Doppler negative for DVT  -  bilateral breast screening mammogram: No evidence of malignancy, BI-RADS 1  -  left lower extremity Doppler for left lower extremity swelling and lump in left lower le.  Occlusive DVT extending from distal left femoral vein through popliteal vein, occlusive thrombus in left peroneal vein  2.  Nonocclusive DVT in left posterior tibial vein  -  started on Eliquis with plan for 3-month course, tolerating well, no bleeding    INTERIM HISTORY:  Pt feels great, she reports no B sx, bone pain, new or concerning sx.   No new s/s of DVT, wearing compression socks intermittently, has persistent b/l LE edema     REVIEW OF SYSTEMS:   A 14 point ROS was reviewed with pertinent positives and negatives in the HPI.       HOME MEDICATIONS:  No current outpatient medications on file.         ALLERGIES:  No Known Allergies      PAST MEDICAL HISTORY:  Past Medical History:   Diagnosis Date    AK (actinic keratosis) 2016    Allergic rhinitis     Hyperlipidemia LDL goal < 130     Impaired fasting glucose     Lyme disease     Nonsenile cataract     OA (osteoarthritis)     Obesity     PONV (postoperative nausea and vomiting)     Rosacea     Vitamin D deficiency 10/12         PAST SURGICAL HISTORY:  Past Surgical History:   Procedure Laterality Date    CATARACT IOL, RT/LT  2013    Toric- RE    CATARACT IOL, RT/LT  2013    LE    COLONOSCOPY      COLONOSCOPY  5-5-14    Return for Colonoscopy in 1 yrs    PHACOEMULSIFICATION  CLEAR CORNEA WITH TORIC INTRAOCULAR LENS IMPLANT  12/6/2013    Procedure: PHACOEMULSIFICATION CLEAR CORNEA WITH TORIC INTRAOCULAR LENS IMPLANT;  LEFT PHACOEMULSIFICATION CLEAR CORNEA WITH TORIC INTRAOCULAR LENS IMPLANT  ;  Surgeon: Otoniel Schuster MD;  Location:  [facility]    PHACOEMULSIFICATION CLEAR CORNEA WITH TORIC INTRAOCULAR LENS IMPLANT  12/27/2013    Procedure: PHACOEMULSIFICATION CLEAR CORNEA WITH TORIC INTRAOCULAR LENS IMPLANT;  RIGHT PHACOEMULSIFICATION CLEAR CORNEA WITH TORIC INTRAOCULAR LENS IMPLANT  ;  Surgeon: Otoniel Schuster MD;  Location:  [facility]    REPAIR PTOSIS BILATERAL Bilateral 3/5/2021    Procedure: Bilateral Upper Eyelid Mechanical Ptosis Repair;  Surgeon: Cristopher Pepper MD;  Location:  OR    TONSILLECTOMY  Age 11    ZZC NONSPECIFIC PROCEDURE  9/19/2003    Left knee scope/OA,MMT    ZZC TOTAL KNEE ARTHROPLASTY Left 4/2/2008    Left    ZZC TOTAL KNEE ARTHROPLASTY Right 8/17/16    DML         SOCIAL HISTORY:  Social History     Socioeconomic History    Marital status:      Spouse name: Not on file    Number of children: 2s    Years of education: Not on file    Highest education level: Not on file   Occupational History     Employer: [employer]   Tobacco Use    Smoking status: Never    Smokeless tobacco: Never   Substance and Sexual Activity    Alcohol use: Yes     Comment: weekends    Drug use: No    Sexual activity: Never     Partners: Male   Other Topics Concern    Parent/sibling w/ CABG, MI or angioplasty before 65F 55M? No   Social History Narrative    Not on file     Social Determinants of Health     Financial Resource Strain: High Risk (10/12/2023)    Financial Resource Strain     Within the past 12 months, have you or your family members you live with been unable to get utilities (heat, electricity) when it was really needed?: Yes   Food Insecurity: Low Risk  (10/12/2023)    Food Insecurity     Within the past 12 months, did you worry that your food would run out before  you got money to buy more?: No     Within the past 12 months, did the food you bought just not last and you didn t have money to get more?: No   Transportation Needs: Low Risk  (10/12/2023)    Transportation Needs     Within the past 12 months, has lack of transportation kept you from medical appointments, getting your medicines, non-medical meetings or appointments, work, or from getting things that you need?: No   Physical Activity: Not on file   Stress: Not on file   Social Connections: Not on file   Interpersonal Safety: Low Risk  (10/12/2023)    Interpersonal Safety     Do you feel physically and emotionally safe where you currently live?: Yes     Within the past 12 months, have you been hit, slapped, kicked or otherwise physically hurt by someone?: No     Within the past 12 months, have you been humiliated or emotionally abused in other ways by your partner or ex-partner?: No   Housing Stability: Low Risk  (10/12/2023)    Housing Stability     Do you have housing? : Yes     Are you worried about losing your housing?: No         FAMILY HISTORY:  Family History   Problem Relation Age of Onset    Cancer Father         lung smoker    Skin Cancer Son         skin cancer    Glaucoma No family hx of     Macular Degeneration No family hx of     Eye Surgery No family hx of     Retinal detachment No family hx of     Melanoma No family hx of          PHYSICAL EXAM:  Vital signs:  There were no vitals taken for this visit.     ECOG:   GENERAL/CONSTITUTIONAL: No acute distress.  EYES: Pupils are equal and round. Extraocular movements intact without nystagmus.  No scleral icterus.  RESPIRATORY: Equal chest rise.   MUSCULOSKELETAL: Warm and well-perfused, no cyanosis, clubbing, or edema.   NEUROLOGIC: Cranial nerves are grossly intact. Alert, oriented to person, place and time, answers questions appropriately.  INTEGUMENTARY: No rashes or jaundice.  GAIT: Steady, does not use assistive device        LABS:   Latest Reference  Range & Units 10/19/23 07:41   Sodium 135 - 145 mmol/L 141   Potassium 3.4 - 5.3 mmol/L 4.6   Chloride 98 - 107 mmol/L 106   Carbon Dioxide (CO2) 22 - 29 mmol/L 24   Urea Nitrogen 8.0 - 23.0 mg/dL 25.5 (H)   Creatinine 0.51 - 0.95 mg/dL 0.76   GFR Estimate >60 mL/min/1.73m2 79   Calcium 8.8 - 10.2 mg/dL 9.3   Anion Gap 7 - 15 mmol/L 11   Albumin 3.5 - 5.2 g/dL 3.9   Protein Total 6.4 - 8.3 g/dL 6.7   Alkaline Phosphatase 35 - 104 U/L 111 (H)   ALT 0 - 50 U/L 14   AST 0 - 45 U/L 25   Bilirubin Total <=1.2 mg/dL 0.6   Glucose 70 - 99 mg/dL 103 (H)   WBC 4.0 - 11.0 10e3/uL 6.8   Hemoglobin 11.7 - 15.7 g/dL 13.1   Hematocrit 35.0 - 47.0 % 41.6   Platelet Count 150 - 450 10e3/uL 191   RBC Count 3.80 - 5.20 10e6/uL 4.45   MCV 78 - 100 fL 94   MCH 26.5 - 33.0 pg 29.4   MCHC 31.5 - 36.5 g/dL 31.5   RDW 10.0 - 15.0 % 13.1   % Neutrophils % 60   % Lymphocytes % 31   % Monocytes % 6   % Eosinophils % 3   % Basophils % 1   Absolute Basophils 0.0 - 0.2 10e3/uL 0.1   Absolute Eosinophils 0.0 - 0.7 10e3/uL 0.2   Absolute Immature Granulocytes <=0.4 10e3/uL 0.0   Absolute Lymphocytes 0.8 - 5.3 10e3/uL 2.1   Absolute Monocytes 0.0 - 1.3 10e3/uL 0.4   % Immature Granulocytes % 0   Absolute Neutrophils 1.6 - 8.3 10e3/uL 4.0   Albumin Fraction 3.7 - 5.1 g/dL 3.8   Alpha 1 Fraction 0.2 - 0.4 g/dL 0.3   Alpha 2 Fraction 0.5 - 0.9 g/dL 0.6   Beta Fraction 0.6 - 1.0 g/dL 0.9   ELP Interpretation:  Small monoclonal protein (0.1 g/dL) seen in the gamma fraction. See immunofixation report on same specimen. Pathologic significance requires clinical correlation. SOLOMON Larsen M.D., Ph.D., Pathologist.   Gamma Fraction 0.7 - 1.6 g/dL 0.8   IGA 84 - 499 mg/dL 375    - 1,616 mg/dL 871   IGM 35 - 242 mg/dL 39   Immunofixation ELP  Faint monoclonal IgG immunoglobulin of lambda light chain type. Pathologic significance requires clinical correlation. SOLOMON Larsen M.D., Ph.D., Pathologist   Kappa Free Lt Chain 0.33 - 1.94 mg/dL 2.33 (H)   Kappa  Lambda Ratio 0.26 - 1.65  1.39   Lambda Free Lt Chain 0.57 - 2.63 mg/dL 1.68   Monoclonal Peak <=0.0 g/dL 0.1 (H)   Total Protein Serum for ELP 6.4 - 8.3 g/dL 6.4   (H): Data is abnormally high  PATHOLOGY:      IMAGING:      ASSESSMENT/PLAN:  Rafaela Rock is a 78 year old  female with:    #low risk IgG lambda MGUS  -  SPEP and SIFE: 0.1g/dL IgG lambda  - 10/22 labs:  24 hr urine UPEP trace albumin and few trace globulin bands, no obvious monoclonal protein seen   24 hr urine UIFE no monoclonal protein seen  24 hr urine volume= 1600mL, 24 hr total protein 105 mg/specimen,   Kappa= 2.00, lambda=1.63, kappa/lambda ratio=1.23 (normal)   IgG 1008,  IgA 433,  IgM 34 (low normal)  - labs:  CBC normal, CMP normal (alk phos 111)  SIFE: Very small monoclonal IgG lambda  SPEP: 0.1 g/dL very small monoclonal protein  Kappa/lambda ratio: Kappa= 2.0, lambda= 1.7, kappa/lambda ratio= 1.18 (normal)  Quantitative immunoglobulins: Ig   IgA: 375    IgM: 31  -10/23 labs:  CBC normal, CMP normal  SIFE: Faint monoclonal IgG lambda  SPEP: 0.1 g/dL small monoclonal protein  Kappa/lambda ratio: Kappa= 2.33, 1.68 lambda= 1.7, kappa/lambda ratio= 1.39 (normal)  Quantitative immunoglobulins: Ig   IgA: 375    IgM: 39  10/23      PLAN:  - 10/23 protein studies stable  - pt has low-risk IgG lambda MGUS (IgG type, M- protein <1.5 gm/dL, normal free light-chain ratio), bone marrow biopsy and imaging can be deferred  (see my prior notes for details)   -in terms of prognosis: absolute risk of disease progression over 20 years in patients with no risk factors (low risk MGUS) is 5 percent or less, d/w pt  - pt prefers 1 year follow up rather than q6 month follow up   - Follow-up CBC, CMP, protein studies in 1 year       #Left lower extremity DVT  - Evaluated by PCP, thought to be a provoked incident related to prior prolonged immobilization and flight back from [place] in , of note patient did have bilateral lower extremity  Doppler  that was negative for DVT  -Cancer screenin/22 bilateral breast screening mammogram KATARINA, 10/2018 colonoscopy with one 4 mm sessile polyp removed from transverse colon (pathology not available for my review) with plan to follow-up in 5 years which would be due when 10/2023  -  occlusive DVT from left distal femoral vein to popliteal vein, left peroneal vein, nonocclusive DVT left posterior tibial vein  -  started Eliquis with plan for 3-month anticoagulation with PCP    PLAN:  - first time, possibly provoked, lower extremity DVT; no family hx of DVT, no personal hx of prior blood clots, no miscarriages, I doubt pt has inherited thrombophilia   - however, pt is planning to fly to [place] in  and [place] 2024 w/potential for longer flights in coming months and the below results may affect ppx recommendations thus:   - We will check the following studies:  1 factor V Leiden mutation  2 prothrombin gene mutation  3 protein C deficiency (protein C chromogenic (activity))  4 protein S deficiency (activity (ordered as miscellaneous lab- [facility] lab, protein S activity) and free antigen)  5 Antithrombin deficiency (activity)  6 antiphospholipid syndrome labs (anticardiolipin Abs, beta 2 glycoprotein  Abs, lupus panel)  - edema PT     RTC 23 1:45PM for virtual visit   RTC 1 year for follow-up with me and labs 1 week prior to visit  Pt leaving town 23    Saskia Vogel DO  Hematology/Oncology  [facility]

## 2023-10-26 NOTE — NURSING NOTE
"Oncology Rooming Note    October 26, 2023 2:14 PM   Rafaela Rock is a 78 year old female who presents for:    Chief Complaint   Patient presents with    Oncology Clinic Visit     6 month follow up     Initial Vitals: /76 (BP Location: Left arm)   Pulse 74   Resp 16   Ht 1.61 m (5' 3.39\")   Wt 91.6 kg (202 lb)   SpO2 94%   BMI 35.35 kg/m   Estimated body mass index is 35.35 kg/m  as calculated from the following:    Height as of this encounter: 1.61 m (5' 3.39\").    Weight as of this encounter: 91.6 kg (202 lb). Body surface area is 2.02 meters squared.  No Pain (0) Comment: Data Unavailable   No LMP recorded. Patient is postmenopausal.  Allergies reviewed: Yes  Medications reviewed: Yes    Medications: Medication refills not needed today.  Pharmacy name entered into [employer]: [facility] [address] [place] [address]    Clinical concerns: results       Amanda Gomez LPN              "

## 2023-10-26 NOTE — LETTER
10/26/2023         RE: Rafaela Rock  [address] [place] [id number]        Dear Colleague,    Thank you for referring your patient, Rafaela Rock, to the [facility]. Please see a copy of my visit note below.    [facility]    Hematology/Oncology Established Patient Follow-up Note    Today's Date: 10/26/2023    Reason for Follow-up: IgG MGUS        HISTORY OF PRESENT ILLNESS: Rafaela Rock is a 78 year old female who presents for follow up regarding IgG MGUS, low risk.    Pt has medical history including hld, prediabetes, osteoarthritis, cataracts, eczema, left lower leg SCC s/p Mohs, hx of lyme disease.       Pt was incidentally found to have monoclonal gammopathy on 9/22 SPEP that was done for workup of pruritis.  Patient reports that she has been having full body pruritus for many years, it is not associated with hives.  Reports that it is intermittent and typically localized to 1 area of her body at a time.  It commonly occurs at night.  Patient has tried to remove all perfumes and scents, without improvement.  Patient has changed all of her detergents and soaps to hypoallergenic, which has also not helped. Patient has kept an eye on her food intake and allergen exposure to determine if she can find a cause, however there is no identifiable pattern.  Patient typically uses a topical steroid which helps.  Patient has noticed that she does not have pruritus when she travels. No family history of any hematological diseases.     -9/22 labs reviewed-   CBC N/A (last CBC from 3/2021 WNL)  Cr normal, Calcium normal w/normal albumin, no protein gap (total protein 7.2, albumin 4.1)  SPEP- 0.1 g/dL   SIFE-  monoclonal IgG lambda  UPEP- only trace albumin and trace globulins   UIFE- no monoclonal protein     - 10/22 labs:  CBC WNL  24 hr urine UPEP trace albumin and few trace globulin bands, no obvious monoclonal protein seen   24 hr urine UIFE no  monoclonal protein seen  24 hr urine volume= 1600mL, 24 hr total protein 105 mg/specimen,   Kappa= 2.00, lambda=1.63, kappa/lambda ratio=1.23 (normal)   IgG 1008,  IgA 433,  IgM 34 (low normal)    -  prolonged immobilization and flight back from [place] (traveled for multiple months to [facility], [facility]) and was immobilized for 12 hours in flight and did not get up to use bathroom or anything at all, did not wear compression socks, right lower extremity edema and pain and palpable lump in right lower leg   - 22 right lower extremity Doppler negative for DVT, only left common femoral vein evaluated in left lower extremity for symmetry and also negative for DVT  - 2022 bilateral lower extremity Doppler negative for DVT  -  bilateral breast screening mammogram: No evidence of malignancy, BI-RADS 1  -  left lower extremity Doppler for left lower extremity swelling and lump in left lower le.  Occlusive DVT extending from distal left femoral vein through popliteal vein, occlusive thrombus in left peroneal vein  2.  Nonocclusive DVT in left posterior tibial vein  -  started on Eliquis with plan for 3-month course, tolerating well, no bleeding    INTERIM HISTORY:  Pt feels great, she reports no B sx, bone pain, new or concerning sx.   No new s/s of DVT, wearing compression socks intermittently, has persistent b/l LE edema     REVIEW OF SYSTEMS:   A 14 point ROS was reviewed with pertinent positives and negatives in the HPI.       HOME MEDICATIONS:  No current outpatient medications on file.         ALLERGIES:  No Known Allergies      PAST MEDICAL HISTORY:  Past Medical History:   Diagnosis Date     AK (actinic keratosis) 2016     Allergic rhinitis      Hyperlipidemia LDL goal < 130      Impaired fasting glucose      Lyme disease      Nonsenile cataract      OA (osteoarthritis)      Obesity      PONV (postoperative nausea and vomiting)      Rosacea      Vitamin D deficiency 10/12          PAST SURGICAL HISTORY:  Past Surgical History:   Procedure Laterality Date     CATARACT IOL, RT/LT  12/27/2013    Toric- RE     CATARACT IOL, RT/LT  12/05/2013    LE     COLONOSCOPY       COLONOSCOPY  5-5-14    Return for Colonoscopy in 1 yrs     PHACOEMULSIFICATION CLEAR CORNEA WITH TORIC INTRAOCULAR LENS IMPLANT  12/6/2013    Procedure: PHACOEMULSIFICATION CLEAR CORNEA WITH TORIC INTRAOCULAR LENS IMPLANT;  LEFT PHACOEMULSIFICATION CLEAR CORNEA WITH TORIC INTRAOCULAR LENS IMPLANT  ;  Surgeon: Otoniel Schuster MD;  Location:  [facility]     PHACOEMULSIFICATION CLEAR CORNEA WITH TORIC INTRAOCULAR LENS IMPLANT  12/27/2013    Procedure: PHACOEMULSIFICATION CLEAR CORNEA WITH TORIC INTRAOCULAR LENS IMPLANT;  RIGHT PHACOEMULSIFICATION CLEAR CORNEA WITH TORIC INTRAOCULAR LENS IMPLANT  ;  Surgeon: Otoniel Schuster MD;  Location:  [facility]     REPAIR PTOSIS BILATERAL Bilateral 3/5/2021    Procedure: Bilateral Upper Eyelid Mechanical Ptosis Repair;  Surgeon: Cristopher Pepper MD;  Location:  OR     TONSILLECTOMY  Age 11     ZZC NONSPECIFIC PROCEDURE  9/19/2003    Left knee scope/OA,MMT     ZZC TOTAL KNEE ARTHROPLASTY Left 4/2/2008    Left     ZZC TOTAL KNEE ARTHROPLASTY Right 8/17/16    DML         SOCIAL HISTORY:  Social History     Socioeconomic History     Marital status:      Spouse name: Not on file     Number of children: 2s     Years of education: Not on file     Highest education level: Not on file   Occupational History     Employer: [employer]   Tobacco Use     Smoking status: Never     Smokeless tobacco: Never   Substance and Sexual Activity     Alcohol use: Yes     Comment: weekends     Drug use: No     Sexual activity: Never     Partners: Male   Other Topics Concern     Parent/sibling w/ CABG, MI or angioplasty before 65F 55M? No   Social History Narrative     Not on file     Social Determinants of Health     Financial Resource Strain: High Risk (10/12/2023)    Financial Resource Strain       Within the past 12 months, have you or your family members you live with been unable to get utilities (heat, electricity) when it was really needed?: Yes   Food Insecurity: Low Risk  (10/12/2023)    Food Insecurity      Within the past 12 months, did you worry that your food would run out before you got money to buy more?: No      Within the past 12 months, did the food you bought just not last and you didn t have money to get more?: No   Transportation Needs: Low Risk  (10/12/2023)    Transportation Needs      Within the past 12 months, has lack of transportation kept you from medical appointments, getting your medicines, non-medical meetings or appointments, work, or from getting things that you need?: No   Physical Activity: Not on file   Stress: Not on file   Social Connections: Not on file   Interpersonal Safety: Low Risk  (10/12/2023)    Interpersonal Safety      Do you feel physically and emotionally safe where you currently live?: Yes      Within the past 12 months, have you been hit, slapped, kicked or otherwise physically hurt by someone?: No      Within the past 12 months, have you been humiliated or emotionally abused in other ways by your partner or ex-partner?: No   Housing Stability: Low Risk  (10/12/2023)    Housing Stability      Do you have housing? : Yes      Are you worried about losing your housing?: No         FAMILY HISTORY:  Family History   Problem Relation Age of Onset     Cancer Father         lung smoker     Skin Cancer Son         skin cancer     Glaucoma No family hx of      Macular Degeneration No family hx of      Eye Surgery No family hx of      Retinal detachment No family hx of      Melanoma No family hx of          PHYSICAL EXAM:  Vital signs:  There were no vitals taken for this visit.     ECOG:   GENERAL/CONSTITUTIONAL: No acute distress.  EYES: Pupils are equal and round. Extraocular movements intact without nystagmus.  No scleral icterus.  RESPIRATORY: Equal chest rise.    MUSCULOSKELETAL: Warm and well-perfused, no cyanosis, clubbing, or edema.   NEUROLOGIC: Cranial nerves are grossly intact. Alert, oriented to person, place and time, answers questions appropriately.  INTEGUMENTARY: No rashes or jaundice.  GAIT: Steady, does not use assistive device        LABS:   Latest Reference Range & Units 10/19/23 07:41   Sodium 135 - 145 mmol/L 141   Potassium 3.4 - 5.3 mmol/L 4.6   Chloride 98 - 107 mmol/L 106   Carbon Dioxide (CO2) 22 - 29 mmol/L 24   Urea Nitrogen 8.0 - 23.0 mg/dL 25.5 (H)   Creatinine 0.51 - 0.95 mg/dL 0.76   GFR Estimate >60 mL/min/1.73m2 79   Calcium 8.8 - 10.2 mg/dL 9.3   Anion Gap 7 - 15 mmol/L 11   Albumin 3.5 - 5.2 g/dL 3.9   Protein Total 6.4 - 8.3 g/dL 6.7   Alkaline Phosphatase 35 - 104 U/L 111 (H)   ALT 0 - 50 U/L 14   AST 0 - 45 U/L 25   Bilirubin Total <=1.2 mg/dL 0.6   Glucose 70 - 99 mg/dL 103 (H)   WBC 4.0 - 11.0 10e3/uL 6.8   Hemoglobin 11.7 - 15.7 g/dL 13.1   Hematocrit 35.0 - 47.0 % 41.6   Platelet Count 150 - 450 10e3/uL 191   RBC Count 3.80 - 5.20 10e6/uL 4.45   MCV 78 - 100 fL 94   MCH 26.5 - 33.0 pg 29.4   MCHC 31.5 - 36.5 g/dL 31.5   RDW 10.0 - 15.0 % 13.1   % Neutrophils % 60   % Lymphocytes % 31   % Monocytes % 6   % Eosinophils % 3   % Basophils % 1   Absolute Basophils 0.0 - 0.2 10e3/uL 0.1   Absolute Eosinophils 0.0 - 0.7 10e3/uL 0.2   Absolute Immature Granulocytes <=0.4 10e3/uL 0.0   Absolute Lymphocytes 0.8 - 5.3 10e3/uL 2.1   Absolute Monocytes 0.0 - 1.3 10e3/uL 0.4   % Immature Granulocytes % 0   Absolute Neutrophils 1.6 - 8.3 10e3/uL 4.0   Albumin Fraction 3.7 - 5.1 g/dL 3.8   Alpha 1 Fraction 0.2 - 0.4 g/dL 0.3   Alpha 2 Fraction 0.5 - 0.9 g/dL 0.6   Beta Fraction 0.6 - 1.0 g/dL 0.9   ELP Interpretation:  Small monoclonal protein (0.1 g/dL) seen in the gamma fraction. See immunofixation report on same specimen. Pathologic significance requires clinical correlation. SOLOMON Larsen M.D., Ph.D., Pathologist.   Gamma Fraction 0.7 - 1.6 g/dL  0.8   IGA 84 - 499 mg/dL 375    - 1,616 mg/dL 871   IGM 35 - 242 mg/dL 39   Immunofixation ELP  Faint monoclonal IgG immunoglobulin of lambda light chain type. Pathologic significance requires clinical correlation. SOLOMON Larsen M.D., Ph.D., Pathologist   Kappa Free Lt Chain 0.33 - 1.94 mg/dL 2.33 (H)   Kappa Lambda Ratio 0.26 - 1.65  1.39   Lambda Free Lt Chain 0.57 - 2.63 mg/dL 1.68   Monoclonal Peak <=0.0 g/dL 0.1 (H)   Total Protein Serum for ELP 6.4 - 8.3 g/dL 6.4   (H): Data is abnormally high  PATHOLOGY:      IMAGING:      ASSESSMENT/PLAN:  Rafaela Rock is a 78 year old  female with:    #low risk IgG lambda MGUS  -  SPEP and SIFE: 0.1g/dL IgG lambda  - 10/22 labs:  24 hr urine UPEP trace albumin and few trace globulin bands, no obvious monoclonal protein seen   24 hr urine UIFE no monoclonal protein seen  24 hr urine volume= 1600mL, 24 hr total protein 105 mg/specimen,   Kappa= 2.00, lambda=1.63, kappa/lambda ratio=1.23 (normal)   IgG 1008,  IgA 433,  IgM 34 (low normal)  - labs:  CBC normal, CMP normal (alk phos 111)  SIFE: Very small monoclonal IgG lambda  SPEP: 0.1 g/dL very small monoclonal protein  Kappa/lambda ratio: Kappa= 2.0, lambda= 1.7, kappa/lambda ratio= 1.18 (normal)  Quantitative immunoglobulins: Ig   IgA: 375    IgM: 31  -10/23 labs:  CBC normal, CMP normal  SIFE: Faint monoclonal IgG lambda  SPEP: 0.1 g/dL small monoclonal protein  Kappa/lambda ratio: Kappa= 2.33, 1.68 lambda= 1.7, kappa/lambda ratio= 1.39 (normal)  Quantitative immunoglobulins: Ig   IgA: 375    IgM: 39  10/23      PLAN:  - 10/23 protein studies stable  - pt has low-risk IgG lambda MGUS (IgG type, M- protein <1.5 gm/dL, normal free light-chain ratio), bone marrow biopsy and imaging can be deferred  (see my prior notes for details)   -in terms of prognosis: absolute risk of disease progression over 20 years in patients with no risk factors (low risk MGUS) is 5 percent or less, d/w pt  - pt prefers  1 year follow up rather than q6 month follow up   - Follow-up CBC, CMP, protein studies in 1 year       #Left lower extremity DVT  - Evaluated by PCP, thought to be a provoked incident related to prior prolonged immobilization and flight back from [place] in , of note patient did have bilateral lower extremity Doppler  that was negative for DVT  -Cancer screenin/22 bilateral breast screening mammogram [facility], 10/2018 colonoscopy with one 4 mm sessile polyp removed from transverse colon (pathology not available for my review) with plan to follow-up in 5 years which would be due when 10/2023  -  occlusive DVT from left distal femoral vein to popliteal vein, left peroneal vein, nonocclusive DVT left posterior tibial vein  -  started Eliquis with plan for 3-month anticoagulation with PCP    PLAN:  - first time, possibly provoked, lower extremity DVT; no family hx of DVT, no personal hx of prior blood clots, no miscarriages, I doubt pt has inherited thrombophilia   - however, pt is planning to fly to [place] in  and [place] 2024 w/potential for longer flights in coming months and the below results may affect ppx recommendations thus:   - We will check the following studies:  1 factor V Leiden mutation  2 prothrombin gene mutation  3 protein C deficiency (protein C chromogenic (activity))  4 protein S deficiency (activity (ordered as miscellaneous lab- [facility] lab, protein S activity) and free antigen)  5 Antithrombin deficiency (activity)  6 antiphospholipid syndrome labs (anticardiolipin Abs, beta 2 glycoprotein  Abs, lupus panel)  - edema PT     RTC 23 1:45PM for virtual visit   RTC 1 year for follow-up with me and labs 1 week prior to visit  Pt leaving town 23    Cheryl Sanchez DO  Hematology/Oncology  [facility]        Again, thank you for allowing me to participate in the care of your patient.        Sincerely,        CHERYL SANCHEZ DO

## 2023-10-27 LAB
AT III ACT/NOR PPP CHRO: 95 % (ref 85–135)
DRVVT SCREEN RATIO: 0.88
INR PPP: 0.93 (ref 0.85–1.15)
LA PPP-IMP: NEGATIVE
LUPUS INTERPRETATION: NORMAL
PROT C ACT/NOR PPP CHRO: 97 % (ref 70–170)
PROT S FREE AG ACT/NOR PPP IA: 100 % (ref 55–125)
PTT RATIO: 1
THROMBIN TIME: 18.7 SECONDS (ref 13–19)

## 2023-10-30 LAB
B2 GLYCOPROT1 IGG SERPL IA-ACNC: 0.9 U/ML
B2 GLYCOPROT1 IGM SERPL IA-ACNC: <2.4 U/ML
CARDIOLIPIN IGG SER IA-ACNC: <2 GPL-U/ML
CARDIOLIPIN IGG SER IA-ACNC: NEGATIVE
CARDIOLIPIN IGM SER IA-ACNC: <2 MPL-U/ML
CARDIOLIPIN IGM SER IA-ACNC: NEGATIVE

## 2023-11-01 ENCOUNTER — ANCILLARY ORDERS (OUTPATIENT)
Dept: FAMILY MEDICINE | Facility: CLINIC | Age: 78
End: 2023-11-01

## 2023-11-01 DIAGNOSIS — Z12.31 VISIT FOR SCREENING MAMMOGRAM: Primary | ICD-10-CM

## 2023-11-06 NOTE — PROGRESS NOTES
Video-Visit Details     Video Start Time: 1:50PM     Type of service:  Video Visit     Video End Time: 2:07PM    Originating Location (pt. Location): Home     Distant Location (provider location):   [facility] [place] on site     Platform used for Video Visit: [place]     [facility]    Hematology/Oncology Established Patient Follow-up Note    Today's Date: 11/7/2023    Reason for Follow-up: IgG MGUS        HISTORY OF PRESENT ILLNESS: Rafaela Rock is a 78 year old female who presents for follow up regarding IgG MGUS, low risk.    Pt has medical history including hld, prediabetes, osteoarthritis, cataracts, eczema, left lower leg SCC s/p Mohs, hx of lyme disease.       Pt was incidentally found to have monoclonal gammopathy on 9/22 SPEP that was done for workup of pruritis.  Patient reports that she has been having full body pruritus for many years, it is not associated with hives.  Reports that it is intermittent and typically localized to 1 area of her body at a time.  It commonly occurs at night.  Patient has tried to remove all perfumes and scents, without improvement.  Patient has changed all of her detergents and soaps to hypoallergenic, which has also not helped. Patient has kept an eye on her food intake and allergen exposure to determine if she can find a cause, however there is no identifiable pattern.  Patient typically uses a topical steroid which helps.  Patient has noticed that she does not have pruritus when she travels. No family history of any hematological diseases.     -9/22 labs reviewed-   CBC N/A (last CBC from 3/2021 WNL)  Cr normal, Calcium normal w/normal albumin, no protein gap (total protein 7.2, albumin 4.1)  SPEP- 0.1 g/dL   SIFE-  monoclonal IgG lambda  UPEP- only trace albumin and trace globulins   UIFE- no monoclonal protein     - 10/22 labs:  CBC WNL  24 hr urine UPEP trace albumin and few trace globulin bands, no obvious monoclonal protein seen   24 hr urine  UIFE no monoclonal protein seen  24 hr urine volume= 1600mL, 24 hr total protein 105 mg/specimen,   Kappa= 2.00, lambda=1.63, kappa/lambda ratio=1.23 (normal)   IgG 1008,  IgA 433,  IgM 34 (low normal)    -  prolonged immobilization and flight back from [place] (traveled for multiple months to [facility], [facility]) and was immobilized for 12 hours in flight and did not get up to use bathroom or anything at all, did not wear compression socks, right lower extremity edema and pain and palpable lump in right lower leg   - 22 right lower extremity Doppler negative for DVT, only left common femoral vein evaluated in left lower extremity for symmetry and also negative for DVT  - 2022 bilateral lower extremity Doppler negative for DVT  -  bilateral breast screening mammogram: No evidence of malignancy, BI-RADS 1  -  left lower extremity Doppler for left lower extremity swelling and lump in left lower le.  Occlusive DVT extending from distal left femoral vein through popliteal vein, occlusive thrombus in left peroneal vein  2.  Nonocclusive DVT in left posterior tibial vein  -  started on Eliquis with plan for 3-month course, tolerating well, no bleeding    INTERIM HISTORY:  Pt feels great, she reports no B sx, bone pain, new or concerning sx.   No new s/s of DVT, wearing compression socks intermittently, has persistent b/l LE edema   Planning on flying to [place] tomorrow    REVIEW OF SYSTEMS:   A 14 point ROS was reviewed with pertinent positives and negatives in the HPI.       HOME MEDICATIONS:  No current outpatient medications on file.         ALLERGIES:  No Known Allergies      PAST MEDICAL HISTORY:  Past Medical History:   Diagnosis Date    AK (actinic keratosis) 2016    Allergic rhinitis     Hyperlipidemia LDL goal < 130     Impaired fasting glucose     Lyme disease     Nonsenile cataract     OA (osteoarthritis)     Obesity     PONV (postoperative nausea and vomiting)     Rosacea      Vitamin D deficiency 10/12         PAST SURGICAL HISTORY:  Past Surgical History:   Procedure Laterality Date    CATARACT IOL, RT/LT  12/27/2013    Toric- RE    CATARACT IOL, RT/LT  12/05/2013    LE    COLONOSCOPY      COLONOSCOPY  5-5-14    Return for Colonoscopy in 1 yrs    PHACOEMULSIFICATION CLEAR CORNEA WITH TORIC INTRAOCULAR LENS IMPLANT  12/6/2013    Procedure: PHACOEMULSIFICATION CLEAR CORNEA WITH TORIC INTRAOCULAR LENS IMPLANT;  LEFT PHACOEMULSIFICATION CLEAR CORNEA WITH TORIC INTRAOCULAR LENS IMPLANT  ;  Surgeon: Otoniel Schuster MD;  Location:  [facility]    PHACOEMULSIFICATION CLEAR CORNEA WITH TORIC INTRAOCULAR LENS IMPLANT  12/27/2013    Procedure: PHACOEMULSIFICATION CLEAR CORNEA WITH TORIC INTRAOCULAR LENS IMPLANT;  RIGHT PHACOEMULSIFICATION CLEAR CORNEA WITH TORIC INTRAOCULAR LENS IMPLANT  ;  Surgeon: Otoniel Schuster MD;  Location:  [facility]    REPAIR PTOSIS BILATERAL Bilateral 3/5/2021    Procedure: Bilateral Upper Eyelid Mechanical Ptosis Repair;  Surgeon: Cristopher Pepper MD;  Location:  [facility]    TONSILLECTOMY  Age 11    ZZC NONSPECIFIC PROCEDURE  9/19/2003    Left knee scope/OA,MMT    ZZC TOTAL KNEE ARTHROPLASTY Left 4/2/2008    Left    ZZC TOTAL KNEE ARTHROPLASTY Right 8/17/16    DML         SOCIAL HISTORY:  Social History     Socioeconomic History    Marital status:      Spouse name: Not on file    Number of children: 2s    Years of education: Not on file    Highest education level: Not on file   Occupational History     Employer: [employer]   Tobacco Use    Smoking status: Never    Smokeless tobacco: Never   Substance and Sexual Activity    Alcohol use: Yes     Comment: weekends    Drug use: No    Sexual activity: Never     Partners: Male   Other Topics Concern    Parent/sibling w/ CABG, MI or angioplasty before 65F 55M? No   Social History Narrative    Not on file     Social Determinants of Health     Financial Resource Strain: High Risk (10/12/2023)    Financial Resource Strain      Within the past 12 months, have you or your family members you live with been unable to get utilities (heat, electricity) when it was really needed?: Yes   Food Insecurity: Low Risk  (10/12/2023)    Food Insecurity     Within the past 12 months, did you worry that your food would run out before you got money to buy more?: No     Within the past 12 months, did the food you bought just not last and you didn t have money to get more?: No   Transportation Needs: Low Risk  (10/12/2023)    Transportation Needs     Within the past 12 months, has lack of transportation kept you from medical appointments, getting your medicines, non-medical meetings or appointments, work, or from getting things that you need?: No   Physical Activity: Not on file   Stress: Not on file   Social Connections: Not on file   Interpersonal Safety: Low Risk  (10/12/2023)    Interpersonal Safety     Do you feel physically and emotionally safe where you currently live?: Yes     Within the past 12 months, have you been hit, slapped, kicked or otherwise physically hurt by someone?: No     Within the past 12 months, have you been humiliated or emotionally abused in other ways by your partner or ex-partner?: No   Housing Stability: Low Risk  (10/12/2023)    Housing Stability     Do you have housing? : Yes     Are you worried about losing your housing?: No         FAMILY HISTORY:  Family History   Problem Relation Age of Onset    Cancer Father         lung smoker    Skin Cancer Son         skin cancer    Glaucoma No family hx of     Macular Degeneration No family hx of     Eye Surgery No family hx of     Retinal detachment No family hx of     Melanoma No family hx of          PHYSICAL EXAM:  Vital signs:  There were no vitals taken for this visit.         LABS:   Latest Reference Range & Units 10/26/23 15:11   Cardiolipin IgG Yue Negative  Negative   Cardiolipin IgM Yue Negative  Negative   Cardiolipin Yue IgG Instrument Value <10.0 GPL-U/mL <2.0    Cardiolipin Yue IgM Instrument Value <10.0 MPL-U/mL <2.0   INR 0.85 - 1.15  0.93   Thrombin Time 13.0 - 19.0 Seconds 18.7   FACTOR V INTERPRETATION  This result contains rich text formatting which cannot be displayed here.   Antithrombin III Chromogenic 85 - 135 % 95   Prot C Chromogenic 70 - 170 % 97   LUPUS ANTICOAGULANT PANEL  Rpt   Lupus Result Negative  Negative   Protein S Antigen Free 55 - 125 % 100   Beta 2 Glycoprotein 1 Antibody IgG <7.0 U/mL 0.9   Beta 2 Glycoprotein 1 Antibody IgM <7.0 U/mL <2.4   FACTOR 2 AND 5 MUTATION ANALYSIS  Rpt !   FACTOR 2 INTERPRETATION  This result contains rich text formatting which cannot be displayed here. !   COMMENTS  This result contains rich text formatting which cannot be displayed here.   DISCLAIMER  This result contains rich text formatting which cannot be displayed here.   LABORATORY MISCELLANEOUS ORDER  Rpt   INTERPRETATION  This result contains rich text formatting which cannot be displayed here.   METHODOLOGY  This result contains rich text formatting which cannot be displayed here.   RESULTS  This result contains rich text formatting which cannot be displayed here.   Lupus Interpretation  The INR is normal.  APTT ratio is normal.    DRVVT Screen ratio is normal.  Thrombin time is normal.  NEGATIVE TEST; A LUPUS ANTICOAGULANT WAS NOT DETECTED IN THIS SPECIMEN WITHIN THE LIMITS OF THE TESTING REPERTOIRE.  If the clinical picture is strongly suggestive of an antiphospholipid syndrome, recommend anticardiolipin and beta-2-glycoprotein (IgG and IgM) antibody tests.    Kathy Teran MD, PhD  [facility]         !: Data is abnormal  Rpt: View report in Results Review for more information      PATHOLOGY:      IMAGING:      ASSESSMENT/PLAN:  Rafaela Rock is a 78 year old  female with:    #low risk IgG lambda MGUS  - 9/22 SPEP and SIFE: 0.1g/dL IgG lambda  - 10/22 labs:  24 hr urine UPEP trace albumin and few trace globulin bands, no obvious monoclonal protein  seen   24 hr urine UIFE no monoclonal protein seen  24 hr urine volume= 1600mL, 24 hr total protein 105 mg/specimen,   Kappa= 2.00, lambda=1.63, kappa/lambda ratio=1.23 (normal)   IgG 1008,  IgA 433,  IgM 34 (low normal)  - labs:  CBC normal, CMP normal (alk phos 111)  SIFE: Very small monoclonal IgG lambda  SPEP: 0.1 g/dL very small monoclonal protein  Kappa/lambda ratio: Kappa= 2.0, lambda= 1.7, kappa/lambda ratio= 1.18 (normal)  Quantitative immunoglobulins: Ig   IgA: 375    IgM: 31  -10/23 labs:  CBC normal, CMP normal  SIFE: Faint monoclonal IgG lambda  SPEP: 0.1 g/dL small monoclonal protein  Kappa/lambda ratio: Kappa= 2.33, 1.68 lambda= 1.7, kappa/lambda ratio= 1.39 (normal)  Quantitative immunoglobulins: Ig   IgA: 375    IgM: 39  10/23      PLAN:  - 10/23 protein studies stable  - pt has low-risk IgG lambda MGUS (IgG type, M- protein <1.5 gm/dL, normal free light-chain ratio), bone marrow biopsy and imaging can be deferred  (see my prior notes for details)   -in terms of prognosis: absolute risk of disease progression over 20 years in patients with no risk factors (low risk MGUS) is 5 percent or less, d/w pt  - pt prefers 1 year follow up rather than q6 month follow up   - Follow-up CBC, CMP, protein studies in 1 year     #Left lower extremity DVT  #heterozygous for prothrombin gene mutation   - Evaluated by PCP, thought to be a provoked incident related to prior prolonged immobilization and flight back from [place] in , of note patient did have bilateral lower extremity Doppler  that was negative for DVT  -Cancer screenin/22 bilateral breast screening mammogram [facility], 10/2018 colonoscopy with one 4 mm sessile polyp removed from transverse colon (pathology not available for my review) with plan to follow-up in 5 years which would be due when 10/2023  -  occlusive DVT from left distal femoral vein to popliteal vein, left peroneal vein, nonocclusive DVT left posterior tibial  vein  - 2/23 started Eliquis with plan for 3-month anticoagulation with PCP  - 10/23 labs:   factor V Leiden mutation negative   prothrombin gene mutation POSITIVE heterozygote for the Factor 2 mutation.   protein C activity normal  protein S activity pending, free antigen normal   antithrombin activity normal   antiphospholipid syndrome labs anticardiolipin Abs negative, beta 2 glycoprotein  Abs negative, LAC panel negative    PLAN:  - first time, possibly provoked, lower extremity DVT in setting of newly found heterozygous prothrombin gene mutation w/transient provoking factor prolonged travel w/immobilization and persistent provoking factor obesity    - in general, risk of 1st episode of VTE (vs control) 3-4 fold  - In a 2009 systematic review that included 18 articles addressing VTE recurrence risk, heterozygosity for the variant did not confer a statistically significant increased risk of recurrent VTE (OR 1.45, 95% CI 0.96-2.2); the point estimate of the observed effect was similar to that of FVL   - It is widely accepted that heterozygosity for prothrombin [id number] does not confer a clinically relevant increased risk for recurrent VTE among patients with a first unprovoked VTE, and the presence of prothrombin D21605G generally does not alter decision-making regarding the duration of anticoagulation.   - in situations with prolonged sitting, recommend ambulating often, performing leg exercise while seated, wearing compression stockings given pt has LE edema  -  There is a paucity of data examining the safety and efficacy of pharmacologic prophylaxis for the prevention of travel-associated VTE. Data from small or retrospective studies provide insufficient evidence to support routine pharmacologic prophylaxis for extended travel in the at-risk population of travelers. However, pharmacologic prophylaxis can be administered on an individual basis to travelers at particularly high risk (eg, prior VTE plus multiple  risk factors) when it is considered by their physician that the benefits of VTE prevention outweigh the risks of bleeding or other adverse event  - discussed use of aspirin 81mg day before travel, day of travel and for 3 days following travel for prolonged flights (pt is planning to fly to [place] in 11/23 and [place] 2/2024 w/potential for longer flights in coming months), risks discussed and pt would like to proceed   - alarm sx of DVT/PE d/w pt  - edema PT     RTC 10/2024 year for follow-up with me and labs 1 week prior to visit  Pt leaving [facility] 11/8/23    Saskia Vogel DO  Hematology/Oncology  [facility]

## 2023-11-07 ENCOUNTER — VIRTUAL VISIT (OUTPATIENT)
Dept: ONCOLOGY | Facility: CLINIC | Age: 78
End: 2023-11-07
Payer: COMMERCIAL

## 2023-11-07 VITALS — BODY MASS INDEX: 34.15 KG/M2 | WEIGHT: 200 LBS | HEIGHT: 64 IN

## 2023-11-07 DIAGNOSIS — D68.52 PROTHROMBIN GENE MUTATION (H): ICD-10-CM

## 2023-11-07 DIAGNOSIS — I82.4Y2 DEEP VEIN THROMBOSIS (DVT) OF PROXIMAL VEIN OF LEFT LOWER EXTREMITY, UNSPECIFIED CHRONICITY (H): Primary | ICD-10-CM

## 2023-11-07 DIAGNOSIS — D47.2 MGUS (MONOCLONAL GAMMOPATHY OF UNKNOWN SIGNIFICANCE): ICD-10-CM

## 2023-11-07 PROCEDURE — 99214 OFFICE O/P EST MOD 30 MIN: CPT | Mod: 95 | Performed by: INTERNAL MEDICINE

## 2023-11-07 ASSESSMENT — PAIN SCALES - GENERAL: PAINLEVEL: NO PAIN (0)

## 2023-11-07 NOTE — LETTER
11/7/2023         RE: Rafaela Rock  [address] [place] [id number]        Dear Colleague,    Thank you for referring your patient, Rafaela Rock, to the [facility] [place]. Please see a copy of my visit note below.    Video-Visit Details     Video Start Time: 1:50PM     Type of service:  Video Visit     Video End Time: 2:07PM    Originating Location (pt. Location): Home     Distant Location (provider location):  [facility] on site     Platform used for Video Visit: [facility]    Hematology/Oncology Established Patient Follow-up Note    Today's Date: 11/7/2023    Reason for Follow-up: IgG MGUS        HISTORY OF PRESENT ILLNESS: Rafaela Rock is a 78 year old female who presents for follow up regarding IgG MGUS, low risk.    Pt has medical history including hld, prediabetes, osteoarthritis, cataracts, eczema, left lower leg SCC s/p Mohs, hx of lyme disease.       Pt was incidentally found to have monoclonal gammopathy on 9/22 SPEP that was done for workup of pruritis.  Patient reports that she has been having full body pruritus for many years, it is not associated with hives.  Reports that it is intermittent and typically localized to 1 area of her body at a time.  It commonly occurs at night.  Patient has tried to remove all perfumes and scents, without improvement.  Patient has changed all of her detergents and soaps to hypoallergenic, which has also not helped. Patient has kept an eye on her food intake and allergen exposure to determine if she can find a cause, however there is no identifiable pattern.  Patient typically uses a topical steroid which helps.  Patient has noticed that she does not have pruritus when she travels. No family history of any hematological diseases.     -9/22 labs reviewed-   CBC N/A (last CBC from 3/2021 WNL)  Cr normal, Calcium normal w/normal albumin, no protein gap (total protein 7.2, albumin 4.1)  SPEP-  0.1 g/dL   SIFE-  monoclonal IgG lambda  UPEP- only trace albumin and trace globulins   UIFE- no monoclonal protein     - 10/22 labs:  CBC WNL  24 hr urine UPEP trace albumin and few trace globulin bands, no obvious monoclonal protein seen   24 hr urine UIFE no monoclonal protein seen  24 hr urine volume= 1600mL, 24 hr total protein 105 mg/specimen,   Kappa= 2.00, lambda=1.63, kappa/lambda ratio=1.23 (normal)   IgG 1008,  IgA 433,  IgM 34 (low normal)    -  prolonged immobilization and flight back from [facility] (traveled for multiple months to [facility], [place], [place]) and was immobilized for 12 hours in flight and did not get up to use bathroom or anything at all, did not wear compression socks, right lower extremity edema and pain and palpable lump in right lower leg   - 22 right lower extremity Doppler negative for DVT, only left common femoral vein evaluated in left lower extremity for symmetry and also negative for DVT  - 2022 bilateral lower extremity Doppler negative for DVT  -  bilateral breast screening mammogram: No evidence of malignancy, BI-RADS 1  -  left lower extremity Doppler for left lower extremity swelling and lump in left lower le.  Occlusive DVT extending from distal left femoral vein through popliteal vein, occlusive thrombus in left peroneal vein  2.  Nonocclusive DVT in left posterior tibial vein  -  started on Eliquis with plan for 3-month course, tolerating well, no bleeding    INTERIM HISTORY:  Pt feels great, she reports no B sx, bone pain, new or concerning sx.   No new s/s of DVT, wearing compression socks intermittently, has persistent b/l LE edema   Planning on flying to [place] tomorrow    REVIEW OF SYSTEMS:   A 14 point ROS was reviewed with pertinent positives and negatives in the HPI.       HOME MEDICATIONS:  No current outpatient medications on file.         ALLERGIES:  No Known Allergies      PAST MEDICAL HISTORY:  Past Medical History:   Diagnosis Date      AK (actinic keratosis) 2/2/2016     Allergic rhinitis      Hyperlipidemia LDL goal < 130      Impaired fasting glucose      Lyme disease      Nonsenile cataract      OA (osteoarthritis)      Obesity      PONV (postoperative nausea and vomiting)      Rosacea      Vitamin D deficiency 10/12         PAST SURGICAL HISTORY:  Past Surgical History:   Procedure Laterality Date     CATARACT IOL, RT/LT  12/27/2013    Toric- RE     CATARACT IOL, RT/LT  12/05/2013    LE     COLONOSCOPY       COLONOSCOPY  5-5-14    Return for Colonoscopy in 1 yrs     PHACOEMULSIFICATION CLEAR CORNEA WITH TORIC INTRAOCULAR LENS IMPLANT  12/6/2013    Procedure: PHACOEMULSIFICATION CLEAR CORNEA WITH TORIC INTRAOCULAR LENS IMPLANT;  LEFT PHACOEMULSIFICATION CLEAR CORNEA WITH TORIC INTRAOCULAR LENS IMPLANT  ;  Surgeon: Otoniel Schuster MD;  Location:  [facility]     PHACOEMULSIFICATION CLEAR CORNEA WITH TORIC INTRAOCULAR LENS IMPLANT  12/27/2013    Procedure: PHACOEMULSIFICATION CLEAR CORNEA WITH TORIC INTRAOCULAR LENS IMPLANT;  RIGHT PHACOEMULSIFICATION CLEAR CORNEA WITH TORIC INTRAOCULAR LENS IMPLANT  ;  Surgeon: Otoniel Schuster MD;  Location:  [facility]     REPAIR PTOSIS BILATERAL Bilateral 3/5/2021    Procedure: Bilateral Upper Eyelid Mechanical Ptosis Repair;  Surgeon: Cristopher Pepper MD;  Location:  [facility]     TONSILLECTOMY  Age 11     ZZC NONSPECIFIC PROCEDURE  9/19/2003    Left knee scope/OA,MMT     ZZC TOTAL KNEE ARTHROPLASTY Left 4/2/2008    Left     ZC TOTAL KNEE ARTHROPLASTY Right 8/17/16    DML         SOCIAL HISTORY:  Social History     Socioeconomic History     Marital status:      Spouse name: Not on file     Number of children: 2s     Years of education: Not on file     Highest education level: Not on file   Occupational History     Employer: [employer]   Tobacco Use     Smoking status: Never     Smokeless tobacco: Never   Substance and Sexual Activity     Alcohol use: Yes     Comment: weekends     Drug use: No      Sexual activity: Never     Partners: Male   Other Topics Concern     Parent/sibling w/ CABG, MI or angioplasty before 65F 55M? No   Social History Narrative     Not on file     Social Determinants of Health     Financial Resource Strain: High Risk (10/12/2023)    Financial Resource Strain      Within the past 12 months, have you or your family members you live with been unable to get utilities (heat, electricity) when it was really needed?: Yes   Food Insecurity: Low Risk  (10/12/2023)    Food Insecurity      Within the past 12 months, did you worry that your food would run out before you got money to buy more?: No      Within the past 12 months, did the food you bought just not last and you didn t have money to get more?: No   Transportation Needs: Low Risk  (10/12/2023)    Transportation Needs      Within the past 12 months, has lack of transportation kept you from medical appointments, getting your medicines, non-medical meetings or appointments, work, or from getting things that you need?: No   Physical Activity: Not on file   Stress: Not on file   Social Connections: Not on file   Interpersonal Safety: Low Risk  (10/12/2023)    Interpersonal Safety      Do you feel physically and emotionally safe where you currently live?: Yes      Within the past 12 months, have you been hit, slapped, kicked or otherwise physically hurt by someone?: No      Within the past 12 months, have you been humiliated or emotionally abused in other ways by your partner or ex-partner?: No   Housing Stability: Low Risk  (10/12/2023)    Housing Stability      Do you have housing? : Yes      Are you worried about losing your housing?: No         FAMILY HISTORY:  Family History   Problem Relation Age of Onset     Cancer Father         lung smoker     Skin Cancer Son         skin cancer     Glaucoma No family hx of      Macular Degeneration No family hx of      Eye Surgery No family hx of      Retinal detachment No family hx of      Melanoma  No family hx of          PHYSICAL EXAM:  Vital signs:  There were no vitals taken for this visit.         LABS:   Latest Reference Range & Units 10/26/23 15:11   Cardiolipin IgG Yue Negative  Negative   Cardiolipin IgM Yue Negative  Negative   Cardiolipin Yue IgG Instrument Value <10.0 GPL-U/mL <2.0   Cardiolipin Yue IgM Instrument Value <10.0 MPL-U/mL <2.0   INR 0.85 - 1.15  0.93   Thrombin Time 13.0 - 19.0 Seconds 18.7   FACTOR V INTERPRETATION  This result contains rich text formatting which cannot be displayed here.   Antithrombin III Chromogenic 85 - 135 % 95   Prot C Chromogenic 70 - 170 % 97   LUPUS ANTICOAGULANT PANEL  Rpt   Lupus Result Negative  Negative   Protein S Antigen Free 55 - 125 % 100   Beta 2 Glycoprotein 1 Antibody IgG <7.0 U/mL 0.9   Beta 2 Glycoprotein 1 Antibody IgM <7.0 U/mL <2.4   FACTOR 2 AND 5 MUTATION ANALYSIS  Rpt !   FACTOR 2 INTERPRETATION  This result contains rich text formatting which cannot be displayed here. !   COMMENTS  This result contains rich text formatting which cannot be displayed here.   DISCLAIMER  This result contains rich text formatting which cannot be displayed here.   LABORATORY MISCELLANEOUS ORDER  Rpt   INTERPRETATION  This result contains rich text formatting which cannot be displayed here.   METHODOLOGY  This result contains rich text formatting which cannot be displayed here.   RESULTS  This result contains rich text formatting which cannot be displayed here.   Lupus Interpretation  The INR is normal.  APTT ratio is normal.    DRVVT Screen ratio is normal.  Thrombin time is normal.  NEGATIVE TEST; A LUPUS ANTICOAGULANT WAS NOT DETECTED IN THIS SPECIMEN WITHIN THE LIMITS OF THE TESTING REPERTOIRE.  If the clinical picture is strongly suggestive of an antiphospholipid syndrome, recommend anticardiolipin and beta-2-glycoprotein (IgG and IgM) antibody tests.    Kathy Teran MD, PhD  [facility]         !: Data is abnormal  Rpt: View report in Results  Review for more information      PATHOLOGY:      IMAGING:      ASSESSMENT/PLAN:  Rafaela Rock is a 78 year old  female with:    #low risk IgG lambda MGUS  -  SPEP and SIFE: 0.1g/dL IgG lambda  - 10/22 labs:  24 hr urine UPEP trace albumin and few trace globulin bands, no obvious monoclonal protein seen   24 hr urine UIFE no monoclonal protein seen  24 hr urine volume= 1600mL, 24 hr total protein 105 mg/specimen,   Kappa= 2.00, lambda=1.63, kappa/lambda ratio=1.23 (normal)   IgG 1008,  IgA 433,  IgM 34 (low normal)  - labs:  CBC normal, CMP normal (alk phos 111)  SIFE: Very small monoclonal IgG lambda  SPEP: 0.1 g/dL very small monoclonal protein  Kappa/lambda ratio: Kappa= 2.0, lambda= 1.7, kappa/lambda ratio= 1.18 (normal)  Quantitative immunoglobulins: Ig   IgA: 375    IgM: 31  -10/23 labs:  CBC normal, CMP normal  SIFE: Faint monoclonal IgG lambda  SPEP: 0.1 g/dL small monoclonal protein  Kappa/lambda ratio: Kappa= 2.33, 1.68 lambda= 1.7, kappa/lambda ratio= 1.39 (normal)  Quantitative immunoglobulins: Ig   IgA: 375    IgM: 39  10/23      PLAN:  - 10/23 protein studies stable  - pt has low-risk IgG lambda MGUS (IgG type, M- protein <1.5 gm/dL, normal free light-chain ratio), bone marrow biopsy and imaging can be deferred  (see my prior notes for details)   -in terms of prognosis: absolute risk of disease progression over 20 years in patients with no risk factors (low risk MGUS) is 5 percent or less, d/w pt  - pt prefers 1 year follow up rather than q6 month follow up   - Follow-up CBC, CMP, protein studies in 1 year     #Left lower extremity DVT  #heterozygous for prothrombin gene mutation   - Evaluated by PCP, thought to be a provoked incident related to prior prolonged immobilization and flight back from [place] in , of note patient did have bilateral lower extremity Doppler  that was negative for DVT  -Cancer screenin/22 bilateral breast screening mammogram [facility], 10/2018  colonoscopy with one 4 mm sessile polyp removed from transverse colon (pathology not available for my review) with plan to follow-up in 5 years which would be due when 10/2023  - 2/23 occlusive DVT from left distal femoral vein to popliteal vein, left peroneal vein, nonocclusive DVT left posterior tibial vein  - 2/23 started Eliquis with plan for 3-month anticoagulation with PCP  - 10/23 labs:   factor V Leiden mutation negative   prothrombin gene mutation POSITIVE heterozygote for the Factor 2 mutation.   protein C activity normal  protein S activity pending, free antigen normal   antithrombin activity normal   antiphospholipid syndrome labs anticardiolipin Abs negative, beta 2 glycoprotein  Abs negative, LAC panel negative    PLAN:  - first time, possibly provoked, lower extremity DVT in setting of newly found heterozygous prothrombin gene mutation w/transient provoking factor prolonged travel w/immobilization and persistent provoking factor obesity    - in general, risk of 1st episode of VTE (vs control) 3-4 fold  - In a 2009 systematic review that included 18 articles addressing VTE recurrence risk, heterozygosity for the variant did not confer a statistically significant increased risk of recurrent VTE (OR 1.45, 95% CI 0.96-2.2); the point estimate of the observed effect was similar to that of FVL   - It is widely accepted that heterozygosity for prothrombin [id number] does not confer a clinically relevant increased risk for recurrent VTE among patients with a first unprovoked VTE, and the presence of prothrombin [id number] generally does not alter decision-making regarding the duration of anticoagulation.   - in situations with prolonged sitting, recommend ambulating often, performing leg exercise while seated, wearing compression stockings given pt has LE edema  -  There is a paucity of data examining the safety and efficacy of pharmacologic prophylaxis for the prevention of travel-associated VTE. Data from small  or retrospective studies provide insufficient evidence to support routine pharmacologic prophylaxis for extended travel in the at-risk population of travelers. However, pharmacologic prophylaxis can be administered on an individual basis to travelers at particularly high risk (eg, prior VTE plus multiple risk factors) when it is considered by their physician that the benefits of VTE prevention outweigh the risks of bleeding or other adverse event  - discussed use of aspirin 81mg day before travel, day of travel and for 3 days following travel for prolonged flights (pt is planning to fly to [place] in 11/23 and [place] 2/2024 w/potential for longer flights in coming months), risks discussed and pt would like to proceed   - alarm sx of DVT/PE d/w pt  - edema PT     RTC 10/2024 year for follow-up with me and labs 1 week prior to visit  Pt leaving [facility] 11/8/23    Cheryl Sanchez DO  Hematology/Oncology  [facility]        Again, thank you for allowing me to participate in the care of your patient.        Sincerely,        CHERYL SANCHEZ DO

## 2023-11-07 NOTE — LETTER
11/7/2023         RE: Rafaela Rock  [address] [place] [id number]        Dear Colleague,    Thank you for referring your patient, Rafaela Rock, to the [facility] [place]. Please see a copy of my visit note below.    Video-Visit Details     Video Start Time: 1:50PM     Type of service:  Video Visit     Video End Time: 2:07PM    Originating Location (pt. Location): Home     Distant Location (provider location):  [facility] on site     Platform used for Video Visit: [facility]    Hematology/Oncology Established Patient Follow-up Note    Today's Date: 11/7/2023    Reason for Follow-up: IgG MGUS        HISTORY OF PRESENT ILLNESS: Rafaela Rokc is a 78 year old female who presents for follow up regarding IgG MGUS, low risk.    Pt has medical history including hld, prediabetes, osteoarthritis, cataracts, eczema, left lower leg SCC s/p Mohs, hx of lyme disease.       Pt was incidentally found to have monoclonal gammopathy on 9/22 SPEP that was done for workup of pruritis.  Patient reports that she has been having full body pruritus for many years, it is not associated with hives.  Reports that it is intermittent and typically localized to 1 area of her body at a time.  It commonly occurs at night.  Patient has tried to remove all perfumes and scents, without improvement.  Patient has changed all of her detergents and soaps to hypoallergenic, which has also not helped. Patient has kept an eye on her food intake and allergen exposure to determine if she can find a cause, however there is no identifiable pattern.  Patient typically uses a topical steroid which helps.  Patient has noticed that she does not have pruritus when she travels. No family history of any hematological diseases.     -9/22 labs reviewed-   CBC N/A (last CBC from 3/2021 WNL)  Cr normal, Calcium normal w/normal albumin, no protein gap (total protein 7.2, albumin 4.1)  SPEP-  0.1 g/dL   SIFE-  monoclonal IgG lambda  UPEP- only trace albumin and trace globulins   UIFE- no monoclonal protein     - 10/22 labs:  CBC WNL  24 hr urine UPEP trace albumin and few trace globulin bands, no obvious monoclonal protein seen   24 hr urine UIFE no monoclonal protein seen  24 hr urine volume= 1600mL, 24 hr total protein 105 mg/specimen,   Kappa= 2.00, lambda=1.63, kappa/lambda ratio=1.23 (normal)   IgG 1008,  IgA 433,  IgM 34 (low normal)    -  prolonged immobilization and flight back from [facility] (traveled for multiple months to [facility], [place], [place]) and was immobilized for 12 hours in flight and did not get up to use bathroom or anything at all, did not wear compression socks, right lower extremity edema and pain and palpable lump in right lower leg   - 22 right lower extremity Doppler negative for DVT, only left common femoral vein evaluated in left lower extremity for symmetry and also negative for DVT  - 2022 bilateral lower extremity Doppler negative for DVT  -  bilateral breast screening mammogram: No evidence of malignancy, BI-RADS 1  -  left lower extremity Doppler for left lower extremity swelling and lump in left lower le.  Occlusive DVT extending from distal left femoral vein through popliteal vein, occlusive thrombus in left peroneal vein  2.  Nonocclusive DVT in left posterior tibial vein  -  started on Eliquis with plan for 3-month course, tolerating well, no bleeding    INTERIM HISTORY:  Pt feels great, she reports no B sx, bone pain, new or concerning sx.   No new s/s of DVT, wearing compression socks intermittently, has persistent b/l LE edema   Planning on flying to [place] tomorrow    REVIEW OF SYSTEMS:   A 14 point ROS was reviewed with pertinent positives and negatives in the HPI.       HOME MEDICATIONS:  No current outpatient medications on file.         ALLERGIES:  No Known Allergies      PAST MEDICAL HISTORY:  Past Medical History:   Diagnosis Date      AK (actinic keratosis) 2/2/2016     Allergic rhinitis      Hyperlipidemia LDL goal < 130      Impaired fasting glucose      Lyme disease      Nonsenile cataract      OA (osteoarthritis)      Obesity      PONV (postoperative nausea and vomiting)      Rosacea      Vitamin D deficiency 10/12         PAST SURGICAL HISTORY:  Past Surgical History:   Procedure Laterality Date     CATARACT IOL, RT/LT  12/27/2013    Toric- RE     CATARACT IOL, RT/LT  12/05/2013    LE     COLONOSCOPY       COLONOSCOPY  5-5-14    Return for Colonoscopy in 1 yrs     PHACOEMULSIFICATION CLEAR CORNEA WITH TORIC INTRAOCULAR LENS IMPLANT  12/6/2013    Procedure: PHACOEMULSIFICATION CLEAR CORNEA WITH TORIC INTRAOCULAR LENS IMPLANT;  LEFT PHACOEMULSIFICATION CLEAR CORNEA WITH TORIC INTRAOCULAR LENS IMPLANT  ;  Surgeon: Otoniel Schuster MD;  Location:  [facility]     PHACOEMULSIFICATION CLEAR CORNEA WITH TORIC INTRAOCULAR LENS IMPLANT  12/27/2013    Procedure: PHACOEMULSIFICATION CLEAR CORNEA WITH TORIC INTRAOCULAR LENS IMPLANT;  RIGHT PHACOEMULSIFICATION CLEAR CORNEA WITH TORIC INTRAOCULAR LENS IMPLANT  ;  Surgeon: Otoniel Schuster MD;  Location:  [facility]     REPAIR PTOSIS BILATERAL Bilateral 3/5/2021    Procedure: Bilateral Upper Eyelid Mechanical Ptosis Repair;  Surgeon: Cristopher Pepper MD;  Location:  OR     TONSILLECTOMY  Age 11     ZZC NONSPECIFIC PROCEDURE  9/19/2003    Left knee scope/OA,MMT     ZZC TOTAL KNEE ARTHROPLASTY Left 4/2/2008    Left     ZC TOTAL KNEE ARTHROPLASTY Right 8/17/16    DML         SOCIAL HISTORY:  Social History     Socioeconomic History     Marital status:      Spouse name: Not on file     Number of children: 2s     Years of education: Not on file     Highest education level: Not on file   Occupational History     Employer: [employer]   Tobacco Use     Smoking status: Never     Smokeless tobacco: Never   Substance and Sexual Activity     Alcohol use: Yes     Comment: weekends     Drug use: No      Sexual activity: Never     Partners: Male   Other Topics Concern     Parent/sibling w/ CABG, MI or angioplasty before 65F 55M? No   Social History Narrative     Not on file     Social Determinants of Health     Financial Resource Strain: High Risk (10/12/2023)    Financial Resource Strain      Within the past 12 months, have you or your family members you live with been unable to get utilities (heat, electricity) when it was really needed?: Yes   Food Insecurity: Low Risk  (10/12/2023)    Food Insecurity      Within the past 12 months, did you worry that your food would run out before you got money to buy more?: No      Within the past 12 months, did the food you bought just not last and you didn t have money to get more?: No   Transportation Needs: Low Risk  (10/12/2023)    Transportation Needs      Within the past 12 months, has lack of transportation kept you from medical appointments, getting your medicines, non-medical meetings or appointments, work, or from getting things that you need?: No   Physical Activity: Not on file   Stress: Not on file   Social Connections: Not on file   Interpersonal Safety: Low Risk  (10/12/2023)    Interpersonal Safety      Do you feel physically and emotionally safe where you currently live?: Yes      Within the past 12 months, have you been hit, slapped, kicked or otherwise physically hurt by someone?: No      Within the past 12 months, have you been humiliated or emotionally abused in other ways by your partner or ex-partner?: No   Housing Stability: Low Risk  (10/12/2023)    Housing Stability      Do you have housing? : Yes      Are you worried about losing your housing?: No         FAMILY HISTORY:  Family History   Problem Relation Age of Onset     Cancer Father         lung smoker     Skin Cancer Son         skin cancer     Glaucoma No family hx of      Macular Degeneration No family hx of      Eye Surgery No family hx of      Retinal detachment No family hx of      Melanoma  No family hx of          PHYSICAL EXAM:  Vital signs:  There were no vitals taken for this visit.         LABS:   Latest Reference Range & Units 10/26/23 15:11   Cardiolipin IgG Yue Negative  Negative   Cardiolipin IgM Yue Negative  Negative   Cardiolipin Yue IgG Instrument Value <10.0 GPL-U/mL <2.0   Cardiolipin Yue IgM Instrument Value <10.0 MPL-U/mL <2.0   INR 0.85 - 1.15  0.93   Thrombin Time 13.0 - 19.0 Seconds 18.7   FACTOR V INTERPRETATION  This result contains rich text formatting which cannot be displayed here.   Antithrombin III Chromogenic 85 - 135 % 95   Prot C Chromogenic 70 - 170 % 97   LUPUS ANTICOAGULANT PANEL  Rpt   Lupus Result Negative  Negative   Protein S Antigen Free 55 - 125 % 100   Beta 2 Glycoprotein 1 Antibody IgG <7.0 U/mL 0.9   Beta 2 Glycoprotein 1 Antibody IgM <7.0 U/mL <2.4   FACTOR 2 AND 5 MUTATION ANALYSIS  Rpt !   FACTOR 2 INTERPRETATION  This result contains rich text formatting which cannot be displayed here. !   COMMENTS  This result contains rich text formatting which cannot be displayed here.   DISCLAIMER  This result contains rich text formatting which cannot be displayed here.   LABORATORY MISCELLANEOUS ORDER  Rpt   INTERPRETATION  This result contains rich text formatting which cannot be displayed here.   METHODOLOGY  This result contains rich text formatting which cannot be displayed here.   RESULTS  This result contains rich text formatting which cannot be displayed here.   Lupus Interpretation  The INR is normal.  APTT ratio is normal.    DRVVT Screen ratio is normal.  Thrombin time is normal.  NEGATIVE TEST; A LUPUS ANTICOAGULANT WAS NOT DETECTED IN THIS SPECIMEN WITHIN THE LIMITS OF THE TESTING REPERTOIRE.  If the clinical picture is strongly suggestive of an antiphospholipid syndrome, recommend anticardiolipin and beta-2-glycoprotein (IgG and IgM) antibody tests.    Kathy Teran MD, PhD  [facility]         !: Data is abnormal  Rpt: View report in Results  Review for more information      PATHOLOGY:      IMAGING:      ASSESSMENT/PLAN:  Rafaela Rock is a 78 year old  female with:    #low risk IgG lambda MGUS  -  SPEP and SIFE: 0.1g/dL IgG lambda  - 10/22 labs:  24 hr urine UPEP trace albumin and few trace globulin bands, no obvious monoclonal protein seen   24 hr urine UIFE no monoclonal protein seen  24 hr urine volume= 1600mL, 24 hr total protein 105 mg/specimen,   Kappa= 2.00, lambda=1.63, kappa/lambda ratio=1.23 (normal)   IgG 1008,  IgA 433,  IgM 34 (low normal)  - labs:  CBC normal, CMP normal (alk phos 111)  SIFE: Very small monoclonal IgG lambda  SPEP: 0.1 g/dL very small monoclonal protein  Kappa/lambda ratio: Kappa= 2.0, lambda= 1.7, kappa/lambda ratio= 1.18 (normal)  Quantitative immunoglobulins: Ig   IgA: 375    IgM: 31  -10/23 labs:  CBC normal, CMP normal  SIFE: Faint monoclonal IgG lambda  SPEP: 0.1 g/dL small monoclonal protein  Kappa/lambda ratio: Kappa= 2.33, 1.68 lambda= 1.7, kappa/lambda ratio= 1.39 (normal)  Quantitative immunoglobulins: Ig   IgA: 375    IgM: 39  10/23      PLAN:  - 10/23 protein studies stable  - pt has low-risk IgG lambda MGUS (IgG type, M- protein <1.5 gm/dL, normal free light-chain ratio), bone marrow biopsy and imaging can be deferred  (see my prior notes for details)   -in terms of prognosis: absolute risk of disease progression over 20 years in patients with no risk factors (low risk MGUS) is 5 percent or less, d/w pt  - pt prefers 1 year follow up rather than q6 month follow up   - Follow-up CBC, CMP, protein studies in 1 year     #Left lower extremity DVT  #heterozygous for prothrombin gene mutation   - Evaluated by PCP, thought to be a provoked incident related to prior prolonged immobilization and flight back from [place] in , of note patient did have bilateral lower extremity Doppler  that was negative for DVT  -Cancer screenin/22 bilateral breast screening mammogram [facility], 10/2018  colonoscopy with one 4 mm sessile polyp removed from transverse colon (pathology not available for my review) with plan to follow-up in 5 years which would be due when 10/2023  - 2/23 occlusive DVT from left distal femoral vein to popliteal vein, left peroneal vein, nonocclusive DVT left posterior tibial vein  - 2/23 started Eliquis with plan for 3-month anticoagulation with PCP  - 10/23 labs:   factor V Leiden mutation negative   prothrombin gene mutation POSITIVE heterozygote for the Factor 2 mutation.   protein C activity normal  protein S activity pending, free antigen normal   antithrombin activity normal   antiphospholipid syndrome labs anticardiolipin Abs negative, beta 2 glycoprotein  Abs negative, LAC panel negative    PLAN:  - first time, possibly provoked, lower extremity DVT in setting of newly found heterozygous prothrombin gene mutation w/transient provoking factor prolonged travel w/immobilization and persistent provoking factor obesity    - in general, risk of 1st episode of VTE (vs control) 3-4 fold  - In a 2009 systematic review that included 18 articles addressing VTE recurrence risk, heterozygosity for the variant did not confer a statistically significant increased risk of recurrent VTE (OR 1.45, 95% CI 0.96-2.2); the point estimate of the observed effect was similar to that of FVL   - It is widely accepted that heterozygosity for prothrombin [id number] does not confer a clinically relevant increased risk for recurrent VTE among patients with a first unprovoked VTE, and the presence of prothrombin W12232Y generally does not alter decision-making regarding the duration of anticoagulation.   - in situations with prolonged sitting, recommend ambulating often, performing leg exercise while seated, wearing compression stockings given pt has LE edema  -  There is a paucity of data examining the safety and efficacy of pharmacologic prophylaxis for the prevention of travel-associated VTE. Data from small  or retrospective studies provide insufficient evidence to support routine pharmacologic prophylaxis for extended travel in the at-risk population of travelers. However, pharmacologic prophylaxis can be administered on an individual basis to travelers at particularly high risk (eg, prior VTE plus multiple risk factors) when it is considered by their physician that the benefits of VTE prevention outweigh the risks of bleeding or other adverse event  - discussed use of aspirin 81mg day before travel, day of travel and for 3 days following travel for prolonged flights (pt is planning to fly to [place] in 11/23 and [place] 2/2024 w/potential for longer flights in coming months), risks discussed and pt would like to proceed   - alarm sx of DVT/PE d/w pt  - edema PT     RTC 10/2024 year for follow-up with me and labs 1 week prior to visit  Pt leaving [facility] 11/8/23    Cheryl Sanchez DO  Hematology/Oncology  [facility]        Again, thank you for allowing me to participate in the care of your patient.        Sincerely,        CHERYL SANCHEZ DO

## 2023-11-07 NOTE — NURSING NOTE
Is the patient currently in the state of [place]? YES    Visit mode:VIDEO    If the visit is dropped, the patient can be reconnected by: VIDEO VISIT: Send to e-mail at: [email or web address]    Will anyone else be joining the visit? NO  (If patient encounters technical issues they should call [phone])    How would you like to obtain your AVS? MyChart    Are changes needed to the allergy or medication list?  Pt is currently not taking any medication per pt    Reason for visit: Hematology (MGUS), Results (Labs from 10/26), Video Visit, and RECHECK    No other vitals to report per pt    Jacquelyn Valentine MA

## 2023-11-15 LAB — SCANNED LAB RESULT: NORMAL

## 2023-11-20 ENCOUNTER — THERAPY VISIT (OUTPATIENT)
Dept: PHYSICAL THERAPY | Facility: REHABILITATION | Age: 78
End: 2023-11-20
Attending: INTERNAL MEDICINE
Payer: COMMERCIAL

## 2023-11-20 DIAGNOSIS — I82.4Y2 DEEP VEIN THROMBOSIS (DVT) OF PROXIMAL VEIN OF LEFT LOWER EXTREMITY, UNSPECIFIED CHRONICITY (H): ICD-10-CM

## 2023-11-20 PROCEDURE — 97140 MANUAL THERAPY 1/> REGIONS: CPT | Mod: GP | Performed by: PHYSICAL THERAPIST

## 2023-11-20 PROCEDURE — 97535 SELF CARE MNGMENT TRAINING: CPT | Mod: GP | Performed by: PHYSICAL THERAPIST

## 2023-11-20 PROCEDURE — 97161 PT EVAL LOW COMPLEX 20 MIN: CPT | Mod: GP | Performed by: PHYSICAL THERAPIST

## 2023-11-20 NOTE — PROGRESS NOTES
PHYSICAL THERAPY EVALUATION  Type of Visit: Evaluation    See electronic medical record for Abuse and Falls Screening details.    Subjective   Pt is an 78 year-old woman with chief complaint B LE swelling. Pt has medical history including HLD, prediabetes, osteoarthritis, cataracts, eczema, left lower leg SCC s/p Mohs, hx of lyme disease, monoclonal gammopathy, history of L LE DVT and pt was put on blood thinners (no longer on the meds). Her left leg had been very swollen prior to February 2023 identification of DVT (the swelling was at the knee/thigh). Now the left knee/thigh is normal size. She is wearing compression socks knee high ( medium  pressure) that she wears daily during colder weather months. While wearing the compression socks she has a little less swelling. Used to have skinnier ankles in the morning, now it is more persistent.       Presenting condition or subjective complaint: Swollen legs/ankles  Date of onset: 10/26/23 (date of order)    Relevant medical history: Arthritis   Dates & types of surgery: LTKA 2008, R TKA 2016    Prior diagnostic imaging/testing results: CT scan blood clot   Prior therapy history for the same diagnosis, illness or injury: No      Prior Level of Function  Transfers: Independent  Ambulation: Independent  ADL: Independent      Living Environment  Social support: Alone   Type of home: House; 2-story   Stairs to enter the home: No       Ramp:     Stairs inside the home: Yes       Help at home: None  Equipment owned:       Employment: No    Hobbies/Interests: Pickleball, snow shoe, exercise classes    Patient goals for therapy: keep swelling down    Pain assessment: Pain denied     Objective       EDEMA EVALUATION  Additional history:  Body part affected by edema: legs  If cancer related, treatment:    If not cancer related, problems with veins or cause of swelling: No  Distance able to walk: 40 miles/week  Time able to stand: 1 hour  Sensation problems in hands/feet:   right  hand goes numb  Edema etiology: DVT, TKA        Cognitive Status Examination  Orientation: Oriented to person, place and time   Level of Consciousness: Alert  Follows Commands and Answers Questions: 100% of the time  Personal Safety and Judgement: Intact  Memory: Intact    EDEMA  Skin Condition: Hemosiderin deposits, Pitting  3+ distally B LEs  Scar: Yes, B TKA scars  Capillary Refill: Symmetrical    Dorsal Pedal Pulse: Symmetrical  Stemmer Sign: +  Ulceration: No    GIRTH MEASUREMENTS: Refer to separate girth measurement flowsheet.         RANGE OF MOTION: LE ROM WNL  STRENGTH: LE Strength WNL    PALPATION: mild tenderness B LEs   ACTIVITIES OF DAILY LIVING: Indep  BED MOBILITY: Independent  TRANSFERS: Independent  GAIT/LOCOMOTION: Indep    SENSATION: LE Sensation WNL      Assessment & Plan   CLINICAL IMPRESSIONS  Medical Diagnosis: Deep vein thrombosis (DVT) of proximal vein of left lower extremity, unspecified chronicity (H), lymphedema    Treatment Diagnosis: Secondary lymphedema   Impression/Assessment: Pt is an 78 year-old woman with chief complaint B LE swelling. Pt has medical history including HLD, prediabetes, osteoarthritis, cataracts, eczema, left lower leg SCC s/p Mohs, hx of lyme disease, monoclonal gammopathy, history of L LE DVT and pt was put on blood thinners (no longer on the meds). Pt demonstrates early stage 2 B LE secondary lymphedema with 3+ pitting, but overall volume is not very significant; most swelling is in distal legs near ankles. Pt has been wearing OTC compression socks and is encouraged to continue to wear these on a daily basis (she will look into donning aides and additional socks to ensure 20-30mmHg compression). She may benefit from a pneumatic pump as she has already been using compression garments, elevating the legs and working on exercises for over 4 weeks without significant change in the swelling. She does not require CDT at this time and will continue with home management.      Clinical Decision Making (Complexity):  Clinical Presentation: Stable/Uncomplicated  Clinical Presentation Rationale: based on medical and personal factors listed in PT evaluation  Clinical Decision Making (Complexity): Low complexity    PLAN OF CARE  Treatment Interventions:  Interventions: Manual Therapy, Self-Care/Home Management    Long Term Goals            Frequency of Treatment: 1 visit  Duration of Treatment: 1 visit           Risks and benefits of evaluation/treatment have been explained.   Patient/Family/caregiver agrees with Plan of Care.     Evaluation Time:     PT Eval, Low Complexity Minutes (18382): 10       Signing Clinician: Jett Leonardo PT      [facility]                                                                                   OUTPATIENT PHYSICAL THERAPY      PLAN OF TREATMENT FOR OUTPATIENT REHABILITATION   Patient's Last Name, First Name, Rafaela Larios YOB: 1945   Provider's Name   [facility]   Medical Record No.  [id number]     Onset Date: 10/26/23 (date of order)  Start of Care Date: 11/20/23     Medical Diagnosis:  Deep vein thrombosis (DVT) of proximal vein of left lower extremity, unspecified chronicity (H), lymphedema      PT Treatment Diagnosis:  Secondary lymphedema Plan of Treatment  Frequency/Duration: 1 visit/ 1 visit    Certification date from 11/20/23 to 11/20/23         See note for plan of treatment details and functional goals     Jett Leonardo, PT                         I CERTIFY THE NEED FOR THESE SERVICES FURNISHED UNDER        THIS PLAN OF TREATMENT AND WHILE UNDER MY CARE     (Physician attestation of this document indicates review and certification of the therapy plan).              Referring Provider:  Saskia Vogel    Initial Assessment  See Epic Evaluation- Start of Care Date: 11/20/23

## 2023-11-21 ENCOUNTER — TELEPHONE (OUTPATIENT)
Dept: ONCOLOGY | Facility: CLINIC | Age: 78
End: 2023-11-21
Payer: COMMERCIAL

## 2023-11-21 NOTE — PROGRESS NOTES
CLINICAL NUTRITION SERVICES     Reason for Contact: Questions from Oncology Distress Screening  1. How concerned are you about your ability to eat? :  0  2. How concerned are you about unintended weight loss or your current weight? : 10    Action: RD called patient indicating reason for phone call. Left a VM with a return call back number.     Follow up: Wait for a return phone call.    Lindsay Roebrts RD, LD  [place] office [phone]

## 2023-11-28 ENCOUNTER — TELEPHONE (OUTPATIENT)
Dept: DERMATOLOGY | Facility: CLINIC | Age: 78
End: 2023-11-28
Payer: COMMERCIAL

## 2023-11-28 NOTE — TELEPHONE ENCOUNTER
Patient Contacted for the patient to call back and schedule the following:    Appointment type: Return  Provider: Dr. Tillman  Return date: 3/5/24  Specialty phone number: [phone]

## 2023-11-30 ENCOUNTER — ANCILLARY PROCEDURE (OUTPATIENT)
Dept: MAMMOGRAPHY | Facility: CLINIC | Age: 78
End: 2023-11-30
Attending: FAMILY MEDICINE
Payer: COMMERCIAL

## 2023-11-30 DIAGNOSIS — Z12.31 VISIT FOR SCREENING MAMMOGRAM: ICD-10-CM

## 2023-11-30 PROCEDURE — 77067 SCR MAMMO BI INCL CAD: CPT | Mod: TC | Performed by: RADIOLOGY

## 2023-11-30 PROCEDURE — 77063 BREAST TOMOSYNTHESIS BI: CPT | Mod: TC | Performed by: RADIOLOGY

## 2024-03-05 ENCOUNTER — OFFICE VISIT (OUTPATIENT)
Dept: FAMILY MEDICINE | Facility: CLINIC | Age: 79
End: 2024-03-05
Payer: COMMERCIAL

## 2024-03-05 DIAGNOSIS — D18.01 CHERRY ANGIOMA: ICD-10-CM

## 2024-03-05 DIAGNOSIS — L82.0 INFLAMED SEBORRHEIC KERATOSIS: ICD-10-CM

## 2024-03-05 DIAGNOSIS — L30.9 DERMATITIS: Primary | ICD-10-CM

## 2024-03-05 DIAGNOSIS — L81.4 LENTIGINES: ICD-10-CM

## 2024-03-05 DIAGNOSIS — L82.1 SEBORRHEIC KERATOSIS: ICD-10-CM

## 2024-03-05 DIAGNOSIS — Z85.828 HISTORY OF NONMELANOMA SKIN CANCER: ICD-10-CM

## 2024-03-05 DIAGNOSIS — D22.9 MULTIPLE BENIGN NEVI: ICD-10-CM

## 2024-03-05 PROCEDURE — 17110 DESTRUCTION B9 LES UP TO 14: CPT | Performed by: DERMATOLOGY

## 2024-03-05 PROCEDURE — 99213 OFFICE O/P EST LOW 20 MIN: CPT | Mod: 25 | Performed by: DERMATOLOGY

## 2024-03-05 RX ORDER — TACROLIMUS 1 MG/G
OINTMENT TOPICAL 2 TIMES DAILY
COMMUNITY
End: 2024-05-17

## 2024-03-05 RX ORDER — TRIAMCINOLONE ACETONIDE 1 MG/G
OINTMENT TOPICAL 2 TIMES DAILY
COMMUNITY
End: 2024-05-17

## 2024-03-05 RX ORDER — TACROLIMUS 1 MG/G
OINTMENT TOPICAL 2 TIMES DAILY
Qty: 60 G | Refills: 11 | Status: SHIPPED | OUTPATIENT
Start: 2024-03-05 | End: 2024-03-12

## 2024-03-05 RX ORDER — NIACINAMIDE 500 MG
500 TABLET ORAL 2 TIMES DAILY WITH MEALS
Qty: 180 TABLET | Refills: 3 | Status: SHIPPED | OUTPATIENT
Start: 2024-03-05

## 2024-03-05 RX ORDER — TRIAMCINOLONE ACETONIDE 1 MG/G
OINTMENT TOPICAL 2 TIMES DAILY
Qty: 454 G | Refills: 3 | Status: SHIPPED | OUTPATIENT
Start: 2024-03-05 | End: 2024-03-12

## 2024-03-05 NOTE — PATIENT INSTRUCTIONS
Niacinamide 500 mg twice for skin cancer prevention          Patient Education       Proper skin care from [facility]:    -Eliminate harsh soaps as they strip the natural oils from the skin, often resulting in dry itchy skin ( i.e. Dial, Zest, [place])  -Use mild soaps such as Cetaphil or Dove Sensitive Skin in the shower. You do not need to use soap on arms, legs, and trunk every time you shower unless visibly soiled.   -Avoid hot or cold showers.  -After showering, lightly dry off and apply moisturizing within 2-3 minutes. This will help trap moisture in the skin.   -Aggressive use of a moisturizer at least 1-2 times a day to the entire body (including -Vanicream, Cetaphil, Aquaphor or Cerave) and moisturize hands after every washing.  -We recommend using moisturizers that come in a tub that needs to be scooped out, not a pump. This has more of an oil base. It will hold moisture in your skin much better than a water base moisturizer. The above recommended are non-pore clogging.      Wear a sunscreen with at least SPF 30 on your face, ears, neck and V of the chest daily. Wear sunscreen on other areas of the body if those areas are exposed to the sun throughout the day. Sunscreens can contain physical and/or chemical blockers. Physical blockers are less likely to clog pores, these include zinc oxide and titanium dioxide. Reapply every two hour and after swimming.     Sunscreen examples: https://www.ewg.org/sunscreen/    UV radiation  UVA radiation remains constant throughout the day and throughout the year. It is a longer wavelength than UVB and therefore penetrates deeper into the skin leading to immediate and delayed tanning, photoaging, and skin cancer. 70-80% of UVA and UVB radiation occurs between the hours of 10am-2pm.  UVB radiation  UVB radiation causes the most harmful effects and is more significant during the summer months. However, snow and ice can reflect UVB radiation leading to skin  damage during the winter months as well. UVB radiation is responsible for tanning, burning, inflammation, delayed erythema (pinkness), pigmentation (brown spots), and skin cancer.     I recommend self monthly full body exams and yearly full body exams with a dermatology provider. If you develop a new or changing lesion please follow up for examination. Most skin cancers are pink and scaly or pink and pearly. However, we do see blue/brown/black skin cancers.  Consider the ABCDEs of melanoma when giving yourself your monthly full body exam ( don't forget the groin, buttocks, feet, toes, etc). A-asymmetry, B-borders, C-color, D-diameter, E-elevation or evolving. If you see any of these changes please follow up in clinic. If you cannot see your back I recommend purchasing a hand held mirror to use with a larger wall mirror.       Checking for Skin Cancer  You can find cancer early by checking your skin each month. There are 3 kinds of skin cancer. They are melanoma, basal cell carcinoma, and squamous cell carcinoma. Doing monthly skin checks is the best way to find new marks or skin changes. Follow the instructions below for checking your skin.   The ABCDEs of checking moles for melanoma   Check your moles or growths for signs of melanoma using ABCDE:   Asymmetry: the sides of the mole or growth don t match  Border: the edges are ragged, notched, or blurred  Color: the color within the mole or growth varies  Diameter: the mole or growth is larger than 6 mm (size of a pencil eraser)  Evolving: the size, shape, or color of the mole or growth is changing (evolving is not shown in the images below)    Checking for other types of skin cancer  Basal cell carcinoma or squamous cell carcinoma have symptoms such as:     A spot or mole that looks different from all other marks on your skin  Changes in how an area feels, such as itching, tenderness, or pain  Changes in the skin's surface, such as oozing, bleeding, or scaliness  A  sore that does not heal  New swelling or redness beyond the border of a mole    Who s at risk?  Anyone can get skin cancer. But you are at greater risk if you have:   Fair skin, light-colored hair, or light-colored eyes  Many moles or abnormal moles on your skin  A history of sunburns from sunlight or tanning beds  A family history of skin cancer  A history of exposure to radiation or chemicals  A weakened immune system  If you have had skin cancer in the past, you are at risk for recurring skin cancer.   How to check your skin  Do your monthly skin checkups in front of a full-length mirror. Check all parts of your body, including your:   Head (ears, face, neck, and scalp)  Torso (front, back, and sides)  Arms (tops, undersides, upper, and lower armpits)  Hands (palms, backs, and fingers, including under the nails)  Buttocks and genitals  Legs (front, back, and sides)  Feet (tops, soles, toes, including under the nails, and between toes)  If you have a lot of moles, take digital photos of them each month. Make sure to take photos both up close and from a distance. These can help you see if any moles change over time.   Most skin changes are not cancer. But if you see any changes in your skin, call your doctor right away. Only he or she can diagnose a problem. If you have skin cancer, seeing your doctor can be the first step toward getting the treatment that could save your life.   [employer] last reviewed this educational content on 4/1/2019 2000-2020 [employer]. [address] [place] [address], [place] [address]. All rights reserved. This information is not intended as a substitute for professional medical care. Always follow your healthcare professional's instructions.       When should I call my doctor?  If you are worsening or not improving, please, contact us or seek urgent care as noted below.     Who should I call with questions (adults)?  [facility] (adult and  pediatric): [phone]  [facility] (adult): [phone]  [facility] ([place], [place], [place] and [place]) [phone]  For urgent needs outside of business hours call the [facility] at [phone] and ask for the dermatology resident on call to be paged  If this is a medical emergency and you are unable to reach an ER, Call [phone]      If you need a prescription refill, please contact your pharmacy. Refills are approved or denied by our Physicians during normal business hours, Monday through Fridays  Per office policy, refills will not be granted if you have not been seen within the past year (or sooner depending on your child's condition)

## 2024-03-05 NOTE — LETTER
3/5/2024         RE: Rafaela Rock  [address] [place] [id number]        Dear Colleague,    Thank you for referring your patient, Rafaela Rock, to the [facility]. Please see a copy of my visit note below.    [facility] Dermatology Note  Encounter Date: Mar 5, 2024  Office Visit     Dermatology Problem List:  Last skin check 03/05/24, recommended every 6 months.  1. Atypical intraepidermal melanocytic proliferation, L mid back, s/p shave bx 7/26/23, s/p excision 08/01/23  2. History of NMSC  - Current tx: Niacinamide 500 mg BID.  - SCC, left superior medial lower leg, s/p Mohs 8/1/23  - SCCis, left lateral lower leg, s/p Mohs 11/2/20   3. AKs. Cryo  - S/p Efudex to chest 9/2020  - Hypertrophic actinic keratosis, left superior shoulder, s/p shave biopsy 9/28/21, s/p cryo  - Lichenoid actinic keratosis, right-mid back, s/p shave biopsy 9/28/21, s/p cryo  - HAK, right chest, s/p bx 2/2016  - AK, right midline neck, s/p bx 2/2016  4. Nummular eczema/xerosis.  - Rx. TMC 0.1% ointment. Cerave cream.  5. Rosacea.  - Rx. Metronidazole gel.  6. Contact dermatitis to metals - gold, silver?  7. Benign bx:  - Epidermoid cyst, R lower eyelid, sp bx 1/4/2020  - BLK, L upper back, s/p bx 5/2019  - BLK, R upper back, s/p bx 5/2019  - SK, left popliteal fossa, s/p bx 5/2019  - BLK, L upper chest, s/ bx 5/2018  - Sebaceous hyperplasia, L cheek, s/p bx 2/2016  - Ulcer and dermal inflammation, left midline back, s/p bx 2/2016  8. Edema, RLE with R thigh bulla - resolved, unclear etiology.  - DVT r/o U/S 9/20/22 negative for DVT (interestingly later was dx with DVT in LLE)  - VZV CPR 9/20/22 negative  - Empiric doxycycline   9. Pruritus.  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 Dermatitis  - Current tx: TMC, protopic  11. ISK's  - Forehead x2, s/p cryo 03/05/24     ____________________________________________    Assessment & Plan:    # Lesion to monitor - left  Ob Labor Progress Note  Any Casas 33 y o  female MRN: [id number]  Unit/Bed#: -01 Encounter: [id number]      Subjective/    Uncomfortable with contractions  Objective/  Blood pressure 144/72, pulse 90, temperature 98 5 °F (36 9 °C), temperature source Temporal, resp  rate 18, height 5' 3" (1 6 m), weight 105 kg (230 lb 12 8 oz), last menstrual period 01/15/2022, SpO2 97 %, not currently breastfeeding  No intake or output data in the 24 hours ending 10/25/22 3626      Physical Exam/      General:  Alert, comfortable, NAD      Fundus nontender      Cervix:          6 / 80 / -1,            Soft consistency / Anterior position      FHR: 125 moderate variability (+)accels, intermittent variables vs scatter  Perla: q3"      Labs/            Lab Results   Component Value Date    WBC 9 05 10/24/2022    HGB 12 9 10/24/2022    HCT 38 9 10/24/2022    MCV 96 10/24/2022     10/24/2022     Sugars: 79, 85, 90    A/P   33 y o   at 38w2d, here with preeclampsia without severe features  (1) Preeclampsia without severe features  Has not required IV antihypertensives or magnesium  --> Follow BP/Sx; no magnesium sulfate for now   --> Continue induction      (2) Induction  FB out at 2030 last night  Cervix noted at 6cm dilated at 0016  ARoM at [address], about 4 hours ago  On 8mu/min oxytocin, and contraction pattern difficult to discern  Reexam now by different examiner - still 6cm dilated  Will allow labor to continue for now, place IUPC to better determine contraction pattern, augment  Discussed with patient's physician Dr Jamal Brown   --> IUPC placed  --> Continue oxytocin per protocol      (3) Pregestational Type II Diabetes  Got 80% of PM dose of 96 units of glargine (= 76 units) last night  Sugars have been good  Not on drip  --> Continue accuchecks, insulin gtt as needed      (4) Fetal status category I  Areas of scattered tracing associated with movement    Normal baseline, accels  --> Continuous fetal monitoring      [facility]  10/25/22 posterior upper arm.  - favor ISK, photo today  - recheck next visit  - notify for changes or symptoms sooner    # Seborrheic keratosis, inflamed, forehead x2  - Cryotherapy performed today (see procedure note(s) below).    # Pruritus, found to have MGUS   - saw heme onc 11/7/23, reivewed note, on surveillance  - Continue triamcinolone 0.1% ointment BID PRN  - Continue daily moisturizer    # Dermatitis - chronic, stable  - Continue TMC 0.1% ointment BID prn.  - Continue protopic 0.1% ointment BID prn.    # Multiple benign nevi.   # H/o AIMP. No evidence of recurrent disease.  - Monitor for ABCDEs of melanoma   - Continue sun protection - recommend SPF 30 or higher with frequent application   - Return sooner if noticing changing or symptomatic lesions     # Benign lesions - SKs, cherry angiomas, lentigenes.  - No treatment required     # History of NMSC. No evidence of recurrent disease.  - Start niacinamide 500 mg BID.  - Continue photoprotection - recommend SPF 30 or higher with frequent reapplication  - Continue yearly skin exams  - Advised to monitor for changing, non-healing, bleeding, painful, changing, or otherwise symptomatic lesions    Procedures Performed:   - Cryotherapy procedure note, location(s): see above. After verbal consent and discussion of risks and benefits including, but not limited to, dyspigmentation/scar, blister, and pain, 2 ISK's lesion(s) was(were) treated with 1-2 mm freeze border for 1-2 cycles with liquid nitrogen. Post cryotherapy instructions were provided.      Follow-up: 6 month(s) in-person, or earlier for new or changing lesions    Staff and Scribe:     Scribe Disclosure:   I, NKECHI VALLE, am serving as a scribe; to document services personally performed by Sarah Tillman MD -based on data collection and the provider's statements to me.    Provider Disclosure:   The documentation recorded by the scribe accurately reflects the services I personally performed and the  decisions made by me.    Sarah Tillman MD    Department of Dermatology  [facility]: Phone: [phone], Fax: [phone]  3/11/2024     ____________________________________________    CC: Skin Check    HPI:  Ms. Rafaela Rock is a(n) 78 year old female who presents today as a return patient for FBSE.    Excision of back went well according to the patient.    The patient questions if she should be taking niacinamide due to her hx of NMSC.    Patient is otherwise feeling well, without additional skin concerns.    Labs Reviewed:  N/A    Physical Exam:  Vitals: There were no vitals taken for this visit.  SKIN: Total skin excluding the undergarment areas was performed. The exam included the head/face, neck, both arms, chest, back, abdomen, both legs, digits and/or nails.   - L posterior upper arm,  pink scaly macule with central brown cerebriform macule.  - There is a tan to brown waxy stuck on papule with surrounding erythema on the forehead.   - There are dome shaped bright red papules on the trunk and extremities .   - Multiple regular brown pigmented macules and papules are identified on the trunk and extremities.   - Scattered brown macules on sun exposed areas.  - Waxy stuck on papules and plaques on trunk and extremities.   - Well healed scar on L mid back and left superior medial lower leg. There is no erythema, telangectasias, nodularity, or pigmentation.  - No other lesions of concern on areas examined.     Medications:  Current Outpatient Medications   Medication     tacrolimus (PROTOPIC) 0.1 % external ointment     triamcinolone (KENALOG) 0.1 % external ointment     No current facility-administered medications for this visit.      Past Medical History:   Patient Active Problem List   Diagnosis     Hyperlipidemia with target LDL less than 130     OA (osteoarthritis)     Rosacea     POSTERIOR VITREOUS DETACHMENT,  os     Advanced directives, counseling/discussion     Obesity (BMI 30.0-34.9)     Cataract     S/P total knee arthroplasty - left     Vitamin D deficiency     AK (actinic keratosis)     Xerosis of skin     Intertrigo     Inflamed seborrheic keratosis     Rash     Diffuse photodamage of skin     Trochanteric bursitis of left hip     Keratoconjunctivitis of both eyes     Myogenic ptosis of left eyelid     Dermatochalasis of both upper eyelids     History of colonic polyps     Deep vein thrombosis (DVT) of proximal vein of left lower extremity, unspecified chronicity (H)     Class 2 severe obesity due to excess calories with serious comorbidity in adult (H)     MGUS (monoclonal gammopathy of unknown significance)     Past Medical History:   Diagnosis Date     AK (actinic keratosis) 2/2/2016     Allergic rhinitis      Hyperlipidemia LDL goal < 130      Impaired fasting glucose      Lyme disease      Nonsenile cataract      OA (osteoarthritis)      Obesity      PONV (postoperative nausea and vomiting)      Rosacea      Vitamin D deficiency 10/12        CC Referred Self, MD  No address on file on close of this encounter.      Again, thank you for allowing me to participate in the care of your patient.        Sincerely,        Sarah Tillman MD

## 2024-03-05 NOTE — PROGRESS NOTES
[facility] Dermatology Note  Encounter Date: Mar 5, 2024  Office Visit     Dermatology Problem List:  Last skin check 03/05/24, recommended every 6 months.  1. Atypical intraepidermal melanocytic proliferation, L mid back, s/p shave bx 7/26/23, s/p excision 08/01/23  2. History of NMSC  - Current tx: Niacinamide 500 mg BID.  - SCC, left superior medial lower leg, s/p Mohs 8/1/23  - SCCis, left lateral lower leg, s/p Mohs 11/2/20   3. AKs. Cryo  - S/p Efudex to chest 9/2020  - Hypertrophic actinic keratosis, left superior shoulder, s/p shave biopsy 9/28/21, s/p cryo  - Lichenoid actinic keratosis, right-mid back, s/p shave biopsy 9/28/21, s/p cryo  - HAK, right chest, s/p bx 2/2016  - AK, right midline neck, s/p bx 2/2016  4. Nummular eczema/xerosis.  - Rx. TMC 0.1% ointment. Cerave cream.  5. Rosacea.  - Rx. Metronidazole gel.  6. Contact dermatitis to metals - gold, silver?  7. Benign bx:  - Epidermoid cyst, R lower eyelid, sp bx 1/4/2020  - BLK, L upper back, s/p bx 5/2019  - BLK, R upper back, s/p bx 5/2019  - SK, left popliteal fossa, s/p bx 5/2019  - BLK, L upper chest, s/ bx 5/2018  - Sebaceous hyperplasia, L cheek, s/p bx 2/2016  - Ulcer and dermal inflammation, left midline back, s/p bx 2/2016  8. Edema, RLE with R thigh bulla - resolved, unclear etiology.  - DVT r/o U/S 9/20/22 negative for DVT (interestingly later was dx with DVT in LLE)  - VZV CPR 9/20/22 negative  - Empiric doxycycline   9. Pruritus.  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 Dermatitis  - Current tx: TMC, protopic  11. ISK's  - Forehead x2, s/p cryo 03/05/24     ____________________________________________    Assessment & Plan:    # Lesion to monitor - left posterior upper arm.  - favor ISK, photo today  - recheck next visit  - notify for changes or symptoms sooner    # Seborrheic keratosis, inflamed, forehead x2  - Cryotherapy performed today (see procedure note(s) below).    # Pruritus, found to have MGUS    - saw heme onc 11/7/23, reivewed note, on surveillance  - Continue triamcinolone 0.1% ointment BID PRN  - Continue daily moisturizer    # Dermatitis - chronic, stable  - Continue TMC 0.1% ointment BID prn.  - Continue protopic 0.1% ointment BID prn.    # Multiple benign nevi.   # H/o AIMP. No evidence of recurrent disease.  - Monitor for ABCDEs of melanoma   - Continue sun protection - recommend SPF 30 or higher with frequent application   - Return sooner if noticing changing or symptomatic lesions     # Benign lesions - SKs, cherry angiomas, lentigenes.  - No treatment required     # History of NMSC. No evidence of recurrent disease.  - Start niacinamide 500 mg BID.  - Continue photoprotection - recommend SPF 30 or higher with frequent reapplication  - Continue yearly skin exams  - Advised to monitor for changing, non-healing, bleeding, painful, changing, or otherwise symptomatic lesions    Procedures Performed:   - Cryotherapy procedure note, location(s): see above. After verbal consent and discussion of risks and benefits including, but not limited to, dyspigmentation/scar, blister, and pain, 2 ISK's lesion(s) was(were) treated with 1-2 mm freeze border for 1-2 cycles with liquid nitrogen. Post cryotherapy instructions were provided.      Follow-up: 6 month(s) in-person, or earlier for new or changing lesions    Staff and Scribe:     Scribe Disclosure:   I, NKECHI VALLE, am serving as a scribe; to document services personally performed by Sarah Tillman MD -based on data collection and the provider's statements to me.    Provider Disclosure:   The documentation recorded by the scribe accurately reflects the services I personally performed and the decisions made by me.    Sarah Tillman MD    Department of Dermatology  [facility] [place]: Phone: [phone], Fax: [phone]  3/11/2024      ____________________________________________    CC: Skin Check    HPI:  Ms. Rafaela Rock is a(n) 78 year old female who presents today as a return patient for FBSE.    Excision of back went well according to the patient.    The patient questions if she should be taking niacinamide due to her hx of NMSC.    Patient is otherwise feeling well, without additional skin concerns.    Labs Reviewed:  N/A    Physical Exam:  Vitals: There were no vitals taken for this visit.  SKIN: Total skin excluding the undergarment areas was performed. The exam included the head/face, neck, both arms, chest, back, abdomen, both legs, digits and/or nails.   - L posterior upper arm,  pink scaly macule with central brown cerebriform macule.  - There is a tan to brown waxy stuck on papule with surrounding erythema on the forehead.   - There are dome shaped bright red papules on the trunk and extremities .   - Multiple regular brown pigmented macules and papules are identified on the trunk and extremities.   - Scattered brown macules on sun exposed areas.  - Waxy stuck on papules and plaques on trunk and extremities.   - Well healed scar on L mid back and left superior medial lower leg. There is no erythema, telangectasias, nodularity, or pigmentation.  - No other lesions of concern on areas examined.     Medications:  Current Outpatient Medications   Medication    tacrolimus (PROTOPIC) 0.1 % external ointment    triamcinolone (KENALOG) 0.1 % external ointment     No current facility-administered medications for this visit.      Past Medical History:   Patient Active Problem List   Diagnosis    Hyperlipidemia with target LDL less than 130    OA (osteoarthritis)    Rosacea    POSTERIOR VITREOUS DETACHMENT, os    Advanced directives, counseling/discussion    Obesity (BMI 30.0-34.9)    Cataract    S/P total knee arthroplasty - left    Vitamin D deficiency    AK (actinic keratosis)    Xerosis of skin    Intertrigo    Inflamed seborrheic  keratosis    Rash    Diffuse photodamage of skin    Trochanteric bursitis of left hip    Keratoconjunctivitis of both eyes    Myogenic ptosis of left eyelid    Dermatochalasis of both upper eyelids    History of colonic polyps    Deep vein thrombosis (DVT) of proximal vein of left lower extremity, unspecified chronicity (H)    Class 2 severe obesity due to excess calories with serious comorbidity in adult (H)    MGUS (monoclonal gammopathy of unknown significance)     Past Medical History:   Diagnosis Date    AK (actinic keratosis) 2/2/2016    Allergic rhinitis     Hyperlipidemia LDL goal < 130     Impaired fasting glucose     Lyme disease     Nonsenile cataract     OA (osteoarthritis)     Obesity     PONV (postoperative nausea and vomiting)     Rosacea     Vitamin D deficiency 10/12        CC Referred Self, MD  No address on file on close of this encounter.

## 2024-03-12 ENCOUNTER — TELEPHONE (OUTPATIENT)
Dept: DERMATOLOGY | Facility: CLINIC | Age: 79
End: 2024-03-12
Payer: COMMERCIAL

## 2024-03-12 DIAGNOSIS — L30.9 DERMATITIS: ICD-10-CM

## 2024-03-12 RX ORDER — TRIAMCINOLONE ACETONIDE 1 MG/G
OINTMENT TOPICAL 2 TIMES DAILY
Qty: 454 G | Refills: 3 | Status: SHIPPED | OUTPATIENT
Start: 2024-03-12

## 2024-03-12 RX ORDER — TACROLIMUS 1 MG/G
OINTMENT TOPICAL 2 TIMES DAILY
Qty: 60 G | Refills: 11 | Status: SHIPPED | OUTPATIENT
Start: 2024-03-12 | End: 2024-05-17

## 2024-03-12 NOTE — TELEPHONE ENCOUNTER
3/5/2024 Assessment & Plan:     # Lesion to monitor - left posterior upper arm.  - favor ISK, photo today  - recheck next visit  - notify for changes or symptoms sooner     # Seborrheic keratosis, inflamed, forehead x2  - Cryotherapy performed today (see procedure note(s) below).     # Pruritus, found to have MGUS   - saw heme onc 11/7/23, reivewed note, on surveillance  - Continue triamcinolone 0.1% ointment BID PRN  - Continue daily moisturizer     # Dermatitis - chronic, stable  - Continue TMC 0.1% ointment BID prn.  - Continue protopic 0.1% ointment BID prn.     # Multiple benign nevi.   # H/o AIMP. No evidence of recurrent disease.  - Monitor for ABCDEs of melanoma   - Continue sun protection - recommend SPF 30 or higher with frequent application   - Return sooner if noticing changing or symptomatic lesions     # Benign lesions - SKs, cherry angiomas, lentigenes.  - No treatment required     # History of NMSC. No evidence of recurrent disease.  - Start niacinamide 500 mg BID.  - Continue photoprotection - recommend SPF 30 or higher with frequent reapplication  - Continue yearly skin exams  - Advised to monitor for changing, non-healing, bleeding, painful, changing, or otherwise symptomatic lesions

## 2024-03-12 NOTE — TELEPHONE ENCOUNTER
Called pharmacy. They state they have received all 3 prescriptions sent on 3/5. Pharmacy is requesting new prescription or clarification with day supply and where on body it needs to be applied for both topical medications. Pharmacy state they are able to receive electronic prescriptions again.    Chele Allen, EMT

## 2024-03-12 NOTE — TELEPHONE ENCOUNTER
[facility] Call Center    Phone Message    May a detailed message be left on voicemail: no     Reason for Call: Medication Question or concern regarding medication   Prescription Clarification  Name of Medication: PtRafaela calling to get Prescriptions Resent to [facility]/ Issue on their side last week:  tacrolimus (PROTOPIC) 0.1 % external ointment  triamcinolone (KENALOG) 0.1 % external ointment//   Prescribing Provider: ZHANNA Tillman MD   Pharmacy: [facility] [address] [place] [address]   What on the order needs clarification? Resend Prescriptions- issue on [facility] side.        Action Taken: Message routed to:  [facility] DERM    Travel Screening: Not Applicable

## 2024-03-18 ENCOUNTER — TELEPHONE (OUTPATIENT)
Dept: DERMATOLOGY | Facility: CLINIC | Age: 79
End: 2024-03-18

## 2024-03-18 NOTE — TELEPHONE ENCOUNTER
Prior Authorization Retail Medication Request    Medication/Dose: tacrolimus (PROTOPIC) 0.1 % external ointment  Diagnosis and ICD code (if different than what is on RX):  L30.9  New/renewal/insurance change PA/secondary ins. PA:  Previously Tried and Failed:  See Chart  Rationale:      Insurance   Primary: [facility]  Insurance ID:  [id number]

## 2024-03-28 NOTE — TELEPHONE ENCOUNTER
Central Prior Authorization Team   Phone: [phone]    PA Initiation    Medication: tacrolimus (PROTOPIC) 0.1 % external ointment  Insurance Company: [employer] Part D - Phone [phone] Fax [phone]  Pharmacy Filling the Rx: [facility] [address]  Filling Pharmacy Phone: [phone]  Filling Pharmacy Fax:    Start Date: 3/28/2024

## 2024-04-02 NOTE — TELEPHONE ENCOUNTER
Prior Authorization Approval    Authorization Effective Date: 3/28/2024  Authorization Expiration Date: 12/31/2024  Medication: tacrolimus (PROTOPIC) 0.1 % external ointment-PA APPROVED   Approved Dose/Quantity:   Reference #:     Insurance Company: [employer] Part D - Phone [phone] Fax [phone]  Expected CoPay:       CoPay Card Available:      Foundation Assistance Needed:    Which Pharmacy is filling the prescription (Not needed for infusion/clinic administered): [facility] [address] [place] [address]  Pharmacy Notified:  Yes- **Instructed pharmacy to notify patient when script is ready to /ship.**  Patient Notified:  Yes

## 2024-05-17 ENCOUNTER — OFFICE VISIT (OUTPATIENT)
Dept: FAMILY MEDICINE | Facility: CLINIC | Age: 79
End: 2024-05-17
Payer: COMMERCIAL

## 2024-05-17 VITALS
RESPIRATION RATE: 20 BRPM | SYSTOLIC BLOOD PRESSURE: 134 MMHG | BODY MASS INDEX: 34.83 KG/M2 | HEART RATE: 72 BPM | WEIGHT: 204 LBS | OXYGEN SATURATION: 98 % | TEMPERATURE: 98 F | DIASTOLIC BLOOD PRESSURE: 86 MMHG | HEIGHT: 64 IN

## 2024-05-17 DIAGNOSIS — D68.52 PROTHROMBIN GENE MUTATION (H): ICD-10-CM

## 2024-05-17 DIAGNOSIS — E78.5 HYPERLIPIDEMIA WITH TARGET LDL LESS THAN 130: ICD-10-CM

## 2024-05-17 DIAGNOSIS — E66.812 CLASS 2 SEVERE OBESITY DUE TO EXCESS CALORIES WITH SERIOUS COMORBIDITY AND BODY MASS INDEX (BMI) OF 35.0 TO 35.9 IN ADULT (H): ICD-10-CM

## 2024-05-17 DIAGNOSIS — D03.59 MELANOMA IN SITU OF OTHER PART OF TRUNK (H): ICD-10-CM

## 2024-05-17 DIAGNOSIS — G56.01 CARPAL TUNNEL SYNDROME OF RIGHT WRIST: Primary | ICD-10-CM

## 2024-05-17 DIAGNOSIS — I10 HYPERTENSION GOAL BP (BLOOD PRESSURE) < 140/90: ICD-10-CM

## 2024-05-17 DIAGNOSIS — E66.01 CLASS 2 SEVERE OBESITY DUE TO EXCESS CALORIES WITH SERIOUS COMORBIDITY AND BODY MASS INDEX (BMI) OF 35.0 TO 35.9 IN ADULT (H): ICD-10-CM

## 2024-05-17 DIAGNOSIS — D47.2 MGUS (MONOCLONAL GAMMOPATHY OF UNKNOWN SIGNIFICANCE): ICD-10-CM

## 2024-05-17 PROBLEM — I82.4Y2 DEEP VEIN THROMBOSIS (DVT) OF PROXIMAL VEIN OF LEFT LOWER EXTREMITY, UNSPECIFIED CHRONICITY (H): Status: RESOLVED | Noted: 2023-05-16 | Resolved: 2024-05-17

## 2024-05-17 PROCEDURE — 99214 OFFICE O/P EST MOD 30 MIN: CPT | Performed by: FAMILY MEDICINE

## 2024-05-17 RX ORDER — TRIAMTERENE AND HYDROCHLOROTHIAZIDE 37.5; 25 MG/1; MG/1
1 CAPSULE ORAL EVERY MORNING
Qty: 90 CAPSULE | Refills: 3 | Status: SHIPPED | OUTPATIENT
Start: 2024-05-17

## 2024-05-17 ASSESSMENT — PAIN SCALES - GENERAL: PAINLEVEL: NO PAIN (0)

## 2024-05-17 NOTE — PROGRESS NOTES
"  Assessment & Plan       ICD-10-CM    1. Carpal tunnel syndrome of right wrist  G56.01       2. Class 2 severe obesity due to excess calories with serious comorbidity and body mass index (BMI) of 35.0 to 35.9 in adult (H)  E66.01 Adult Comprehensive Weight Management [facility] Referral    Z68.35       3. Hyperlipidemia with target LDL less than 130  E78.5       4. Hypertension goal BP (blood pressure) < 140/90  I10 triamterene-HCTZ (DYAZIDE) 37.5-25 MG capsule      5. Prothrombin gene mutation (H24)  D68.52       6. Melanoma in situ of other part of trunk (H)  D03.59       7. MGUS (monoclonal gammopathy of unknown significance)  D47.2         She appears to have right carpal tunnel syndrome  I discussed that condition with her and recommended use of a nighttime  wrist splint for her right upper extremity  Discussed possible role for a cortisone injection if symptoms persist    She is concerned and frustrated about her weight  She does have morbid obesity now with a BMI above 35 and comorbidities including hyperlipidemia and mild hypertension  She has had a number of blood pressure readings above 140/90 in the past, so we will start her on generic Dyazide for blood pressure control and to help with her lower extremity edema  I also discussed various potential weight loss medications and she would like to pursue that, so I will refer her to our medical weight loss clinic for that    Continue routine and ongoing follow-up with oncology and dermatology for the MGUS and the history of skin cancer    Follow-up back with me on a prn basis    The longitudinal plan of care for the diagnosis(es)/condition(s) as documented were addressed during this visit. Due to the added complexity in care, I will continue to support Rafaela in the subsequent management and with ongoing continuity of care.    BMI  Estimated body mass index is 35.02 kg/m  as calculated from the following:    Height as of this encounter: 1.626 m (5' 4\").    " Weight as of this encounter: 92.5 kg (204 lb).   Weight management plan: Patient referred to endocrine and/or weight management specialty Discussed healthy diet and exercise guidelines        Subjective   Rafaela is a 79 year old, presenting for the following health issues:  Circulation (Circulation issues with hands and feet.)      5/17/2024    10:19 AM   Additional Questions   Roomed by cam         5/17/2024    10:19 AM   Patient Reported Additional Medications   Patient reports taking the following new medications none     History of Present Illness       Reason for visit:  Legs. Hands, Diet medication  Symptom onset:  More than a month  Symptoms include:  Swollen legs, numb hands , weight control problem  Symptom intensity:  Moderate  Symptom progression:  Worsening  Had these symptoms before:  Yes  Has tried/received treatment for these symptoms:  No  What makes it worse:  Standing too long , repetition of finger movement and no more loss of weight.  What makes it better:  No    She eats 2-3 servings of fruits and vegetables daily.She consumes 1 sweetened beverage(s) daily.She exercises with enough effort to increase her heart rate 30 to 60 minutes per day.  She exercises with enough effort to increase her heart rate 5 days per week.   She is taking medications regularly.     She has been having some lower leg swelling for quite some time.  It gets worse as the day goes on.  It is better in the morning.  She has been having some numbness and tingling of the right thumb index and middle fingers.  It is worse in the morning.  It generally does not awaken her at night.  She has been struggling with her weight for years.  She has been meeting with a nutritionist and is trying to eat healthy and trying to get exercise, but her weight has not been improving.  She wonders about weight loss drugs.  She has tried numerous and various dietary plans in the past.  She is currently trying to follow a Mediterranean type  "diet.    Patient Active Problem List   Diagnosis    Hyperlipidemia with target LDL less than 130    OA (osteoarthritis)    Rosacea    POSTERIOR VITREOUS DETACHMENT, os    Advanced directives, counseling/discussion    Obesity (BMI 30.0-34.9)    Cataract    S/P total knee arthroplasty - left    Vitamin D deficiency    AK (actinic keratosis)    Xerosis of skin    Intertrigo    Inflamed seborrheic keratosis    Rash    Diffuse photodamage of skin    Trochanteric bursitis of left hip    Keratoconjunctivitis of both eyes    Myogenic ptosis of left eyelid    Dermatochalasis of both upper eyelids    History of colonic polyps    Class 2 severe obesity due to excess calories with serious comorbidity in adult (H)    MGUS (monoclonal gammopathy of unknown significance)    Prothrombin gene mutation (H24)    Melanoma in situ of other part of trunk (H)         Current Outpatient Medications   Medication Sig Dispense Refill    niacinamide 500 MG tablet Take 1 tablet (500 mg) by mouth 2 times daily (with meals) 180 tablet 3    triamcinolone (KENALOG) 0.1 % external ointment Apply topically 2 times daily To trunk and extremities as needed 454 g 3            No current facility-administered medications for this visit.           Review of Systems  Constitutional, HEENT, cardiovascular, pulmonary, gi and gu systems are negative, except as otherwise noted.      Objective    /86   Pulse 72   Temp 98  F (36.7  C) (Temporal)   Resp 20   Ht 1.626 m (5' 4\")   Wt 92.5 kg (204 lb)   SpO2 98%   BMI 35.02 kg/m    Body mass index is 35.02 kg/m .  Physical Exam   GENERAL: alert and no distress  EXT: she has trace to 1+ lower extremity edema bilaterally.  She has a positive Phalen sign at the right wrist for carpal tunnel syndrome.  Negative Tinel sign at the wrist.  NEURO: Normal strength in the right upper extremity    Past lab values were reviewed.  Past blood pressure values were reviewed.        Signed Electronically by: Josue CHAO" MD Pablo

## 2024-06-05 ENCOUNTER — TELEPHONE (OUTPATIENT)
Dept: FAMILY MEDICINE | Facility: CLINIC | Age: 79
End: 2024-06-05
Payer: COMMERCIAL

## 2024-06-05 NOTE — TELEPHONE ENCOUNTER
Patient Quality Outreach    Patient is due for the following:   Physical Annual Wellness Visit    Next Steps:   See below    Type of outreach:    Sent letter.    Next Steps:  Reach out within 90 days via  done .    Max number of attempts reached: Yes. Will try again in 90 days if patient still on fail list.    Questions for provider review:    None           Larisa Marley, [facility]  Chart routed to Closing encounter  .

## 2024-06-05 NOTE — LETTER
June 5, 2024    To  Rafaela Rock  [address] [place] [id number]    Your team at [facility] cares about your health. We have reviewed your chart and based on our findings; we are making the following recommendations to better manage your health.     You are in particular need of attention regarding the following:     PREVENTATIVE VISIT: Annual Medicare Wellness:Schedule an Annual Medicare Wellness Exam. Please call your [facility] to set up your appointment.    Please call [phone]    If you have already completed these items, please contact the clinic via phone or   [facility] so your care team can review and update your records. Thank you for   choosing [facility] for your healthcare needs. For any questions,   concerns, or to schedule an appointment please contact our clinic.    Healthy Regards,      Your [facility] Care Team

## 2024-06-13 NOTE — TELEPHONE ENCOUNTER
Anesthesia Transfer of Care Note    Patient: Nick Feliciano    Procedure(s) Performed: Procedure(s) (LRB):  MYRINGOTOMY, WITH TYMPANOSTOMY TUBE INSERTION (Bilateral)    Patient location: PACU    Anesthesia Type: general    Transport from OR: Transported from OR on room air with adequate spontaneous ventilation    Post pain: adequate analgesia    Post assessment: no apparent anesthetic complications    Post vital signs: stable    Level of consciousness: awake    Nausea/Vomiting: no nausea/vomiting    Complications: none    Transfer of care protocol was followed      Last vitals: Visit Vitals  Pulse 99   Temp 37 °C (98.6 °F) (Temporal)   Resp 20   Wt 15.5 kg (34 lb 2.7 oz)   SpO2 99%      As resident on call, received refill request. Chart and attached communication reviewed. Patient last seen 2/2017. Per Dr. Miles, plan at that time was to continue triamcinolone 0.1% as needed for presumed atopic dermatitis. Rx refilled with 1 refills.    Maris Avendano MD  PGY-3, Dermatology  [phone]  Resident on Call

## 2024-06-27 ENCOUNTER — OFFICE VISIT (OUTPATIENT)
Dept: OPHTHALMOLOGY | Facility: CLINIC | Age: 79
End: 2024-06-27
Payer: COMMERCIAL

## 2024-06-27 DIAGNOSIS — L71.8 OCULAR ROSACEA: Primary | ICD-10-CM

## 2024-06-27 DIAGNOSIS — Z98.890 HISTORY OF PTOSIS REPAIR: ICD-10-CM

## 2024-06-27 DIAGNOSIS — H18.523 ANTERIOR BASEMENT MEMBRANE DYSTROPHY (ABMD) OF BOTH EYES: ICD-10-CM

## 2024-06-27 DIAGNOSIS — Z96.1 PSEUDOPHAKIA OF BOTH EYES: ICD-10-CM

## 2024-06-27 DIAGNOSIS — Z86.69 HISTORY OF EPISCLERITIS: ICD-10-CM

## 2024-06-27 DIAGNOSIS — H52.223 REGULAR ASTIGMATISM OF BOTH EYES: ICD-10-CM

## 2024-06-27 PROCEDURE — 92015 DETERMINE REFRACTIVE STATE: CPT | Performed by: OPTOMETRIST

## 2024-06-27 PROCEDURE — 92014 COMPRE OPH EXAM EST PT 1/>: CPT | Performed by: OPTOMETRIST

## 2024-06-27 ASSESSMENT — REFRACTION_WEARINGRX
OD_AXIS: 160
OS_AXIS: 010
OS_CYLINDER: +1.25
OD_SPHERE: -1.00
OD_CYLINDER: +1.25
OS_ADD: +2.75
SPECS_TYPE: PAL
OD_ADD: +2.75
OS_SPHERE: -1.00

## 2024-06-27 ASSESSMENT — REFRACTION_MANIFEST
OS_ADD: +2.75
OS_AXIS: 010
OD_ADD: +2.75
OD_SPHERE: -1.00
OD_AXIS: 160
OS_SPHERE: -1.00
OD_CYLINDER: +1.25
OS_CYLINDER: +1.75

## 2024-06-27 ASSESSMENT — CONF VISUAL FIELD
OS_INFERIOR_NASAL_RESTRICTION: 0
OD_NORMAL: 1
OS_NORMAL: 1
OD_SUPERIOR_NASAL_RESTRICTION: 0
OS_SUPERIOR_NASAL_RESTRICTION: 0
OD_SUPERIOR_TEMPORAL_RESTRICTION: 0
OS_INFERIOR_TEMPORAL_RESTRICTION: 0
OD_INFERIOR_NASAL_RESTRICTION: 0
OD_INFERIOR_TEMPORAL_RESTRICTION: 0
OS_SUPERIOR_TEMPORAL_RESTRICTION: 0
METHOD: COUNTING FINGERS

## 2024-06-27 ASSESSMENT — VISUAL ACUITY
OS_CC: 20/20
OD_CC: 20/20
METHOD: SNELLEN - LINEAR
OS_CC+: -1
OD_CC+: -1
OD_CC: 20/20-1
CORRECTION_TYPE: GLASSES
OS_CC: 20/20

## 2024-06-27 ASSESSMENT — SLIT LAMP EXAM - LIDS
COMMENTS: 1+ MGD, LID TELANGIECTASIA
COMMENTS: 1+ MGD, LID TELANGIECTASIA

## 2024-06-27 ASSESSMENT — TONOMETRY
OS_IOP_MMHG: 9
OD_IOP_MMHG: 9
IOP_METHOD: ICARE

## 2024-06-27 ASSESSMENT — CUP TO DISC RATIO
OS_RATIO: 0.4
OD_RATIO: 0.4

## 2024-06-27 ASSESSMENT — EXTERNAL EXAM - LEFT EYE: OS_EXAM: NORMAL

## 2024-06-27 ASSESSMENT — EXTERNAL EXAM - RIGHT EYE: OD_EXAM: NORMAL

## 2024-06-27 NOTE — NURSING NOTE
Chief Complaints and History of Present Illnesses   Patient presents with    Annual Eye Exam     Chief Complaint(s) and History of Present Illness(es)       Annual Eye Exam               Comments    Currently wearing progressives full time. Content with vision. Denies redness/irritation of eyes. Currently using Systane every day.

## 2024-06-27 NOTE — PROGRESS NOTES
History  HPI    Currently wearing progressives full time. Content with vision. Denies redness/irritation of eyes. Currently using Systane every day or more if needed.   Last edited by Rasheeda Goodrich on 6/27/2024 10:46 AM.          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 Continue use of artificial tears as needed. Recommended larger filtered bottle (Systane Complete PF) due to arthritis. Monitor annually.     (Z96.1) Pseudophakia of both eyes  Comment: s/p YAG both eyes, stable  Plan:    Monitor annually.     (Z86.69) History of episcleritis  Comment: No recent recurrences  Plan:    Monitor annually, or sooner if symptoms recur.     (H52.223) Regular astigmatism of both eyes  Comment: Mixed astigmatism with presbyopia both eyes   Plan: REFRACTION   Dispensed spectacle prescription for full time wear. Monitor annually.      (Z98.890) History of ptosis repair  Comment: s/p blepharoplasty and ptosis repair with Dr. Pepper, happy with results  Plan:    No treatment indicated at this time. Monitor annually.     Return to clinic in 1 year for comprehensive eye exam.    Complete documentation of historical and exam elements from today's encounter can  be found in the full encounter summary report (not reduplicated in this progress  note). I personally obtained the chief complaint(s) and history of present illness. I  confirmed and edited as necessary the review of systems, past medical/surgical  history, family history, social history, and examination findings as documented by  others; and I examined the patient myself. I personally reviewed the relevant tests,  images, and reports as documented above. I formulated and edited as necessary the  assessment and plan and discussed the findings and management plan with  the  patient and family.    Blue Rojas, GWENDOLYN, FAAO

## 2024-07-01 ASSESSMENT — SLEEP AND FATIGUE QUESTIONNAIRES
HOW LIKELY ARE YOU TO NOD OFF OR FALL ASLEEP WHILE SITTING AND TALKING TO SOMEONE: WOULD NEVER DOZE
HOW LIKELY ARE YOU TO NOD OFF OR FALL ASLEEP WHEN YOU ARE A PASSENGER IN A CAR FOR AN HOUR WITHOUT A BREAK: SLIGHT CHANCE OF DOZING
HOW LIKELY ARE YOU TO NOD OFF OR FALL ASLEEP WHILE LYING DOWN TO REST IN THE AFTERNOON WHEN CIRCUMSTANCES PERMIT: MODERATE CHANCE OF DOZING
HOW LIKELY ARE YOU TO NOD OFF OR FALL ASLEEP IN A CAR, WHILE STOPPED FOR A FEW MINUTES IN TRAFFIC: WOULD NEVER DOZE
HOW LIKELY ARE YOU TO NOD OFF OR FALL ASLEEP WHILE SITTING AND READING: MODERATE CHANCE OF DOZING
HOW LIKELY ARE YOU TO NOD OFF OR FALL ASLEEP WHILE WATCHING TV: MODERATE CHANCE OF DOZING
HOW LIKELY ARE YOU TO NOD OFF OR FALL ASLEEP WHILE SITTING QUIETLY AFTER LUNCH WITHOUT ALCOHOL: SLIGHT CHANCE OF DOZING
HOW LIKELY ARE YOU TO NOD OFF OR FALL ASLEEP WHILE SITTING INACTIVE IN A PUBLIC PLACE: WOULD NEVER DOZE

## 2024-07-02 ENCOUNTER — OFFICE VISIT (OUTPATIENT)
Dept: SURGERY | Facility: CLINIC | Age: 79
End: 2024-07-02
Payer: COMMERCIAL

## 2024-07-02 ENCOUNTER — LAB (OUTPATIENT)
Dept: LAB | Facility: CLINIC | Age: 79
End: 2024-07-02
Payer: COMMERCIAL

## 2024-07-02 VITALS
DIASTOLIC BLOOD PRESSURE: 70 MMHG | WEIGHT: 202.2 LBS | SYSTOLIC BLOOD PRESSURE: 122 MMHG | HEIGHT: 63 IN | BODY MASS INDEX: 35.83 KG/M2

## 2024-07-02 DIAGNOSIS — E66.812 CLASS 2 SEVERE OBESITY DUE TO EXCESS CALORIES WITH SERIOUS COMORBIDITY AND BODY MASS INDEX (BMI) OF 35.0 TO 35.9 IN ADULT (H): ICD-10-CM

## 2024-07-02 DIAGNOSIS — E66.812 CLASS 2 SEVERE OBESITY DUE TO EXCESS CALORIES WITH SERIOUS COMORBIDITY AND BODY MASS INDEX (BMI) OF 35.0 TO 35.9 IN ADULT (H): Primary | ICD-10-CM

## 2024-07-02 DIAGNOSIS — R79.9 ABNORMAL FINDING OF BLOOD CHEMISTRY, UNSPECIFIED: ICD-10-CM

## 2024-07-02 DIAGNOSIS — R73.03 PREDIABETES: ICD-10-CM

## 2024-07-02 DIAGNOSIS — R63.2 HYPERPHAGIA: ICD-10-CM

## 2024-07-02 DIAGNOSIS — E66.01 CLASS 2 SEVERE OBESITY DUE TO EXCESS CALORIES WITH SERIOUS COMORBIDITY AND BODY MASS INDEX (BMI) OF 35.0 TO 35.9 IN ADULT (H): ICD-10-CM

## 2024-07-02 DIAGNOSIS — R73.09 ELEVATED HEMOGLOBIN A1C: ICD-10-CM

## 2024-07-02 DIAGNOSIS — E66.01 CLASS 2 SEVERE OBESITY DUE TO EXCESS CALORIES WITH SERIOUS COMORBIDITY AND BODY MASS INDEX (BMI) OF 35.0 TO 35.9 IN ADULT (H): Primary | ICD-10-CM

## 2024-07-02 LAB
HBA1C MFR BLD: 5.7 % (ref 0–5.6)
PTH-INTACT SERPL-MCNC: 43 PG/ML (ref 15–65)

## 2024-07-02 PROCEDURE — 80053 COMPREHEN METABOLIC PANEL: CPT

## 2024-07-02 PROCEDURE — 82607 VITAMIN B-12: CPT

## 2024-07-02 PROCEDURE — 82728 ASSAY OF FERRITIN: CPT

## 2024-07-02 PROCEDURE — 83036 HEMOGLOBIN GLYCOSYLATED A1C: CPT

## 2024-07-02 PROCEDURE — 36415 COLL VENOUS BLD VENIPUNCTURE: CPT

## 2024-07-02 PROCEDURE — 83970 ASSAY OF PARATHORMONE: CPT

## 2024-07-02 PROCEDURE — 99205 OFFICE O/P NEW HI 60 MIN: CPT | Performed by: FAMILY MEDICINE

## 2024-07-02 PROCEDURE — 84443 ASSAY THYROID STIM HORMONE: CPT

## 2024-07-02 PROCEDURE — 82306 VITAMIN D 25 HYDROXY: CPT

## 2024-07-02 NOTE — PROGRESS NOTES
"    New Medical Weight Management Consult    PATIENT:  Rafaela Rock  MRN:         [id number]  :         1945  JOSEPH:         2024    Dear Dr. Shah,    I had the pleasure of seeing your patient, Rafaela Rock. Full intake/assessment was done to determine barriers to weight loss success and develop a treatment plan. Rafaela Rock is a 79 year old female interested in treatment of medical problems associated with excess weight. She has a height of 5' 3.386\", a weight of 202 lbs 3.2 oz, and the calculated Body mass index is 35.38 kg/m . Rafaela has discussed GLP-1RA medications and without MI, CVA, PVD or DM2 she would not have coverage through Medicare.     ASSESSMENT/PLAN:  Meal skipping which can slow metabolism  Calorie dense foods often while entertaining  With Elevated A1c, BMI 35 Rafaela is concerned regarding metabolic risk  Active with Silver Sneakers 4X/wk and Pickleball    RD for Medical Nutritional Therapy.   Labs related to fatigue/difficulty maintaining a healthier weight  Goal 3 meals daily-will help keep metabolism healthy  -Protein first to help preserve muscle mass, continue bands, resistance at [employer]ne[employer] with consistency  Eating 2 meals with protein in the first half of the day will lessen sweet cravings and snacking in the evenings  [facility] is a resource which can be helpful in identifying areas willing and able to improve nutritionally  Lomaira (Phentermine 8mg tab) 1/2 tab 4mg for appetite suppression.  We discussed risks and benefits and costs of weight loss medications including Wegovy and Zepbound. After weighting risks of potential side effects with benefits, she is very motivated to try a medication.    We discussed Bariatric Basics including:  -eating 3 meals daily  -eating protein first  -eating slowly, chewing food well  -avoiding/limiting calorie containing beverages  -choosing wheat, not white with breads, crackers, pastas, welsey, bagels, tortillas, " "rice  -limiting restaurant or cafeteria eating to twice a week or less    We discussed the importance of restorative sleep and stress management in maintaining a healthy weight.    We reviewed medications associated with weight gain.    We discussed insulin resistance and glycemic index as it relates to appetite and weight control.     We discussed the National Weight Control Registry healthy weight maintenance strategies and ways to optimize metabolism.  We discussed the importance of physical activity including cardiovascular and strength training in maintaining a healthier weight and explored viable options.    We discussed medications available for weight loss including Phentermine, Phendimetrazine, Topamax, Qsymia, Diethylproprion, Orlistat, Contrave (Bupropion/Naltrexone), Saxenda (Liraglutide), Wegovy (Semaglutide), Zepbound (Tirzepatide) and Vyvanse (for Binge Eating Disorder). We discussed the risks and benefits of each. We discussed indications, contraindications, potential side effects, and estimated costs of each. Literature was provided. Rafaela understands that not using a weight loss medication is an option.       She has the following co-morbidities:        7/1/2024     9:09 AM   --   I have the following health issues associated with obesity None of the above   I have the following symptoms associated with obesity Lower Extremity Swelling            No data to display                    7/1/2024     9:09 AM   Referring Provider   Please name the provider who referred you to Medical Weight Management  If you do not know, please answer \"I Don't Know\" Dr Josue Shah           7/1/2024     9:09 AM   Weight History   How concerned are you about your weight? Very Concerned   I became overweight After Pregnancy   The following factors have contributed to my weight gain Genetic (Runs in the Family)   I have tried the following methods to lose weight Watching Portions or Calories    Exercise    Weight Watchers "    Fasting   My lowest weight since age 16 was 130   My highest weight since age 16 was 215   The most weight I have ever lost was (lbs) 35   I have the following family history of obesity/being overweight My father is overweight   How has your weight changed over the last year? Gained   How many pounds? 15           7/1/2024     9:09 AM   Diet Recall Review with Patient   If you do eat lunch, what types of food do you typically eat? Salad ,protein, leftovers . Biggest meal of the day.   If you do eat supper, what types of food do you typically eat? Light meal ,Not real hungry .   If you do snack, what types of food do you typically eat? Salty chips , dip , cheese crackers   How many glasses of juice do you drink in a typical day? 0   How many of glasses of milk do you drink in a typical day? 1   If you do drink milk, what type? 2%   How many 8oz glasses of sugar containing drinks such as Philip-Aid/sweet tea do you drink in a day? 0   How many cans/bottles of sugar pop/soda/tea/sports drinks do you drink in a day? 0   How many cans/bottles of diet pop/soda/tea or sports drink do you drink in a day? 1   How often do you have a drink of alcohol? 2-4 Times a Month   If you do drink, how many drinks might you have in a day? 1 or 2           7/1/2024     9:09 AM   Eating Habits   Generally, my meals include foods like these bread, pasta, rice, potatoes, corn, crackers, sweet dessert, pop, or juice Less Than Weekly   Generally, my meals include foods like these fried meats, brats, burgers, french fries, pizza, cheese, chips, or ice cream Less Than Weekly   Eat fast food (like McDonalds, Burger Clive, Taco Bell) Less Than Weekly   Eat at a buffet or sit-down restaurant Less Than Weekly   Eat most of my meals in front of the TV or computer Less Than Weekly   Often skip meals, eat at random times, have no regular eating times Almost Everyday   Rarely sit down for a meal but snack or graze throughout Never   Eat extra snacks  between meals Almost Everyday   Eat most of my food at the end of the day Never   Eat in the middle of the night or wake up at night to eat Never   Eat extra snacks to prevent or correct low blood sugar Never   Eat to prevent acid reflux or stomach pain Never   Worry about not having enough food to eat Never   I eat when I am depressed Never   I eat when I am stressed Once a Week   I eat when I am bored Once a Week   I eat when I am anxious Once a Week   I eat when I am happy or as a reward Once a Week   I feel hungry all the time even if I just have eaten Less Than Weekly   Feeling full is important to me Never   I finish all the food on my plate even if I am already full Less Than Weekly   I can't resist eating delicious food or walk past the good food/smell Less Than Weekly   I eat/snack without noticing that I am eating Never   I eat when I am preparing the meal Everyday   I eat more than usual when I see others eating Never   I have trouble not eating sweets, ice cream, cookies, or chips if they are around the house A Few Times a Week   I think about food all day Less Than Weekly   What foods, if any, do you crave? Cheese           7/1/2024     9:09 AM   Amount of Food   I feel out of control when eating Everyday   I eat a large amount of food, like a loaf of bread, a box of cookies, a pint/quart of ice cream, all at once Never   I eat a large amount of food even when I am not hungry Weekly   I eat rapidly Never   I eat alone because I feel embarrassed and do not want others to see how much I have eaten Never   I eat until I am uncomfortably full Monthly   I feel bad, disgusted, or guilty after I overeat Monthly           7/1/2024     9:09 AM   Activity/Exercise History   How much of a typical 12 hour day do you spend sitting? Less Than Half the Day   How much of a typical 12 hour day do you spend lying down? Less Than Half the Day   How much of a typical day do you spend walking/standing? Most of the Day   How  many hours (not including work) do you spend on the TV/Video Games/Computer/Tablet/Phone? 4-5 Hours   How many times a week are you active for the purpose of exercise? 4-5 TImes a Week   What keeps you from being more active? Other   How many total minutes do you spend doing some activity for the purpose of exercising when you exercise? More Than 30 Minutes       PAST MEDICAL HISTORY:  Past Medical History:   Diagnosis Date    AK (actinic keratosis) 02/02/2016    Allergic rhinitis     Hyperlipidemia LDL goal < 130     Impaired fasting glucose     Lyme disease     Nonsenile cataract     OA (osteoarthritis)     Obesity     Personal history of DVT (deep vein thrombosis) 2023    plane travel to [place], [place], [facility]. Heterozygous Factor 2 mutation    PONV (postoperative nausea and vomiting)     Rosacea     Vitamin D deficiency 10/2012           7/1/2024     9:09 AM   Work/Social History Reviewed With Patient   My employment status is Retired   What is your marital status? Single   Who does the food shopping? Me           7/1/2024     9:09 AM   Mental Health History Reviewed With Patient   Have you ever been physically or sexually abused? No   How often in the past 2 weeks have you felt little interest or pleasure in doing things? Not at all   Over the past 2 weeks how often have you felt down, depressed, or hopeless? Not at all           7/1/2024     9:09 AM   Sleep History Reviewed With Patient   How many hours do you sleep at night? 7       MEDICATIONS:   Current Outpatient Medications   Medication Sig Dispense Refill    niacinamide 500 MG tablet Take 1 tablet (500 mg) by mouth 2 times daily (with meals) 180 tablet 3    phentermine (LOMAIRA) 8 MG tablet Take 0.5-1 tablets (4-8 mg) by mouth every morning for 90 days 90 tablet 0    triamcinolone (KENALOG) 0.1 % external ointment Apply topically 2 times daily To trunk and extremities as needed 454 g 3    triamterene-HCTZ (DYAZIDE) 37.5-25 MG capsule Take 1 capsule by  "mouth every morning 90 capsule 3       ALLERGIES:   No Known Allergies    PHYSICAL EXAM:  /70 (BP Location: Right arm, Patient Position: Sitting, Cuff Size: Adult Small)   Ht 1.61 m (5' 3.39\")   Wt 91.7 kg (202 lb 3.2 oz)   BMI 35.38 kg/m      Waist circumference: 47.75 cm    Wt Readings from Last 4 Encounters:   07/02/24 91.7 kg (202 lb 3.2 oz)   05/17/24 92.5 kg (204 lb)   11/07/23 90.7 kg (200 lb)   10/26/23 91.6 kg (202 lb)     Pleasant and in no distress. Appears much younger than her stated age  Neck 14.75\" Mallampati 2-3+  Heart regular without murmur  Lungs clear  Abdominal circumference 47.75\"  A & O x 3  Respirations unlabored  Bilateral lower extremity varicosities and GABBY, L>R      FOLLOW-UP:   scheduled.    TIME: 60 min spent on evaluation, management, counseling, education, & motivational interviewing     Sincerely,    Elisha Doyle MD        "

## 2024-07-02 NOTE — LETTER
"2024      Rafaela Rock  [address] [place] [id number]      Dear Colleague,    Thank you for referring your patient, Rafaela Rock, to the [facility] [place]. Please see a copy of my visit note below.        New Medical Weight Management Consult    PATIENT:  Rafaela Rock  MRN:         [id number]  :         1945  JOSEPH:         2024    Dear Dr. Shah,    I had the pleasure of seeing your patient, Rafaela Rock. Full intake/assessment was done to determine barriers to weight loss success and develop a treatment plan. Rafaela Rock is a 79 year old female interested in treatment of medical problems associated with excess weight. She has a height of 5' 3.386\", a weight of 202 lbs 3.2 oz, and the calculated Body mass index is 35.38 kg/m . Rafaela has discussed GLP-1RA medications and without MI, CVA, PVD or DM2 she would not have coverage through Medicare.     ASSESSMENT/PLAN:  Meal skipping which can slow metabolism  Calorie dense foods often while entertaining  With Elevated A1c, BMI 35 Rafaela is concerned regarding metabolic risk  Active with Silver Sneakers 4X/wk and Pickleball    RD for Medical Nutritional Therapy.   Labs related to fatigue/difficulty maintaining a healthier weight  Goal 3 meals daily-will help keep metabolism healthy  -Protein first to help preserve muscle mass, continue bands, resistance at [employer]ne[employer] with consistency  Eating 2 meals with protein in the first half of the day will lessen sweet cravings and snacking in the evenings  [employer] is a resource which can be helpful in identifying areas willing and able to improve nutritionally  Lomaira (Phentermine 8mg tab) 1/2 tab 4mg for appetite suppression.  We discussed risks and benefits and costs of weight loss medications including Wegovy and Zepbound. After weighting risks of potential side effects with benefits, she is very motivated to try a " "medication.    We discussed Bariatric Basics including:  -eating 3 meals daily  -eating protein first  -eating slowly, chewing food well  -avoiding/limiting calorie containing beverages  -choosing wheat, not white with breads, crackers, pastas, wesley, bagels, tortillas, rice  -limiting restaurant or cafeteria eating to twice a week or less    We discussed the importance of restorative sleep and stress management in maintaining a healthy weight.    We reviewed medications associated with weight gain.    We discussed insulin resistance and glycemic index as it relates to appetite and weight control.     We discussed the National Weight Control Registry healthy weight maintenance strategies and ways to optimize metabolism.  We discussed the importance of physical activity including cardiovascular and strength training in maintaining a healthier weight and explored viable options.    We discussed medications available for weight loss including Phentermine, Phendimetrazine, Topamax, Qsymia, Diethylproprion, Orlistat, Contrave (Bupropion/Naltrexone), Saxenda (Liraglutide), Wegovy (Semaglutide), Zepbound (Tirzepatide) and Vyvanse (for Binge Eating Disorder). We discussed the risks and benefits of each. We discussed indications, contraindications, potential side effects, and estimated costs of each. Literature was provided. Rafaela understands that not using a weight loss medication is an option.       She has the following co-morbidities:        7/1/2024     9:09 AM   --   I have the following health issues associated with obesity None of the above   I have the following symptoms associated with obesity Lower Extremity Swelling            No data to display                    7/1/2024     9:09 AM   Referring Provider   Please name the provider who referred you to Medical Weight Management  If you do not know, please answer \"I Don't Know\" Dr Josue Shah           7/1/2024     9:09 AM   Weight History   How concerned are you " about your weight? Very Concerned   I became overweight After Pregnancy   The following factors have contributed to my weight gain Genetic (Runs in the Family)   I have tried the following methods to lose weight Watching Portions or Calories    Exercise    Weight Watchers    Fasting   My lowest weight since age 16 was 130   My highest weight since age 16 was 215   The most weight I have ever lost was (lbs) 35   I have the following family history of obesity/being overweight My father is overweight   How has your weight changed over the last year? Gained   How many pounds? 15           7/1/2024     9:09 AM   Diet Recall Review with Patient   If you do eat lunch, what types of food do you typically eat? Salad ,protein, leftovers . Biggest meal of the day.   If you do eat supper, what types of food do you typically eat? Light meal ,Not real hungry .   If you do snack, what types of food do you typically eat? Salty chips , dip , cheese crackers   How many glasses of juice do you drink in a typical day? 0   How many of glasses of milk do you drink in a typical day? 1   If you do drink milk, what type? 2%   How many 8oz glasses of sugar containing drinks such as Philip-Aid/sweet tea do you drink in a day? 0   How many cans/bottles of sugar pop/soda/tea/sports drinks do you drink in a day? 0   How many cans/bottles of diet pop/soda/tea or sports drink do you drink in a day? 1   How often do you have a drink of alcohol? 2-4 Times a Month   If you do drink, how many drinks might you have in a day? 1 or 2           7/1/2024     9:09 AM   Eating Habits   Generally, my meals include foods like these bread, pasta, rice, potatoes, corn, crackers, sweet dessert, pop, or juice Less Than Weekly   Generally, my meals include foods like these fried meats, brats, burgers, french fries, pizza, cheese, chips, or ice cream Less Than Weekly   Eat fast food (like McDonalds, Burger Clive, Taco Bell) Less Than Weekly   Eat at a buffet or  sit-down restaurant Less Than Weekly   Eat most of my meals in front of the TV or computer Less Than Weekly   Often skip meals, eat at random times, have no regular eating times Almost Everyday   Rarely sit down for a meal but snack or graze throughout Never   Eat extra snacks between meals Almost Everyday   Eat most of my food at the end of the day Never   Eat in the middle of the night or wake up at night to eat Never   Eat extra snacks to prevent or correct low blood sugar Never   Eat to prevent acid reflux or stomach pain Never   Worry about not having enough food to eat Never   I eat when I am depressed Never   I eat when I am stressed Once a Week   I eat when I am bored Once a Week   I eat when I am anxious Once a Week   I eat when I am happy or as a reward Once a Week   I feel hungry all the time even if I just have eaten Less Than Weekly   Feeling full is important to me Never   I finish all the food on my plate even if I am already full Less Than Weekly   I can't resist eating delicious food or walk past the good food/smell Less Than Weekly   I eat/snack without noticing that I am eating Never   I eat when I am preparing the meal Everyday   I eat more than usual when I see others eating Never   I have trouble not eating sweets, ice cream, cookies, or chips if they are around the house A Few Times a Week   I think about food all day Less Than Weekly   What foods, if any, do you crave? Cheese           7/1/2024     9:09 AM   Amount of Food   I feel out of control when eating Everyday   I eat a large amount of food, like a loaf of bread, a box of cookies, a pint/quart of ice cream, all at once Never   I eat a large amount of food even when I am not hungry Weekly   I eat rapidly Never   I eat alone because I feel embarrassed and do not want others to see how much I have eaten Never   I eat until I am uncomfortably full Monthly   I feel bad, disgusted, or guilty after I overeat Monthly           7/1/2024      9:09 AM   Activity/Exercise History   How much of a typical 12 hour day do you spend sitting? Less Than Half the Day   How much of a typical 12 hour day do you spend lying down? Less Than Half the Day   How much of a typical day do you spend walking/standing? Most of the Day   How many hours (not including work) do you spend on the TV/Video Games/Computer/Tablet/Phone? 4-5 Hours   How many times a week are you active for the purpose of exercise? 4-5 TImes a Week   What keeps you from being more active? Other   How many total minutes do you spend doing some activity for the purpose of exercising when you exercise? More Than 30 Minutes       PAST MEDICAL HISTORY:  Past Medical History:   Diagnosis Date     AK (actinic keratosis) 02/02/2016     Allergic rhinitis      Hyperlipidemia LDL goal < 130      Impaired fasting glucose      Lyme disease      Nonsenile cataract      OA (osteoarthritis)      Obesity      Personal history of DVT (deep vein thrombosis) 2023    plane travel to [place], [facility]. Heterozygous Factor 2 mutation     PONV (postoperative nausea and vomiting)      Rosacea      Vitamin D deficiency 10/2012           7/1/2024     9:09 AM   Work/Social History Reviewed With Patient   My employment status is Retired   What is your marital status? Single   Who does the food shopping? Me           7/1/2024     9:09 AM   Mental Health History Reviewed With Patient   Have you ever been physically or sexually abused? No   How often in the past 2 weeks have you felt little interest or pleasure in doing things? Not at all   Over the past 2 weeks how often have you felt down, depressed, or hopeless? Not at all           7/1/2024     9:09 AM   Sleep History Reviewed With Patient   How many hours do you sleep at night? 7       MEDICATIONS:   Current Outpatient Medications   Medication Sig Dispense Refill     niacinamide 500 MG tablet Take 1 tablet (500 mg) by mouth 2 times daily (with meals) 180 tablet 3      "phentermine (LOMAIRA) 8 MG tablet Take 0.5-1 tablets (4-8 mg) by mouth every morning for 90 days 90 tablet 0     triamcinolone (KENALOG) 0.1 % external ointment Apply topically 2 times daily To trunk and extremities as needed 454 g 3     triamterene-HCTZ (DYAZIDE) 37.5-25 MG capsule Take 1 capsule by mouth every morning 90 capsule 3       ALLERGIES:   No Known Allergies    PHYSICAL EXAM:  /70 (BP Location: Right arm, Patient Position: Sitting, Cuff Size: Adult Small)   Ht 1.61 m (5' 3.39\")   Wt 91.7 kg (202 lb 3.2 oz)   BMI 35.38 kg/m      Waist circumference: 47.75 cm    Wt Readings from Last 4 Encounters:   07/02/24 91.7 kg (202 lb 3.2 oz)   05/17/24 92.5 kg (204 lb)   11/07/23 90.7 kg (200 lb)   10/26/23 91.6 kg (202 lb)     Pleasant and in no distress. Appears much younger than her stated age  Neck 14.75\" Mallampati 2-3+  Heart regular without murmur  Lungs clear  Abdominal circumference 47.75\"  A & O x 3  Respirations unlabored  Bilateral lower extremity varicosities and GABBY, L>R      FOLLOW-UP:   scheduled.    TIME: 60 min spent on evaluation, management, counseling, education, & motivational interviewing     Sincerely,    Elisha Doyle MD            Again, thank you for allowing me to participate in the care of your patient.        Sincerely,        Elisha Doyle MD  "

## 2024-07-02 NOTE — PATIENT INSTRUCTIONS
81 mg baby Aspirin the day before you fly, through the trip and for 3 days after getting back.    Wear compression stockings and walk the aisles during air travel.        [facility] Bariatric Basics    Remember to: check out [employer] a food diary analysis tool    -Eat 3 meals a day (not 2, not 5) Chew your food well/SLOW down  -Eat your protein first  -Be a water drinker/Minize liquid calories (no regular pop, no juice) skim or 1% milk OK  -Sleep 7-8 hours each night. Address sleep if problematic  -Stress management is important. Address if problematic  -Move-8000 steps daily Muscle: maintain your muscle mass (strength training 2X/wk)  -Wheat, not white (bread, pasta, crackers, wesley, bagels, tortillas, rice)  -Limit restaurant, cafeteria, take out, drive through to 2 times per week or less  -Minimize caffeine, alcohol, and night-time snacking  -Consider keeping a food diary (i.e. My Fitness Pal, Lose It, or other food tracker)  -Follow up with the dietitian      **Some lean proteins: chicken, turkey, tuna, salmon, crab, fish, shrimp, scallops, lobster, lean cuts of beef and pork, luncheon meats, veggie burgers, beans (black, lima, garbanzo, hargrove, kidney, refried), chile, cottage cheese, string cheese, other cheese, eggs, tofu, peanut butter, nuts, vegan crumbles, greek yogurt        MEDICATIONS FOR WEIGHT LOSS    PHENTERMINE (Adipex 15mg, 30mg, 37.5mg) )(Lomaira 8mg tab): approved in 1959 for appetite suppression.  It has stimulant effects and cannot be used with Ritalin, Concerta, or other stimulants.  It is not addictive although it's chemically related to amphetamines.  Amphetamines are addictive. The most common side effects are dry mouth, increased energy and concentration, increased pulse, and constipation.  You should not take phentermine if you have glaucoma, hyperthyroidism, or uncontrolled/untreated hypertension.  $24-$30 for 90 tablets    PHENDIMETRAZINE (Bontril): Appetite  suppressant/sympathomimetic.  Controlled substance.  Side effects and contraindications similar to phentermine.  $45-$60 for 3 month supply    TOPIRAMATE (Topamax): Anti-seizure medication, also used to prevent migraines.  Side effects include paresthesia, glaucoma, altered concentration, attention difficulties, memory and speech problems, metabolic acidosis, depression, increase in body temperature and decrease sweating, kidney stones, and weight loss.  Do not take Topamax while taking Depakote as this can cause high ammonia levels.  You must have reliable birth control as Topamax can cause birth defects.  Discontinue slowly to avoid seizure.  Insurance usually covers Topiramate.    QSYMIA (Phentermine + Topamax):  See above information about phentermine and Topamax.  Most common side effects are paresthesia, dizziness, distortion of taste, insomnia, constipation, and dry mouth.  $150-$220 per month    DIETHYLPROPION (Tenuate): Sympathomimetic amine.  Appetite suppressant.  Doses 25 mg before meals or 75 mg per day.  Most common side effects are hypertension, palpitations, EKG changes, and increased seizures in epileptics.  There can be a possible adverse reaction with alcohol.  $70-$90 per 3 months    XENICAL(Orlistat) (Bob OTC): Approved in 1999.  A fat-blocker.  It reduces absorption of fat by approximately 30%.  It has beneficial effects on lipid levels.  Side effects include diarrhea, abdominal cramping, fecal incontinence, oily spotting, and flatus with discharge.  Side effects are minimized if the patient limits their dietary fat to no more than 30% of their diet.  Patients must take a multivitamin daily to avoid vitamin D, E, A, and K deficiency.  $120 per month    CONTRAVE (Naltrexone/Bupropion): Approved in 2014.  It is a combination pill including an opioid receptor blocker and a long-standing antidepressant.  Most common side effects include nausea, constipation, headache, vomiting, dizziness, trouble  sleeping, dry mouth, and diarrhea.  With all antidepressants watch for mood changes and suicide ideation.  Bupropion has been known to lower the seizure threshold in those prone to seizures.  It should not be used in a patient with a recent history of bulimia. It has been associated with liver damage from taking higher than recommended doses.  Do not use countrave if you have taken opioid medications or opioid street drugs in the past 7-10 days, if you are currently on opioids, methadone, or if you are pregnant.  Do not use contrave if you have recently stopped using alcohol or benzodiazepines.  Taper off contrave slowly.  Dosing: titrate up to 2 tablets twice daily of the Naltrexone 8 mg/ Bupropion 90 mg tablets.  $200 for 90 tablets    SAXENDA (Liraglutide (called Victoza for Type 2 Diabetes): A daily injectable (3mg daily) medication used for type 2 diabetes (Victoza). Glucagon-like peptide-1 (GLP-1) agonist. Contraindications include personal or family history of medullary thyroid cancer or MEN type 2. Acute pancreatitis has been observed in patients taking liraglutide. Liraglutide causes C-cell tumors in rats and mice. It is unknown whether liraglutide causes tumors in humans. Start at 0.6mg, increasing the dose weekly up to 3mg.     VYVANSE (Lisdexamfetamine dimesylate): a CNS stimulant used to treat ADHD. Indicated for the treatment of moderate to severe Binge Eating Disorder in Adults. Contraindicated in patients with known heart disease, structural abnormalities of the heart, serious heart arrhythmias or unexplained syncope. CNS stimulants such as vyvanse may cause manic or psychotic symptoms in patients with BPAD or pre-existing psychosis. Use with caution in patients with Raynaud's phenomenon. Most common side effects include dry mouth, insomnia, decreased appetite, increased heart rate, jittery feeling, constipation and anxiety.     WEGOVY (Semaglutide (called Ozempic for Type 2 Diabetes): A weekly  injectable (2.4mg weekly) medication used for type 2 diabetes (Ozempic). Glucagon-like peptide-1 (GLP-1) agonist. Contraindications include personal or family history of medullary thyroid cancer or MEN type 2. Acute pancreatitis has been observed in patients taking Semaglutide. Semaglutide causes C-cell tumors in rats and mice. It is unknown whether Semaglutide causes tumors in humans. Start at 0.25mg, increasing to 0.5, 1.0, 1.7 then 2.4 monthly. $1200/mo    ZEPBOUND (Tirzepatide (called Mounjaro for Type 2 Diabetes): A weekly injectable medication indicated for type 2 diabetes in 2022 and for weight loss in 2023.. Glucagon-like-peptide-1 (GLP-1) agonist and Glucose-Dependent Insulinotropic Polypeptide (GIP) agonist. Contraindications include personal or family history of medullary thyroid cancer or MEN type 2. Acute pancreatitis has been observed in patients taking Tirzepatide. Tirzepatide causes C-cell tumors in rats and mice. It is unknown whether Tirzepatide causes tumors in humans. Watch for neck mass, difficulty swallowing, persistent hoarseness, and epigastric abdominal pain. Most common side effects include nausea, diarrhea, vomiting, constipation, dyspepsia, and abdominal pain. Start at 2.5mg, increasing to 5.0, 7.5, 10, 12.5 then 15mg monthly. $1,000/month

## 2024-07-03 LAB
ALBUMIN SERPL BCG-MCNC: 4 G/DL (ref 3.5–5.2)
ALP SERPL-CCNC: 108 U/L (ref 40–150)
ALT SERPL W P-5'-P-CCNC: 17 U/L (ref 0–50)
ANION GAP SERPL CALCULATED.3IONS-SCNC: 10 MMOL/L (ref 7–15)
AST SERPL W P-5'-P-CCNC: 26 U/L (ref 0–45)
BILIRUB SERPL-MCNC: 0.8 MG/DL
BUN SERPL-MCNC: 18 MG/DL (ref 8–23)
CALCIUM SERPL-MCNC: 9.8 MG/DL (ref 8.8–10.2)
CHLORIDE SERPL-SCNC: 101 MMOL/L (ref 98–107)
CREAT SERPL-MCNC: 0.84 MG/DL (ref 0.51–0.95)
DEPRECATED HCO3 PLAS-SCNC: 27 MMOL/L (ref 22–29)
EGFRCR SERPLBLD CKD-EPI 2021: 69 ML/MIN/1.73M2
FERRITIN SERPL-MCNC: 163 NG/ML (ref 11–328)
GLUCOSE SERPL-MCNC: 91 MG/DL (ref 70–99)
POTASSIUM SERPL-SCNC: 3.9 MMOL/L (ref 3.4–5.3)
PROT SERPL-MCNC: 6.9 G/DL (ref 6.4–8.3)
SODIUM SERPL-SCNC: 138 MMOL/L (ref 135–145)
TSH SERPL DL<=0.005 MIU/L-ACNC: 1.72 UIU/ML (ref 0.3–4.2)
VIT B12 SERPL-MCNC: 481 PG/ML (ref 232–1245)
VIT D+METAB SERPL-MCNC: 34 NG/ML (ref 20–50)

## 2024-07-11 ENCOUNTER — VIRTUAL VISIT (OUTPATIENT)
Dept: SURGERY | Facility: CLINIC | Age: 79
End: 2024-07-11
Payer: COMMERCIAL

## 2024-07-11 DIAGNOSIS — E66.9 OBESITY (BMI 30-39.9): Primary | ICD-10-CM

## 2024-07-11 DIAGNOSIS — Z53.9 ERRONEOUS ENCOUNTER--DISREGARD: ICD-10-CM

## 2024-07-11 NOTE — LETTER
"7/11/2024      Rafaela Rock  [address] [place] [id number]      Dear Colleague,    Thank you for referring your patient, Rafaela Rock, to the [facility] [place]. Please see a copy of my visit note below.    Patient was advised that Medicare may not pay for this nutrition consult today. \"If Medicare doesn't pay for services, you may have to pay. Medicare does not pay for everything, even some care that you or your health care provider have good reason to think you need. We expect that Medicare may not pay for the visit today.\"     Patient declines visit at this time.     Linda Romeo RD      Again, thank you for allowing me to participate in the care of your patient.        Sincerely,        Linda Romeo RD  "

## 2024-07-11 NOTE — PROGRESS NOTES
"Patient was advised that Medicare may not pay for this nutrition consult today. \"If Medicare doesn't pay for services, you may have to pay. Medicare does not pay for everything, even some care that you or your health care provider have good reason to think you need. We expect that Medicare may not pay for the visit today.\"     Patient declines visit at this time.     Linda Romeo, RD    "

## 2024-07-18 ENCOUNTER — VIRTUAL VISIT (OUTPATIENT)
Dept: SURGERY | Facility: CLINIC | Age: 79
End: 2024-07-18
Payer: COMMERCIAL

## 2024-07-18 DIAGNOSIS — E66.9 OBESITY (BMI 30-39.9): Primary | ICD-10-CM

## 2024-07-18 PROCEDURE — 97802 MEDICAL NUTRITION INDIV IN: CPT | Mod: 95

## 2024-07-18 NOTE — LETTER
"7/18/2024      Rafaela Rock  [address] [place] [id number]      Dear Colleague,    Thank you for referring your patient, Rafaela Rock, to the [facility] [place]. Please see a copy of my visit note below.    Rafaela Rock is a 79 year old who is being evaluated via a billable video visit.      How would you like to obtain your AVS? MyChart  If the video visit is dropped, the invitation should be resent by: Text to cell phone: [phone]  Will anyone else be joining your video visit? No      Patient was advised that Medicare may not pay for this nutrition consult today. \"If Medicare doesn't pay for services, you may have to pay. Medicare does not pay for everything, even some care that you or your health care provider have good reason to think you need. We expect that Medicare may not pay for the visit today.\"     Patient accepts visit at this time.       Medical Weight Loss Initial Diet Evaluation  Assessment:  This patient was referred by Dr. Doyle for MNT as treatment for Obesity.  Rafaela is presenting today for a new weight management nutrition consultation. Pt has had an initial appointment with Dr. Doyle.    Weight loss medication: Phentermine.     Personal Goals: weight loss     Anthropometrics:    Pt's weight is 202 lbs   Initial weight: 202 lbs  Weight change: none  BMI: There is no height or weight on file to calculate BMI.   Ideal body weight: 53.3 kg (117 lb 7.7 oz)  Adjusted ideal body weight: 68.7 kg (151 lb 5.9 oz)  Estimated RMR ([place]-St Jeor equation):  1360 kcals x 1.2 (sedentary) = 1870 kcals (for weight maintenance)    Recommended Protein Intake: 60-80 grams of protein/day    Medical History:  Patient Active Problem List   Diagnosis     Hyperlipidemia with target LDL less than 130     OA (osteoarthritis)     Rosacea     POSTERIOR VITREOUS DETACHMENT, os     Obesity (BMI 30.0-34.9)     Cataract     S/P total knee arthroplasty - left     " Vitamin D deficiency     AK (actinic keratosis)     Xerosis of skin     Intertrigo     Inflamed seborrheic keratosis     Rash     Diffuse photodamage of skin     Trochanteric bursitis of left hip     Keratoconjunctivitis of both eyes     Myogenic ptosis of left eyelid     Dermatochalasis of both upper eyelids     History of colonic polyps     Class 2 severe obesity due to excess calories with serious comorbidity in adult (H)     MGUS (monoclonal gammopathy of unknown significance)     Prothrombin gene mutation (H24)     Melanoma in situ of other part of trunk (H)     Hypertension goal BP (blood pressure) < 140/90        Nutrition History:   Food allergies/intolerances/cultural or religous food customs: No     Weight loss history: Patient noted she has been trying to implementing a breakfast before her workout since seeing Dr. Doyle. Noted she has significantly increased her fruit intake. Uses Twick[employer] bennie. Reports she is not feeling any effects from the phentermine but also noted she is snacking less.  - noted her chip intake has lessoned    Vitamins/Mineral Supplementation: not daily, Vit D, Vit B3, Vit C, Vit A    Dietary Recall:  Breakfast: oatmeal   Lunch: Salad with protein OR leftovers . Biggest meal of the day.   Dinner: [place] and rice with peas  Typical Snacks: fruit, chips   Overnight eating: No  Eating out: 0-1x per week    Beverages:   3-5 16oz water bottles daily  Coffee with cream and sugar 1 cup  Green tea 1 cup     Exercise: participates in workout classes in the AM 4x per week at the community [place] and playa pickle ball 1x per week     Nutrition Diagnosis (PES statement):     Obesity related to excessive energy intake as evidence by patients subjective report (grazing habits) and BMI of 35.38       Nutrition Intervention  Food and/or Nutrient Delivery   Placed emphasis on importance of developing a healthy meal routine, aiming for 3 meals a day and no snacks.  Discussed using a protein  supplement  Nutrition Education   Discussed with patient how to build a meal: the importance of including a lean/low fat protein at each meal, include a source of vegetables at a minimum of lunch and dinner and limiting carbohydrate intake to 25% per meal.  Educated on sources of lean protein, portion sizes, the amount of grams found in each source. Recommend patient to aim for 20-30g protein at each meal.  Educated on how to read a food label: keeping total fat <10g and sugar <10g per serving.  Discussed the importance of adequate hydration, with emphasis on drinking 64oz of water or zero calorie beverages per day.  Nutrition Counseling   Encouraged importance of developing routine exercise for health benefits and weight loss.    Goals established by patient:   Target at least 20g protein per meal   Follow 10/10 rule           Handouts provided:  Intro to MWM    Assessment/Plan:    Pt will follow up in 5 month(s) with bariatrician. Patient will call clinic and make appointment per preference      Video-Visit Details    Type of service:  Video Visit    Video Start Time (time video started): 11:00 AM    Video End Time (time video stopped): 11:19 AM    Originating Location (pt. Location): Home      Distant Location (provider location):  Off-site    Mode of Communication:  Video Conference via [facility]    Physician has received verbal consent for a Video Visit from the patient? Yes      Linda Romeo RD         Again, thank you for allowing me to participate in the care of your patient.        Sincerely,        Linda Romeo RD

## 2024-07-18 NOTE — PATIENT INSTRUCTIONS
Eat Better ? Move More ? Live Well    Eat 3 nutrient-rich meals each day     Don't skip meals--it will cause you to overeat later in the day!     Eating fiber (vegetables/fruits/whole grains) and protein with meals helps you stay full longer     Choose foods with less than 10 grams of sugar and 5 grams of fat per serving to prevent excess calories and weight re-gain   Eat around the same times each day to develop a routine eating schedule    Avoid snacking unless physically hungry.   Planned snacks: 1-2 times per day and no more than 150 calories    Eat protein first    Protein helps with healing, maintaining adequate muscle mass, reducing hunger and optimizing nutritional status    Aim for ____________________ grams of protein per day   Fill up on Fiber    Fiber comes from plants--fruits, veggies, whole grains, nuts/seeds and beans    Fiber is low in calories, high in phytonutrients and helps you stay full longer    Aim for 25-35 grams per day by eating fiber with meals and snacks  Eat [id number]    Take 20-30 minutes to eat each meal by taking small bites, chewing foods to applesauce consistency or 20-30 times before you swallow    Eating foods too fast can delay satiety/fullness signals and increase overeating   Slow down your eating by using toddler utensils, putting your fork/spoon down between bites and not watching TV or emailing during meals!   Keep a Journal          Writing down what you eat, how you feel and when you are active helps you identify new changes to work on from week to week          Look for ways to cut 100 calories from your current diet 2-3 times per day  Drink 64 ounces of 0-Calorie drinks between meals    Water    Zero calorie Propel  or Vitamin Water      SoBe [place]  Zero Calories    Crystal Light , Sugar-Free [employer] , and other sugar-free lemonade or flavored glover    Keep Caffeine to less than 300mg per day ie: 3-6oz cups coffee     Work up to 45-60 minutes of physical activity  most days of the week    Helps with losing weight and prevent regaining those extra pounds!     Do a combo of cardio (walking/water exercises) and strength training (lifting weights/Vinyasa yoga)    Avoid Mindless Eating    Be present when you eat--take note of the smell, taste and quality of your food    Make a list of alternative activities you could do to prevent eating out of boredom/stress  Go for a walk, call a friend, chew gum, paint your nails, re-organize the garage, etc      LEAN PROTEIN SOURCES    Protein Source Portion Calories Grams of Protein                           Nonfat, plain Greek yogurt    (10 grams sugar or less) 3/4 cup (6 oz)  12-17   Light Yogurt (10 grams sugar or less) 3/4 cup (6 oz)  6-8   Protein Shake 1 shake 110-180 15-30   Skim/1% Milk or lactose-free milk 1 cup ( 8 oz)  8   Plain or light, flavored soymilk 1 cup  7-8   Plain or light, hemp milk 1 cup 110 6   Fat Free or 1% Cottage Cheese 1/2 cup 90 15   Part skim ricotta cheese 1/2 cup 100 14   Part skim or reduced fat cheese slices 1/4cup, 3 dice 65-80 8     Mozzarella String Cheese 1 80 8   Canned tuna, chicken, crab or salmon  (canned in water)  1/2 cup 100 15-20   White fish (broiled, grilled, baked) 3 ounces 100 21   [place]/Tuna (broiled, grilled, baked) 3 ounces 150-180 21   Shrimp, Scallops, Lobster, Crab 3 ounces 100 21   Pork loin, Pork Tenderloin 3 ounces 150 21   Boneless, skinless chicken /turkey breast                          (broiled, grilled, baked) 3 ounces 120 21   [place], [place], [place], and Venison 3 ounces 120 21   Lean cuts of red meat and pork (sirloin,   round, tenderloin, flank, ground 93%-96%) 3 ounces 170 21   Lean or Extra Lean Ground Turkey 1/2 cup 150 20   90-95% Lean [place] Burger 1 edna 140-180 21   Low-fat casserole with lean meat 3/4 cup 200 17   Luncheon Meats                                                        (turkey, lean ham, roast beef, chicken) 3 ounces 100 21   Egg  (boiled, poached, scrambled) 1 Egg 60 7   Egg Substitute 1/2 cup 70 10   Nuts (limit to 1 serving per day)  3 Tbsp. 150 7   Nut [place] (peanut, almond)  Limit to 1 serving or less daily 1 Tbsp. 90 4   Soy Burger (varies) 1  10-15   Edamame  1/2 cup ~95 9   Garbanzo, Black, Glass Beans 1/2 cup 110 7   Refried Beans 1/2 cup 100 7   Kidney and Lima beans 1/2 cup 110 7   Tempeh 3 oz 175 18   Vegan crumbles 1/2 cup 100 14   Tofu 1/2 cup 110 14   Chili (beans and extra lean beef or turkey) 1 cup 200 23   Lentil Stew/Soup 1 cup 150 12   Black Bean Soup 1 cup 175 12     Carbohydrates  Carbohydrates fuel your body with glucose (sugar)--the energy your body needs so you can do your daily activities.  Carbohydrates offer an immediate source of energy for your body. They provide the fuel for your muscles and organs, such as your brain.     Types of Carbohydrates     Complex Carbohydrates are higher in fiber and keep you feeling full longer--helping you eat less.   These are found in nearly all plant-based foods and usually take longer for the body to digest.  They are most commonly found in whole-wheat bread, whole-grain pasta, brown rice, starchy vegetables,   and fruits  Refined Carbohydrates require almost NO WORK for digestion and break down into glucose more quickly   than complex carbohydrates. Refined carbohydrates are usually high in calories and low in nutrients and fiber--  eating more of these can lead to weight gain.  Thinking about eliminating carbohydrates???  If you do not eat enough carbohydrates, the following can occur:  Fatigue  Muscle cramps  Poor mental function  Fatigue easily results from deprivation of carbohydrates, which is seen in people who fast, possibly interfering with activities of daily living.      Thinking about eliminating carbohydrates???  If you do not eat enough carbohydrates, the following can occur:  Fatigue  Muscle cramps  Poor mental function  Fatigue easily results from  deprivation of carbohydrates, which is seen in people who fast, possibly interfering with activities of daily living    Carbohydrates are your body's first choice for fuel. If given a choice of several types of foods simultaneously, your body will use the energy from carbohydrates first.    What foods contain carbohydrates?  Choose the following foods containing carbohydrates (the BEST ones to eat):   Fruit-fresh, frozen, canned in their own juices  Whole grains:  Whole-wheat breads  Brown rice  Oatmeal  Whole-grain cereals  Other starchy foods containing a minimum of 3 grams (g) fiber/100 calories  The ingredient label should list whole wheat or whole grain as one of the first ingredients (bulgur, quinoa, buckwheat, millet, spelt, faro, kasha)  Milk or yogurt (a natural source of carbohydrates):  Low-fat milk  Fat-free milk  Yogurt   Beans or legumes     Starchy vegetables, raw or frozen:  Potatoes  Peas  Corn    AVOID or limit the following foods containing carbohydrates:  Refined sugars, such as in:  Candy  Desserts-ice cream, cakes, pies, brownies, frozen yogurt, sherbet/sorbet  Cookies  White flour: bread/pasta/crackers/rice/tortillas  Sugary snacks: sweetened cereal, granola bars, cereal bars, donuts, muffins, bagels  Sugary Drinks:  Fruit Juice, Smoothies  Sports Drinks  Regular Soda    What are typical serving sizes or portions?  The following are some serving and portion sizes for foods containing carbohydrates:  One medium piece of fruit, about 4?5 ounces (oz) (-tennis ball)  1 cup (C) berries or melon    C canned fruit    C juice (100% vegetable)    C starchy vegetables, cooked or chopped  One slice whole-grain bread  ? C brown rice, quinoa, buckwheat, millet, spelt, faro, kasha    C oatmeal (dry)    C bulgur  One small tortilla (less than 6inch diameter)    C wheat germ  1 oz pretzels     C flaked cereal        Calorie-Controlled Sample Meal Plans    Examples of small healthy meals    Breakfast   Omelet  made with   cup to   cup egg substitute or 2 eggs    cup chopped vegetables  1-2 tbsp. of light cheese     cup salsa  Medium banana    1 cup non-fat plain, Greek yogurt mixed with 1 cup berries and 1-2 Tbsp nuts or cereal   -3/4 cup skim or 1% cottage cheese    cup unsweetened whole-grain cereal  1/2 cup of fresh strawberries  Whole-wheat English muffin or mini bagel, 1 scrambled egg and 1 slice Swiss cheese   Small orange  Protein Bar or Shake (15-30 grams protein and 15-25 grams Carbohydrates)    cup cottage cheese, low-fat    cup fresh fruit    11 ounces of Slim Fast Low Carb (only), Saumya's Advantage, EAS Carb Control    Lunch/Dinner  2-3 slices roasted turkey breast  1 tbsp. of fat free mayonnaise  2 slices of  whole-wheat bread, Medium apple  10 baby carrots with 1 tbsp. of low-fat dip     cup water packed tuna or chicken  1 tablespoons of low-fat mayonnaise  1-2 tbsp. dill relish  1 serving of whole-grain crackers  1 cup of strawberries   6 inch turkey sub sandwich with light mayonnaise,   cup cottage cheese                                                                                                                                                      Black bean and low-fat cheese on a whole wheat tortilla with salsa and light sour cream  Grilled chicken sandwich  Tossed salad with light dressing    Baked potato with 3/4 cup of extra lean ground beef, light shredded cheese and salsa  Fresh fruit                                                 Chicken chunks with lettuce and vegetables stuffed in [place]  Steamed broccoli                                                 3 oz boneless/skinless chicken breast  1/2 cup brown rice with stir-fried vegetables    grapefruit  3 ounces of salmon, trout, or tuna  1 cup of steamed asparagus  1 small slice whole grain Italian bread  Broiled white or pink fish  3/4 cup whole wheat pasta with tomatoes  3/4 cup of roasted red peppers  3 oz. of extra lean (93/7) hamburger on a  Semaj's [place] Thins  Tossed salad with light dressing       Black bean or [place] bean soup with grated mozzarella cheese    of a flour tortilla    3 ounces of grilled pork loin with 1 tbsp. of low-sugar barbeque sauce, 1 cup of green beans seasoned with pepper  Small dinner roll or   cup of grapefruit sections    1-2 cups of torn sandeep    cup of garbanzo beans or diced skinless chicken breast  5-6 cherry tomatoes  1  tbsp. of crumbled feta cheese  1 tbsp. of roasted soy nuts  1 tsp. of olive oil and 2-3 Tbsp. of balsamic or red wine vinegar  Small whole-wheat dinner roll or   cup of cut up pineapple

## 2024-07-18 NOTE — PROGRESS NOTES
"Rafaela Rock is a 79 year old who is being evaluated via a billable video visit.      How would you like to obtain your AVS? MyChart  If the video visit is dropped, the invitation should be resent by: Text to cell phone: [phone]  Will anyone else be joining your video visit? No      Patient was advised that Medicare may not pay for this nutrition consult today. \"If Medicare doesn't pay for services, you may have to pay. Medicare does not pay for everything, even some care that you or your health care provider have good reason to think you need. We expect that Medicare may not pay for the visit today.\"     Patient accepts visit at this time.       Medical Weight Loss Initial Diet Evaluation  Assessment:  This patient was referred by Dr. Doyle for MNT as treatment for Obesity.  Rafaela is presenting today for a new weight management nutrition consultation. Pt has had an initial appointment with Dr. Doyle.    Weight loss medication: Phentermine.     Personal Goals: weight loss     Anthropometrics:    Pt's weight is 202 lbs   Initial weight: 202 lbs  Weight change: none  BMI: There is no height or weight on file to calculate BMI.   Ideal body weight: 53.3 kg (117 lb 7.7 oz)  Adjusted ideal body weight: 68.7 kg (151 lb 5.9 oz)  Estimated RMR ([place]-St Jeor equation):  1360 kcals x 1.2 (sedentary) = 1870 kcals (for weight maintenance)    Recommended Protein Intake: 60-80 grams of protein/day    Medical History:  Patient Active Problem List   Diagnosis    Hyperlipidemia with target LDL less than 130    OA (osteoarthritis)    Rosacea    POSTERIOR VITREOUS DETACHMENT, os    Obesity (BMI 30.0-34.9)    Cataract    S/P total knee arthroplasty - left    Vitamin D deficiency    AK (actinic keratosis)    Xerosis of skin    Intertrigo    Inflamed seborrheic keratosis    Rash    Diffuse photodamage of skin    Trochanteric bursitis of left hip    Keratoconjunctivitis of both eyes    Myogenic ptosis of left eyelid    " Dermatochalasis of both upper eyelids    History of colonic polyps    Class 2 severe obesity due to excess calories with serious comorbidity in adult (H)    MGUS (monoclonal gammopathy of unknown significance)    Prothrombin gene mutation (H24)    Melanoma in situ of other part of trunk (H)    Hypertension goal BP (blood pressure) < 140/90        Nutrition History:   Food allergies/intolerances/cultural or religous food customs: No     Weight loss history: Patient noted she has been trying to implementing a breakfast before her workout since seeing Dr. Doyle. Noted she has significantly increased her fruit intake. Uses Zerto bennie. Reports she is not feeling any effects from the phentermine but also noted she is snacking less.  - noted her chip intake has lessoned    Vitamins/Mineral Supplementation: not daily, Vit D, Vit B3, Vit C, Vit A    Dietary Recall:  Breakfast: oatmeal   Lunch: Salad with protein OR leftovers . Biggest meal of the day.   Dinner: [place] and rice with peas  Typical Snacks: fruit, chips   Overnight eating: No  Eating out: 0-1x per week    Beverages:   3-5 16oz water bottles daily  Coffee with cream and sugar 1 cup  Green tea 1 cup     Exercise: participates in workout classes in the AM 4x per week at the [facility] and playa [employer] ball 1x per week     Nutrition Diagnosis (PES statement):     Obesity related to excessive energy intake as evidence by patients subjective report (grazing habits) and BMI of 35.38       Nutrition Intervention  Food and/or Nutrient Delivery   Placed emphasis on importance of developing a healthy meal routine, aiming for 3 meals a day and no snacks.  Discussed using a protein supplement  Nutrition Education   Discussed with patient how to build a meal: the importance of including a lean/low fat protein at each meal, include a source of vegetables at a minimum of lunch and dinner and limiting carbohydrate intake to 25% per meal.  Educated on sources of lean protein,  portion sizes, the amount of grams found in each source. Recommend patient to aim for 20-30g protein at each meal.  Educated on how to read a food label: keeping total fat <10g and sugar <10g per serving.  Discussed the importance of adequate hydration, with emphasis on drinking 64oz of water or zero calorie beverages per day.  Nutrition Counseling   Encouraged importance of developing routine exercise for health benefits and weight loss.    Goals established by patient:   Target at least 20g protein per meal   Follow 10/10 rule           Handouts provided:  Intro to MWM    Assessment/Plan:    Pt will follow up in 5 month(s) with bariatrician. Patient will call clinic and make appointment per preference      Video-Visit Details    Type of service:  Video Visit    Video Start Time (time video started): 11:00 AM    Video End Time (time video stopped): 11:19 AM    Originating Location (pt. Location): Home      Distant Location (provider location):  Off-site    Mode of Communication:  Video Conference via [facility]    Physician has received verbal consent for a Video Visit from the patient? Yes      Linda Romeo RD

## 2024-08-26 ENCOUNTER — DOCUMENTATION ONLY (OUTPATIENT)
Dept: ONCOLOGY | Facility: CLINIC | Age: 79
End: 2024-08-26
Payer: COMMERCIAL

## 2024-08-26 DIAGNOSIS — D47.2 MGUS (MONOCLONAL GAMMOPATHY OF UNKNOWN SIGNIFICANCE): Primary | ICD-10-CM

## 2024-08-26 NOTE — PROGRESS NOTES
Rafaela Rock has an upcoming lab appointment:    Future Appointments   Date Time Provider Department Center   8/30/2024  9:45 AM [facility]   8/30/2024 10:15 AM Saskia Vogel DO [facility]   9/18/2024 10:30 AM Sarah Tillman MD [facility]   12/19/2024  2:00 PM Elisha Briones MD [facility]   3/12/2025 10:15 AM Sarah Tillman MD [facility]     Patient is scheduled for the following lab(s): [facility] labs  There is no order available. Please review and place future orders current open order in chart isn't expected until October.       Thank you,  Latha Trevizo

## 2024-08-27 ENCOUNTER — LAB (OUTPATIENT)
Dept: LAB | Facility: CLINIC | Age: 79
End: 2024-08-27
Payer: COMMERCIAL

## 2024-08-27 DIAGNOSIS — D47.2 MGUS (MONOCLONAL GAMMOPATHY OF UNKNOWN SIGNIFICANCE): ICD-10-CM

## 2024-08-27 LAB
ALBUMIN SERPL BCG-MCNC: 4 G/DL (ref 3.5–5.2)
ALP SERPL-CCNC: 109 U/L (ref 40–150)
ALT SERPL W P-5'-P-CCNC: 16 U/L (ref 0–50)
ANION GAP SERPL CALCULATED.3IONS-SCNC: 9 MMOL/L (ref 7–15)
AST SERPL W P-5'-P-CCNC: 24 U/L (ref 0–45)
BASOPHILS # BLD AUTO: 0 10E3/UL (ref 0–0.2)
BASOPHILS NFR BLD AUTO: 1 %
BILIRUB SERPL-MCNC: 0.6 MG/DL
BUN SERPL-MCNC: 20.6 MG/DL (ref 8–23)
CALCIUM SERPL-MCNC: 9.5 MG/DL (ref 8.8–10.4)
CHLORIDE SERPL-SCNC: 103 MMOL/L (ref 98–107)
CREAT SERPL-MCNC: 0.8 MG/DL (ref 0.51–0.95)
EGFRCR SERPLBLD CKD-EPI 2021: 74 ML/MIN/1.73M2
EOSINOPHIL # BLD AUTO: 0.2 10E3/UL (ref 0–0.7)
EOSINOPHIL NFR BLD AUTO: 2 %
ERYTHROCYTE [DISTWIDTH] IN BLOOD BY AUTOMATED COUNT: 13 % (ref 10–15)
GLUCOSE SERPL-MCNC: 109 MG/DL (ref 70–99)
HCO3 SERPL-SCNC: 26 MMOL/L (ref 22–29)
HCT VFR BLD AUTO: 40.5 % (ref 35–47)
HGB BLD-MCNC: 13 G/DL (ref 11.7–15.7)
IMM GRANULOCYTES # BLD: 0 10E3/UL
IMM GRANULOCYTES NFR BLD: 0 %
LYMPHOCYTES # BLD AUTO: 2.5 10E3/UL (ref 0.8–5.3)
LYMPHOCYTES NFR BLD AUTO: 30 %
MCH RBC QN AUTO: 29.1 PG (ref 26.5–33)
MCHC RBC AUTO-ENTMCNC: 32.1 G/DL (ref 31.5–36.5)
MCV RBC AUTO: 91 FL (ref 78–100)
MONOCYTES # BLD AUTO: 0.6 10E3/UL (ref 0–1.3)
MONOCYTES NFR BLD AUTO: 7 %
NEUTROPHILS # BLD AUTO: 5.1 10E3/UL (ref 1.6–8.3)
NEUTROPHILS NFR BLD AUTO: 61 %
PLATELET # BLD AUTO: 220 10E3/UL (ref 150–450)
POTASSIUM SERPL-SCNC: 4.1 MMOL/L (ref 3.4–5.3)
PROT SERPL-MCNC: 7 G/DL (ref 6.4–8.3)
RBC # BLD AUTO: 4.47 10E6/UL (ref 3.8–5.2)
SODIUM SERPL-SCNC: 138 MMOL/L (ref 135–145)
TOTAL PROTEIN SERUM FOR ELP: 6.7 G/DL (ref 6.4–8.3)
WBC # BLD AUTO: 8.5 10E3/UL (ref 4–11)

## 2024-08-27 PROCEDURE — 86334 IMMUNOFIX E-PHORESIS SERUM: CPT | Performed by: PATHOLOGY

## 2024-08-27 PROCEDURE — 80053 COMPREHEN METABOLIC PANEL: CPT

## 2024-08-27 PROCEDURE — 84155 ASSAY OF PROTEIN SERUM: CPT

## 2024-08-27 PROCEDURE — 36415 COLL VENOUS BLD VENIPUNCTURE: CPT

## 2024-08-27 PROCEDURE — 82784 ASSAY IGA/IGD/IGG/IGM EACH: CPT

## 2024-08-27 PROCEDURE — 85025 COMPLETE CBC W/AUTO DIFF WBC: CPT

## 2024-08-27 PROCEDURE — 84165 PROTEIN E-PHORESIS SERUM: CPT | Performed by: PATHOLOGY

## 2024-08-27 PROCEDURE — 83521 IG LIGHT CHAINS FREE EACH: CPT

## 2024-08-28 LAB
ALBUMIN SERPL ELPH-MCNC: 4 G/DL (ref 3.7–5.1)
ALPHA1 GLOB SERPL ELPH-MCNC: 0.3 G/DL (ref 0.2–0.4)
ALPHA2 GLOB SERPL ELPH-MCNC: 0.6 G/DL (ref 0.5–0.9)
B-GLOBULIN SERPL ELPH-MCNC: 1 G/DL (ref 0.6–1)
GAMMA GLOB SERPL ELPH-MCNC: 0.9 G/DL (ref 0.7–1.6)
IGA SERPL-MCNC: 348 MG/DL (ref 84–499)
IGG SERPL-MCNC: 872 MG/DL (ref 610–1616)
IGM SERPL-MCNC: 28 MG/DL (ref 35–242)
KAPPA LC FREE SER-MCNC: 2.01 MG/DL (ref 0.33–1.94)
KAPPA LC FREE/LAMBDA FREE SER NEPH: 1.47 {RATIO} (ref 0.26–1.65)
LAMBDA LC FREE SERPL-MCNC: 1.37 MG/DL (ref 0.57–2.63)
LOCATION OF TASK: ABNORMAL
LOCATION OF TASK: NORMAL
M PROTEIN SERPL ELPH-MCNC: 0.1 G/DL
PROT PATTERN SERPL ELPH-IMP: ABNORMAL
PROT PATTERN SERPL IFE-IMP: NORMAL

## 2024-08-30 ENCOUNTER — ONCOLOGY VISIT (OUTPATIENT)
Dept: ONCOLOGY | Facility: CLINIC | Age: 79
End: 2024-08-30
Attending: INTERNAL MEDICINE
Payer: COMMERCIAL

## 2024-08-30 VITALS
RESPIRATION RATE: 16 BRPM | DIASTOLIC BLOOD PRESSURE: 79 MMHG | HEART RATE: 64 BPM | BODY MASS INDEX: 35.17 KG/M2 | SYSTOLIC BLOOD PRESSURE: 144 MMHG | WEIGHT: 201 LBS | OXYGEN SATURATION: 96 %

## 2024-08-30 DIAGNOSIS — I82.4Y2 DEEP VEIN THROMBOSIS (DVT) OF PROXIMAL VEIN OF LEFT LOWER EXTREMITY, UNSPECIFIED CHRONICITY (H): ICD-10-CM

## 2024-08-30 DIAGNOSIS — D47.2 MGUS (MONOCLONAL GAMMOPATHY OF UNKNOWN SIGNIFICANCE): Primary | ICD-10-CM

## 2024-08-30 DIAGNOSIS — D68.52 PROTHROMBIN GENE MUTATION (H): ICD-10-CM

## 2024-08-30 PROCEDURE — G0463 HOSPITAL OUTPT CLINIC VISIT: HCPCS | Performed by: INTERNAL MEDICINE

## 2024-08-30 PROCEDURE — 99214 OFFICE O/P EST MOD 30 MIN: CPT | Performed by: INTERNAL MEDICINE

## 2024-08-30 ASSESSMENT — PAIN SCALES - GENERAL: PAINLEVEL: NO PAIN (0)

## 2024-08-30 NOTE — PROGRESS NOTES
[facility]    Hematology/Oncology Established Patient Follow-up Note    Today's Date: 8/30/24    Reason for Follow-up: IgG MGUS        HISTORY OF PRESENT ILLNESS: Rafaela Rock is a 79 year old female who presents for follow up regarding IgG MGUS, low risk.    Pt has medical history including hld, prediabetes, osteoarthritis, cataracts, eczema, left lower leg SCC s/p Mohs, hx of lyme disease.       Pt was incidentally found to have monoclonal gammopathy on 9/22 SPEP that was done for workup of pruritis.  Patient reports that she has been having full body pruritus for many years, it is not associated with hives.  Reports that it is intermittent and typically localized to 1 area of her body at a time.  It commonly occurs at night.  Patient has tried to remove all perfumes and scents, without improvement.  Patient has changed all of her detergents and soaps to hypoallergenic, which has also not helped. Patient has kept an eye on her food intake and allergen exposure to determine if she can find a cause, however there is no identifiable pattern.  Patient typically uses a topical steroid which helps.  Patient has noticed that she does not have pruritus when she travels. No family history of any hematological diseases.     -9/22 labs reviewed-   CBC N/A (last CBC from 3/2021 WNL)  Cr normal, Calcium normal w/normal albumin, no protein gap (total protein 7.2, albumin 4.1)  SPEP- 0.1 g/dL   SIFE-  monoclonal IgG lambda  UPEP- only trace albumin and trace globulins   UIFE- no monoclonal protein     - 10/22 labs:  CBC WNL  24 hr urine UPEP trace albumin and few trace globulin bands, no obvious monoclonal protein seen   24 hr urine UIFE no monoclonal protein seen  24 hr urine volume= 1600mL, 24 hr total protein 105 mg/specimen,   Kappa= 2.00, lambda=1.63, kappa/lambda ratio=1.23 (normal)   IgG 1008,  IgA 433,  IgM 34 (low normal)    - 9/22 prolonged immobilization and flight back from [place] (traveled  for multiple months to [facility], [place], [place]) and was immobilized for 12 hours in flight and did not get up to use bathroom or anything at all, did not wear compression socks, right lower extremity edema and pain and palpable lump in right lower leg   - 22 right lower extremity Doppler negative for DVT, only left common femoral vein evaluated in left lower extremity for symmetry and also negative for DVT  - 2022 bilateral lower extremity Doppler negative for DVT  -  bilateral breast screening mammogram: No evidence of malignancy, BI-RADS 1  -  left lower extremity Doppler for left lower extremity swelling and lump in left lower le.  Occlusive DVT extending from distal left femoral vein through popliteal vein, occlusive thrombus in left peroneal vein  2.  Nonocclusive DVT in left posterior tibial vein  -  started on Eliquis with plan for 3-month course, tolerating well, no bleeding    INTERIM HISTORY:  Pt feels great, she reports no B sx, bone pain, new or concerning sx.   No new s/s of DVT,  has persistent b/l LE edema for which she is on diuretic. Not wearing compression socks in winter. Felt her LE edema was worse when she was working out 4x/week- walking, dance, yoga and is better when she is working out 1-2x/week    She is working with PCP and weight management team regarding weight loss, started phentermine   Continues to follow w/dermatology of skin cancer screening, last seen 3/2024    REVIEW OF SYSTEMS:   A 14 point ROS was reviewed with pertinent positives and negatives in the HPI.       HOME MEDICATIONS:  Current Outpatient Medications   Medication Sig Dispense Refill    niacinamide 500 MG tablet Take 1 tablet (500 mg) by mouth 2 times daily (with meals) 180 tablet 3    phentermine (LOMAIRA) 8 MG tablet Take 0.5-1 tablets (4-8 mg) by mouth every morning for 90 days 90 tablet 0    triamterene-HCTZ (DYAZIDE) 37.5-25 MG capsule Take 1 capsule by mouth every morning 90 capsule 3     triamcinolone (KENALOG) 0.1 % external ointment Apply topically 2 times daily To trunk and extremities as needed (Patient not taking: Reported on 8/30/2024) 454 g 3         ALLERGIES:  No Known Allergies      PAST MEDICAL HISTORY:  Past Medical History:   Diagnosis Date    AK (actinic keratosis) 02/02/2016    Allergic rhinitis     Hyperlipidemia LDL goal < 130     Impaired fasting glucose     Lyme disease     Nonsenile cataract     OA (osteoarthritis)     Obesity     Personal history of DVT (deep vein thrombosis) 2023    plane travel to [place], [place], [facility]. Heterozygous Factor 2 mutation    PONV (postoperative nausea and vomiting)     Rosacea     Vitamin D deficiency 10/2012         PAST SURGICAL HISTORY:  Past Surgical History:   Procedure Laterality Date    CATARACT IOL, RT/LT  12/27/2013    Toric- RE    CATARACT IOL, RT/LT  12/05/2013    LE    COLONOSCOPY      COLONOSCOPY  5-5-14    Return for Colonoscopy in 1 yrs    PHACOEMULSIFICATION CLEAR CORNEA WITH TORIC INTRAOCULAR LENS IMPLANT  12/6/2013    Procedure: PHACOEMULSIFICATION CLEAR CORNEA WITH TORIC INTRAOCULAR LENS IMPLANT;  LEFT PHACOEMULSIFICATION CLEAR CORNEA WITH TORIC INTRAOCULAR LENS IMPLANT  ;  Surgeon: Otoniel Schuster MD;  Location:  [facility]    PHACOEMULSIFICATION CLEAR CORNEA WITH TORIC INTRAOCULAR LENS IMPLANT  12/27/2013    Procedure: PHACOEMULSIFICATION CLEAR CORNEA WITH TORIC INTRAOCULAR LENS IMPLANT;  RIGHT PHACOEMULSIFICATION CLEAR CORNEA WITH TORIC INTRAOCULAR LENS IMPLANT  ;  Surgeon: Otoniel Schuster MD;  Location:  [facility]    REPAIR PTOSIS BILATERAL Bilateral 3/5/2021    Procedure: Bilateral Upper Eyelid Mechanical Ptosis Repair;  Surgeon: Cristopher Pepper MD;  Location:  [facility]    TONSILLECTOMY  Age 11    [facility] NONSPECIFIC PROCEDURE  9/19/2003    Left knee scope/OA,MMT    [facility] TOTAL KNEE ARTHROPLASTY Left 4/2/2008    Left    [facility] TOTAL KNEE ARTHROPLASTY Right 8/17/16    DML         SOCIAL HISTORY:  Social History      Socioeconomic History    Marital status:      Spouse name: Not on file    Number of children: 2s    Years of education: Not on file    Highest education level: Not on file   Occupational History     Employer: [employer]   Tobacco Use    Smoking status: Never    Smokeless tobacco: Never   Vaping Use    Vaping status: Never Used   Substance and Sexual Activity    Alcohol use: Not Currently     Alcohol/week: 0.0 - 2.0 standard drinks of alcohol     Comment: Weekend some times    Drug use: No    Sexual activity: Not Currently     Partners: Male     Birth control/protection: None   Other Topics Concern    Parent/sibling w/ CABG, MI or angioplasty before 65F 55M? No   Social History Narrative    Div. Boyfriend. 2 grown children 3 grandchildren Retired . Lives in a townhouse alone. No pets.      Social Determinants of Health     Financial Resource Strain: High Risk (10/12/2023)    Financial Resource Strain     Within the past 12 months, have you or your family members you live with been unable to get utilities (heat, electricity) when it was really needed?: Yes   Food Insecurity: Low Risk  (10/12/2023)    Food Insecurity     Within the past 12 months, did you worry that your food would run out before you got money to buy more?: No     Within the past 12 months, did the food you bought just not last and you didn t have money to get more?: No   Transportation Needs: Low Risk  (10/12/2023)    Transportation Needs     Within the past 12 months, has lack of transportation kept you from medical appointments, getting your medicines, non-medical meetings or appointments, work, or from getting things that you need?: No   Physical Activity: Not on file   Stress: Not on file   Social Connections: Not on file   Interpersonal Safety: Low Risk  (5/17/2024)    Interpersonal Safety     Do you feel physically and emotionally safe where you currently live?: Yes     Within the past 12 months, have you been hit,  slapped, kicked or otherwise physically hurt by someone?: No     Within the past 12 months, have you been humiliated or emotionally abused in other ways by your partner or ex-partner?: No   Housing Stability: Low Risk  (10/12/2023)    Housing Stability     Do you have housing? : Yes     Are you worried about losing your housing?: No         FAMILY HISTORY:  Family History   Problem Relation Age of Onset    Cancer Father         lung smoker    Obesity Father     Obesity Brother     Other Cancer Brother         Smoker    Unknown/Adopted Brother     Unknown/Adopted Brother     Skin Cancer Son         skin cancer    Glaucoma No family hx of     Macular Degeneration No family hx of     Eye Surgery No family hx of     Retinal detachment No family hx of     Melanoma No family hx of          PHYSICAL EXAM:  Vital signs:  BP (!) 144/79 (BP Location: Right arm)   Pulse 64   Resp 16   Wt 91.2 kg (201 lb)   SpO2 96%   BMI 35.17 kg/m           ECOG:   GENERAL/CONSTITUTIONAL: No acute distress.  EYES: Pupils are equal and round. Extraocular movements intact without nystagmus.  No scleral icterus.  RESPIRATORY: Equal chest rise.   MUSCULOSKELETAL: Warm and well-perfused, no cyanosis, clubbing. Chronic venous stasis changes- stable  NEUROLOGIC: Cranial nerves are grossly intact. Alert, oriented to person, place and time, answers questions appropriately.  INTEGUMENTARY: No rashes or jaundice.  GAIT: Steady, does not use assistive device      LABS:   Latest Reference Range & Units 07/02/24 15:44 08/27/24 14:48   Sodium 135 - 145 mmol/L 138 138   Potassium 3.4 - 5.3 mmol/L 3.9 4.1   Chloride 98 - 107 mmol/L 101 103   Carbon Dioxide (CO2) 22 - 29 mmol/L 27 26   Urea Nitrogen 8.0 - 23.0 mg/dL 18.0 20.6   Creatinine 0.51 - 0.95 mg/dL 0.84 0.80   GFR Estimate >60 mL/min/1.73m2 69 74   Calcium 8.8 - 10.4 mg/dL 9.8 9.5   Anion Gap 7 - 15 mmol/L 10 9   Albumin 3.5 - 5.2 g/dL 4.0 4.0   Protein Total 6.4 - 8.3 g/dL 6.9 7.0   Alkaline  Phosphatase 40 - 150 U/L 108 109   ALT 0 - 50 U/L 17 16   AST 0 - 45 U/L 26 24   Bilirubin Total <=1.2 mg/dL 0.8 0.6   Ferritin 11 - 328 ng/mL 163    Glucose 70 - 99 mg/dL 91 109 (H)   Hemoglobin A1C 0.0 - 5.6 % 5.7 (H)    TSH 0.30 - 4.20 uIU/mL 1.72    Vitamin B12 232 - 1,245 pg/mL 481    Vitamin D, Total (25-Hydroxy) 20 - 50 ng/mL 34    WBC 4.0 - 11.0 10e3/uL  8.5   Hemoglobin 11.7 - 15.7 g/dL  13.0   Hematocrit 35.0 - 47.0 %  40.5   Platelet Count 150 - 450 10e3/uL  220   RBC Count 3.80 - 5.20 10e6/uL  4.47   MCV 78 - 100 fL  91   MCH 26.5 - 33.0 pg  29.1   MCHC 31.5 - 36.5 g/dL  32.1   RDW 10.0 - 15.0 %  13.0   % Neutrophils %  61   % Lymphocytes %  30   % Monocytes %  7   % Eosinophils %  2   % Basophils %  1   Absolute Basophils 0.0 - 0.2 10e3/uL  0.0   Absolute Eosinophils 0.0 - 0.7 10e3/uL  0.2   Absolute Immature Granulocytes <=0.4 10e3/uL  0.0   Absolute Lymphocytes 0.8 - 5.3 10e3/uL  2.5   Absolute Monocytes 0.0 - 1.3 10e3/uL  0.6   % Immature Granulocytes %  0   Absolute Neutrophils 1.6 - 8.3 10e3/uL  5.1   Albumin Fraction 3.7 - 5.1 g/dL  4.0   Alpha 1 Fraction 0.2 - 0.4 g/dL  0.3   Alpha 2 Fraction 0.5 - 0.9 g/dL  0.6   Beta Fraction 0.6 - 1.0 g/dL  1.0   ELP Interpretation:   Very small monoclonal protein (about 0.1 g/dL) seen in the gamma fraction. See immunofixation report on same specimen. Pathologic significance requires clinical correlation. HERLINDA Reich M.D., Ph.D., Pathologist ().   Gamma Fraction 0.7 - 1.6 g/dL  0.9   IGA 84 - 499 mg/dL  348    - 1,616 mg/dL  872   IGM 35 - 242 mg/dL  28 (L)   Immunofixation ELP   Very small monoclonal IgG immunoglobulin of lambda light chain type. Pathologic significance requires clinical correlation.  HERLINDA Reich M.D., Ph.D., Pathologist ()   Kappa Free Lt Chain 0.33 - 1.94 mg/dL  2.01 (H)   Kappa Lambda Ratio 0.26 - 1.65   1.47   Lambda Free Lt Chain 0.57 - 2.63 mg/dL  1.37   Monoclonal Peak <=0.0 g/dL  0.1 (H)    Parathyroid Hormone Intact 15 - 65 pg/mL 43    Total Protein Serum for ELP 6.4 - 8.3 g/dL  6.7   (H): Data is abnormally high  (L): Data is abnormally low    PATHOLOGY:      IMAGING:      ASSESSMENT/PLAN:  Rafaela Rock is a 79 year old female with:    #low risk IgG lambda MGUS  -  SPEP and SIFE: 0.1g/dL IgG lambda  - 10/22 labs:  24 hr urine UPEP trace albumin and few trace globulin bands, no obvious monoclonal protein seen   24 hr urine UIFE no monoclonal protein seen  24 hr urine volume= 1600mL, 24 hr total protein 105 mg/specimen,   Kappa= 2.00, lambda=1.63, kappa/lambda ratio=1.23 (normal)   IgG 1008,  IgA 433,  IgM 34 (low normal)  - labs:  CBC normal, CMP normal (alk phos 111)  SIFE: Very small monoclonal IgG lambda  SPEP: 0.1 g/dL very small monoclonal protein  Kappa/lambda ratio: Kappa= 2.0, lambda= 1.7, kappa/lambda ratio= 1.18 (normal)  Quantitative immunoglobulins: Ig   IgA: 375    IgM: 31  -10/23 labs:  CBC normal, CMP normal  SIFE: Faint monoclonal IgG lambda  SPEP: 0.1 g/dL small monoclonal protein  Kappa/lambda ratio: Kappa= 2.33, 1.68 lambda= 1.7, kappa/lambda ratio= 1.39 (normal)  Quantitative immunoglobulins: Ig   IgA: 375    IgM: 39  - 2024 labs:  CBC normal, CMP normal  SIFE: monoclonal IgG lambda  SPEP: 0.1 g/dL small monoclonal protein  Kappa/lambda ratio: Kappa= 2.01, 1.68 lambda= 1.37, kappa/lambda ratio= 1.47 (normal)  Quantitative immunoglobulins: Ig   IgA: 348    IgM: 28    PLAN:  -  protein studies stable  - pt has low-risk IgG lambda MGUS (IgG type, M- protein <1.5 gm/dL, normal free light-chain ratio), bone marrow biopsy and imaging can be deferred  (see my prior notes for details)   -in terms of prognosis: absolute risk of disease progression over 20 years in patients with no risk factors (low risk MGUS) is 5 percent or less, d/w pt  - Follow-up CBC, CMP, protein studies in 1 year     #Left lower extremity DVT  #heterozygous for prothrombin gene  mutation   - Evaluated by PCP, thought to be a provoked incident related to prior prolonged immobilization and flight back from [place] in , of note patient did have bilateral lower extremity Doppler  that was negative for DVT  -Cancer screenin/22 bilateral breast screening mammogram KATARINA, 10/2018 colonoscopy with one 4 mm sessile polyp removed from transverse colon (pathology not available for my review) with plan to follow-up in 5 years which would be due when 10/2023  -  occlusive DVT from left distal femoral vein to popliteal vein, left peroneal vein, nonocclusive DVT left posterior tibial vein  -  started Eliquis with plan for 3-month anticoagulation with PCP  - 10/23 labs:   factor V Leiden mutation negative   prothrombin gene mutation POSITIVE heterozygote for the Factor 2 mutation.   protein C activity normal  protein S activity 123%, free antigen normal   antithrombin activity normal   antiphospholipid syndrome labs anticardiolipin Abs negative, beta 2 glycoprotein  Abs negative, LAC panel negative    PLAN:  - first time, possibly provoked, lower extremity DVT in setting of newly found heterozygous prothrombin gene mutation w/transient provoking factor prolonged travel w/immobilization and persistent provoking factor obesity    - in general, risk of 1st episode of VTE (vs control) 3-4 fold  - In a 2009 systematic review that included 18 articles addressing VTE recurrence risk, heterozygosity for the variant did not confer a statistically significant increased risk of recurrent VTE (OR 1.45, 95% CI 0.96-2.2); the point estimate of the observed effect was similar to that of FVL   - It is widely accepted that heterozygosity for prothrombin [id number] does not confer a clinically relevant increased risk for recurrent VTE among patients with a first unprovoked VTE, and the presence of prothrombin B90086P generally does not alter decision-making regarding the duration of anticoagulation.   - in  situations with prolonged sitting, recommend ambulating often, performing leg exercise while seated, wearing compression stockings given pt has LE edema  -  There is a paucity of data examining the safety and efficacy of pharmacologic prophylaxis for the prevention of travel-associated VTE. Data from small or retrospective studies provide insufficient evidence to support routine pharmacologic prophylaxis for extended travel in the at-risk population of travelers. However, pharmacologic prophylaxis can be administered on an individual basis to travelers at particularly high risk (eg, prior VTE plus multiple risk factors) when it is considered by their physician that the benefits of VTE prevention outweigh the risks of bleeding or other adverse event  - alarm sx of DVT/PE d/w pt    RTC 1 year for follow-up with ALLIE and labs 1 week prior to visit    Saskia DO Jaspreet  Hematology/Oncology  [facility]

## 2024-08-30 NOTE — NURSING NOTE
"Oncology Rooming Note    August 30, 2024 10:16 AM   Rafaela Rock is a 79 year old female who presents for:    Chief Complaint   Patient presents with    Oncology Clinic Visit     Follow up     Initial Vitals: BP (!) 144/79 (BP Location: Right arm)   Pulse 64   Resp 16   Wt 91.2 kg (201 lb)   SpO2 96%   BMI 35.17 kg/m   Estimated body mass index is 35.17 kg/m  as calculated from the following:    Height as of 7/2/24: 1.61 m (5' 3.39\").    Weight as of this encounter: 91.2 kg (201 lb). Body surface area is 2.02 meters squared.  No Pain (0) Comment: Data Unavailable   No LMP recorded. Patient is postmenopausal.  Allergies reviewed: Yes  Medications reviewed: Yes    Medications: Medication refills not needed today.  Pharmacy name entered into [facility]: [facility] [address] [place] [address]    Frailty Screening:   Is the patient here for a new oncology consult visit in cancer care? 2. No      Clinical concerns: results       Amanda Gomez LPN              "

## 2024-08-30 NOTE — LETTER
8/30/2024      Rafaela Rock  [address] [place] [id number]      Dear Colleague,    Thank you for referring your patient, Rafaela Rock, to the [facility]. Please see a copy of my visit note below.    [facility]    Hematology/Oncology Established Patient Follow-up Note    Today's Date: 8/30/24    Reason for Follow-up: IgG MGUS        HISTORY OF PRESENT ILLNESS: Rafaela Rock is a 79 year old female who presents for follow up regarding IgG MGUS, low risk.    Pt has medical history including hld, prediabetes, osteoarthritis, cataracts, eczema, left lower leg SCC s/p Mohs, hx of lyme disease.       Pt was incidentally found to have monoclonal gammopathy on 9/22 SPEP that was done for workup of pruritis.  Patient reports that she has been having full body pruritus for many years, it is not associated with hives.  Reports that it is intermittent and typically localized to 1 area of her body at a time.  It commonly occurs at night.  Patient has tried to remove all perfumes and scents, without improvement.  Patient has changed all of her detergents and soaps to hypoallergenic, which has also not helped. Patient has kept an eye on her food intake and allergen exposure to determine if she can find a cause, however there is no identifiable pattern.  Patient typically uses a topical steroid which helps.  Patient has noticed that she does not have pruritus when she travels. No family history of any hematological diseases.     -9/22 labs reviewed-   CBC N/A (last CBC from 3/2021 WNL)  Cr normal, Calcium normal w/normal albumin, no protein gap (total protein 7.2, albumin 4.1)  SPEP- 0.1 g/dL   SIFE-  monoclonal IgG lambda  UPEP- only trace albumin and trace globulins   UIFE- no monoclonal protein     - 10/22 labs:  CBC WNL  24 hr urine UPEP trace albumin and few trace globulin bands, no obvious monoclonal protein seen   24 hr urine UIFE no monoclonal protein  seen  24 hr urine volume= 1600mL, 24 hr total protein 105 mg/specimen,   Kappa= 2.00, lambda=1.63, kappa/lambda ratio=1.23 (normal)   IgG 1008,  IgA 433,  IgM 34 (low normal)    -  prolonged immobilization and flight back from [place] (traveled for multiple months to [facility], [place], [place]) and was immobilized for 12 hours in flight and did not get up to use bathroom or anything at all, did not wear compression socks, right lower extremity edema and pain and palpable lump in right lower leg   - 22 right lower extremity Doppler negative for DVT, only left common femoral vein evaluated in left lower extremity for symmetry and also negative for DVT  - 2022 bilateral lower extremity Doppler negative for DVT  -  bilateral breast screening mammogram: No evidence of malignancy, BI-RADS 1  -  left lower extremity Doppler for left lower extremity swelling and lump in left lower le.  Occlusive DVT extending from distal left femoral vein through popliteal vein, occlusive thrombus in left peroneal vein  2.  Nonocclusive DVT in left posterior tibial vein  -  started on Eliquis with plan for 3-month course, tolerating well, no bleeding    INTERIM HISTORY:  Pt feels great, she reports no B sx, bone pain, new or concerning sx.   No new s/s of DVT,  has persistent b/l LE edema for which she is on diuretic. Not wearing compression socks in winter. Felt her LE edema was worse when she was working out 4x/week- walking, dance, yoga and is better when she is working out 1-2x/week    She is working with PCP and weight management team regarding weight loss, started phentermine   Continues to follow w/dermatology of skin cancer screening, last seen 3/2024    REVIEW OF SYSTEMS:   A 14 point ROS was reviewed with pertinent positives and negatives in the HPI.       HOME MEDICATIONS:  Current Outpatient Medications   Medication Sig Dispense Refill     niacinamide 500 MG tablet Take 1 tablet (500 mg) by mouth 2  times daily (with meals) 180 tablet 3     phentermine (LOMAIRA) 8 MG tablet Take 0.5-1 tablets (4-8 mg) by mouth every morning for 90 days 90 tablet 0     triamterene-HCTZ (DYAZIDE) 37.5-25 MG capsule Take 1 capsule by mouth every morning 90 capsule 3     triamcinolone (KENALOG) 0.1 % external ointment Apply topically 2 times daily To trunk and extremities as needed (Patient not taking: Reported on 8/30/2024) 454 g 3         ALLERGIES:  No Known Allergies      PAST MEDICAL HISTORY:  Past Medical History:   Diagnosis Date     AK (actinic keratosis) 02/02/2016     Allergic rhinitis      Hyperlipidemia LDL goal < 130      Impaired fasting glucose      Lyme disease      Nonsenile cataract      OA (osteoarthritis)      Obesity      Personal history of DVT (deep vein thrombosis) 2023    plane travel to [facility], [place], [facility]. Heterozygous Factor 2 mutation     PONV (postoperative nausea and vomiting)      Rosacea      Vitamin D deficiency 10/2012         PAST SURGICAL HISTORY:  Past Surgical History:   Procedure Laterality Date     CATARACT IOL, RT/LT  12/27/2013    Toric- RE     CATARACT IOL, RT/LT  12/05/2013    LE     COLONOSCOPY       COLONOSCOPY  5-5-14    Return for Colonoscopy in 1 yrs     PHACOEMULSIFICATION CLEAR CORNEA WITH TORIC INTRAOCULAR LENS IMPLANT  12/6/2013    Procedure: PHACOEMULSIFICATION CLEAR CORNEA WITH TORIC INTRAOCULAR LENS IMPLANT;  LEFT PHACOEMULSIFICATION CLEAR CORNEA WITH TORIC INTRAOCULAR LENS IMPLANT  ;  Surgeon: Otoniel Schuster MD;  Location:  [facility]     PHACOEMULSIFICATION CLEAR CORNEA WITH TORIC INTRAOCULAR LENS IMPLANT  12/27/2013    Procedure: PHACOEMULSIFICATION CLEAR CORNEA WITH TORIC INTRAOCULAR LENS IMPLANT;  RIGHT PHACOEMULSIFICATION CLEAR CORNEA WITH TORIC INTRAOCULAR LENS IMPLANT  ;  Surgeon: Otoniel Schuster MD;  Location:  [facility]     REPAIR PTOSIS BILATERAL Bilateral 3/5/2021    Procedure: Bilateral Upper Eyelid Mechanical Ptosis Repair;  Surgeon: Cristopher Pepper  MD;  Location: [facility]     TONSILLECTOMY  Age 11     Z NONSPECIFIC PROCEDURE  9/19/2003    Left knee scope/OA,MMT     ZZC TOTAL KNEE ARTHROPLASTY Left 4/2/2008    Left     ZZC TOTAL KNEE ARTHROPLASTY Right 8/17/16    DML         SOCIAL HISTORY:  Social History     Socioeconomic History     Marital status:      Spouse name: Not on file     Number of children: 2s     Years of education: Not on file     Highest education level: Not on file   Occupational History     Employer: [employer]   Tobacco Use     Smoking status: Never     Smokeless tobacco: Never   Vaping Use     Vaping status: Never Used   Substance and Sexual Activity     Alcohol use: Not Currently     Alcohol/week: 0.0 - 2.0 standard drinks of alcohol     Comment: Weekend some times     Drug use: No     Sexual activity: Not Currently     Partners: Male     Birth control/protection: None   Other Topics Concern     Parent/sibling w/ CABG, MI or angioplasty before 65F 55M? No   Social History Narrative    Div. Boyfriend. 2 grown children 3 grandchildren Retired . Lives in a townhouse alone. No pets.      Social Determinants of Health     Financial Resource Strain: High Risk (10/12/2023)    Financial Resource Strain      Within the past 12 months, have you or your family members you live with been unable to get utilities (heat, electricity) when it was really needed?: Yes   Food Insecurity: Low Risk  (10/12/2023)    Food Insecurity      Within the past 12 months, did you worry that your food would run out before you got money to buy more?: No      Within the past 12 months, did the food you bought just not last and you didn t have money to get more?: No   Transportation Needs: Low Risk  (10/12/2023)    Transportation Needs      Within the past 12 months, has lack of transportation kept you from medical appointments, getting your medicines, non-medical meetings or appointments, work, or from getting things that you need?: No   Physical  Activity: Not on file   Stress: Not on file   Social Connections: Not on file   Interpersonal Safety: Low Risk  (5/17/2024)    Interpersonal Safety      Do you feel physically and emotionally safe where you currently live?: Yes      Within the past 12 months, have you been hit, slapped, kicked or otherwise physically hurt by someone?: No      Within the past 12 months, have you been humiliated or emotionally abused in other ways by your partner or ex-partner?: No   Housing Stability: Low Risk  (10/12/2023)    Housing Stability      Do you have housing? : Yes      Are you worried about losing your housing?: No         FAMILY HISTORY:  Family History   Problem Relation Age of Onset     Cancer Father         lung smoker     Obesity Father      Obesity Brother      Other Cancer Brother         Smoker     Unknown/Adopted Brother      Unknown/Adopted Brother      Skin Cancer Son         skin cancer     Glaucoma No family hx of      Macular Degeneration No family hx of      Eye Surgery No family hx of      Retinal detachment No family hx of      Melanoma No family hx of          PHYSICAL EXAM:  Vital signs:  BP (!) 144/79 (BP Location: Right arm)   Pulse 64   Resp 16   Wt 91.2 kg (201 lb)   SpO2 96%   BMI 35.17 kg/m           ECOG:   GENERAL/CONSTITUTIONAL: No acute distress.  EYES: Pupils are equal and round. Extraocular movements intact without nystagmus.  No scleral icterus.  RESPIRATORY: Equal chest rise.   MUSCULOSKELETAL: Warm and well-perfused, no cyanosis, clubbing. Chronic venous stasis changes- stable  NEUROLOGIC: Cranial nerves are grossly intact. Alert, oriented to person, place and time, answers questions appropriately.  INTEGUMENTARY: No rashes or jaundice.  GAIT: Steady, does not use assistive device      LABS:   Latest Reference Range & Units 07/02/24 15:44 08/27/24 14:48   Sodium 135 - 145 mmol/L 138 138   Potassium 3.4 - 5.3 mmol/L 3.9 4.1   Chloride 98 - 107 mmol/L 101 103   Carbon Dioxide (CO2) 22  - 29 mmol/L 27 26   Urea Nitrogen 8.0 - 23.0 mg/dL 18.0 20.6   Creatinine 0.51 - 0.95 mg/dL 0.84 0.80   GFR Estimate >60 mL/min/1.73m2 69 74   Calcium 8.8 - 10.4 mg/dL 9.8 9.5   Anion Gap 7 - 15 mmol/L 10 9   Albumin 3.5 - 5.2 g/dL 4.0 4.0   Protein Total 6.4 - 8.3 g/dL 6.9 7.0   Alkaline Phosphatase 40 - 150 U/L 108 109   ALT 0 - 50 U/L 17 16   AST 0 - 45 U/L 26 24   Bilirubin Total <=1.2 mg/dL 0.8 0.6   Ferritin 11 - 328 ng/mL 163    Glucose 70 - 99 mg/dL 91 109 (H)   Hemoglobin A1C 0.0 - 5.6 % 5.7 (H)    TSH 0.30 - 4.20 uIU/mL 1.72    Vitamin B12 232 - 1,245 pg/mL 481    Vitamin D, Total (25-Hydroxy) 20 - 50 ng/mL 34    WBC 4.0 - 11.0 10e3/uL  8.5   Hemoglobin 11.7 - 15.7 g/dL  13.0   Hematocrit 35.0 - 47.0 %  40.5   Platelet Count 150 - 450 10e3/uL  220   RBC Count 3.80 - 5.20 10e6/uL  4.47   MCV 78 - 100 fL  91   MCH 26.5 - 33.0 pg  29.1   MCHC 31.5 - 36.5 g/dL  32.1   RDW 10.0 - 15.0 %  13.0   % Neutrophils %  61   % Lymphocytes %  30   % Monocytes %  7   % Eosinophils %  2   % Basophils %  1   Absolute Basophils 0.0 - 0.2 10e3/uL  0.0   Absolute Eosinophils 0.0 - 0.7 10e3/uL  0.2   Absolute Immature Granulocytes <=0.4 10e3/uL  0.0   Absolute Lymphocytes 0.8 - 5.3 10e3/uL  2.5   Absolute Monocytes 0.0 - 1.3 10e3/uL  0.6   % Immature Granulocytes %  0   Absolute Neutrophils 1.6 - 8.3 10e3/uL  5.1   Albumin Fraction 3.7 - 5.1 g/dL  4.0   Alpha 1 Fraction 0.2 - 0.4 g/dL  0.3   Alpha 2 Fraction 0.5 - 0.9 g/dL  0.6   Beta Fraction 0.6 - 1.0 g/dL  1.0   ELP Interpretation:   Very small monoclonal protein (about 0.1 g/dL) seen in the gamma fraction. See immunofixation report on same specimen. Pathologic significance requires clinical correlation. HERLINDA Reich M.D., Ph.D., Pathologist ().   Gamma Fraction 0.7 - 1.6 g/dL  0.9   IGA 84 - 499 mg/dL  348    - 1,616 mg/dL  872   IGM 35 - 242 mg/dL  28 (L)   Immunofixation ELP   Very small monoclonal IgG immunoglobulin of lambda light chain type.  Pathologic significance requires clinical correlation.  HERLINDA Reich M.D., Ph.D., Pathologist ()   Kappa Free Lt Chain 0.33 - 1.94 mg/dL  2.01 (H)   Kappa Lambda Ratio 0.26 - 1.65   1.47   Lambda Free Lt Chain 0.57 - 2.63 mg/dL  1.37   Monoclonal Peak <=0.0 g/dL  0.1 (H)   Parathyroid Hormone Intact 15 - 65 pg/mL 43    Total Protein Serum for ELP 6.4 - 8.3 g/dL  6.7   (H): Data is abnormally high  (L): Data is abnormally low    PATHOLOGY:      IMAGING:      ASSESSMENT/PLAN:  Rafaela Rock is a 79 year old female with:    #low risk IgG lambda MGUS  -  SPEP and SIFE: 0.1g/dL IgG lambda  - 10/22 labs:  24 hr urine UPEP trace albumin and few trace globulin bands, no obvious monoclonal protein seen   24 hr urine UIFE no monoclonal protein seen  24 hr urine volume= 1600mL, 24 hr total protein 105 mg/specimen,   Kappa= 2.00, lambda=1.63, kappa/lambda ratio=1.23 (normal)   IgG 1008,  IgA 433,  IgM 34 (low normal)  - labs:  CBC normal, CMP normal (alk phos 111)  SIFE: Very small monoclonal IgG lambda  SPEP: 0.1 g/dL very small monoclonal protein  Kappa/lambda ratio: Kappa= 2.0, lambda= 1.7, kappa/lambda ratio= 1.18 (normal)  Quantitative immunoglobulins: Ig   IgA: 375    IgM: 31  -10/23 labs:  CBC normal, CMP normal  SIFE: Faint monoclonal IgG lambda  SPEP: 0.1 g/dL small monoclonal protein  Kappa/lambda ratio: Kappa= 2.33, 1.68 lambda= 1.7, kappa/lambda ratio= 1.39 (normal)  Quantitative immunoglobulins: Ig   IgA: 375    IgM: 2024 labs:  CBC normal, CMP normal  SIFE: monoclonal IgG lambda  SPEP: 0.1 g/dL small monoclonal protein  Kappa/lambda ratio: Kappa= 2.01, 1.68 lambda= 1.37, kappa/lambda ratio= 1.47 (normal)  Quantitative immunoglobulins: Ig   IgA: 348    IgM: 28    PLAN:  -  protein studies stable  - pt has low-risk IgG lambda MGUS (IgG type, M- protein <1.5 gm/dL, normal free light-chain ratio), bone marrow biopsy and imaging can be deferred  (see my prior notes  for details)   -in terms of prognosis: absolute risk of disease progression over 20 years in patients with no risk factors (low risk MGUS) is 5 percent or less, d/w pt  - Follow-up CBC, CMP, protein studies in 1 year     #Left lower extremity DVT  #heterozygous for prothrombin gene mutation   - Evaluated by PCP, thought to be a provoked incident related to prior prolonged immobilization and flight back from [place] in , of note patient did have bilateral lower extremity Doppler  that was negative for DVT  -Cancer screenin/22 bilateral breast screening mammogram KATARINA, 10/2018 colonoscopy with one 4 mm sessile polyp removed from transverse colon (pathology not available for my review) with plan to follow-up in 5 years which would be due when 10/2023  -  occlusive DVT from left distal femoral vein to popliteal vein, left peroneal vein, nonocclusive DVT left posterior tibial vein  -  started Eliquis with plan for 3-month anticoagulation with PCP  - 10/23 labs:   factor V Leiden mutation negative   prothrombin gene mutation POSITIVE heterozygote for the Factor 2 mutation.   protein C activity normal  protein S activity 123%, free antigen normal   antithrombin activity normal   antiphospholipid syndrome labs anticardiolipin Abs negative, beta 2 glycoprotein  Abs negative, LAC panel negative    PLAN:  - first time, possibly provoked, lower extremity DVT in setting of newly found heterozygous prothrombin gene mutation w/transient provoking factor prolonged travel w/immobilization and persistent provoking factor obesity    - in general, risk of 1st episode of VTE (vs control) 3-4 fold  - In a 2009 systematic review that included 18 articles addressing VTE recurrence risk, heterozygosity for the variant did not confer a statistically significant increased risk of recurrent VTE (OR 1.45, 95% CI 0.96-2.2); the point estimate of the observed effect was similar to that of FVL   - It is widely accepted that  heterozygosity for prothrombin [id number] does not confer a clinically relevant increased risk for recurrent VTE among patients with a first unprovoked VTE, and the presence of prothrombin [id number] generally does not alter decision-making regarding the duration of anticoagulation.   - in situations with prolonged sitting, recommend ambulating often, performing leg exercise while seated, wearing compression stockings given pt has LE edema  -  There is a paucity of data examining the safety and efficacy of pharmacologic prophylaxis for the prevention of travel-associated VTE. Data from small or retrospective studies provide insufficient evidence to support routine pharmacologic prophylaxis for extended travel in the at-risk population of travelers. However, pharmacologic prophylaxis can be administered on an individual basis to travelers at particularly high risk (eg, prior VTE plus multiple risk factors) when it is considered by their physician that the benefits of VTE prevention outweigh the risks of bleeding or other adverse event  - alarm sx of DVT/PE d/w pt    RTC 1 year for follow-up with ALLIE and labs 1 week prior to visit    Cheryl Sanchez DO  Hematology/Oncology  [facility]        Again, thank you for allowing me to participate in the care of your patient.        Sincerely,        CHERYL SANCHEZ DO

## 2024-09-17 NOTE — PROGRESS NOTES
[facility] Dermatology Note  Encounter Date: Sep 18, 2024  Office Visit     Dermatology Problem List:  Last skin check 9/18/24, recommended every 6 months.  1. Atypical intraepidermal melanocytic proliferation, L mid back, s/p excision 08/01/23  2. History of NMSC  - Current tx: Niacinamide 500 mg BID.  - SCC, left superior medial lower leg, s/p Mohs 8/1/23  - SCCis, left lateral lower leg, s/p Mohs 11/2/20   3. AKs. Cryo  - S/p Efudex to chest 9/2020  - Hypertrophic actinic keratosis, left superior shoulder, s/p shave biopsy 9/28/21, s/p cryo  - Lichenoid actinic keratosis, right-mid back, s/p shave biopsy 9/28/21, s/p cryo  - HAK, right chest, s/p bx 2/2016  - AK, right midline neck, s/p bx 2/2016  4. Nummular eczema/xerosis.  - Rx. TMC 0.1% ointment. Cerave cream.  5. Rosacea.  - Rx. Metronidazole gel.  6. Contact dermatitis to metals - gold, silver?  7. Benign bx:  - Epidermoid cyst, R lower eyelid, sp bx 1/4/2020  - BLK, L upper back, s/p bx 5/2019  - BLK, R upper back, s/p bx 5/2019  - SK, left popliteal fossa, s/p bx 5/2019  - BLK, L upper chest, s/ bx 5/2018  - Sebaceous hyperplasia, L cheek, s/p bx 2/2016  - Ulcer and dermal inflammation, left midline back, s/p bx 2/2016  8. Edema, RLE with R thigh bulla - resolved, unclear etiology.  - DVT r/o U/S 9/20/22 negative for DVT (interestingly later was dx with DVT in LLE)  - VZV CPR 9/20/22 negative  - Empiric doxycycline   9. Pruritus.  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 Dermatitis  - Current tx: TMC, protopic  11. ISK's  - Forehead x2, s/p cryo 03/05/24    #NUB L popliteal fossa  -s/p shave bx 9/18/24  # Dermatitis, L posterior shoulder  Ddx phototoxic drug rxn vs bullous pemphigoid vs dermatitis  -s/p punch bx 9/18/24     ____________________________________________    Assessment & Plan:    # Pruritus, found to have MGUS   Discussed other options including antihistamines (cetirizine) or phototherapy. Patient would like to  continue triamcinolone at this time.  - saw heme onc 8/30/24, reviewed note, on surveillance with stable protein studies  - Continue triamcinolone 0.1% ointment BID PRN  - Continue daily moisturizer    # Pink minimally scaly ill defines papules on the chest and upper back in photo distributed pattern. Patient notes the lesions are not itchy and is unsure when they started. She recently started phentermine and dyazide. Ddx includes phototoxic drug rxn vs eczematous dermatitis vs less likely bullous pemphigoid. Punch biopsy performed today as well as pemphigoid panel ordered. Counseled patient to apply TMC to the area.  - Continue TMC 0.1% ointment BID prn.  - s/p punch biopsy today (see procedure note below)  - labs: pemphigoid panel    # Actinic keratoses  - location(s): dorsal nose  - number: 1  - cryotherapy performed (see procedure note)  - sun protection reviewed with SPF30+ and sun protective clothing    # Seborrheic keratoses, inflamed  - location: L ear lobe  - number: 1  - benign nature reviewed  - cryotherapy perormed (see procedure note)     #NUB, L popliteal fossa  Ddx SCC vs other  - s/p shave biopsy today (see procedure note below)    # Multiple benign nevi.   # H/o AIMP. No evidence of recurrent disease.  - Monitor for ABCDEs of melanoma   - Continue sun protection - recommend SPF 30 or higher with frequent application   - Return sooner if noticing changing or symptomatic lesions     # Benign lesions - SKs, cherry angiomas, lentigenes.  - No treatment required     # History of NMSC. No evidence of recurrent disease.  - continue niacinamide 500 mg BID.  - Continue photoprotection - recommend SPF 30 or higher with frequent reapplication  - Continue yearly skin exams  - Advised to monitor for changing, non-healing, bleeding, painful, changing, or otherwise symptomatic lesions    Procedures Performed:   Punch biopsy procedure  - location(s): L posterior shoulder  After discussion of benefits and risks  including but not limited to bleeding/bruising, pain/swelling, infection, scar, incomplete removal, nerve damage/numbness, recurrence, and non-diagnostic biopsy, verbal consent and photographs obtained, time-out performed, area cleaned with alcohol, 1% lidocaine injected to obtain anesthesia, 4 mm punch biopsy performed, 4-0 proline sutures used to approximate epidermal edges (to be removed in 10-14 days), Vaseline and bandage applied to wound, post-care instructions provided    Shave biopsy procedure note  - location(s): L popliteal fossa  After discussion of benefits and risks including but not limited to bleeding/bruising, pain/swelling, infection, scar, incomplete removal, nerve damage/numbness, recurrence, and non-diagnostic biopsy, verbal consent and photographs obtained, time-out performed, area cleaned with alcohol, 1% lidocaine injected to obtain anesthesia, shave biopsy performed, hemostasis achieved with aluminum chloride, Vaseline and bandage applied, post-care instructions provided      Cryotherapy procedure note  - location: nose, L ear  - number of lesions treated: 2  After verbal consent and discussion of risks and benefits including but no limited to dyspigmentation/scar, blister, and pain, lesions treated with 1-2mm freeze border for 2 cycles with liquid nitrogen, post cryotherapy instructions provided    Follow-up: 6 month(s) in-person, or earlier for new or changing lesions    Staff and Resident:  Octavio Caldwell MD,  Dermatology Resident, PGY2    Staff Physician Comments:   I saw and evaluated the patient with the resident and I agree with the assessment and plan.  I was present for the entire minor procedure, key portions of the above major procedure and examination.    Sarah Tillman MD    Department of Dermatology  [facility] [place]: Phone: [phone], Fax: [phone]  9/19/2024        ____________________________________________    CC: Skin Check (Here today for a skin check. No concerns. )    HPI:  Ms. Rafaela Rock is a(n) 79 year old female who presents today as a return patient for skin check. Last seen by myself on 3/5/24 at which time she had a benign skin check with cryotherapy treated for iSK and monitoring a lesion on the L posterior upper arm.    Today patient notes that she has one spot of concern on the L ear that is nontender but will intermittently bleed. She has itching on the lower legs with associated swelling. She notices this worsens when she doesn't exercise. She uses triamcinolone to the itchy spots.    Patient is otherwise feeling well, without additional skin concerns.    Labs Reviewed:        Physical exam:  Vitals: There were no vitals taken for this visit.  GEN: This is a well developed, well-nourished female in no acute distress, in a pleasant mood.    SKIN: Full body skin exam excluding the genitals was performed including face, scalp, neck, ears, chest, back, bilateral arms, hands, bilateral legs, feet, and buttocks.   - There is an erythematous macule with overyling adherent scale on the dorsum of the nose.  There are dome shaped bright red papules on the trunk/extremities.   Few regular brown pigmented macules and papules are identified on the trunk/extremities.   There are waxy stuck on tan to brown papules on the trunk/extremities including on the L ear lobe  - Pink minimally scaly ill defined papules on the chest and upper back.  - Bright pink papule on the L popliteal fossa  - linear scar on L back with no repigmentation  - thin pink stuck on papule on L upper posterior arm  - No other lesions of concern on areas examined.       Medications:  Current Outpatient Medications   Medication Sig Dispense Refill    niacinamide 500 MG tablet Take 1 tablet (500 mg) by mouth 2 times daily (with meals) 180 tablet 3    phentermine (LOMAIRA) 8 MG tablet Take 0.5-1  tablets (4-8 mg) by mouth every morning for 90 days 90 tablet 0    triamcinolone (KENALOG) 0.1 % external ointment Apply topically 2 times daily To trunk and extremities as needed 454 g 3    triamterene-HCTZ (DYAZIDE) 37.5-25 MG capsule Take 1 capsule by mouth every morning 90 capsule 3     No current facility-administered medications for this visit.      Past Medical History:   Patient Active Problem List   Diagnosis    Hyperlipidemia with target LDL less than 130    OA (osteoarthritis)    Rosacea    POSTERIOR VITREOUS DETACHMENT, os    Obesity (BMI 30.0-34.9)    Cataract    S/P total knee arthroplasty - left    Vitamin D deficiency    AK (actinic keratosis)    Xerosis of skin    Intertrigo    Inflamed seborrheic keratosis    Rash    Diffuse photodamage of skin    Trochanteric bursitis of left hip    Keratoconjunctivitis of both eyes    Myogenic ptosis of left eyelid    Dermatochalasis of both upper eyelids    History of colonic polyps    Class 2 severe obesity due to excess calories with serious comorbidity in adult (H)    MGUS (monoclonal gammopathy of unknown significance)    Prothrombin gene mutation (H24)    Melanoma in situ of other part of trunk (H)    Hypertension goal BP (blood pressure) < 140/90     Past Medical History:   Diagnosis Date    AK (actinic keratosis) 02/02/2016    Allergic rhinitis     Hyperlipidemia LDL goal < 130     Impaired fasting glucose     Lyme disease     Nonsenile cataract     OA (osteoarthritis)     Obesity     Personal history of DVT (deep vein thrombosis) 2023    plane travel to [place], [place], [facility]. Heterozygous Factor 2 mutation    PONV (postoperative nausea and vomiting)     Rosacea     Vitamin D deficiency 10/2012        CC Referred Self, MD  No address on file on close of this encounter.

## 2024-09-18 ENCOUNTER — OFFICE VISIT (OUTPATIENT)
Dept: DERMATOLOGY | Facility: CLINIC | Age: 79
End: 2024-09-18
Payer: COMMERCIAL

## 2024-09-18 ENCOUNTER — LAB (OUTPATIENT)
Dept: LAB | Facility: CLINIC | Age: 79
End: 2024-09-18
Payer: COMMERCIAL

## 2024-09-18 DIAGNOSIS — L81.8 ATYPICAL MELANOCYTIC HYPERPLASIA: ICD-10-CM

## 2024-09-18 DIAGNOSIS — L82.0 INFLAMED SEBORRHEIC KERATOSIS: ICD-10-CM

## 2024-09-18 DIAGNOSIS — L29.9 PRURITUS: ICD-10-CM

## 2024-09-18 DIAGNOSIS — L81.4 LENTIGINES: ICD-10-CM

## 2024-09-18 DIAGNOSIS — D47.2 MGUS (MONOCLONAL GAMMOPATHY OF UNKNOWN SIGNIFICANCE): ICD-10-CM

## 2024-09-18 DIAGNOSIS — D49.2 NEOPLASM OF UNSPECIFIED BEHAVIOR OF BONE, SOFT TISSUE, AND SKIN: ICD-10-CM

## 2024-09-18 DIAGNOSIS — L30.9 DERMATITIS: ICD-10-CM

## 2024-09-18 DIAGNOSIS — D22.9 MULTIPLE BENIGN NEVI: ICD-10-CM

## 2024-09-18 DIAGNOSIS — L57.0 ACTINIC KERATOSIS: ICD-10-CM

## 2024-09-18 DIAGNOSIS — Z85.828 HISTORY OF NONMELANOMA SKIN CANCER: Primary | ICD-10-CM

## 2024-09-18 DIAGNOSIS — D18.01 CHERRY ANGIOMA: ICD-10-CM

## 2024-09-18 DIAGNOSIS — L82.1 SEBORRHEIC KERATOSIS: ICD-10-CM

## 2024-09-18 LAB
ALBUMIN SERPL BCG-MCNC: 3.8 G/DL (ref 3.5–5.2)
ALP SERPL-CCNC: 112 U/L (ref 40–150)
ALT SERPL W P-5'-P-CCNC: 15 U/L (ref 0–50)
ANION GAP SERPL CALCULATED.3IONS-SCNC: 9 MMOL/L (ref 7–15)
AST SERPL W P-5'-P-CCNC: 25 U/L (ref 0–45)
BILIRUB SERPL-MCNC: 0.7 MG/DL
BUN SERPL-MCNC: 24.3 MG/DL (ref 8–23)
CALCIUM SERPL-MCNC: 9.5 MG/DL (ref 8.8–10.4)
CHLORIDE SERPL-SCNC: 108 MMOL/L (ref 98–107)
CREAT SERPL-MCNC: 0.84 MG/DL (ref 0.51–0.95)
EGFRCR SERPLBLD CKD-EPI 2021: 69 ML/MIN/1.73M2
GLUCOSE SERPL-MCNC: 96 MG/DL (ref 70–99)
HCO3 SERPL-SCNC: 25 MMOL/L (ref 22–29)
POTASSIUM SERPL-SCNC: 3.9 MMOL/L (ref 3.4–5.3)
PROT SERPL-MCNC: 6.7 G/DL (ref 6.4–8.3)
SODIUM SERPL-SCNC: 142 MMOL/L (ref 135–145)

## 2024-09-18 PROCEDURE — 11103 TANGNTL BX SKIN EA SEP/ADDL: CPT | Mod: XS | Performed by: DERMATOLOGY

## 2024-09-18 PROCEDURE — 11104 PUNCH BX SKIN SINGLE LESION: CPT | Mod: GC | Performed by: DERMATOLOGY

## 2024-09-18 PROCEDURE — 36415 COLL VENOUS BLD VENIPUNCTURE: CPT | Performed by: PATHOLOGY

## 2024-09-18 PROCEDURE — 17000 DESTRUCT PREMALG LESION: CPT | Mod: XS | Performed by: DERMATOLOGY

## 2024-09-18 PROCEDURE — 88350 IMFLUOR EA ADDL 1ANTB STN PX: CPT | Mod: 90 | Performed by: PATHOLOGY

## 2024-09-18 PROCEDURE — 88346 IMFLUOR 1ST 1ANTB STAIN PX: CPT | Mod: 90 | Performed by: PATHOLOGY

## 2024-09-18 PROCEDURE — 17110 DESTRUCTION B9 LES UP TO 14: CPT | Mod: XS | Performed by: DERMATOLOGY

## 2024-09-18 PROCEDURE — 99000 SPECIMEN HANDLING OFFICE-LAB: CPT | Performed by: PATHOLOGY

## 2024-09-18 PROCEDURE — 99214 OFFICE O/P EST MOD 30 MIN: CPT | Mod: 25 | Performed by: DERMATOLOGY

## 2024-09-18 PROCEDURE — 83516 IMMUNOASSAY NONANTIBODY: CPT | Mod: 90 | Performed by: PATHOLOGY

## 2024-09-18 PROCEDURE — 80053 COMPREHEN METABOLIC PANEL: CPT | Performed by: PATHOLOGY

## 2024-09-18 PROCEDURE — 88305 TISSUE EXAM BY PATHOLOGIST: CPT | Mod: 26 | Performed by: DERMATOLOGY

## 2024-09-18 PROCEDURE — 88305 TISSUE EXAM BY PATHOLOGIST: CPT | Mod: TC | Performed by: DERMATOLOGY

## 2024-09-18 ASSESSMENT — PAIN SCALES - GENERAL: PAINLEVEL: NO PAIN (0)

## 2024-09-18 NOTE — NURSING NOTE
Lidocaine-epinephrine 1-1:756039 % injection   1mL once for one use, starting 9/18/2024 ending 9/18/2024,  2mL disp, R-0, injection  Injected by Dr. Rogers.

## 2024-09-18 NOTE — NURSING NOTE
Dermatology Rooming Note    Rafaela Rock's goals for this visit include:   Chief Complaint   Patient presents with    Skin Check     Here today for a skin check. No concerns.      Eunice Alicia RN

## 2024-09-18 NOTE — LETTER
9/18/2024       RE: Rafaela Rock  [address] [place] [address]     Dear Colleague,    Thank you for referring your patient, Rafaela Rock, to the [facility] [place] at [facility]. Please see a copy of my visit note below.    [facility] Dermatology Note  Encounter Date: Sep 18, 2024  Office Visit     Dermatology Problem List:  Last skin check 9/18/24, recommended every 6 months.  1. Atypical intraepidermal melanocytic proliferation, L mid back, s/p excision 08/01/23  2. History of NMSC  - Current tx: Niacinamide 500 mg BID.  - SCC, left superior medial lower leg, s/p Mohs 8/1/23  - SCCis, left lateral lower leg, s/p Mohs 11/2/20   3. AKs. Cryo  - S/p Efudex to chest 9/2020  - Hypertrophic actinic keratosis, left superior shoulder, s/p shave biopsy 9/28/21, s/p cryo  - Lichenoid actinic keratosis, right-mid back, s/p shave biopsy 9/28/21, s/p cryo  - HAK, right chest, s/p bx 2/2016  - AK, right midline neck, s/p bx 2/2016  4. Nummular eczema/xerosis.  - Rx. TMC 0.1% ointment. Cerave cream.  5. Rosacea.  - Rx. Metronidazole gel.  6. Contact dermatitis to metals - gold, silver?  7. Benign bx:  - Epidermoid cyst, R lower eyelid, sp bx 1/4/2020  - BLK, L upper back, s/p bx 5/2019  - BLK, R upper back, s/p bx 5/2019  - SK, left popliteal fossa, s/p bx 5/2019  - BLK, L upper chest, s/ bx 5/2018  - Sebaceous hyperplasia, L cheek, s/p bx 2/2016  - Ulcer and dermal inflammation, left midline back, s/p bx 2/2016  8. Edema, RLE with R thigh bulla - resolved, unclear etiology.  - DVT r/o U/S 9/20/22 negative for DVT (interestingly later was dx with DVT in LLE)  - VZV CPR 9/20/22 negative  - Empiric doxycycline   9. Pruritus.  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 Dermatitis  - Current tx: TMC, protopic  11. ISK's  - Forehead x2, s/p cryo 03/05/24    #NUB L popliteal fossa  -s/p shave bx 9/18/24  #  Dermatitis, L posterior shoulder  Ddx phototoxic drug rxn vs bullous pemphigoid vs dermatitis  -s/p punch bx 9/18/24     ____________________________________________    Assessment & Plan:    # Pruritus, found to have MGUS   Discussed other options including antihistamines (cetirizine) or phototherapy. Patient would like to continue triamcinolone at this time.  - saw heme onc 8/30/24, reviewed note, on surveillance with stable protein studies  - Continue triamcinolone 0.1% ointment BID PRN  - Continue daily moisturizer    # Pink minimally scaly ill defines papules on the chest and upper back in photo distributed pattern. Patient notes the lesions are not itchy and is unsure when they started. She recently started phentermine and dyazide. Ddx includes phototoxic drug rxn vs eczematous dermatitis vs less likely bullous pemphigoid. Punch biopsy performed today as well as pemphigoid panel ordered. Counseled patient to apply TMC to the area.  - Continue TMC 0.1% ointment BID prn.  - s/p punch biopsy today (see procedure note below)  - labs: pemphigoid panel    # Actinic keratoses  - location(s): dorsal nose  - number: 1  - cryotherapy performed (see procedure note)  - sun protection reviewed with SPF30+ and sun protective clothing    # Seborrheic keratoses, inflamed  - location: L ear lobe  - number: 1  - benign nature reviewed  - cryotherapy perormed (see procedure note)     #NUB, L popliteal fossa  Ddx SCC vs other  - s/p shave biopsy today (see procedure note below)    # Multiple benign nevi.   # H/o AIMP. No evidence of recurrent disease.  - Monitor for ABCDEs of melanoma   - Continue sun protection - recommend SPF 30 or higher with frequent application   - Return sooner if noticing changing or symptomatic lesions     # Benign lesions - SKs, cherry angiomas, lentigenes.  - No treatment required     # History of NMSC. No evidence of recurrent disease.  - continue niacinamide 500 mg BID.  - Continue photoprotection -  recommend SPF 30 or higher with frequent reapplication  - Continue yearly skin exams  - Advised to monitor for changing, non-healing, bleeding, painful, changing, or otherwise symptomatic lesions    Procedures Performed:   Punch biopsy procedure  - location(s): L posterior shoulder  After discussion of benefits and risks including but not limited to bleeding/bruising, pain/swelling, infection, scar, incomplete removal, nerve damage/numbness, recurrence, and non-diagnostic biopsy, verbal consent and photographs obtained, time-out performed, area cleaned with alcohol, 1% lidocaine injected to obtain anesthesia, 4 mm punch biopsy performed, 4-0 proline sutures used to approximate epidermal edges (to be removed in 10-14 days), Vaseline and bandage applied to wound, post-care instructions provided    Shave biopsy procedure note  - location(s): L popliteal fossa  After discussion of benefits and risks including but not limited to bleeding/bruising, pain/swelling, infection, scar, incomplete removal, nerve damage/numbness, recurrence, and non-diagnostic biopsy, verbal consent and photographs obtained, time-out performed, area cleaned with alcohol, 1% lidocaine injected to obtain anesthesia, shave biopsy performed, hemostasis achieved with aluminum chloride, Vaseline and bandage applied, post-care instructions provided      Cryotherapy procedure note  - location: nose, L ear  - number of lesions treated: 2  After verbal consent and discussion of risks and benefits including but no limited to dyspigmentation/scar, blister, and pain, lesions treated with 1-2mm freeze border for 2 cycles with liquid nitrogen, post cryotherapy instructions provided    Follow-up: 6 month(s) in-person, or earlier for new or changing lesions    Staff and Resident:  Octavio Caldwell MD,  Dermatology Resident, PGY2    Staff Physician Comments:   I saw and evaluated the patient with the resident and I agree with the assessment and plan.  I was  present for the entire minor procedure, key portions of the above major procedure and examination.    Sarah Tillman MD    Department of Dermatology  [facility]: Phone: [phone], Fax: [phone]  9/19/2024       ____________________________________________    CC: Skin Check (Here today for a skin check. No concerns. )    HPI:  Ms. Rafaela Rock is a(n) 79 year old female who presents today as a return patient for skin check. Last seen by myself on 3/5/24 at which time she had a benign skin check with cryotherapy treated for iSK and monitoring a lesion on the L posterior upper arm.    Today patient notes that she has one spot of concern on the L ear that is nontender but will intermittently bleed. She has itching on the lower legs with associated swelling. She notices this worsens when she doesn't exercise. She uses triamcinolone to the itchy spots.    Patient is otherwise feeling well, without additional skin concerns.    Labs Reviewed:        Physical exam:  Vitals: There were no vitals taken for this visit.  GEN: This is a well developed, well-nourished female in no acute distress, in a pleasant mood.    SKIN: Full body skin exam excluding the genitals was performed including face, scalp, neck, ears, chest, back, bilateral arms, hands, bilateral legs, feet, and buttocks.   - There is an erythematous macule with overyling adherent scale on the dorsum of the nose.  There are dome shaped bright red papules on the trunk/extremities.   Few regular brown pigmented macules and papules are identified on the trunk/extremities.   There are waxy stuck on tan to brown papules on the trunk/extremities including on the L ear lobe  - Pink minimally scaly ill defined papules on the chest and upper back.  - Bright pink papule on the L popliteal fossa  - linear scar on L back with no repigmentation  - thin pink stuck on papule on  L upper posterior arm  - No other lesions of concern on areas examined.       Medications:  Current Outpatient Medications   Medication Sig Dispense Refill     niacinamide 500 MG tablet Take 1 tablet (500 mg) by mouth 2 times daily (with meals) 180 tablet 3     phentermine (LOMAIRA) 8 MG tablet Take 0.5-1 tablets (4-8 mg) by mouth every morning for 90 days 90 tablet 0     triamcinolone (KENALOG) 0.1 % external ointment Apply topically 2 times daily To trunk and extremities as needed 454 g 3     triamterene-HCTZ (DYAZIDE) 37.5-25 MG capsule Take 1 capsule by mouth every morning 90 capsule 3     No current facility-administered medications for this visit.      Past Medical History:   Patient Active Problem List   Diagnosis     Hyperlipidemia with target LDL less than 130     OA (osteoarthritis)     Rosacea     POSTERIOR VITREOUS DETACHMENT, os     Obesity (BMI 30.0-34.9)     Cataract     S/P total knee arthroplasty - left     Vitamin D deficiency     AK (actinic keratosis)     Xerosis of skin     Intertrigo     Inflamed seborrheic keratosis     Rash     Diffuse photodamage of skin     Trochanteric bursitis of left hip     Keratoconjunctivitis of both eyes     Myogenic ptosis of left eyelid     Dermatochalasis of both upper eyelids     History of colonic polyps     Class 2 severe obesity due to excess calories with serious comorbidity in adult (H)     MGUS (monoclonal gammopathy of unknown significance)     Prothrombin gene mutation (H24)     Melanoma in situ of other part of trunk (H)     Hypertension goal BP (blood pressure) < 140/90     Past Medical History:   Diagnosis Date     AK (actinic keratosis) 02/02/2016     Allergic rhinitis      Hyperlipidemia LDL goal < 130      Impaired fasting glucose      Lyme disease      Nonsenile cataract      OA (osteoarthritis)      Obesity      Personal history of DVT (deep vein thrombosis) 2023    [place] Heterozygous Factor 2 mutation     PONV  (postoperative nausea and vomiting)      Rosacea      Vitamin D deficiency 10/2012        CC Referred Self, MD  No address on file on close of this encounter.      Again, thank you for allowing me to participate in the care of your patient.      Sincerely,    Sarah Tillman MD

## 2024-09-19 NOTE — PATIENT INSTRUCTIONS
Cryotherapy    What is it?  Use of a very cold liquid, such as liquid nitrogen, to freeze and destroy abnormal skin cells that need to be removed    What should I expect?  Tenderness and redness  A small blister that might grow and fill with dark purple blood. There may be crusting.  More than one treatment may be needed if the lesions do not go away.    How do I care for the treated area?  Gently wash the area with your hands when bathing.  Use a thin layer of Vaseline to help with healing. You may use a Band-Aid.   The area should heal within 7-10 days and may leave behind a pink or lighter color.   Do not use an antibiotic or Neosporin ointment.   You may take acetaminophen (Tylenol) for pain.     Call your doctor if you have:  Severe pain  Signs of infection (warmth, redness, cloudy yellow drainage, and or a bad smell)  Questions or concerns    Who should I call with questions?      [facility]: [phone]      [facility]: [phone]      For urgent needs outside of business hours call the [facility] at [phone] and ask for the dermatology resident on call     Wound Care After a Biopsy    What is a skin biopsy?  A skin biopsy allows the doctor to examine a very small piece of tissue under the microscope to determine the diagnosis and the best treatment for the skin condition. A local anesthetic (numbing medicine) is injected with a very small needle into the skin area to be tested. A small piece of skin is taken from the area. Sometimes a suture (stitch) is used.     What are the risks of a skin biopsy?  I will experience scar, bleeding, swelling, pain, crusting and redness. I may experience incomplete removal or recurrence. Risks of this procedure are excessive bleeding, bruising, infection, nerve damage, numbness, thick (hypertrophic or keloidal) scar and non-diagnostic biopsy.    How should I care for my wound for the first 24  hours?  Keep the wound dry and covered for 24 hours  If it bleeds, hold direct pressure on the area for 15 minutes. If bleeding does not stop, call us or go to the emergency room  Avoid strenuous exercise the first 1-2 days or as your doctor instructs you    How should I care for the wound after 24 hours?  After 24 hours, remove the bandage  You may bathe or shower as normal  If you had a scalp biopsy, you can shampoo as usual and can use shower water to clean the biopsy site daily  Clean the wound once a day with gentle soap and water  Do not scrub, be gentle  Apply white petroleum/Vaseline after cleaning the wound with a cotton swab or a clean finger, and keep the site covered with a Bandaid /bandage. Bandages are not necessary with a scalp biopsy  If you are unable to cover the site with a Bandaid /bandage, re-apply ointment 2-3 times a day to keep the site moist. Moisture will help with healing  Avoid strenuous activity for first 1-2 days  Avoid lakes, rivers, pools, and oceans until the stitches are removed or the site is healed    How do I clean my wound?  Wash hands thoroughly with soap or use hand  before all wound care  Clean the wound with gentle soap and water  Apply white petroleum/Vaseline  to wound after it is clean  Replace the Bandaid /bandage to keep the wound covered for the first few days or as instructed by your doctor  If you had a scalp biopsy, warm shower water to the area on a daily basis should suffice    What should I use to clean my wound?   Cotton-tipped applicators (Qtips )  White petroleum jelly (Vaseline ). Use a clean new container and use Q-tips to apply.  Bandaids  as needed  Gentle soap     How should I care for my wound long term?  Do not get your wound dirty  Keep up with wound care for one week or until the area is healed.  If you have stitches, stitches need to be removed in 14 days. You may return to our clinic for this or you may have it done locally at your doctor s  office.  A small scab will form and fall off by itself when the area is completely healed. The area will be red and will become pink in color as it heals. Sun protection is very important for how your scar will turn out. Sunscreen with an SPF 30 or greater is recommended once the area is healed.  You should have some soreness but it should be mild and slowly go away over several days. Talk to your doctor about using tylenol for pain,    When should I call my doctor?  If you have increased:   Pain or swelling  Pus or drainage (clear or slightly yellow drainage is ok)  Temperature over 100F  Spreading redness or warmth around wound    When will I hear about my results?  The biopsy results can take 2 weeks to come back.  Your results will automatically release to Feedback before your provider has even reviewed them.  The clinic will call you with the results, send you a Feedback message, or have you schedule a follow-up clinic or phone time to discuss the results.  Contact our clinics if you do not hear from us in 2 weeks.    Who should I call with questions?  [facility]: [phone]  [facility]: [phone]  For urgent needs outside of business hours call the [facility] at [phone] and ask for the dermatology resident on call         Checking for Skin Cancer  You can help find cancer early by checking your skin each month. There are 3 main kinds of skin cancer: melanoma, basal cell carcinoma, and squamous cell carcinoma. Doing monthly skin checks is the best way to find new marks, sores, or skin changes. Follow these instructions for checking your skin.   The ABCDEs of checking moles for melanoma   Check your moles or growths for signs of melanoma using ABCDE:   Asymmetry: The sides of the mole or growth don t match.  Border: The edges are ragged, notched, or blurred.  Color: The color within the mole or growth varies. It could be black, brown,  tan, white, or shades of red, gray, or blue.  Diameter: The mole or growth is larger than   inch or 6 mm (size of a pencil eraser).  Evolving: The size, shape, texture, or color of the mole or growth is changing.     ABCDE's of moles on light skin.        ABCDE's of moles on dark skin may be harder to identify.     Checking for other types of skin cancer  Basal cell carcinoma or squamous cell carcinoma cause symptoms like:     A spot or mole that looks different from all other marks on your skin  Changes in how an area feels, such as itching, tenderness, or pain  Changes in the skin's surface, such as oozing, bleeding, or scaliness  A sore that doesn't heal  New swelling, redness, or spread of color beyond the border of a mole    Who s at risk?  Anyone of any skin color can get skin cancer. But you're at greater risk if you have:   Fair skin that freckles easily and burns instead of tanning  Light-colored or red hair  Light-colored eyes  Many moles or abnormal moles on your skin  A long history of unprotected exposure to sunlight or tanning beds  A history of many blistering sunburns as a child or teen  A family history of skin cancer  Been exposed to radiation or chemicals  A weakened immune system  Been exposed to arsenic  If you've had skin cancer in the past, you're at high risk of having it again.   How to check your skin  Do your monthly skin checkups in front of a full-length mirror. Use a room with good lighting so it's easier to see. Use a hand mirror to look at hard-to-see places like your buttocks and back. You can also have a trusted friend or family member help you with these checks. Check every part of your body, including your:   Head (ears, face, neck, and scalp)  Torso (front, back, sides, and under breasts)  Arms (tops, undersides, and armpits)  Hands (palms, backs, and fingers, including under the nails)  Lower back, buttocks, and genitals  Legs (front, back, and sides)  Feet (tops, soles, toes,  including under the nails, and between toes)  Watch for new spots on your skin or a spot that's changing in color, shape, size.   If you have a lot of moles, take digital photos of them each month. Make sure to take photos both up close and from a distance. These can help you see if any moles change over time.   Know your skin  Most skin changes aren't cancer. But if you see any changes in your skin, call your healthcare provider right away. Only they can tell you if a change is a problem. If you have skin cancer, seeing your provider can be the first step to getting the treatment that could save your life.   [employer] last reviewed this educational content on 10/1/2021    0605-6276 [employer]. All rights reserved. This information is not intended as a substitute for professional medical care. Always follow your healthcare professional's instructions.

## 2024-09-20 LAB
ANNOTATION COMMENT IMP: NORMAL
PATH REPORT.COMMENTS IMP SPEC: NORMAL
PATH REPORT.FINAL DX SPEC: NORMAL
PATH REPORT.GROSS SPEC: NORMAL
PATH REPORT.MICROSCOPIC SPEC OTHER STN: NORMAL
PATH REPORT.RELEVANT HX SPEC: NORMAL
PATHOLOGY STUDY: NORMAL

## 2024-10-18 ENCOUNTER — OFFICE VISIT (OUTPATIENT)
Dept: OPHTHALMOLOGY | Facility: CLINIC | Age: 79
End: 2024-10-18
Payer: COMMERCIAL

## 2024-10-18 ENCOUNTER — TELEPHONE (OUTPATIENT)
Dept: OPHTHALMOLOGY | Facility: CLINIC | Age: 79
End: 2024-10-18

## 2024-10-18 DIAGNOSIS — H00.024 HORDEOLUM INTERNUM LEFT UPPER EYELID: Primary | ICD-10-CM

## 2024-10-18 PROCEDURE — 92012 INTRM OPH EXAM EST PATIENT: CPT | Performed by: OPTOMETRIST

## 2024-10-18 ASSESSMENT — VISUAL ACUITY
OD_CC: 20/20
METHOD: SNELLEN - LINEAR
OS_CC: 20/20
CORRECTION_TYPE: GLASSES

## 2024-10-18 ASSESSMENT — REFRACTION_WEARINGRX
OD_ADD: +2.75
OS_SPHERE: -1.00
OD_SPHERE: -1.00
OD_CYLINDER: +1.25
OS_AXIS: 010
OD_AXIS: 160
OS_ADD: +2.75
OS_CYLINDER: +1.75

## 2024-10-18 ASSESSMENT — EXTERNAL EXAM - LEFT EYE: OS_EXAM: NORMAL

## 2024-10-18 ASSESSMENT — SLIT LAMP EXAM - LIDS: COMMENTS: 1+ MGD, LID TELANGIECTASIA

## 2024-10-18 ASSESSMENT — TONOMETRY
OD_IOP_MMHG: 10
OS_IOP_MMHG: 9
IOP_METHOD: ICARE

## 2024-10-18 ASSESSMENT — EXTERNAL EXAM - RIGHT EYE: OD_EXAM: NORMAL

## 2024-10-18 NOTE — TELEPHONE ENCOUNTER
H/o pink eye per pt    H/o ocular rosacea and episcleritis    Redness/vessels prominent, no pain, some discomfort when blinks.    Pt noticing white raised bumps on white part of eye    No vision changes  No light sensitivities    Pt not using artificial tears.    Recommended increase use of preservative free tears and offered same day appt time slot with Dr. Alba at  location and pt agrees    Tommie Dipo RN 9:43 AM 10/18/24

## 2024-10-18 NOTE — PROGRESS NOTES
Assessment/Plan  (H00.024) Hordeolum internum left upper eyelid  (primary encounter diagnosis)  Comment: x3 days.  Plan: Begin hot compresses (microwaved rice/sock or hot pack) at least 4 times daily for 3-5 minutes followed by digital massage. Regular use of compresses should help resolve condition within the next couple of days. Call or return to clinic with worsening pain or redness. Continue using lubricating drops as needed to help with stinging/burning.         Complete documentation of historical and exam elements from today's encounter can  be found in the full encounter summary report (not reduplicated in this progress  note). I personally obtained the chief complaint(s) and history of present illness. I  confirmed and edited as necessary the review of systems, past medical/surgical  history, family history, social history, and examination findings as documented by  others; and I examined the patient myself. I personally reviewed the relevant tests,  images, and reports as documented above. I formulated and edited as necessary the  assessment and plan and discussed the findings and management plan with the  patient and family.    Jose A Alba OD

## 2024-10-18 NOTE — TELEPHONE ENCOUNTER
[facility] Call Center    Phone Message    May a detailed message be left on voicemail: yes     Reason for Call: Other: Pt called in regards to her eye. She thinks it's pink eye again, it's at the early stage of being really red and itchy, not to the goopy stage yet. Pt is wondering what she should do, or if an appt is needed. Please call back to discuss     Action Taken: Other: [facility]    Travel Screening: Not Applicable     Date of Service:

## 2024-10-23 DIAGNOSIS — E66.01 CLASS 2 SEVERE OBESITY DUE TO EXCESS CALORIES WITH SERIOUS COMORBIDITY AND BODY MASS INDEX (BMI) OF 35.0 TO 35.9 IN ADULT (H): ICD-10-CM

## 2024-10-23 DIAGNOSIS — E66.812 CLASS 2 SEVERE OBESITY DUE TO EXCESS CALORIES WITH SERIOUS COMORBIDITY AND BODY MASS INDEX (BMI) OF 35.0 TO 35.9 IN ADULT (H): ICD-10-CM

## 2024-10-23 DIAGNOSIS — R63.2 HYPERPHAGIA: ICD-10-CM

## 2024-10-23 RX ORDER — PHENTERMINE HYDROCHLORIDE 8 MG/1
TABLET ORAL
Qty: 90 TABLET | Refills: 1 | Status: SHIPPED | OUTPATIENT
Start: 2024-10-23

## 2024-11-14 ENCOUNTER — ANCILLARY ORDERS (OUTPATIENT)
Dept: MAMMOGRAPHY | Facility: CLINIC | Age: 79
End: 2024-11-14

## 2024-11-14 DIAGNOSIS — Z12.31 VISIT FOR SCREENING MAMMOGRAM: Primary | ICD-10-CM

## 2024-11-16 ENCOUNTER — HEALTH MAINTENANCE LETTER (OUTPATIENT)
Age: 79
End: 2024-11-16

## 2024-12-11 ENCOUNTER — ANCILLARY PROCEDURE (OUTPATIENT)
Dept: MAMMOGRAPHY | Facility: CLINIC | Age: 79
End: 2024-12-11
Payer: COMMERCIAL

## 2024-12-11 DIAGNOSIS — Z12.31 VISIT FOR SCREENING MAMMOGRAM: ICD-10-CM

## 2024-12-11 PROCEDURE — 77063 BREAST TOMOSYNTHESIS BI: CPT | Mod: TC | Performed by: RADIOLOGY

## 2024-12-11 PROCEDURE — 77067 SCR MAMMO BI INCL CAD: CPT | Mod: TC | Performed by: RADIOLOGY

## 2024-12-19 ENCOUNTER — VIRTUAL VISIT (OUTPATIENT)
Dept: SURGERY | Facility: CLINIC | Age: 79
End: 2024-12-19
Payer: COMMERCIAL

## 2024-12-19 VITALS — BODY MASS INDEX: 34.55 KG/M2 | HEIGHT: 63 IN | WEIGHT: 195 LBS

## 2024-12-19 DIAGNOSIS — E66.811 OBESITY (BMI 30.0-34.9): Primary | ICD-10-CM

## 2024-12-19 ASSESSMENT — PAIN SCALES - GENERAL: PAINLEVEL_OUTOF10: NO PAIN (0)

## 2024-12-19 NOTE — PROGRESS NOTES
Virtual Visit Details    Type of service:  Video Visit   Video Start Time:  2:20  Video End Time:2:43 PM    Originating Location (pt. Location): Home    Distant Location (provider location):  On-site  Platform used for Video Visit: [place]    Bariatric Follow-up    79 year old  female BMI:Body mass index is 34.12 kg/m .    Weight:   Wt Readings from Last 1 Encounters:   12/19/24 88.5 kg (195 lb)    pounds    Comorbidities:  Patient Active Problem List   Diagnosis    Hyperlipidemia with target LDL less than 130    OA (osteoarthritis)    Rosacea    POSTERIOR VITREOUS DETACHMENT, os    Obesity (BMI 30.0-34.9)    Cataract    S/P total knee arthroplasty - left    Vitamin D deficiency    AK (actinic keratosis)    Xerosis of skin    Intertrigo    Inflamed seborrheic keratosis    Rash    Diffuse photodamage of skin    Trochanteric bursitis of left hip    Keratoconjunctivitis of both eyes    Myogenic ptosis of left eyelid    Dermatochalasis of both upper eyelids    History of colonic polyps    Class 2 severe obesity due to excess calories with serious comorbidity in adult (H)    MGUS (monoclonal gammopathy of unknown significance)    Prothrombin gene mutation (H)    Melanoma in situ of other part of trunk (H)    Hypertension goal BP (blood pressure) < 140/90         Current Outpatient Medications:     phentermine (LOMAIRA) 8 MG tablet, Take 1/2 to 1 tables by mouth each morning for 90 days, Disp: 90 tablet, Rfl: 1    triamcinolone (KENALOG) 0.1 % external ointment, Apply topically 2 times daily To trunk and extremities as needed, Disp: 454 g, Rfl: 3    triamterene-HCTZ (DYAZIDE) 37.5-25 MG capsule, Take 1 capsule by mouth every morning, Disp: 90 capsule, Rfl: 3       Interim: Maintaining a 7# weight loss. Going to the gym [employer] [place] every morning. Playing Pickle Ball every Thursday and Saturday during the warm weather. Feels eating protein first has helped. Has been walking the neighbor's dog for 1 hour  every morning for 1 month. Noticing less strength in her legs.     Cholesterol   Date Value Ref Range Status   06/21/2022 213 (H) <200 mg/dL Final   03/02/2021 195 <200 mg/dL Final      Hemoglobin A1C   Date Value Ref Range Status   07/02/2024 5.7 (H) 0.0 - 5.6 % Final     Comment:     Normal <5.7%   Prediabetes 5.7-6.4%    Diabetes 6.5% or higher     Note: Adopted from ADA consensus guidelines.   03/02/2021 5.4 0 - 5.6 % Final     Comment:     Normal <5.7% Prediabetes 5.7-6.4%  Diabetes 6.5% or higher - adopted from ADA   consensus guidelines.        BP Readings from Last 3 Encounters:   08/30/24 (!) 144/79   07/02/24 122/70   05/17/24 134/86        Plan:  DIET  Lean dense protein with 3 meals   EXERCISE continue your excellent activity level at the [facility]   PHARMACOTHERAPY continue Phentermine 8mg     discussed optimizing metabolism, protein and resisance for muscle and staying the course.     -We reviewed her medications and whether associated with weight gain.        We discussed [facility] Bariatric Basics including:  -eating 3 meals daily  -eating protein first  -eating slowly, chewing food well  -avoiding/limiting calorie containing beverages  -choosing wheat, not white with breads, crackers, pastas, wesley, bagels, tortillas, rice  -limiting restaurant or cafeteria eating to twice a week or less  -We discussed the importance of restorative sleep and stress management in maintaining a healthy weight.  -We discussed insulin resistance and glycemic index as it relates to appetite and weight control  -We discussed the National Weight Control Registry healthy weight maintenance strategies and ways to optimize metabolism.  -We discussed the importance of physical activity including cardiovascular and strength training in maintaining a healthier weight and explored viable options.      DIETARY HISTORY  Meals Per Day: 3  Eating Protein First?: yes  Food Diary: B:oatmeal L:beef carrots, potatoes D:cottage cheese  "and peaches  Snacks Per Day: not for a while  Typical Snack: chips  Fluid Intake: intentional  Portion Control: improved  Calorie Containing Beverages: OJ once in a while  Alcohol per week: 0-1  Typical Protein Food Choices: lean  Choosing Whole Grains: yes  Grocery Shopping is done by: herself  Food Preparation is done by: herself  Meals at Restaurant/Cafeteria/Take Out Per Week: 0-1  Eating at the Table:   TV is Off During Meals:     Positive Changes Since Last Visit: 7#  Struggling With: weight loss    Knowledgeable in Reading Food Labels: yes  Getting Adequate Protein: yes  Sleeping 7-8 hours/day well in spurts  Stress management OK    PHYSICAL ACTIVITY PATTERNS:  Cardiovascular: going to the [facility] every morning, walking the dog, playing Pickle ball  Strength Training: at  the [facility].     REVIEW OF SYSTEMS    PHYSICAL EXAM: (most recent vitals and today's stated weight)  Vitals: Ht 1.61 m (5' 3.39\")   Wt 88.5 kg (195 lb)   BMI 34.12 kg/m        GEN: Pleasant and in no acute distress  PSYCH: A&OX3,     I have reviewed the note as documented above.  This accurately captures the substance of my conversation with the patient.  Thank you for the opportunity to participate in the care of your patient.    Elisha Doyle MD, FAAFP  [facility]  Diplomate, American Board of Obesity Medicine    Total time spent on the date of this encounter doing: chart review, review of test results, patient visit, physical exam, education, counseling, developing plan of care, and documenting = 30 minutes.      "

## 2024-12-19 NOTE — LETTER
12/19/2024      Rafaela Rock  [address] [place] [id number]      Dear Colleague,    Thank you for referring your patient, Rafaela Rock, to the [facility] [place]. Please see a copy of my visit note below.    Virtual Visit Details    Type of service:  Video Visit   Video Start Time:  2:20  Video End Time:2:43 PM    Originating Location (pt. Location): Home    Distant Location (provider location):  On-site  Platform used for Video Visit: [facility]    Bariatric Follow-up    79 year old  female BMI:Body mass index is 34.12 kg/m .    Weight:   Wt Readings from Last 1 Encounters:   12/19/24 88.5 kg (195 lb)    pounds    Comorbidities:  Patient Active Problem List   Diagnosis     Hyperlipidemia with target LDL less than 130     OA (osteoarthritis)     Rosacea     POSTERIOR VITREOUS DETACHMENT, os     Obesity (BMI 30.0-34.9)     Cataract     S/P total knee arthroplasty - left     Vitamin D deficiency     AK (actinic keratosis)     Xerosis of skin     Intertrigo     Inflamed seborrheic keratosis     Rash     Diffuse photodamage of skin     Trochanteric bursitis of left hip     Keratoconjunctivitis of both eyes     Myogenic ptosis of left eyelid     Dermatochalasis of both upper eyelids     History of colonic polyps     Class 2 severe obesity due to excess calories with serious comorbidity in adult (H)     MGUS (monoclonal gammopathy of unknown significance)     Prothrombin gene mutation (H)     Melanoma in situ of other part of trunk (H)     Hypertension goal BP (blood pressure) < 140/90         Current Outpatient Medications:      phentermine (LOMAIRA) 8 MG tablet, Take 1/2 to 1 tables by mouth each morning for 90 days, Disp: 90 tablet, Rfl: 1     triamcinolone (KENALOG) 0.1 % external ointment, Apply topically 2 times daily To trunk and extremities as needed, Disp: 454 g, Rfl: 3     triamterene-HCTZ (DYAZIDE) 37.5-25 MG capsule, Take 1 capsule by mouth every morning,  Disp: 90 capsule, Rfl: 3       Interim: Maintaining a 7# weight loss. Going to the gym [facility] every morning. Playing Pickle Ball every Thursday and Saturday during the warm weather. Feels eating protein first has helped. Has been walking the neighbor's dog for 1 hour every morning for 1 month. Noticing less strength in her legs.     Cholesterol   Date Value Ref Range Status   06/21/2022 213 (H) <200 mg/dL Final   03/02/2021 195 <200 mg/dL Final      Hemoglobin A1C   Date Value Ref Range Status   07/02/2024 5.7 (H) 0.0 - 5.6 % Final     Comment:     Normal <5.7%   Prediabetes 5.7-6.4%    Diabetes 6.5% or higher     Note: Adopted from ADA consensus guidelines.   03/02/2021 5.4 0 - 5.6 % Final     Comment:     Normal <5.7% Prediabetes 5.7-6.4%  Diabetes 6.5% or higher - adopted from ADA   consensus guidelines.        BP Readings from Last 3 Encounters:   08/30/24 (!) 144/79   07/02/24 122/70   05/17/24 134/86        Plan:  DIET  Lean dense protein with 3 meals   EXERCISE continue your excellent activity level at the [facility]   PHARMACOTHERAPY continue Phentermine 8mg     discussed optimizing metabolism, protein and resisance for muscle and staying the course.     -We reviewed her medications and whether associated with weight gain.        We discussed [facility] Bariatric Basics including:  -eating 3 meals daily  -eating protein first  -eating slowly, chewing food well  -avoiding/limiting calorie containing beverages  -choosing wheat, not white with breads, crackers, pastas, wesley, bagels, tortillas, rice  -limiting restaurant or cafeteria eating to twice a week or less  -We discussed the importance of restorative sleep and stress management in maintaining a healthy weight.  -We discussed insulin resistance and glycemic index as it relates to appetite and weight control  -We discussed the National Weight Control Registry healthy weight maintenance strategies and ways to optimize metabolism.  -We  "discussed the importance of physical activity including cardiovascular and strength training in maintaining a healthier weight and explored viable options.      DIETARY HISTORY  Meals Per Day: 3  Eating Protein First?: yes  Food Diary: B:oatmeal L:beef carrots, potatoes D:cottage cheese and peaches  Snacks Per Day: not for a while  Typical Snack: chips  Fluid Intake: intentional  Portion Control: improved  Calorie Containing Beverages: OJ once in a while  Alcohol per week: 0-1  Typical Protein Food Choices: lean  Choosing Whole Grains: yes  Grocery Shopping is done by: herself  Food Preparation is done by: herself  Meals at Restaurant/Cafeteria/Take Out Per Week: 0-1  Eating at the Table:   TV is Off During Meals:     Positive Changes Since Last Visit: 7#  Struggling With: weight loss    Knowledgeable in Reading Food Labels: yes  Getting Adequate Protein: yes  Sleeping 7-8 hours/day well in spurts  Stress management OK    PHYSICAL ACTIVITY PATTERNS:  Cardiovascular: going to the [facility] every morning, walking the dog, playing Pickle ball  Strength Training: at  the [facility].     REVIEW OF SYSTEMS    PHYSICAL EXAM: (most recent vitals and today's stated weight)  Vitals: Ht 1.61 m (5' 3.39\")   Wt 88.5 kg (195 lb)   BMI 34.12 kg/m        GEN: Pleasant and in no acute distress  PSYCH: A&OX3,     I have reviewed the note as documented above.  This accurately captures the substance of my conversation with the patient.  Thank you for the opportunity to participate in the care of your patient.    Elisha Doyle MD, FAAFP  [facility]  Diplomate, American Board of Obesity Medicine    Total time spent on the date of this encounter doing: chart review, review of test results, patient visit, physical exam, education, counseling, developing plan of care, and documenting = 30 minutes.        Again, thank you for allowing me to participate in the care of your patient.  "       Sincerely,        Elisha Doyle MD

## 2024-12-19 NOTE — NURSING NOTE
Current patient location: [address] [place] [id number]    Is the patient currently in the state of [place]? YES    Visit mode:VIDEO    If the visit is dropped, the patient can be reconnected by:VIDEO VISIT: Send to e-mail at: [email or web address]    Will anyone else be joining the visit? NO  (If patient encounters technical issues they should call [phone]    Are changes needed to the allergy or medication list? No    Are refills needed on medications prescribed by this physician? NO    Rooming Documentation:  Questionnaire(s) completed    Reason for visit: RECHFLORINA MARIEEF

## 2025-01-30 ENCOUNTER — OFFICE VISIT (OUTPATIENT)
Dept: FAMILY MEDICINE | Facility: CLINIC | Age: 80
End: 2025-01-30
Payer: COMMERCIAL

## 2025-01-30 VITALS
SYSTOLIC BLOOD PRESSURE: 138 MMHG | DIASTOLIC BLOOD PRESSURE: 73 MMHG | HEIGHT: 63 IN | WEIGHT: 203 LBS | TEMPERATURE: 98.9 F | RESPIRATION RATE: 18 BRPM | BODY MASS INDEX: 35.97 KG/M2 | HEART RATE: 81 BPM | OXYGEN SATURATION: 96 %

## 2025-01-30 DIAGNOSIS — R60.0 BILATERAL LEG EDEMA: ICD-10-CM

## 2025-01-30 DIAGNOSIS — E78.5 HYPERLIPIDEMIA WITH TARGET LDL LESS THAN 130: ICD-10-CM

## 2025-01-30 DIAGNOSIS — L03.115 CELLULITIS OF RIGHT LOWER EXTREMITY: Primary | ICD-10-CM

## 2025-01-30 DIAGNOSIS — I10 HYPERTENSION GOAL BP (BLOOD PRESSURE) < 140/90: ICD-10-CM

## 2025-01-30 DIAGNOSIS — Z86.0100 HISTORY OF COLONIC POLYPS: ICD-10-CM

## 2025-01-30 DIAGNOSIS — Z86.718 PERSONAL HISTORY OF DVT (DEEP VEIN THROMBOSIS): ICD-10-CM

## 2025-01-30 DIAGNOSIS — Z96.653 S/P TOTAL KNEE ARTHROPLASTY, BILATERAL: ICD-10-CM

## 2025-01-30 DIAGNOSIS — E66.812 CLASS 2 SEVERE OBESITY DUE TO EXCESS CALORIES WITH SERIOUS COMORBIDITY AND BODY MASS INDEX (BMI) OF 35.0 TO 35.9 IN ADULT (H): ICD-10-CM

## 2025-01-30 DIAGNOSIS — E66.01 CLASS 2 SEVERE OBESITY DUE TO EXCESS CALORIES WITH SERIOUS COMORBIDITY AND BODY MASS INDEX (BMI) OF 35.0 TO 35.9 IN ADULT (H): ICD-10-CM

## 2025-01-30 RX ORDER — CEPHALEXIN 500 MG/1
500 CAPSULE ORAL 2 TIMES DAILY
Qty: 14 CAPSULE | Refills: 0 | Status: SHIPPED | OUTPATIENT
Start: 2025-01-30

## 2025-01-30 RX ORDER — TRIAMTERENE AND HYDROCHLOROTHIAZIDE 37.5; 25 MG/1; MG/1
1 CAPSULE ORAL EVERY MORNING
Qty: 90 CAPSULE | Refills: 3 | Status: CANCELLED | OUTPATIENT
Start: 2025-01-30

## 2025-01-30 NOTE — PROGRESS NOTES
"  Assessment & Plan       ICD-10-CM    1. Cellulitis of right lower extremity  L03.115 cephALEXin (KEFLEX) 500 MG capsule      2. Bilateral leg edema  R60.0       3. Hypertension goal BP (blood pressure) < 140/90  I10       4. Class 2 severe obesity due to excess calories with serious comorbidity and body mass index (BMI) of 35.0 to 35.9 in adult (H)  E66.812     E66.01     Z68.35       5. Hyperlipidemia with target LDL less than 130  E78.5       6. History of colonic polyps  Z86.0100       7. Personal history of DVT (deep vein thrombosis)  Z86.718       8. S/P total knee arthroplasty, bilateral  Z96.653         She appears to have some cellulitis of the right lower leg wound, so we will treat that with cephalexin 500 mg twice a day for 1 week  I advised her to go back on the triamterene HCTZ to help control the edema and also her blood pressure  Discussed use of compression socks, elevation, low-salt diet, etc. to help address the edema as well  She will plan to hold off on any further colonoscopies unless symptoms develop to warrant that  Reviewed DVT prophylaxis for her upcoming plane flight  Plan a follow-up visit sometime in the spring with an annual wellness exam and some routine labs    The longitudinal plan of care for the diagnosis(es)/condition(s) as documented were addressed during this visit. Due to the added complexity in care, I will continue to support Rafaela in the subsequent management and with ongoing continuity of care.      BMI  Estimated body mass index is 35.68 kg/m  as calculated from the following:    Height as of this encounter: 1.607 m (5' 3.25\").    Weight as of this encounter: 92.1 kg (203 lb).   Weight management plan: Patient referred to endocrine and/or weight management specialty        Subjective   Rafaela is a 79 year old, presenting for the following health issues:  Swelling      1/30/2025     2:45 PM   Additional Questions   Roomed by Xiomara   Accompanied by self         1/30/2025     " 2:45 PM   Patient Reported Additional Medications   Patient reports taking the following new medications Lymph MD 2 tablets daily     History of Present Illness       Reason for visit:  Edema in legs    She eats 0-1 servings of fruits and vegetables daily.She consumes 0 sweetened beverage(s) daily.She exercises with enough effort to increase her heart rate 30 to 60 minutes per day.  She exercises with enough effort to increase her heart rate 5 days per week.   She is taking medications regularly.     She stopped taking her triamterene HCTZ medication at the start of this month.  She has been noticing some increased leg swelling since then.  She bumped her right lower anterior leg a week or so ago and developed a lot of bruising around that site.  She is still has an open wound there and there seems to be increasing redness around it and she is concerned about infection.  She has 2 artificial knees and she is worried about those.  She remains on phentermine for weight loss.    Review of her chart shows that she is overdue for a 5-year follow-up colonoscopy for colon polyps.  She has decided that she would rather not do that given the risk of complications including colon perforation.    Patient Active Problem List   Diagnosis    Hyperlipidemia with target LDL less than 130    OA (osteoarthritis)    Rosacea    POSTERIOR VITREOUS DETACHMENT, os    Obesity (BMI 30.0-34.9)    Cataract    S/P total knee arthroplasty - left    Vitamin D deficiency    AK (actinic keratosis)    Xerosis of skin    Intertrigo    Inflamed seborrheic keratosis    Rash    Diffuse photodamage of skin    Trochanteric bursitis of left hip    Keratoconjunctivitis of both eyes    Myogenic ptosis of left eyelid    Dermatochalasis of both upper eyelids    History of colonic polyps    Class 2 severe obesity due to excess calories with serious comorbidity in adult (H)    MGUS (monoclonal gammopathy of unknown significance)    Prothrombin gene mutation     "Melanoma in situ of other part of trunk (H)    Hypertension goal BP (blood pressure) < 140/90     Current Outpatient Medications   Medication Sig Dispense Refill       0    phentermine (LOMAIRA) 8 MG tablet Take 1/2 to 1 tables by mouth each morning for 90 days 90 tablet 1    triamcinolone (KENALOG) 0.1 % external ointment Apply topically 2 times daily To trunk and extremities as needed 454 g 3    triamterene-HCTZ (DYAZIDE) 37.5-25 MG capsule Take 1 capsule by mouth every morning (Patient not taking: Reported on 1/30/2025) 90 capsule 3     No current facility-administered medications for this visit.           Review of Systems  She will be flying on a plane to [place] in the near future and we discussed DVT prevention including taking a low-dose aspirin, wearing compression socks, pumping the feet and ankles periodically throughout the flight, etc.      Objective    /73   Pulse 81   Temp 98.9  F (37.2  C) (Temporal)   Resp 18   Ht 1.607 m (5' 3.25\")   Wt 92.1 kg (203 lb)   SpO2 96%   BMI 35.68 kg/m    Body mass index is 35.68 kg/m .  Physical Exam   GENERAL: alert and no distress  EXT: She has 1+ swelling of the right lower extremity and trace swelling of the left lower extremity.  She has a superficial scabbed wound over the anterior lower right leg with mild surrounding erythema suggestive of cellulitis.  No purulent drainage.    Past colonoscopy results and DEXA scan results and lab results were reviewed in her chart.        Signed Electronically by: Josue Shah MD    "

## 2025-01-30 NOTE — PROGRESS NOTES
{PROVIDER CHARTING PREFERENCE:524965}    Subjective   Rafaela is a 79 year old, presenting for the following health issues:  Swelling  {(!) Visit Details have not yet been documented.  Please enter Visit Details and then use this list to pull in documentation. (Optional):900982}  History of Present Illness       Reason for visit:  Edema in legs    She eats 0-1 servings of fruits and vegetables daily.She consumes 0 sweetened beverage(s) daily.She exercises with enough effort to increase her heart rate 30 to 60 minutes per day.  She exercises with enough effort to increase her heart rate 5 days per week.   She is taking medications regularly.       {MA/LPN/RN Pre-Provider Visit Orders- hCG/UA/Strep (Optional):024427}  {SUPERLIST (Optional):531948}  {additonal problems for provider to add (Optional):506079}    {ROS Picklists (Optional):239714}      Objective    There were no vitals taken for this visit.  There is no height or weight on file to calculate BMI.  Physical Exam   {Exam List (Optional):621208}    {Diagnostic Test Results (Optional):816736}        Signed Electronically by: Josue Shah MD  {Email feedback regarding this note to [email or web address]   :585536}

## 2025-03-10 NOTE — PROGRESS NOTES
[facility] Dermatology Note  Encounter Date: Mar 12, 2025  Office Visit     Dermatology Problem List:  Last skin check 3/12/2025, recommended every 6 months.  1. Atypical intraepidermal melanocytic proliferation, L mid back, s/p excision 08/01/23  2. History of NMSC  - Current tx: Niacinamide 500 mg BID.  - SCC, left superior medial lower leg, s/p Mohs 8/1/23  - SCCis, left lateral lower leg, s/p Mohs 11/2/20   3. AKs. Cryo  - S/p Efudex to chest 9/2020  - Hypertrophic actinic keratosis, left superior shoulder, s/p shave biopsy 9/28/21, s/p cryo  - Lichenoid actinic keratosis, right-mid back, s/p shave biopsy 9/28/21, s/p cryo  - HAK, right chest, s/p bx 2/2016  - AK, right midline neck, s/p bx 2/2016  4. Nummular eczema/xerosis.  - Rx. TMC 0.1% ointment. Cerave cream.  5. Rosacea.  - Rx. Metronidazole gel.  6. Contact dermatitis to metals - gold, silver?  7. Benign bx:  - ISK, L popliteal fossa, s/p shave bx 9/18/24  - patchy lichenod dermatitis, L posterior shoulder, s/p punch bx 9/18/24 (possible lichenoid drug?)  - Epidermoid cyst, R lower eyelid, sp bx 1/4/2020  - BLK, L upper back, s/p bx 5/2019  - BLK, R upper back, s/p bx 5/2019  - SK, left popliteal fossa, s/p bx 5/2019  - BLK, L upper chest, s/ bx 5/2018  - Sebaceous hyperplasia, L cheek, s/p bx 2/2016  - Ulcer and dermal inflammation, left midline back, s/p bx 2/2016  8. Edema, RLE with R thigh bulla - resolved, unclear etiology.  - DVT r/o U/S 9/20/22 negative for DVT (interestingly later was dx with DVT in LLE)  - VZV CPR 9/20/22 negative  - Empiric doxycycline   9. Pruritus.  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 Dermatitis  - Current tx: TMC, protopic      ____________________________________________    Assessment & Plan:    # Lesions to monitor  - Left lower leg, pink dome shaped papule, two other lesions present nearby on left shin and right shin; pt notes coming/going, the one in particular question has been present  about 6 months, possibly prurigo nodules? Discussed bx v empiric treatment for prurigos. Need to closely follow and exclude SCC for particularly the one on the left mid shin  - start clobetasol ointment BID prn, recheck 4-6 weeks  - continue compression stockings  - notify for pain, growth, changes    # Lesions to monitor  - Left posterior arm, pink flat plaque with fine overlying scale, favor BLK  - photo today and recheck    # Rosacea  - Continue metro gel, refilled today    # Pruritus, found to have MGUS   - saw heme onc 8/30/24, reviewed note, on surveillance with stable protein studies  - Continue triamcinolone 0.1% ointment BID PRN  - Continue daily moisturizer    # Multiple benign nevi.   - Monitor for ABCDEs of melanoma   - Continue sun protection - recommend SPF 30 or higher with frequent application   - Return sooner if noticing changing or symptomatic lesions    # Benign lesions - SKs, cherry angiomas, lentigenes.  - No treatment required      # History of NMSC. No evidence of recurrent disease.  - continue niacinamide 500 mg BID.  - Continue photoprotection - recommend SPF 30 or higher with frequent reapplication  - Continue yearly skin exams  - Advised to monitor for changing, non-healing, bleeding, painful, changing, or otherwise symptomatic lesions    Procedures Performed:     Follow-up: 6 month(s) in-person, or earlier for new or changing lesions    Staff and resident:  Vera Cherry MD    Scribe Disclosure:   I, Barbara Hinojosa, am serving as a scribe; to document services personally performed by Sarah Tillman MD -based on data collection and the provider's statements to me.     Staff Physician Comments:   I saw and evaluated the patient with the resident and I agree with the assessment and plan.  I was present for the examination.    Sarah Tillman MD    Department of Dermatology  [facility]: Phone:  [phone], Fax: [phone]  3/12/2025         ____________________________________________    CC: Skin Check (Here today for a skin check; No spots of concern.)    HPI:  Ms. Rafaela Rock is a(n) 79 year old female who presents today as a return patient for skin check. Patient is otherwise feeling well, without additional skin concerns.    Labs Reviewed:  Derm path from 09/18/2024    Final Diagnosis   A. Left popliteal fossa:  - Seborrheic keratosis, inflamed - (see description)     B. Left posterior shoulder:  - Patchy lichenoid dermatitis - (see comment)       Physical exam:  Vitals: There were no vitals taken for this visit.  GEN: This is a well developed, well-nourished female in no acute distress, in a pleasant mood.    SKIN: Total skin excluding the undergarment areas was performed. The exam included the head/face, neck, both arms, chest, back, abdomen, both legs, digits and/or nails.   - There are dome shaped bright red papules on the trunk and extremities .   - Multiple regular brown pigmented macules and papules are identified on the trunk and extremities. .   - Scattered brown macules on sun exposed areas.  - Waxy stuck on papules and plaques on trunk and extremities.   - linear scar on L back with no repigmentation  - dull pink macule on left upper posterior arm, some central brown scale, no arborizing vessels  - legs edematous; a few scattered dome shaped pink papules on anterior shins  - No other lesions of concern on areas examined.     Medications:  Current Outpatient Medications   Medication Sig Dispense Refill    cephALEXin (KEFLEX) 500 MG capsule Take 1 capsule (500 mg) by mouth 2 times daily. 14 capsule 0    phentermine (LOMAIRA) 8 MG tablet Take 1/2 to 1 tables by mouth each morning for 90 days 90 tablet 1    triamcinolone (KENALOG) 0.1 % external ointment Apply topically 2 times daily To trunk and extremities as needed 454 g 3    triamterene-HCTZ (DYAZIDE) 37.5-25 MG capsule Take 1 capsule  by mouth every morning 90 capsule 3     No current facility-administered medications for this visit.      Past Medical History:   Patient Active Problem List   Diagnosis    Hyperlipidemia with target LDL less than 130    OA (osteoarthritis)    Rosacea    POSTERIOR VITREOUS DETACHMENT, os    Obesity (BMI 30.0-34.9)    Cataract    Vitamin D deficiency    AK (actinic keratosis)    Xerosis of skin    Intertrigo    Inflamed seborrheic keratosis    Rash    Diffuse photodamage of skin    Trochanteric bursitis of left hip    Keratoconjunctivitis of both eyes    Myogenic ptosis of left eyelid    Dermatochalasis of both upper eyelids    History of colonic polyps    Class 2 severe obesity due to excess calories with serious comorbidity in adult (H)    MGUS (monoclonal gammopathy of unknown significance)    Prothrombin gene mutation    Melanoma in situ of other part of trunk (H)    Hypertension goal BP (blood pressure) < 140/90     Past Medical History:   Diagnosis Date    AK (actinic keratosis) 02/02/2016    Allergic rhinitis     Hyperlipidemia LDL goal < 130     Impaired fasting glucose     Lyme disease     Nonsenile cataract     OA (osteoarthritis)     Obesity     Personal history of DVT (deep vein thrombosis) 2023    plane travel to [place], [place], [place]. Heterozygous Factor 2 mutation    PONV (postoperative nausea and vomiting)     Rosacea     Vitamin D deficiency 10/2012        CC Referred Self, MD  No address on file on close of this encounter.

## 2025-03-12 ENCOUNTER — OFFICE VISIT (OUTPATIENT)
Dept: DERMATOLOGY | Facility: CLINIC | Age: 80
End: 2025-03-12
Payer: COMMERCIAL

## 2025-03-12 DIAGNOSIS — D18.01 CHERRY ANGIOMA: ICD-10-CM

## 2025-03-12 DIAGNOSIS — L82.1 SEBORRHEIC KERATOSIS: ICD-10-CM

## 2025-03-12 DIAGNOSIS — L29.9 PRURITUS: ICD-10-CM

## 2025-03-12 DIAGNOSIS — L81.4 LENTIGINES: ICD-10-CM

## 2025-03-12 DIAGNOSIS — Z12.83 SKIN CANCER SCREENING: Primary | ICD-10-CM

## 2025-03-12 DIAGNOSIS — D22.9 MULTIPLE BENIGN NEVI: ICD-10-CM

## 2025-03-12 DIAGNOSIS — L71.9 ROSACEA: ICD-10-CM

## 2025-03-12 DIAGNOSIS — Z85.828 HISTORY OF NONMELANOMA SKIN CANCER: ICD-10-CM

## 2025-03-12 RX ORDER — CLOBETASOL PROPIONATE 0.5 MG/G
OINTMENT TOPICAL
Qty: 45 G | Refills: 1 | Status: SHIPPED | OUTPATIENT
Start: 2025-03-12

## 2025-03-12 RX ORDER — METRONIDAZOLE 10 MG/G
GEL TOPICAL
Qty: 60 G | Refills: 5 | Status: SHIPPED | OUTPATIENT
Start: 2025-03-12

## 2025-03-12 ASSESSMENT — PAIN SCALES - GENERAL: PAINLEVEL_OUTOF10: NO PAIN (0)

## 2025-03-12 NOTE — LETTER
3/12/2025       RE: Rafaela Rock  [address] [place], [place] [address] [facility] [id number]     Dear Colleague,    Thank you for referring your patient, Rafaela Rock, to the [facility] [place] at [facility]. Please see a copy of my visit note below.    [facility] Dermatology Note  Encounter Date: Mar 12, 2025  Office Visit     Dermatology Problem List:  Last skin check 3/12/2025, recommended every 6 months.  1. Atypical intraepidermal melanocytic proliferation, L mid back, s/p excision 08/01/23  2. History of NMSC  - Current tx: Niacinamide 500 mg BID.  - SCC, left superior medial lower leg, s/p Mohs 8/1/23  - SCCis, left lateral lower leg, s/p Mohs 11/2/20   3. AKs. Cryo  - S/p Efudex to chest 9/2020  - Hypertrophic actinic keratosis, left superior shoulder, s/p shave biopsy 9/28/21, s/p cryo  - Lichenoid actinic keratosis, right-mid back, s/p shave biopsy 9/28/21, s/p cryo  - HAK, right chest, s/p bx 2/2016  - AK, right midline neck, s/p bx 2/2016  4. Nummular eczema/xerosis.  - Rx. TMC 0.1% ointment. Cerave cream.  5. Rosacea.  - Rx. Metronidazole gel.  6. Contact dermatitis to metals - gold, silver?  7. Benign bx:  - ISK, L popliteal fossa, s/p shave bx 9/18/24  - patchy lichenod dermatitis, L posterior shoulder, s/p punch bx 9/18/24 (possible lichenoid drug?)  - Epidermoid cyst, R lower eyelid, sp bx 1/4/2020  - BLK, L upper back, s/p bx 5/2019  - BLK, R upper back, s/p bx 5/2019  - SK, left popliteal fossa, s/p bx 5/2019  - BLK, L upper chest, s/ bx 5/2018  - Sebaceous hyperplasia, L cheek, s/p bx 2/2016  - Ulcer and dermal inflammation, left midline back, s/p bx 2/2016  8. Edema, RLE with R thigh bulla - resolved, unclear etiology.  - DVT r/o U/S 9/20/22 negative for DVT (interestingly later was dx with DVT in LLE)  - VZV CPR 9/20/22 negative  - Empiric doxycycline   9. Pruritus.  - Found to have MGUS, following with  heme/onc  10. Dermatitis  - Current tx: TMC, protopic      ____________________________________________    Assessment & Plan:    # Lesions to monitor  - Left lower leg, pink dome shaped papule, two other lesions present nearby on left shin and right shin; pt notes coming/going, the one in particular question has been present about 6 months, possibly prurigo nodules? Discussed bx v empiric treatment for prurigos. Need to closely follow and exclude SCC for particularly the one on the left mid shin  - start clobetasol ointment BID prn, recheck 4-6 weeks  - continue compression stockings  - notify for pain, growth, changes    # Lesions to monitor  - Left posterior arm, pink flat plaque with fine overlying scale, favor BLK  - photo today and recheck    # Rosacea  - Continue metro gel, refilled today    # Pruritus, found to have MGUS   - saw heme onc 8/30/24, reviewed note, on surveillance with stable protein studies  - Continue triamcinolone 0.1% ointment BID PRN  - Continue daily moisturizer    # Multiple benign nevi.   - Monitor for ABCDEs of melanoma   - Continue sun protection - recommend SPF 30 or higher with frequent application   - Return sooner if noticing changing or symptomatic lesions    # Benign lesions - SKs, cherry angiomas, lentigenes.  - No treatment required      # History of NMSC. No evidence of recurrent disease.  - continue niacinamide 500 mg BID.  - Continue photoprotection - recommend SPF 30 or higher with frequent reapplication  - Continue yearly skin exams  - Advised to monitor for changing, non-healing, bleeding, painful, changing, or otherwise symptomatic lesions    Procedures Performed:     Follow-up: 6 month(s) in-person, or earlier for new or changing lesions    Staff and resident:  Vera Cherry MD    Scribe Disclosure:   I, Barbara Hinojosa, am serving as a scribe; to document services personally performed by Sarah Tillman MD -based on data collection and the provider's statements to me.      Staff Physician Comments:   I saw and evaluated the patient with the resident and I agree with the assessment and plan.  I was present for the examination.    Sarah Tillman MD    Department of Dermatology  [facility]: Phone: [phone], Fax: [phone]  3/12/2025         ____________________________________________    CC: Skin Check (Here today for a skin check; No spots of concern.)    HPI:  Ms. Rafaela Rock is a(n) 79 year old female who presents today as a return patient for skin check. Patient is otherwise feeling well, without additional skin concerns.    Labs Reviewed:  Derm path from 09/18/2024    Final Diagnosis   A. Left popliteal fossa:  - Seborrheic keratosis, inflamed - (see description)     B. Left posterior shoulder:  - Patchy lichenoid dermatitis - (see comment)       Physical exam:  Vitals: There were no vitals taken for this visit.  GEN: This is a well developed, well-nourished female in no acute distress, in a pleasant mood.    SKIN: Total skin excluding the undergarment areas was performed. The exam included the head/face, neck, both arms, chest, back, abdomen, both legs, digits and/or nails.   - There are dome shaped bright red papules on the trunk and extremities .   - Multiple regular brown pigmented macules and papules are identified on the trunk and extremities. .   - Scattered brown macules on sun exposed areas.  - Waxy stuck on papules and plaques on trunk and extremities.   - linear scar on L back with no repigmentation  - dull pink macule on left upper posterior arm, some central brown scale, no arborizing vessels  - legs edematous; a few scattered dome shaped pink papules on anterior shins  - No other lesions of concern on areas examined.     Medications:  Current Outpatient Medications   Medication Sig Dispense Refill     cephALEXin (KEFLEX) 500 MG capsule Take 1 capsule (500 mg)  by mouth 2 times daily. 14 capsule 0     phentermine (LOMAIRA) 8 MG tablet Take 1/2 to 1 tables by mouth each morning for 90 days 90 tablet 1     triamcinolone (KENALOG) 0.1 % external ointment Apply topically 2 times daily To trunk and extremities as needed 454 g 3     triamterene-HCTZ (DYAZIDE) 37.5-25 MG capsule Take 1 capsule by mouth every morning 90 capsule 3     No current facility-administered medications for this visit.      Past Medical History:   Patient Active Problem List   Diagnosis     Hyperlipidemia with target LDL less than 130     OA (osteoarthritis)     Rosacea     POSTERIOR VITREOUS DETACHMENT, os     Obesity (BMI 30.0-34.9)     Cataract     Vitamin D deficiency     AK (actinic keratosis)     Xerosis of skin     Intertrigo     Inflamed seborrheic keratosis     Rash     Diffuse photodamage of skin     Trochanteric bursitis of left hip     Keratoconjunctivitis of both eyes     Myogenic ptosis of left eyelid     Dermatochalasis of both upper eyelids     History of colonic polyps     Class 2 severe obesity due to excess calories with serious comorbidity in adult (H)     MGUS (monoclonal gammopathy of unknown significance)     Prothrombin gene mutation     Melanoma in situ of other part of trunk (H)     Hypertension goal BP (blood pressure) < 140/90     Past Medical History:   Diagnosis Date     AK (actinic keratosis) 02/02/2016     Allergic rhinitis      Hyperlipidemia LDL goal < 130      Impaired fasting glucose      Lyme disease      Nonsenile cataract      OA (osteoarthritis)      Obesity      Personal history of DVT (deep vein thrombosis) 2023    plane travel to [place], [place], [place]. Heterozygous Factor 2 mutation     PONV (postoperative nausea and vomiting)      Rosacea      Vitamin D deficiency 10/2012        CC Referred Self, MD  No address on file on close of this encounter.      Again, thank you for allowing me to participate in the care of your patient.      Sincerely,    Sarah  MD Layne

## 2025-03-12 NOTE — NURSING NOTE
Dermatology Rooming Note    Rafaela Rock's goals for this visit include:   Chief Complaint   Patient presents with    Skin Check     Here today for a skin check; No spots of concern.     Jane Gray, Visit Facilitator

## 2025-03-12 NOTE — PATIENT INSTRUCTIONS

## 2025-04-15 NOTE — PROGRESS NOTES
[facility] Dermatology Note  Encounter Date: Apr 16, 2025  Office Visit     Dermatology Problem List:    # NUB  - L posterior shoulder s/p shave biopsy 4/16/25 -- rule out SCCis  - L lower shin s/p shave biopsy 4/16/25 -- rule out SCC    Last skin check 3/12/25  1. Atypical intraepidermal melanocytic proliferation, L mid back, s/p excision 08/01/23  2. History of NMSC  - Current tx: Niacinamide 500 mg BID.  - SCC, left superior medial lower leg, s/p Mohs 8/1/23  - SCCis, left lateral lower leg, s/p Mohs 11/2/20   3. AKs. Cryo  - S/p Efudex to chest 9/2020  - Hypertrophic actinic keratosis, left superior shoulder, s/p shave biopsy 9/28/21, s/p cryo  - Lichenoid actinic keratosis, right-mid back, s/p shave biopsy 9/28/21, s/p cryo  - HAK, right chest, s/p bx 2/2016  - AK, right midline neck, s/p bx 2/2016  4. Nummular eczema/xerosis.  - Rx. TMC 0.1% ointment. Cerave cream.  5. Rosacea.  - Rx. Metronidazole gel.  6. Contact dermatitis to metals - gold, silver?  7. Benign bx:  - ISK, L popliteal fossa, s/p shave bx 9/18/24  - patchy lichenod dermatitis, L posterior shoulder, s/p punch bx 9/18/24 (possible lichenoid drug?)  - Epidermoid cyst, R lower eyelid, sp bx 1/4/2020  - BLK, L upper back, s/p bx 5/2019  - BLK, R upper back, s/p bx 5/2019  - SK, left popliteal fossa, s/p bx 5/2019  - BLK, L upper chest, s/ bx 5/2018  - Sebaceous hyperplasia, L cheek, s/p bx 2/2016  - Ulcer and dermal inflammation, left midline back, s/p bx 2/2016  8. Edema, RLE with R thigh bulla - resolved, unclear etiology.  - DVT r/o U/S 9/20/22 negative for DVT (interestingly later was dx with DVT in LLE)  - VZV CPR 9/20/22 negative  - Empiric doxycycline   9. Pruritus.  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 Dermatitis  - Current tx: TMC, protopic    ____________________________________________    Assessment & Plan:  # NUB  - L posterior shoulder s/p shave biopsy 4/16/25 -- rule out SCCis  - L lower shin s/p  shave biopsy 4/16/25 -- rule out SCC  - see shave bx note below    # LTM - pink scaly patch on L shin inferior to above  - has improved with clobetasol, could be stasis derm or known dermatitis  - can continue clobetasol BID prn but follow for resolution  - ddx AK or SCCis    Nomi Vogel MD    Procedures Performed:   Shave biopsy: Location(s) see above.  After discussion of benefits and risks including but not limited to bleeding/bruising, pain/swelling, infection, scar, incomplete removal, nerve damage/numbness, recurrence, and non-diagnostic biopsy,  verbal consent and photographs were obtained. Time-out was performed. The area was cleaned with isopropyl alcohol. 0.5mL of 1% lidocaine with epinephrine was injected to obtain adequate anesthesia of each lesion. Shave biopsy was performed. Hemostasis was achieved with aluminium chloride. Vaseline and a sterile dressing were applied. The patient tolerated the procedure and no complications were noted. The patient was provided with verbal and written post care instructions.       Follow-up: 3 month(s) in-person for FBSE as planned, or earlier for new or changing lesions    Staff and Scribe:    Scribe Disclosure:   I, Barbara Hinojosa, am serving as a scribe; to document services personally performed by Sarah Tillman MD -based on data collection and the provider's statements to me.     Staff Physician Comments:   I saw and evaluated the patient with the resident and I agree with the assessment and plan.  I was present for the key portions of the above major procedure and examination.    Sarah Tillman MD    Department of Dermatology  [facility]: Phone: [phone], Fax: [phone]  4/22/2025       ____________________________________________    CC: Derm Problem (Left lower leg recheck- she states that this spot has improved )    HPI:  Ms. Rafaela Rock is a(n) 80 year  old female who presents today as a return patient for skin check. The two spots on the L lower leg have improved with clobetasol treatment. The lower one has disappeared. The upper one has become more of a bump. The spot on her L posterior shoulder is unchanged. Patient is otherwise feeling well, without additional skin concerns.    Labs Reviewed:      Physical exam:  Vitals: There were no vitals taken for this visit.  GEN: This is a well developed, well-nourished male in no acute distress, in a pleasant mood.    SKIN: L posterior shoulder and L lower leg were examined   - dull pink macule on left upper posterior arm, some central brown scale, no arborizing vessels   - 0.5cm dark pink papule on the L shin with one vessel appreciated on dermoscopy    - slightly scaly pink patch inferior to this which has improved  - No other lesions of concern on areas examined.       Medications:  Current Outpatient Medications   Medication Sig Dispense Refill    clobetasol (TEMOVATE) 0.05 % external ointment Apply to the affected area on the skin twice a day 45 g 1    metroNIDAZOLE (METROGEL) 1 % external gel Apply to face daily 60 g 5    phentermine (LOMAIRA) 8 MG tablet Take 1/2 to 1 tables by mouth each morning for 90 days 90 tablet 1    triamcinolone (KENALOG) 0.1 % external ointment Apply topically 2 times daily To trunk and extremities as needed 454 g 3    triamterene-HCTZ (DYAZIDE) 37.5-25 MG capsule Take 1 capsule by mouth every morning 90 capsule 3     No current facility-administered medications for this visit.      Past Medical History:   Patient Active Problem List   Diagnosis    Hyperlipidemia with target LDL less than 130    OA (osteoarthritis)    Rosacea    POSTERIOR VITREOUS DETACHMENT, os    Obesity (BMI 30.0-34.9)    Cataract    Vitamin D deficiency    AK (actinic keratosis)    Xerosis of skin    Intertrigo    Inflamed seborrheic keratosis    Rash    Diffuse photodamage of skin    Trochanteric bursitis of left hip     Keratoconjunctivitis of both eyes    Myogenic ptosis of left eyelid    Dermatochalasis of both upper eyelids    History of colonic polyps    Class 2 severe obesity due to excess calories with serious comorbidity in adult (H)    MGUS (monoclonal gammopathy of unknown significance)    Prothrombin gene mutation    Melanoma in situ of other part of trunk (H)    Hypertension goal BP (blood pressure) < 140/90     Past Medical History:   Diagnosis Date    AK (actinic keratosis) 02/02/2016    Allergic rhinitis     Hyperlipidemia LDL goal < 130     Impaired fasting glucose     Lyme disease     Nonsenile cataract     OA (osteoarthritis)     Obesity     Personal history of DVT (deep vein thrombosis) 2023    plane travel to [place], [place], [place]. Heterozygous Factor 2 mutation    PONV (postoperative nausea and vomiting)     Rosacea     Vitamin D deficiency 10/2012        CC Referred Self, MD  No address on file on close of this encounter.

## 2025-04-16 ENCOUNTER — OFFICE VISIT (OUTPATIENT)
Dept: DERMATOLOGY | Facility: CLINIC | Age: 80
End: 2025-04-16
Payer: COMMERCIAL

## 2025-04-16 DIAGNOSIS — D22.9 MULTIPLE BENIGN NEVI: ICD-10-CM

## 2025-04-16 DIAGNOSIS — D18.01 CHERRY ANGIOMA: ICD-10-CM

## 2025-04-16 DIAGNOSIS — L82.1 SEBORRHEIC KERATOSIS: ICD-10-CM

## 2025-04-16 DIAGNOSIS — D48.9 NEOPLASM OF UNCERTAIN BEHAVIOR: Primary | ICD-10-CM

## 2025-04-16 PROCEDURE — 1126F AMNT PAIN NOTED NONE PRSNT: CPT | Performed by: DERMATOLOGY

## 2025-04-16 PROCEDURE — 88305 TISSUE EXAM BY PATHOLOGIST: CPT | Mod: 26 | Performed by: DERMATOLOGY

## 2025-04-16 PROCEDURE — 88305 TISSUE EXAM BY PATHOLOGIST: CPT | Mod: TC | Performed by: DERMATOLOGY

## 2025-04-16 PROCEDURE — 11102 TANGNTL BX SKIN SINGLE LES: CPT | Performed by: DERMATOLOGY

## 2025-04-16 PROCEDURE — 11103 TANGNTL BX SKIN EA SEP/ADDL: CPT | Mod: XS | Performed by: DERMATOLOGY

## 2025-04-16 ASSESSMENT — PAIN SCALES - GENERAL: PAINLEVEL_OUTOF10: NO PAIN (0)

## 2025-04-16 NOTE — LETTER
4/16/2025       RE: Rafaela Rock  [address] [place] [id number]     Dear Colleague,    Thank you for referring your patient, Rafaela Rock, to the [facility] [place] at [facility]. Please see a copy of my visit note below.    [facility] Dermatology Note  Encounter Date: Apr 16, 2025  Office Visit     Dermatology Problem List:    # NUB  - L posterior shoulder s/p shave biopsy 4/16/25 -- rule out SCCis  - L lower shin s/p shave biopsy 4/16/25 -- rule out SCC    Last skin check 3/12/25  1. Atypical intraepidermal melanocytic proliferation, L mid back, s/p excision 08/01/23  2. History of NMSC  - Current tx: Niacinamide 500 mg BID.  - SCC, left superior medial lower leg, s/p Mohs 8/1/23  - SCCis, left lateral lower leg, s/p Mohs 11/2/20   3. AKs. Cryo  - S/p Efudex to chest 9/2020  - Hypertrophic actinic keratosis, left superior shoulder, s/p shave biopsy 9/28/21, s/p cryo  - Lichenoid actinic keratosis, right-mid back, s/p shave biopsy 9/28/21, s/p cryo  - HAK, right chest, s/p bx 2/2016  - AK, right midline neck, s/p bx 2/2016  4. Nummular eczema/xerosis.  - Rx. TMC 0.1% ointment. Cerave cream.  5. Rosacea.  - Rx. Metronidazole gel.  6. Contact dermatitis to metals - gold, silver?  7. Benign bx:  - ISK, L popliteal fossa, s/p shave bx 9/18/24  - patchy lichenod dermatitis, L posterior shoulder, s/p punch bx 9/18/24 (possible lichenoid drug?)  - Epidermoid cyst, R lower eyelid, sp bx 1/4/2020  - BLK, L upper back, s/p bx 5/2019  - BLK, R upper back, s/p bx 5/2019  - SK, left popliteal fossa, s/p bx 5/2019  - BLK, L upper chest, s/ bx 5/2018  - Sebaceous hyperplasia, L cheek, s/p bx 2/2016  - Ulcer and dermal inflammation, left midline back, s/p bx 2/2016  8. Edema, RLE with R thigh bulla - resolved, unclear etiology.  - DVT r/o U/S 9/20/22 negative for DVT (interestingly later was dx with DVT in LLE)  - VZV CPR  9/20/22 negative  - Empiric doxycycline   9. Pruritus.  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 Dermatitis  - Current tx: TMC, protopic    ____________________________________________    Assessment & Plan:  # NUB  - L posterior shoulder s/p shave biopsy 4/16/25 -- rule out SCCis  - L lower shin s/p shave biopsy 4/16/25 -- rule out SCC  - see shave bx note below    # LTM - pink scaly patch on L shin inferior to above  - has improved with clobetasol, could be stasis derm or known dermatitis  - can continue clobetasol BID prn but follow for resolution  - ddx AK or SCCis    Nomi Vogel MD    Procedures Performed:   Shave biopsy: Location(s) see above.  After discussion of benefits and risks including but not limited to bleeding/bruising, pain/swelling, infection, scar, incomplete removal, nerve damage/numbness, recurrence, and non-diagnostic biopsy,  verbal consent and photographs were obtained. Time-out was performed. The area was cleaned with isopropyl alcohol. 0.5mL of 1% lidocaine with epinephrine was injected to obtain adequate anesthesia of each lesion. Shave biopsy was performed. Hemostasis was achieved with aluminium chloride. Vaseline and a sterile dressing were applied. The patient tolerated the procedure and no complications were noted. The patient was provided with verbal and written post care instructions.       Follow-up: 3 month(s) in-person for FBSE as planned, or earlier for new or changing lesions    Staff and Scribe:    Scribe Disclosure:   I, Barbara Hinojosa, am serving as a scribe; to document services personally performed by Sarah Tillman MD -based on data collection and the provider's statements to me.     Staff Physician Comments:   I saw and evaluated the patient with the resident and I agree with the assessment and plan.  I was present for the key portions of the above major procedure and examination.    Sarah Tillman MD    Department of  Dermatology  [facility]: Phone: [phone], Fax: [phone]  4/22/2025       ____________________________________________    CC: Derm Problem (Left lower leg recheck- she states that this spot has improved )    HPI:  Ms. Rafaela Rock is a(n) 80 year old female who presents today as a return patient for skin check. The two spots on the L lower leg have improved with clobetasol treatment. The lower one has disappeared. The upper one has become more of a bump. The spot on her L posterior shoulder is unchanged. Patient is otherwise feeling well, without additional skin concerns.    Labs Reviewed:      Physical exam:  Vitals: There were no vitals taken for this visit.  GEN: This is a well developed, well-nourished male in no acute distress, in a pleasant mood.    SKIN: L posterior shoulder and L lower leg were examined   - dull pink macule on left upper posterior arm, some central brown scale, no arborizing vessels   - 0.5cm dark pink papule on the L shin with one vessel appreciated on dermoscopy    - slightly scaly pink patch inferior to this which has improved  - No other lesions of concern on areas examined.       Medications:  Current Outpatient Medications   Medication Sig Dispense Refill     clobetasol (TEMOVATE) 0.05 % external ointment Apply to the affected area on the skin twice a day 45 g 1     metroNIDAZOLE (METROGEL) 1 % external gel Apply to face daily 60 g 5     phentermine (LOMAIRA) 8 MG tablet Take 1/2 to 1 tables by mouth each morning for 90 days 90 tablet 1     triamcinolone (KENALOG) 0.1 % external ointment Apply topically 2 times daily To trunk and extremities as needed 454 g 3     triamterene-HCTZ (DYAZIDE) 37.5-25 MG capsule Take 1 capsule by mouth every morning 90 capsule 3     No current facility-administered medications for this visit.      Past Medical History:   Patient Active Problem List   Diagnosis      Hyperlipidemia with target LDL less than 130     OA (osteoarthritis)     Rosacea     POSTERIOR VITREOUS DETACHMENT, os     Obesity (BMI 30.0-34.9)     Cataract     Vitamin D deficiency     AK (actinic keratosis)     Xerosis of skin     Intertrigo     Inflamed seborrheic keratosis     Rash     Diffuse photodamage of skin     Trochanteric bursitis of left hip     Keratoconjunctivitis of both eyes     Myogenic ptosis of left eyelid     Dermatochalasis of both upper eyelids     History of colonic polyps     Class 2 severe obesity due to excess calories with serious comorbidity in adult (H)     MGUS (monoclonal gammopathy of unknown significance)     Prothrombin gene mutation     Melanoma in situ of other part of trunk (H)     Hypertension goal BP (blood pressure) < 140/90     Past Medical History:   Diagnosis Date     AK (actinic keratosis) 02/02/2016     Allergic rhinitis      Hyperlipidemia LDL goal < 130      Impaired fasting glucose      Lyme disease      Nonsenile cataract      OA (osteoarthritis)      Obesity      Personal history of DVT (deep vein thrombosis) 2023    plane travel to [place], [place], [place]. Heterozygous Factor 2 mutation     PONV (postoperative nausea and vomiting)      Rosacea      Vitamin D deficiency 10/2012        CC Referred Self, MD  No address on file on close of this encounter.      Again, thank you for allowing me to participate in the care of your patient.      Sincerely,    Sarah Tillman MD

## 2025-04-16 NOTE — NURSING NOTE
Lidocaine-epinephrine 1-1:610065 % injection   1.5mL once for one use, starting 4/11/2025 ending 4/11/2025,  2mL disp, R-0, injection  Injected by Dr. Vogel

## 2025-04-19 ENCOUNTER — TELEPHONE (OUTPATIENT)
Dept: DERMATOLOGY | Facility: CLINIC | Age: 80
End: 2025-04-19
Payer: COMMERCIAL

## 2025-04-19 DIAGNOSIS — C44.92 SCC (SQUAMOUS CELL CARCINOMA): Primary | ICD-10-CM

## 2025-04-19 NOTE — TELEPHONE ENCOUNTER
4/16/2025 Dermatopathology Result Note:    Dear MsJeri Pranav,     I am glad we checked your spot! Your biopsy result on the left shin returned as a squamous cell skin cancer. To treat this, I will refer you for Mohs surgery. I will ask our scheduling team to call you to set this up.     And better news that the spot on the left shoulder returned as low risk pre-cancer called an actinic keratosis. Thankfully no signs of skin cancer. Sometimes the biopsy itself can take care of these areas but other times there can be some pre-cancer cells left behind that we should treat with liquid nitrogen. I would recommend letting this area heal up and then rechecking it again at your next visit. If there's anything left behind, we can treat it with liquid nitrogen at that time.     Please let me know any questions or concerns you have and sorry for the answer we didn't want on the leg!     Sincerely,  Sarah Tillman MD    Department of Dermatology  [facility]: Phone: [phone], Fax: [phone]  4/17/2025   Written by Sarah Tillman MD on 4/17/2025  3:58 PM CDT  Seen by patient Rafaela Rock on 4/18/2025  8:11 PM  Final Diagnosis   A. Left lower shin:  - Squamous cell carcinoma, well-differentiated - (see description)      B. Left posterior shoulder:  - Lichenoid actinic keratosis - (see description)

## 2025-04-21 ENCOUNTER — TELEPHONE (OUTPATIENT)
Dept: FAMILY MEDICINE | Facility: CLINIC | Age: 80
End: 2025-04-21
Payer: COMMERCIAL

## 2025-04-21 NOTE — TELEPHONE ENCOUNTER
Patient calling, says she just tested positive for covid, her symptoms started 4/13/25, by Friday she had severe body aches, Saturday was chilled.  Too late to consider anti-viral treatment.    She had Easter dinner with family yesterday as she was feeling better.   Says she is improved, tired, occasional cough, she is talking easily.   Denies chest pain or shortness.    She did call everyone who was around her to let them know she tested positive today; per guidelines below, she is likely on the tail end of her illness and could have been off isolation anyhow.   She never did have a documented fever.    Discharge Instructions for COVID-19 Patients  You were tested for COVID-19. Your result was positive. This means you do have COVID-19. Follow the steps below. If you were tested for an upcoming treatment (procedure), you should also contact your care team for next steps.  How can I take care of myself at home?  Get lots of rest.  Drink extra fluids (unless a doctor has told you not to).  Take acetaminophen (Tylenol) for fever or pain. If you have liver or kidney problems, first ask your care team if it's safe to take acetaminophen.  Adults can take either:  650 mg (two 325 mg pills) every 4 to 6 hours as needed (but no more than 10 pills per day), OR   1,000 mg (two 500 mg pills) every 6 hours as needed (but no more than 6 pills per day).  Note: Don't take more than 3,000 mg of acetaminophen (Tylenol) in one day. Acetaminophen is found in many medicines, even over-the-counter medicines. Read all labels to be sure you don't take too much.  For children:  Check the acetaminophen (Tylenol) bottle to find out the right dose based on their age or weight.  Don't give children more than 1,625 mg of Tylenol in one day.  Know when to call 911. Emergency warning signs include:  Trouble breathing or shortness of breath  Pain or pressure in the chest that doesn't go away  Feeling confused like you haven't felt before, or not  being able to wake up  Bluish-colored lips or face  If you have other health problems (like cancer, heart failure, an organ transplant, or severe kidney disease): Call your specialty clinic if you don't feel better in the next 2 days.  How can I protect others?  If you DO have symptoms:  Stay home and away from others (self-isolate). You can go back to being with other people when:  You've had no fever for 24 hours--without taking any medicine that reduces fever, AND  Your other symptoms (such as a cough) are better.  Wear a mask or face covering for 5 full days anytime you're around other people.    If you plan to visit a clinic or hospital, please check their guidelines before you arrive--healthcare sites may have different rules. If you were tested because you're going to have surgery or another treatment, contact your care team for next steps.  During self-isolation  Stay home until it's safe to be around others.  At home, stay away from other people and pets. Or, wear a well-fitting mask when you need to be around others.  Monitor your symptoms. If you have any emergency warning signs listed at the link (such as trouble breathing, chest pain that won't go away, or confusion that's new to you), then get emergency medical care right away.  Stay in a separate room from other household members, if possible.  Use a separate bathroom, if possible.  Improve ventilation (air flow) at home, if possible.  Don't share personal household items, like cups, towels, and utensils.    Patient verbalized understanding of and agreement with plan.    Taylor LEVINE RN  [facility] Triage

## 2025-04-22 ENCOUNTER — TELEPHONE (OUTPATIENT)
Dept: DERMATOLOGY | Facility: CLINIC | Age: 80
End: 2025-04-22
Payer: COMMERCIAL

## 2025-04-22 NOTE — TELEPHONE ENCOUNTER
Called patient to schedule surgery with Dr. Roman    Date of Surgery: 06/05    Surgery type: Mohs    Consult scheduled: No    Has patient had mohs with us before? Yes    Additional comments: declined optional consult. Pt requested appt date due to travel.      Ilsa Almaraz on 4/22/2025 at 10:13 AM

## 2025-05-14 ENCOUNTER — ANCILLARY PROCEDURE (OUTPATIENT)
Dept: GENERAL RADIOLOGY | Facility: CLINIC | Age: 80
End: 2025-05-14
Attending: ORTHOPAEDIC SURGERY
Payer: COMMERCIAL

## 2025-05-14 ENCOUNTER — OFFICE VISIT (OUTPATIENT)
Dept: ORTHOPEDICS | Facility: CLINIC | Age: 80
End: 2025-05-14
Payer: COMMERCIAL

## 2025-05-14 VITALS
OXYGEN SATURATION: 96 % | SYSTOLIC BLOOD PRESSURE: 131 MMHG | BODY MASS INDEX: 35.96 KG/M2 | HEART RATE: 84 BPM | HEIGHT: 63 IN | DIASTOLIC BLOOD PRESSURE: 76 MMHG

## 2025-05-14 DIAGNOSIS — M25.511 RIGHT SHOULDER PAIN: ICD-10-CM

## 2025-05-14 DIAGNOSIS — S46.011A TRAUMATIC TEAR OF RIGHT ROTATOR CUFF, UNSPECIFIED TEAR EXTENT, INITIAL ENCOUNTER: ICD-10-CM

## 2025-05-14 DIAGNOSIS — M25.511 ACUTE PAIN OF RIGHT SHOULDER: Primary | ICD-10-CM

## 2025-05-14 PROCEDURE — 3078F DIAST BP <80 MM HG: CPT | Performed by: ORTHOPAEDIC SURGERY

## 2025-05-14 PROCEDURE — 99203 OFFICE O/P NEW LOW 30 MIN: CPT | Mod: 25 | Performed by: ORTHOPAEDIC SURGERY

## 2025-05-14 PROCEDURE — 20610 DRAIN/INJ JOINT/BURSA W/O US: CPT | Mod: RT | Performed by: ORTHOPAEDIC SURGERY

## 2025-05-14 PROCEDURE — 3075F SYST BP GE 130 - 139MM HG: CPT | Performed by: ORTHOPAEDIC SURGERY

## 2025-05-14 PROCEDURE — 73030 X-RAY EXAM OF SHOULDER: CPT | Mod: TC | Performed by: RADIOLOGY

## 2025-05-14 PROCEDURE — 1125F AMNT PAIN NOTED PAIN PRSNT: CPT | Performed by: ORTHOPAEDIC SURGERY

## 2025-05-14 RX ORDER — METHYLPREDNISOLONE ACETATE 80 MG/ML
80 INJECTION, SUSPENSION INTRA-ARTICULAR; INTRALESIONAL; INTRAMUSCULAR; SOFT TISSUE
Status: COMPLETED | OUTPATIENT
Start: 2025-05-14 | End: 2025-05-14

## 2025-05-14 RX ORDER — LIDOCAINE HYDROCHLORIDE 10 MG/ML
4 INJECTION, SOLUTION INFILTRATION; PERINEURAL
Status: COMPLETED | OUTPATIENT
Start: 2025-05-14 | End: 2025-05-14

## 2025-05-14 RX ADMIN — METHYLPREDNISOLONE ACETATE 80 MG: 80 INJECTION, SUSPENSION INTRA-ARTICULAR; INTRALESIONAL; INTRAMUSCULAR; SOFT TISSUE at 10:57

## 2025-05-14 RX ADMIN — LIDOCAINE HYDROCHLORIDE 4 ML: 10 INJECTION, SOLUTION INFILTRATION; PERINEURAL at 10:57

## 2025-05-14 ASSESSMENT — PAIN SCALES - GENERAL: PAINLEVEL_OUTOF10: MODERATE PAIN (5)

## 2025-05-14 NOTE — PROGRESS NOTES
SUBJECTIVE:  Rafaela Rock is a 80 year old female who is seen as self referral for right shoulder pain that started  that started/occurred  2 months ago.   Cause: fell on some steps at her complex. Landed on buttock, doesn't remember hitting arm.  Pain started soon after  Got back to her normal activities.  Last month felt a pop and pain when dog pulled on leash    Present symptoms: worse as the days go on  Pain lateral, down to thumb sometimes and up into neck, but usually shoulder to elbow.  Reaching up, putting dishes on shelf.  Weakness and pain  Pain with abduction > forward flexion   Pain worst half-way.  Can feel crunching.  Laying down at night on side or back.-- dull pain.     Associated symptoms: has carpal tunnel syndrome symptoms for years, wears a brace at night.    Treatment up to this point: accupuncture, exercises at gym, nsaids. Ice.  Prior history of related problems: NO problems with the neck.  Significant Orthopedic past medical history: history of arthritis    Past Medical History:   Diagnosis Date    AK (actinic keratosis) 02/02/2016    Allergic rhinitis     Hyperlipidemia LDL goal < 130     Impaired fasting glucose     Lyme disease     Nonsenile cataract     OA (osteoarthritis)     Obesity     Personal history of DVT (deep vein thrombosis) 2023    plane travel to [place], [place], [place]. Heterozygous Factor 2 mutation    PONV (postoperative nausea and vomiting)     Rosacea     Vitamin D deficiency 10/2012       Past Surgical History:   Procedure Laterality Date    CATARACT IOL, RT/LT  12/27/2013    Toric- RE    CATARACT IOL, RT/LT  12/05/2013    LE    COLONOSCOPY      COLONOSCOPY  5-5-14    Return for Colonoscopy in 1 yrs    PHACOEMULSIFICATION CLEAR CORNEA WITH TORIC INTRAOCULAR LENS IMPLANT  12/6/2013    Procedure: PHACOEMULSIFICATION CLEAR CORNEA WITH TORIC INTRAOCULAR LENS IMPLANT;  LEFT PHACOEMULSIFICATION CLEAR CORNEA WITH TORIC INTRAOCULAR LENS IMPLANT  ;  Surgeon: Otoniel Schuster  MD [place];  [facility]:  [facility]    PHACOEMULSIFICATION CLEAR CORNEA WITH TORIC INTRAOCULAR LENS IMPLANT  12/27/2013    Procedure: PHACOEMULSIFICATION CLEAR CORNEA WITH TORIC INTRAOCULAR LENS IMPLANT;  RIGHT PHACOEMULSIFICATION CLEAR CORNEA WITH TORIC INTRAOCULAR LENS IMPLANT  ;  Surgeon: Otoniel Schuster MD;  Location:  [facility]    REPAIR PTOSIS BILATERAL Bilateral 3/5/2021    Procedure: Bilateral Upper Eyelid Mechanical Ptosis Repair;  Surgeon: Cristopher Pepper MD;  Location:  OR    TONSILLECTOMY  Age 11    [facility] NONSPECIFIC PROCEDURE  9/19/2003    Left knee scope/OA,MMT    Z TOTAL KNEE ARTHROPLASTY Left 4/2/2008    Left    [facility] TOTAL KNEE ARTHROPLASTY Right 8/17/16    DML       REVIEW OF SYSTEMS:  CONSTITUTIONAL:  NEGATIVE for fever, chills, change in weight  INTEGUMENTARY/SKIN:  NEGATIVE for worrisome rashes, moles or lesions  EYES:  NEGATIVE for vision changes or irritation  ENT/MOUTH:  NEGATIVE for ear, mouth and throat problems  RESP:  NEGATIVE for significant cough or SOB  BREAST:  NEGATIVE for masses, tenderness or discharge  CV:  NEGATIVE for chest pain, palpitations or peripheral edema  GI:  NEGATIVE for nausea, abdominal pain, heartburn, or change in bowel habits  :  Negative   MUSCULOSKELETAL:  See HPI above  NEURO:  NEGATIVE for weakness, dizziness or paresthesias  ENDOCRINE:  NEGATIVE for temperature intolerance, skin/hair changes  HEME/ALLERGY/IMMUNE:  NEGATIVE for bleeding problems  PSYCHIATRIC:  NEGATIVE for changes in mood or affect    OBJECTIVE:  There were no vitals taken for this visit.     GENERAL: healthy, alert and no distress  GAIT: normal   SKIN: no suspicious lesions or rashes  NEURO: Normal strength and tone, mentation intact and speech normal  VASCULAR:  normal pulses and cappillary refill   PSYCH:  mentation appears normal and affect normal/bright    MUSCULOSKELETAL:    NECK:  Cervical range of motion: full,  painfree, and does not cause shoulder pain or reproduce shoulder pain.,  does not cause pain into shoulder or reproduce shoulder pain  Sensory deficits:  none  Motor deficits:  none    SHOULDER:  Shoulder Inspection: no swelling, bruising, discoloration, or obvious deformity or asymmetry  no atrophy  Tender: greater tuberosity  Non-tender: AC joint  Range of Motion:   Active:all normal   Passive: all normal, crepitations noted  Impingement: all grade 2 positive  Strength: forward flexion 5-/5, painful, External rotation 4+/5, painful  Liftoff: Able Bear Hug: Negative  Special tests: Speed: Positive    Negative Tinels at the carpal tunnel. Equivocal Phalens. No thenar atrophy or fasciculations.  Normal sensation and  on right     X-RAY INTERPRETATION  Essentially normal  Moderate  AC joint arthrosis    MRI:    none    ASSESSMENT    ICD-10-CM    1. Acute pain of right shoulder  M25.511 XR Shoulder Right G/E 3 Views      2. Traumatic tear of right rotator cuff, unspecified tear extent, initial encounter  S46.011A       No evidence of cervical spine issues to explain the occasional radicular type of pain. No definite exam signs of carpal tunnel syndrome.    PLAN:   I suggested an MRI, but she wanted a less involved process. I offered a corticosteroid injection.   Procedure Note:   Informed consent obtained. Risks, benefits and complications of the injection were discussed with the patient and the patient elected to proceed. Right shoulder injected into the subacromial space after sterile prep, using 80mg Depomedrol and 4cc local anesthetic.     Physical therapy ordered    Return to clinic 6-8 weeks if still problems. Consider MRI then.      .DAHIANA Jean MD  Dept. Orthopedic Surgery  [facility]

## 2025-05-14 NOTE — PATIENT INSTRUCTIONS
AFTER VISIT SUMMARY    [facility] CORTISONE Injection Discharge Instructions    You may shower, however avoid swimming, tub baths or hot tubs for 24 hours following your procedure    You may have a mild to moderate increase in pain for several days following the injection.    It may take up to 14 days for the steroid medication to start working although you may feel the effect as early as a few days after the procedure.    You may use ice packs for 10-15 minutes, 3 to 4 times a day at the injection site for comfort    You may use anti-inflammatory medications (such as Ibuprofen or Aleve or Advil) or Tylenol for pain control if necessary    If you were fasting, you may resume your normal diet and medications after the procedure    If you have diabetes, check your blood sugar more frequently than usual as your blood sugar may be higher than normal for 10-14 days following a steroid injection. Contact your doctor who manages your diabetes if your blood sugar is higher than usual      If you experience any of the following, call [facility] [phone]  -Fever over 100 degree F  -Swelling, bleeding, redness, drainage, warmth at the injection site  - New or worsening pain

## 2025-05-14 NOTE — PROGRESS NOTES
Large Joint Injection/Arthocentesis: R subacromial bursa    Date/Time: 5/14/2025 10:57 AM    Performed by: Abimael Santiago  Authorized by: Sascha Jean MD    Indications:  Pain  Needle Size:  22 G  Guidance: landmark guided    Approach:  Lateral  Location:  Shoulder      Site:  R subacromial bursa  Medications:  80 mg methylPREDNISolone 80 MG/ML; 4 mL lidocaine 1 %  Outcome:  Tolerated well, no immediate complications  Procedure discussed: discussed risks, benefits, and alternatives    Consent Given by:  Patient  Timeout: timeout called immediately prior to procedure    Prep: patient was prepped and draped in usual sterile fashion

## 2025-05-14 NOTE — LETTER
5/14/2025      Rafaela Rock  [address] [place] [id number]      Dear Colleague,    Thank you for referring your patient, Rafaela Rock, to the [facility]. Please see a copy of my visit note below.    SUBJECTIVE:  Rafaela Rock is a 80 year old female who is seen as self referral for right shoulder pain that started  that started/occurred  2 months ago.   Cause: fell on some steps at her complex. Landed on buttock, doesn't remember hitting arm.  Pain started soon after  Got back to her normal activities.  Last month felt a pop and pain when dog pulled on leash    Present symptoms: worse as the days go on  Pain lateral, down to thumb sometimes and up into neck, but usually shoulder to elbow.  Reaching up, putting dishes on shelf.  Weakness and pain  Pain with abduction > forward flexion   Pain worst half-way.  Can feel crunching.  Laying down at night on side or back.-- dull pain.     Associated symptoms: has carpal tunnel syndrome symptoms for years, wears a brace at night.    Treatment up to this point: accupuncture, exercises at gym, nsaids. Ice.  Prior history of related problems: NO problems with the neck.  Significant Orthopedic past medical history: history of arthritis    Past Medical History:   Diagnosis Date     AK (actinic keratosis) 02/02/2016     Allergic rhinitis      Hyperlipidemia LDL goal < 130      Impaired fasting glucose      Lyme disease      Nonsenile cataract      OA (osteoarthritis)      Obesity      Personal history of DVT (deep vein thrombosis) 2023    plane travel to [place], [place], [place]. Heterozygous Factor 2 mutation     PONV (postoperative nausea and vomiting)      Rosacea      Vitamin D deficiency 10/2012       Past Surgical History:   Procedure Laterality Date     CATARACT IOL, RT/LT  12/27/2013    Toric- RE     CATARACT IOL, RT/LT  12/05/2013    LE     COLONOSCOPY       COLONOSCOPY  5-5-14    Return for Colonoscopy in 1 yrs      PHACOEMULSIFICATION CLEAR CORNEA WITH TORIC INTRAOCULAR LENS IMPLANT  12/6/2013    Procedure: PHACOEMULSIFICATION CLEAR CORNEA WITH TORIC INTRAOCULAR LENS IMPLANT;  LEFT PHACOEMULSIFICATION CLEAR CORNEA WITH TORIC INTRAOCULAR LENS IMPLANT  ;  Surgeon: Otoniel Schuster MD;  Location:  [facility]     PHACOEMULSIFICATION CLEAR CORNEA WITH TORIC INTRAOCULAR LENS IMPLANT  12/27/2013    Procedure: PHACOEMULSIFICATION CLEAR CORNEA WITH TORIC INTRAOCULAR LENS IMPLANT;  RIGHT PHACOEMULSIFICATION CLEAR CORNEA WITH TORIC INTRAOCULAR LENS IMPLANT  ;  Surgeon: Otoniel Schuster MD;  Location:  [facility]     REPAIR PTOSIS BILATERAL Bilateral 3/5/2021    Procedure: Bilateral Upper Eyelid Mechanical Ptosis Repair;  Surgeon: Cristopher Pepper MD;  Location:  OR     TONSILLECTOMY  Age 11     [facility] NONSPECIFIC PROCEDURE  9/19/2003    Left knee scope/OA,MMT     Z TOTAL KNEE ARTHROPLASTY Left 4/2/2008    Left     [facility] TOTAL KNEE ARTHROPLASTY Right 8/17/16    DML       REVIEW OF SYSTEMS:  CONSTITUTIONAL:  NEGATIVE for fever, chills, change in weight  INTEGUMENTARY/SKIN:  NEGATIVE for worrisome rashes, moles or lesions  EYES:  NEGATIVE for vision changes or irritation  ENT/MOUTH:  NEGATIVE for ear, mouth and throat problems  RESP:  NEGATIVE for significant cough or SOB  BREAST:  NEGATIVE for masses, tenderness or discharge  CV:  NEGATIVE for chest pain, palpitations or peripheral edema  GI:  NEGATIVE for nausea, abdominal pain, heartburn, or change in bowel habits  :  Negative   MUSCULOSKELETAL:  See HPI above  NEURO:  NEGATIVE for weakness, dizziness or paresthesias  ENDOCRINE:  NEGATIVE for temperature intolerance, skin/hair changes  HEME/ALLERGY/IMMUNE:  NEGATIVE for bleeding problems  PSYCHIATRIC:  NEGATIVE for changes in mood or affect    OBJECTIVE:  There were no vitals taken for this visit.     GENERAL: healthy, alert and no distress  GAIT: normal   SKIN: no suspicious lesions or rashes  NEURO: Normal strength and tone, mentation  intact and speech normal  VASCULAR:  normal pulses and cappillary refill   PSYCH:  mentation appears normal and affect normal/bright    MUSCULOSKELETAL:    NECK:  Cervical range of motion: full,  painfree, and does not cause shoulder pain or reproduce shoulder pain., does not cause pain into shoulder or reproduce shoulder pain  Sensory deficits:  none  Motor deficits:  none    SHOULDER:  Shoulder Inspection: no swelling, bruising, discoloration, or obvious deformity or asymmetry  no atrophy  Tender: greater tuberosity  Non-tender: AC joint  Range of Motion:   Active:all normal   Passive: all normal, crepitations noted  Impingement: all grade 2 positive  Strength: forward flexion 5-/5, painful, External rotation 4+/5, painful  Liftoff: Able Bear Hug: Negative  Special tests: Speed: Positive    Negative Tinels at the carpal tunnel. Equivocal Phalens. No thenar atrophy or fasciculations.  Normal sensation and  on right     X-RAY INTERPRETATION  Essentially normal  Moderate  AC joint arthrosis    MRI:    none    ASSESSMENT    ICD-10-CM    1. Acute pain of right shoulder  M25.511 XR Shoulder Right G/E 3 Views      2. Traumatic tear of right rotator cuff, unspecified tear extent, initial encounter  S46.011A       No evidence of cervical spine issues to explain the occasional radicular type of pain. No definite exam signs of carpal tunnel syndrome.    PLAN:   I suggested an MRI, but she wanted a less involved process. I offered a corticosteroid injection.   Procedure Note:   Informed consent obtained. Risks, benefits and complications of the injection were discussed with the patient and the patient elected to proceed. Right shoulder injected into the subacromial space after sterile prep, using 80mg Depomedrol and 4cc local anesthetic.     Physical therapy ordered    Return to clinic 6-8 weeks if still problems. Consider MRI then.      .DAHIANA Jean MD  Dept. Orthopedic Surgery  [facility]    Large  Joint Injection/Arthocentesis: R subacromial bursa    Date/Time: 5/14/2025 10:57 AM    Performed by: Abimael Santiago  Authorized by: Sascha Jean MD    Indications:  Pain  Needle Size:  22 G  Guidance: landmark guided    Approach:  Lateral  Location:  Shoulder      Site:  R subacromial bursa  Medications:  80 mg methylPREDNISolone 80 MG/ML; 4 mL lidocaine 1 %  Outcome:  Tolerated well, no immediate complications  Procedure discussed: discussed risks, benefits, and alternatives    Consent Given by:  Patient  Timeout: timeout called immediately prior to procedure    Prep: patient was prepped and draped in usual sterile fashion          Again, thank you for allowing me to participate in the care of your patient.        Sincerely,        Sascha Jean MD    Electronically signed

## 2025-05-20 DIAGNOSIS — R63.2 HYPERPHAGIA: ICD-10-CM

## 2025-05-20 DIAGNOSIS — E66.01 CLASS 2 SEVERE OBESITY DUE TO EXCESS CALORIES WITH SERIOUS COMORBIDITY AND BODY MASS INDEX (BMI) OF 35.0 TO 35.9 IN ADULT (H): ICD-10-CM

## 2025-05-20 DIAGNOSIS — E66.812 CLASS 2 SEVERE OBESITY DUE TO EXCESS CALORIES WITH SERIOUS COMORBIDITY AND BODY MASS INDEX (BMI) OF 35.0 TO 35.9 IN ADULT (H): ICD-10-CM

## 2025-05-20 NOTE — TELEPHONE ENCOUNTER
Spoke to the patient and she has been paying $80 plus for 90 days.  We talked about using the Good Rx coupon to bring it down to $47 according to the bennie.  Lindsay Syk RN

## 2025-05-20 NOTE — TELEPHONE ENCOUNTER
Medication Question or Refill    Contacts       Contact Date/Time Type Contact Phone/Fax    05/20/2025 08:44 AM CDT Phone (Incoming) Rafaela Rock (Self) [phone] (M)            What medication are you calling about (include dose and sig)?: Lomaira 8 mg    Preferred Pharmacy:[facility] [address] [place] [id number]  Phone: [phone] Fax: [phone]      Controlled Substance Agreement on file:   CSA -- Patient Level:    CSA: None found at the patient level.       Who prescribed the medication?: Dr. Doyle    Do you need a refill? Yes      Do you have any questions or concerns?  Yes: pt would like to know if there is a generic version      Could we send this information to you in [facility] or would you prefer to receive a phone call?:   Patient would prefer a phone call   Okay to leave a detailed message?: Yes at Cell number on file:    Telephone Information:   Mobile [phone]

## 2025-06-05 ENCOUNTER — TELEPHONE (OUTPATIENT)
Dept: DERMATOLOGY | Facility: CLINIC | Age: 80
End: 2025-06-05

## 2025-06-05 ENCOUNTER — OFFICE VISIT (OUTPATIENT)
Dept: DERMATOLOGY | Facility: CLINIC | Age: 80
End: 2025-06-05
Payer: COMMERCIAL

## 2025-06-05 VITALS — DIASTOLIC BLOOD PRESSURE: 79 MMHG | SYSTOLIC BLOOD PRESSURE: 132 MMHG

## 2025-06-05 DIAGNOSIS — C44.729 SQUAMOUS CELL CARCINOMA OF LEFT LOWER LEG: Primary | ICD-10-CM

## 2025-06-05 PROCEDURE — 3075F SYST BP GE 130 - 139MM HG: CPT | Performed by: DERMATOLOGY

## 2025-06-05 PROCEDURE — 1126F AMNT PAIN NOTED NONE PRSNT: CPT | Performed by: DERMATOLOGY

## 2025-06-05 PROCEDURE — 3078F DIAST BP <80 MM HG: CPT | Performed by: DERMATOLOGY

## 2025-06-05 PROCEDURE — 17313 MOHS 1 STAGE T/A/L: CPT | Mod: GC | Performed by: DERMATOLOGY

## 2025-06-05 ASSESSMENT — PAIN SCALES - GENERAL: PAINLEVEL_OUTOF10: NO PAIN (0)

## 2025-06-05 NOTE — PATIENT INSTRUCTIONS
Caring for your skin after surgery    For the first 48 hours after your surgery:  Leave the pressure dressing on and keep it dry. If it should come loose, you may re-tape it, but do not take it off.  Relax and take it easy.  Do not do any vigorous exercise, heavy lifting or bending forward. This could cause the wound to bleed.  If the wound is on your head, sleeping with your head elevated for the first few nights will help with swelling and bleeding. (Use linens/pillow cases that would be ok to get blood on in the event there is some oozing from the bandage).  Post-operative pain is usually mild.  You may alternate between 1000 mg of Tylenol (acetaminophen) and 400 mg of Ibuprofen every 4 hours.  This means, for example, that you could take the followin,000 mg of Tylenol, followed 4 hours later by 400 mg Ibuprofen, followed 4 hours later with 1,000 mg of Tylenol, and so forth.  Do not take more than 4,000 mg of acetaminophen in a 24-hour period or 3200 mg of Ibuprofen in a 24-hr period.    Avoid alcohol and vitamin E as these may increase your tendency to bleed.  Apply an ice pack for 20 min every 2-3 hours while awake.  This may help reduce swelling, bruising, and pain.  Make sure the ice pack is waterproof so that the pressure bandage doesn't get wet.  You may see a small amount of drainage or blood on your pressure bandage. This is normal. However:  If drainage or bleeding continues or saturates the bandage, you will need to apply firm pressure over the bandage with a clean washcloth for 15 minutes.    Remove the saturated bandage.  If bleeding has stopped, apply Vaseline over the suture line and cover with a non-stick bandage.  To add some pressure over the wound, fold a piece of gauze and tape over the area.  If bleeding continues after applying pressure for 15 minutes, apply an ice pack with gentle pressure to the bandaged area for another 15 minutes.  If bleeding still continues, call our office or go to  the nearest emergency room.    48 Hours After Surgery:  Remove bandage.  Wash the wound gently with mild soap and water.  Do not use a washcloth, just your hands.  Let the wound be the last thing you wash before getting out of the shower.  Rinse well and pat dry.  Apply Vaseline or Aquaphor ointment (from a new container or tube) over the wound and the surrounding tissue with a Q-tip  Cover with a bandage.  Do this daily until the wound is healed.    What to expect:  The first couple of days your wound may be tender and may bleed slightly when doing wound care.  There may be swelling and bruising around the wound. For your comfort, you may apply ice or cold compresses to the area.  The area around your wound may be numb for several weeks or even months.  You may experience periodic sharp pain or mild itching around the wound as it heals.    Call Us If:  You have bleeding that will not stop after applying pressure and ice.  You have pain that is not controlled with Tylenol and Ibuprofen.  You have signs or symptoms of an infection such as fever over 100 degrees Fahrenheit, redness, swelling, or warmth spreading from the wound, increasing pain after the first 48 hours, or white/yellow/green drainage from the wound (may or may not have a foul odor).    [facility], Dermatology [phone].  Available .  For urgent needs outside of business hours, call the [facility] at [phone] and ask to speak with/page the dermatology resident on call.

## 2025-06-05 NOTE — PROGRESS NOTES
[facility] [place] Procedure Note    Dermatology Problem List:        Last skin check 3/12/25  1. Atypical intraepidermal melanocytic proliferation, L mid back, s/p excision 08/01/23  2. History of NMSC  - Current tx: Niacinamide 500 mg BID.  - SCC, left lower shin, s/p bx 4/16/25, s/p MMS 6/5/25  - SCC, left superior medial lower leg, s/p Mohs 8/1/23  - SCCis, left lateral lower leg, s/p Mohs 11/2/20   3. AKs. Cryo  - S/p Efudex to chest 9/2020  - Hypertrophic actinic keratosis, left superior shoulder, s/p shave biopsy 9/28/21, s/p cryo  - Lichenoid actinic keratosis, right-mid back, s/p shave biopsy 9/28/21, s/p cryo  - HAK, right chest, s/p bx 2/2016  - AK, right midline neck, s/p bx 2/2016  4. Nummular eczema/xerosis.  - Rx. TMC 0.1% ointment. Cerave cream.  5. Rosacea.  - Rx. Metronidazole gel.  6. Contact dermatitis to metals - gold, silver?  7. Benign bx:  - Lichenoid AK, left posterior shoulder, s/p bx 4/16/25  - ISK, L popliteal fossa, s/p shave bx 9/18/24  - patchy lichenod dermatitis, L posterior shoulder, s/p punch bx 9/18/24 (possible lichenoid drug?)  - Epidermoid cyst, R lower eyelid, sp bx 1/4/2020  - BLK, L upper back, s/p bx 5/2019  - BLK, R upper back, s/p bx 5/2019  - SK, left popliteal fossa, s/p bx 5/2019  - BLK, L upper chest, s/ bx 5/2018  - Sebaceous hyperplasia, L cheek, s/p bx 2/2016  - Ulcer and dermal inflammation, left midline back, s/p bx 2/2016  8. Edema, RLE with R thigh bulla - resolved, unclear etiology.  - DVT r/o U/S 9/20/22 negative for DVT (interestingly later was dx with DVT in LLE)  - VZV CPR 9/20/22 negative  - Empiric doxycycline   9. Pruritus.  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 Dermatitis  - Current tx: TMC, protopic     ____________________________________________    Date of Service:  Jun 5, 2025  Surgery: Mohs micrographic surgery    Case 1  Repair Type: Second intent  Repair Size: N/A  Suture Material: None  Tumor  Type: SCC - Squamous cell carcinoma  Location: left lower shin  Derm-Path Accession #: [id number]  PreOp Size: 2.8 x 2.4 cm  PostOp Size: 3.6 x 2.6 cm  Mohs Accession #:   Level of Defect: fat      Procedure:  We discussed the principles of treatment and most likely complications including scarring, bleeding, infection, swelling, pain, crusting, nerve damage, large wound,  incomplete excision, wound dehiscence,  nerve damage, recurrence, and a second procedure may be recommended to obtain the best cosmetic or functional result.    Informed consent was obtained and the patient underwent the procedure as follows:  The patient was placed supine on the operating table.  The cancer was identified, outlined with a marker, and verified by the patient.  The entire surgical field was prepped with Hibiclens.  The surgical site was anesthetized using 1% lido withepi.      The area of clinically apparent tumor was debulked. The layer of tissue was then surgically excised using a #15 blade and was then transferred onto a specimen sheet maintaining the orientation of the specimen. Hemostasis was obtained using heat cautery. The wound site was then covered with a dressing while the tissue samples were processed for examination.    The excised tissue was transported to the Mohs histology laboratory maintaining the tissue orientation.  The tissue specimen was relaxed so that the entire surgical margin was in a a single horizontal plane for sectioning and inked for precise mapping.  A precise reference map was drawn to reflect the sectioning of the specimen, colored inking of the margins, and orientation on the patient. The tissue was processed using horizontal sectioning of the base and continuous peripheral margins.  The histopathologic sections were reviewed in conjunction with the reference map.    Total blocks: 1    Total slides:  3    There were no cancer cells visualized on examination, therefore Mohs surgery was  complete.    REPAIR: Secondary intent with Puracol collagen graft    The patient is status post Mohs micrographic surgery.  The surgical site was examined with attention to normal anatomic and functional relationships.  After consideration and discussion of multiple options with the patient, it was determined that healing by second intention would offer the best chance for preservation/restoration of all normal anatomic and functional relationships.     The open wound was cleaned with chlorhexidine and rinsed with sterile saline, a Puracol collagen graft was utilized to promote healing and protect the wound bed. The graft was secured to the wound base using skin glue. A pressure dressing consisting of non-adherent gauze and tape was applied.   Wound care was discussed with patient both orally and in writing.     Puracol Lot Number: [id number]   Expiration: 8/31/27     Staff Involved:   Scribe/Fellow/Staff     Scribe Disclosure:   CLAUDIA DANG, am serving as a scribe; to document services personally performed by Jules Roman MD -based on data collection and the provider's statements to me.    Jessi Pineda MD  Mohs Micrographic Surgery and Dermatologic Oncology Fellow  [facility]

## 2025-06-05 NOTE — NURSING NOTE
Dermatology Rooming Note    Rafaela Rock's goals for this visit include:   Chief Complaint   Patient presents with    Derm Problem     SCC left lower garcia     Connor Haney, EMT  Clinic Support  [facility]     [phone]    Employed by [employer]

## 2025-06-05 NOTE — LETTER
6/5/2025       RE: Rafaela Rock  [address] [place] [id number]     Dear Colleague,    Thank you for referring your patient, Rafaela Rock, to the [facility]MATOLOG[facility] SUR[facility]ERY [facility] [place] at [facility]. Please see a copy of my visit note below.    [facility] [place] Procedure Note    Dermatology Problem List:        Last skin check 3/12/25  1. Atypical intraepidermal melanocytic proliferation, L mid back, s/p excision 08/01/23  2. History of NMSC  - Current tx: Niacinamide 500 mg BID.  - SCC, left lower shin, s/p bx 4/16/25, s/p MMS 6/5/25  - SCC, left superior medial lower leg, s/p Mohs 8/1/23  - SCCis, left lateral lower leg, s/p Mohs 11/2/20   3. AKs. Cryo  - S/p Efudex to chest 9/2020  - Hypertrophic actinic keratosis, left superior shoulder, s/p shave biopsy 9/28/21, s/p cryo  - Lichenoid actinic keratosis, right-mid back, s/p shave biopsy 9/28/21, s/p cryo  - HAK, right chest, s/p bx 2/2016  - AK, right midline neck, s/p bx 2/2016  4. Nummular eczema/xerosis.  - Rx. TMC 0.1% ointment. Cerave cream.  5. Rosacea.  - Rx. Metronidazole gel.  6. Contact dermatitis to metals - gold, silver?  7. Benign bx:  - Lichenoid AK, left posterior shoulder, s/p bx 4/16/25  - ISK, L popliteal fossa, s/p shave bx 9/18/24  - patchy lichenod dermatitis, L posterior shoulder, s/p punch bx 9/18/24 (possible lichenoid drug?)  - Epidermoid cyst, R lower eyelid, sp bx 1/4/2020  - BLK, L upper back, s/p bx 5/2019  - BLK, R upper back, s/p bx 5/2019  - SK, left popliteal fossa, s/p bx 5/2019  - BLK, L upper chest, s/ bx 5/2018  - Sebaceous hyperplasia, L cheek, s/p bx 2/2016  - Ulcer and dermal inflammation, left midline back, s/p bx 2/2016  8. Edema, RLE with R thigh bulla - resolved, unclear etiology.  - DVT r/o U/S 9/20/22 negative for DVT (interestingly later was dx with DVT in LLE)  - VZV CPR 9/20/22 negative  - Empiric  doxycycline   9. Pruritus.  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 Dermatitis  - Current tx: TMC, protopic     ____________________________________________    Date of Service:  Jun 5, 2025  Surgery: Mohs micrographic surgery    Case 1  Repair Type: Second intent  Repair Size: N/A  Suture Material: None  Tumor Type: SCC - Squamous cell carcinoma  Location: left lower shin  Derm-Path Accession #: [id number]  PreOp Size: 2.8 x 2.4 cm  PostOp Size: 3.6 x 2.6 cm  Mohs Accession #:   Level of Defect: fat      Procedure:  We discussed the principles of treatment and most likely complications including scarring, bleeding, infection, swelling, pain, crusting, nerve damage, large wound,  incomplete excision, wound dehiscence,  nerve damage, recurrence, and a second procedure may be recommended to obtain the best cosmetic or functional result.    Informed consent was obtained and the patient underwent the procedure as follows:  The patient was placed supine on the operating table.  The cancer was identified, outlined with a marker, and verified by the patient.  The entire surgical field was prepped with Hibiclens.  The surgical site was anesthetized using 1% lido withepi.      The area of clinically apparent tumor was debulked. The layer of tissue was then surgically excised using a #15 blade and was then transferred onto a specimen sheet maintaining the orientation of the specimen. Hemostasis was obtained using heat cautery. The wound site was then covered with a dressing while the tissue samples were processed for examination.    The excised tissue was transported to the Mohs histology laboratory maintaining the tissue orientation.  The tissue specimen was relaxed so that the entire surgical margin was in a a single horizontal plane for sectioning and inked for precise mapping.  A precise reference map was drawn to reflect the sectioning of the specimen, colored inking of the margins, and orientation on the  patient. The tissue was processed using horizontal sectioning of the base and continuous peripheral margins.  The histopathologic sections were reviewed in conjunction with the reference map.    Total blocks: 1    Total slides:  3    There were no cancer cells visualized on examination, therefore Mohs surgery was complete.    REPAIR: Secondary intent with Puracol collagen graft    The patient is status post Mohs micrographic surgery.  The surgical site was examined with attention to normal anatomic and functional relationships.  After consideration and discussion of multiple options with the patient, it was determined that healing by second intention would offer the best chance for preservation/restoration of all normal anatomic and functional relationships.     The open wound was cleaned with chlorhexidine and rinsed with sterile saline, a Puracol collagen graft was utilized to promote healing and protect the wound bed. The graft was secured to the wound base using skin glue. A pressure dressing consisting of non-adherent gauze and tape was applied.   Wound care was discussed with patient both orally and in writing.     Puracol Lot Number: [id number]   Expiration: 8/31/27     Staff Involved:   Scribe/Fellow/Staff     Scribe Disclosure:   ICLAUDIA, am serving as a scribe; to document services personally performed by Jules Roman MD -based on data collection and the provider's statements to me.    Jessi Pineda MD  Mohs Micrographic Surgery and Dermatologic Oncology Fellow  [facility]    Attestation signed by Jules Roman MD at 6/9/2025  9:16 AM:    Attending attestation:  I was present for key elements of the procedure and immediately available for all other portions of the procedure.  I have reviewed the note and edited it as necessary.    Jules Roman M.D.  Professor  Director of Dermatologic Surgery  Department of Dermatology  [facility]    Dermatology Surgery Clinic  [facility]  [facility] [place] [phone]      Again, thank you for allowing me to participate in the care of your patient.      Sincerely,    Jules Roman MD

## 2025-06-05 NOTE — TELEPHONE ENCOUNTER
Follow up call completed following Mohs procedure with Dr. Roman.       Are you having pain? YES: Mild  Are you taking pain medication?   Are you applying ice?  NO  Have you had any noticeable bleeding through the bandage? NO   Do you have any other concerns? NO       Please call [phone] if you have any questions or concerns during business hours. For concerns after hours, call the hospital  to reach the dermatology resident on call at [phone], option 4.

## 2025-06-06 PROBLEM — M25.511 ACUTE PAIN OF RIGHT SHOULDER: Status: ACTIVE | Noted: 2025-06-06

## 2025-06-06 PROBLEM — S46.011A TRAUMATIC TEAR OF RIGHT ROTATOR CUFF, UNSPECIFIED TEAR EXTENT, INITIAL ENCOUNTER: Status: ACTIVE | Noted: 2025-06-06

## 2025-07-01 ENCOUNTER — VIRTUAL VISIT (OUTPATIENT)
Dept: DERMATOLOGY | Facility: CLINIC | Age: 80
End: 2025-07-01
Payer: COMMERCIAL

## 2025-07-01 DIAGNOSIS — T14.8XXA SURGICAL WOUND PRESENT: ICD-10-CM

## 2025-07-01 DIAGNOSIS — C44.729 SQUAMOUS CELL CARCINOMA OF LEFT LOWER LEG: Primary | ICD-10-CM

## 2025-07-01 NOTE — PROGRESS NOTES
[facility] Dermatology Note  Encounter Date: Jul 1, 2025  Store-and-Forward and Telephone. Location of teledermatologist: [facility] [place].  Start time: 3:45 PM. End time: 3:50 PM.    Dermatology Problem List:  Last skin check 3/12/25  1. Atypical intraepidermal melanocytic proliferation, L mid back, s/p excision 08/01/23  2. History of NMSC  - Current tx: Niacinamide 500 mg BID.  - SCC, left lower shin, s/p bx 4/16/25, s/p MMS 6/5/25  - SCC, left superior medial lower leg, s/p Mohs 8/1/23  - SCCis, left lateral lower leg, s/p Mohs 11/2/20   3. AKs. Cryo  - S/p Efudex to chest 9/2020  - Hypertrophic actinic keratosis, left superior shoulder, s/p shave biopsy 9/28/21, s/p cryo  - Lichenoid actinic keratosis, right-mid back, s/p shave biopsy 9/28/21, s/p cryo  - HAK, right chest, s/p bx 2/2016  - AK, right midline neck, s/p bx 2/2016  4. Nummular eczema/xerosis.  - Rx. TMC 0.1% ointment. Cerave cream.  5. Rosacea.  - Rx. Metronidazole gel.  6. Contact dermatitis to metals - gold, silver?  7. Benign bx:  - Lichenoid AK, left posterior shoulder, s/p bx 4/16/25  - ISK, L popliteal fossa, s/p shave bx 9/18/24  - patchy lichenod dermatitis, L posterior shoulder, s/p punch bx 9/18/24 (possible lichenoid drug?)  - Epidermoid cyst, R lower eyelid, sp bx 1/4/2020  - BLK, L upper back, s/p bx 5/2019  - BLK, R upper back, s/p bx 5/2019  - SK, left popliteal fossa, s/p bx 5/2019  - BLK, L upper chest, s/ bx 5/2018  - Sebaceous hyperplasia, L cheek, s/p bx 2/2016  - Ulcer and dermal inflammation, left midline back, s/p bx 2/2016  8. Edema, RLE with R thigh bulla - resolved, unclear etiology.  - DVT r/o U/S 9/20/22 negative for DVT (interestingly later was dx with DVT in LLE)  - VZV CPR 9/20/22 negative  - Empiric doxycycline   9. Pruritus.  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 Dermatitis  - Current tx: TMC, protopic    CC: RECHECK (L lower leg granulation  check )      Subjective: Rafaela Rock is a 80 year old female who presents today for wound check after MMS.   - Patient is concerned with the yellow that is showing  - no other concerns today    Objective:   Focused examination of the left lower shin within the teledermatology photograph(s) on 07/01/25 was performed.   - The surgical site noted above is clean, dry, and intact. There is no surrounding erythema or purulence. No clinical evidence of infection noted today.        Assessment and Plan:     1. Open surgical wound  - The patient's surgery site(s) is/are healing very well. No evidence of infection on examination today.  - The patient was told to continue with wound cares until the area(s) is/are no longer crusted.   - The patient should follow up with dermatologic surgery PRN, as well as continue with regular skin exams in general dermatology clinic.    Patient was discussed with and evaluated by attending physician Dr. Roman.    Scribe Disclosure:   I, Annetta Posey, am serving as a scribe; to document services personally performed by Jules Roman MD -based on data collection and the provider's statements to me.     Hillary Paula MD   Mohs Surgery and Dermatologic Oncology Fellow

## 2025-07-01 NOTE — LETTER
7/1/2025       RE: Rafaela Rock  [address] [place] [id number]     Dear Colleague,    Thank you for referring your patient, Rafaela Rock, to the [facility] DERMATOLOGIC SURGERY CLINIC [place] at [facility]. Please see a copy of my visit note below.    [facility] Dermatology Note  Encounter Date: Jul 1, 2025  Store-and-Forward and Telephone. Location of teledermatologist: [facility] DERMATOLOGIC SURGERY CLINIC [place].  Start time: 3:45 PM. End time: 3:50 PM.    Dermatology Problem List:  Last skin check 3/12/25  1. Atypical intraepidermal melanocytic proliferation, L mid back, s/p excision 08/01/23  2. History of NMSC  - Current tx: Niacinamide 500 mg BID.  - SCC, left lower shin, s/p bx 4/16/25, s/p MMS 6/5/25  - SCC, left superior medial lower leg, s/p Mohs 8/1/23  - SCCis, left lateral lower leg, s/p Mohs 11/2/20   3. AKs. Cryo  - S/p Efudex to chest 9/2020  - Hypertrophic actinic keratosis, left superior shoulder, s/p shave biopsy 9/28/21, s/p cryo  - Lichenoid actinic keratosis, right-mid back, s/p shave biopsy 9/28/21, s/p cryo  - HAK, right chest, s/p bx 2/2016  - AK, right midline neck, s/p bx 2/2016  4. Nummular eczema/xerosis.  - Rx. TMC 0.1% ointment. Cerave cream.  5. Rosacea.  - Rx. Metronidazole gel.  6. Contact dermatitis to metals - gold, silver?  7. Benign bx:  - Lichenoid AK, left posterior shoulder, s/p bx 4/16/25  - ISK, L popliteal fossa, s/p shave bx 9/18/24  - patchy lichenod dermatitis, L posterior shoulder, s/p punch bx 9/18/24 (possible lichenoid drug?)  - Epidermoid cyst, R lower eyelid, sp bx 1/4/2020  - BLK, L upper back, s/p bx 5/2019  - BLK, R upper back, s/p bx 5/2019  - SK, left popliteal fossa, s/p bx 5/2019  - BLK, L upper chest, s/ bx 5/2018  - Sebaceous hyperplasia, L cheek, s/p bx 2/2016  - Ulcer and dermal inflammation, left midline back, s/p bx 2/2016  8. Edema, RLE with R  thigh bulla - resolved, unclear etiology.  - DVT r/o U/S 9/20/22 negative for DVT (interestingly later was dx with DVT in LLE)  - VZV CPR 9/20/22 negative  - Empiric doxycycline   9. Pruritus.  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 Dermatitis  - Current tx: TMC, protopic    CC: RECHECK (L lower leg granulation check )      Subjective: Rafaela Rock is a 80 year old female who presents today for wound check after MMS.   - Patient is concerned with the yellow that is showing  - no other concerns today    Objective:   Focused examination of the left lower shin within the teledermatology photograph(s) on 07/01/25 was performed.   - The surgical site noted above is clean, dry, and intact. There is no surrounding erythema or purulence. No clinical evidence of infection noted today.        Assessment and Plan:     1. Open surgical wound  - The patient's surgery site(s) is/are healing very well. No evidence of infection on examination today.  - The patient was told to continue with wound cares until the area(s) is/are no longer crusted.   - The patient should follow up with dermatologic surgery PRN, as well as continue with regular skin exams in general dermatology clinic.    Patient was discussed with and evaluated by attending physician Dr. Roman.    Scribe Disclosure:   I, Annetta Posey, am serving as a scribe; to document services personally performed by Jules Roman MD -based on data collection and the provider's statements to me.     Hillary Paula MD   Mohs Surgery and Dermatologic Oncology Fellow              Attestation signed by Jules Roman MD at 7/8/2025  9:46 AM:  Attending Attestation  I attest that I discussed the case with the Fellow.  I was physically present for the entire interview. I agree with the plan.   I have reviewed the note written by the scribe and fellow and edited it as necessary.    Jules Roman M.D.  Professor  Director of Dermatologic Surgery  Department of  Dermatology  [facility]                  Again, thank you for allowing me to participate in the care of your patient.      Sincerely,    Jules Roman MD

## 2025-07-02 ENCOUNTER — THERAPY VISIT (OUTPATIENT)
Age: 80
End: 2025-07-02
Payer: COMMERCIAL

## 2025-07-02 DIAGNOSIS — M25.511 ACUTE PAIN OF RIGHT SHOULDER: Primary | ICD-10-CM

## 2025-07-02 PROCEDURE — 97110 THERAPEUTIC EXERCISES: CPT | Mod: GP

## 2025-07-15 ENCOUNTER — THERAPY VISIT (OUTPATIENT)
Age: 80
End: 2025-07-15
Payer: COMMERCIAL

## 2025-07-15 ENCOUNTER — TELEPHONE (OUTPATIENT)
Dept: FAMILY MEDICINE | Facility: CLINIC | Age: 80
End: 2025-07-15

## 2025-07-15 DIAGNOSIS — M25.511 ACUTE PAIN OF RIGHT SHOULDER: Primary | ICD-10-CM

## 2025-07-15 PROCEDURE — 97110 THERAPEUTIC EXERCISES: CPT | Mod: GP

## 2025-08-12 ENCOUNTER — THERAPY VISIT (OUTPATIENT)
Age: 80
End: 2025-08-12
Payer: COMMERCIAL

## 2025-08-12 DIAGNOSIS — M25.511 ACUTE PAIN OF RIGHT SHOULDER: Primary | ICD-10-CM

## 2025-08-12 PROCEDURE — 97112 NEUROMUSCULAR REEDUCATION: CPT | Mod: GP

## 2025-08-12 PROCEDURE — 97110 THERAPEUTIC EXERCISES: CPT | Mod: GP

## 2025-08-13 DIAGNOSIS — I10 HYPERTENSION GOAL BP (BLOOD PRESSURE) < 140/90: ICD-10-CM

## 2025-08-13 RX ORDER — TRIAMTERENE AND HYDROCHLOROTHIAZIDE 37.5; 25 MG/1; MG/1
1 CAPSULE ORAL EVERY MORNING
Qty: 90 CAPSULE | Refills: 0 | Status: SHIPPED | OUTPATIENT
Start: 2025-08-13

## 2025-08-18 DIAGNOSIS — D47.2 MGUS (MONOCLONAL GAMMOPATHY OF UNKNOWN SIGNIFICANCE): Primary | ICD-10-CM

## 2025-09-01 SDOH — HEALTH STABILITY: PHYSICAL HEALTH: ON AVERAGE, HOW MANY MINUTES DO YOU ENGAGE IN EXERCISE AT THIS LEVEL?: 60 MIN

## 2025-09-01 SDOH — HEALTH STABILITY: PHYSICAL HEALTH: ON AVERAGE, HOW MANY DAYS PER WEEK DO YOU ENGAGE IN MODERATE TO STRENUOUS EXERCISE (LIKE A BRISK WALK)?: 4 DAYS

## 2025-09-03 ENCOUNTER — THERAPY VISIT (OUTPATIENT)
Dept: PHYSICAL THERAPY | Facility: REHABILITATION | Age: 80
End: 2025-09-03
Payer: COMMERCIAL

## 2025-09-03 DIAGNOSIS — I89.0 LYMPHEDEMA: ICD-10-CM

## 2025-09-03 PROCEDURE — 97161 PT EVAL LOW COMPLEX 20 MIN: CPT | Mod: GP | Performed by: PHYSICAL THERAPIST

## 2025-09-03 PROCEDURE — 97535 SELF CARE MNGMENT TRAINING: CPT | Mod: GP | Performed by: PHYSICAL THERAPIST

## 2025-09-04 ENCOUNTER — OFFICE VISIT (OUTPATIENT)
Dept: FAMILY MEDICINE | Facility: CLINIC | Age: 80
End: 2025-09-04
Payer: COMMERCIAL

## 2025-09-04 VITALS
OXYGEN SATURATION: 97 % | BODY MASS INDEX: 33.29 KG/M2 | SYSTOLIC BLOOD PRESSURE: 126 MMHG | TEMPERATURE: 98.1 F | DIASTOLIC BLOOD PRESSURE: 81 MMHG | WEIGHT: 195 LBS | HEIGHT: 64 IN | HEART RATE: 70 BPM | RESPIRATION RATE: 12 BRPM

## 2025-09-04 DIAGNOSIS — D03.59 MELANOMA IN SITU OF OTHER PART OF TRUNK (H): ICD-10-CM

## 2025-09-04 DIAGNOSIS — E66.811 OBESITY (BMI 30.0-34.9): ICD-10-CM

## 2025-09-04 DIAGNOSIS — Z00.00 ENCOUNTER FOR MEDICARE ANNUAL WELLNESS EXAM: Primary | ICD-10-CM

## 2025-09-04 DIAGNOSIS — D47.2 MGUS (MONOCLONAL GAMMOPATHY OF UNKNOWN SIGNIFICANCE): ICD-10-CM

## 2025-09-04 DIAGNOSIS — I10 HYPERTENSION GOAL BP (BLOOD PRESSURE) < 140/90: ICD-10-CM

## 2025-09-04 DIAGNOSIS — I89.0 LYMPHEDEMA: ICD-10-CM

## 2025-09-04 PROBLEM — R21 RASH: Status: RESOLVED | Noted: 2017-03-26 | Resolved: 2025-09-04

## 2025-09-04 RX ORDER — TRIAMTERENE AND HYDROCHLOROTHIAZIDE 37.5; 25 MG/1; MG/1
1 CAPSULE ORAL EVERY MORNING
Qty: 90 CAPSULE | Refills: 3 | Status: SHIPPED | OUTPATIENT
Start: 2025-09-04

## 2025-09-04 ASSESSMENT — PAIN SCALES - GENERAL: PAINLEVEL_OUTOF10: NO PAIN (0)

## (undated) DEVICE — SOL WATER IRRIG 1000ML BOTTLE 2F7114

## (undated) DEVICE — SU CHROMIC 5-0 G-3 18" 792G

## (undated) DEVICE — GLOVE PROTEXIS W/NEU-THERA 7.5  2D73TE75

## (undated) DEVICE — LINEN TOWEL PACK X5 5464

## (undated) DEVICE — GLOVE PROTEXIS MICRO 6.0  2D73PM60

## (undated) DEVICE — SOL NACL 0.9% IRRIG 1000ML BOTTLE 2F7124

## (undated) DEVICE — PACK OCULOPLATIC SEN15OCFSD

## (undated) DEVICE — EYE PREP BETADINE 5% SOLUTION 30ML 0065-0411-30

## (undated) DEVICE — ESU EYE HIGH TEMP 65410-183

## (undated) RX ORDER — ERYTHROMYCIN 5 MG/G
OINTMENT OPHTHALMIC
Status: DISPENSED
Start: 2020-01-14

## (undated) RX ORDER — PROPOFOL 10 MG/ML
INJECTION, EMULSION INTRAVENOUS
Status: DISPENSED
Start: 2021-03-05

## (undated) RX ORDER — FENTANYL CITRATE 50 UG/ML
INJECTION, SOLUTION INTRAMUSCULAR; INTRAVENOUS
Status: DISPENSED
Start: 2021-03-05

## (undated) RX ORDER — LIDOCAINE HYDROCHLORIDE AND EPINEPHRINE 10; 10 MG/ML; UG/ML
INJECTION, SOLUTION INFILTRATION; PERINEURAL
Status: DISPENSED
Start: 2025-04-18